# Patient Record
Sex: MALE | Race: WHITE | Employment: OTHER | URBAN - METROPOLITAN AREA
[De-identification: names, ages, dates, MRNs, and addresses within clinical notes are randomized per-mention and may not be internally consistent; named-entity substitution may affect disease eponyms.]

---

## 2017-08-16 ENCOUNTER — HOSPITAL ENCOUNTER (OUTPATIENT)
Dept: CT IMAGING | Age: 57
Discharge: HOME | End: 2017-08-16
Payer: MEDICARE

## 2017-08-16 DIAGNOSIS — K43.2 INCISIONAL HERNIA WITHOUT OBSTRUCTION OR GANGRENE: ICD-10-CM

## 2017-08-16 PROCEDURE — 74177 CT ABD & PELVIS W/CONTRAST: CPT

## 2017-08-16 PROCEDURE — 6360000004 HC RX CONTRAST MEDICATION: Performed by: SURGERY

## 2017-08-16 PROCEDURE — 2580000003 HC RX 258: Performed by: SURGERY

## 2017-08-16 RX ORDER — SODIUM CHLORIDE 0.9 % (FLUSH) 0.9 %
10 SYRINGE (ML) INJECTION PRN
Status: DISCONTINUED | OUTPATIENT
Start: 2017-08-16 | End: 2017-08-19 | Stop reason: HOSPADM

## 2017-08-16 RX ORDER — 0.9 % SODIUM CHLORIDE 0.9 %
100 INTRAVENOUS SOLUTION INTRAVENOUS ONCE
Status: COMPLETED | OUTPATIENT
Start: 2017-08-16 | End: 2017-08-16

## 2017-08-16 RX ADMIN — SODIUM CHLORIDE 100 ML: 9 INJECTION, SOLUTION INTRAVENOUS at 10:54

## 2017-08-16 RX ADMIN — IOVERSOL 130 ML: 741 INJECTION INTRA-ARTERIAL; INTRAVENOUS at 10:54

## 2017-08-16 RX ADMIN — Medication 10 ML: at 10:54

## 2017-08-16 RX ADMIN — IOHEXOL 50 ML: 240 INJECTION, SOLUTION INTRATHECAL; INTRAVASCULAR; INTRAVENOUS; ORAL at 10:54

## 2017-09-19 ENCOUNTER — OFFICE VISIT (OUTPATIENT)
Dept: UROLOGY | Age: 57
End: 2017-09-19
Payer: MEDICARE

## 2017-09-19 VITALS
SYSTOLIC BLOOD PRESSURE: 137 MMHG | WEIGHT: 194.8 LBS | BODY MASS INDEX: 27.89 KG/M2 | TEMPERATURE: 97.9 F | HEIGHT: 70 IN | HEART RATE: 85 BPM | DIASTOLIC BLOOD PRESSURE: 82 MMHG

## 2017-09-19 DIAGNOSIS — R33.9 URINARY RETENTION: ICD-10-CM

## 2017-09-19 DIAGNOSIS — R31.9 HEMATURIA: Primary | ICD-10-CM

## 2017-09-19 LAB
BILIRUBIN, POC: NORMAL
BLOOD URINE, POC: NORMAL
CLARITY, POC: CLEAR
COLOR, POC: YELLOW
GLUCOSE URINE, POC: NORMAL
KETONES, POC: NORMAL
LEUKOCYTE EST, POC: NORMAL
NITRITE, POC: NORMAL
PH, POC: NORMAL
PROTEIN, POC: NORMAL
SPECIFIC GRAVITY, POC: NORMAL
UROBILINOGEN, POC: NORMAL

## 2017-09-19 PROCEDURE — G8427 DOCREV CUR MEDS BY ELIG CLIN: HCPCS | Performed by: UROLOGY

## 2017-09-19 PROCEDURE — 3017F COLORECTAL CA SCREEN DOC REV: CPT | Performed by: UROLOGY

## 2017-09-19 PROCEDURE — G8417 CALC BMI ABV UP PARAM F/U: HCPCS | Performed by: UROLOGY

## 2017-09-19 PROCEDURE — 99204 OFFICE O/P NEW MOD 45 MIN: CPT | Performed by: UROLOGY

## 2017-09-19 PROCEDURE — 51798 US URINE CAPACITY MEASURE: CPT | Performed by: UROLOGY

## 2017-09-19 PROCEDURE — 4004F PT TOBACCO SCREEN RCVD TLK: CPT | Performed by: UROLOGY

## 2017-09-19 PROCEDURE — 81003 URINALYSIS AUTO W/O SCOPE: CPT | Performed by: UROLOGY

## 2017-09-19 ASSESSMENT — ENCOUNTER SYMPTOMS
SHORTNESS OF BREATH: 0
NAUSEA: 0
WHEEZING: 0
COUGH: 0
EYE REDNESS: 0
COLOR CHANGE: 0
VOMITING: 0
BACK PAIN: 1
EYE PAIN: 0
ABDOMINAL PAIN: 0

## 2017-10-06 ENCOUNTER — PROCEDURE VISIT (OUTPATIENT)
Dept: UROLOGY | Age: 57
End: 2017-10-06
Payer: MEDICARE

## 2017-10-06 DIAGNOSIS — R31.9 HEMATURIA: Primary | ICD-10-CM

## 2017-10-06 PROCEDURE — 4004F PT TOBACCO SCREEN RCVD TLK: CPT | Performed by: UROLOGY

## 2017-10-06 PROCEDURE — 52000 CYSTOURETHROSCOPY: CPT | Performed by: UROLOGY

## 2017-10-06 RX ORDER — LAMOTRIGINE 200 MG/1
200 TABLET ORAL DAILY
COMMUNITY
Start: 2017-09-25

## 2017-10-06 RX ORDER — TAMSULOSIN HYDROCHLORIDE 0.4 MG/1
0.4 CAPSULE ORAL DAILY
Qty: 90 CAPSULE | Refills: 3 | Status: SHIPPED | OUTPATIENT
Start: 2017-10-06 | End: 2018-10-24 | Stop reason: SDUPTHER

## 2017-10-06 NOTE — PROGRESS NOTES
Cystoscopy Operative Note (10/6/17)  Surgeon: Bakari Morgan MD  Anesthesia: Urethral 2% Xylocaine   Indications: hematuria  Position: Dorsal Lithotomy    Findings:   The patient was prepped and draped in the usual sterile fashion. The flexible cystoscope was advanced through the urethra and into the bladder. The bladder was thoroughly inspected and the following was noted:    Residual Urine: none  Urethra: normal appearing urethra with no masses, tenderness or lesions  Prostate: partially obstructing lateral lobes of prostate; median lobe present? yes. Bladder: No tumors or CIS noted. No bladder diverticulum. There was mild trabeculation noted. Ureters: Clear efflux from both ureters. Orifices with normal configuration and location. The cystoscope was removed. The patient tolerated the procedure well.

## 2017-10-06 NOTE — MR AVS SNAPSHOT
After Visit Summary             Isatu Pena   10/6/2017 9:30 AM   Procedure visit    Description:  Male : 1960   Provider:  Ja Rivera MD   Department:  [facility] - [address] [place] [address] 114 and Future Appointments         Below is a list of your follow-up and future appointments. This may not be a complete list as you may have made appointments directly with providers that we are not aware of or your providers may have made some for you. Please call your providers to confirm appointments. It is important to keep your appointments. Please bring your current insurance card, photo ID, co-pay, and all medication bottles to your appointment. If self-pay, payment is expected at the time of service. Your To-Do List     Future Appointments Provider Department Dept Phone    2017 1:00 PM Ten Khanna NP [address] [phone]    Please arrive 15 minutes prior to appointment, bring photo ID and insurance card. 2018 11:50 AM Ja Rivera MD [facility] - [place] [phone]    Please arrive 15 minutes prior to appointment, bring photo ID and insurance card. Future Orders Complete By Expires    PSA, Diagnostic [[id number] Custom]  2018 10/6/2018    Follow-Up    Return in about 6 months (around 2018) for Follow up. Information from Your Visit        Department     Name Address Phone Fax    [facility]   [address]  [address] [place] [address] [phone]      You Were Seen for:         Comments    Hematuria   [[id number]]         Vital Signs     Smoking Status                   Current Every Day Smoker           Additional Information about your Body Mass Index (BMI)           Your BMI as listed above is considered overweight (25.0-29.9). BMI is an estimate of body fat, calculated from your height and weight.   The higher cloNIDine (CATAPRES) 0.1 MG tablet Take 1 tablet by mouth daily    cyclobenzaprine (FLEXERIL) 10 MG tablet Take 1 tablet by mouth daily    acetaminophen (TYLENOL) 325 MG tablet Take 2 tablets by mouth every 4 hours as needed for Pain    meloxicam (MOBIC) 7.5 MG tablet Take 1 tablet by mouth 2 times daily    DULoxetine (CYMBALTA) 60 MG extended release capsule Take 60 mg by mouth daily    hydrOXYzine (VISTARIL) 25 MG capsule Take 25 mg by mouth 3 times daily as needed for Itching (1-2 tabs)    QUEtiapine (SEROQUEL) 50 MG tablet Take 1 tablet by mouth nightly    risperiDONE (RISPERDAL) 3 MG tablet Take 1 tablet by mouth nightly      Allergies           No Known Allergies      We Ordered/Performed the following           [place] CYSTOURETHROSCOPY          Additional Information        Basic Information     Date Of Birth Sex Race Ethnicity Preferred Language    1960 Male White Non-/Non  English      Problem List as of 10/6/2017  Date Reviewed: 9/19/2017                Colon polyp    History of colon surgery    Mild obesity    Abdominal pain, generalized    Colon polyposis    Erectile dysfunction    Encounter for genetic counseling    Macular puckering of retina, left eye    Tubular adenoma    Hyperplastic polyp of intestine    Retinal detachment with multiple breaks, left eye    Medication monitoring encounter    Chronic back pain    724.8 (Chronic)    Sacroiliitis, not elsewhere classified (HCC) (Chronic)    Chronic, continuous use of opioids    Lumbar radiculopathy    DDD (degenerative disc disease), lumbar    Hypertension    Anxiety    Nicotine dependence    Osteoarthritis    Obstructive sleep apnea on CPAP    Depression    Bipolar 1 disorder ([facility] [address]      Immunizations as of 10/6/2017     Name Date    Influenza, Quadv, 3 yrs and older, IM, Preservative Free 9/15/2017, 11/15/2016    Pneumococcal 13-valent Conjugate ([id number]) 11/15/2016    Pneumococcal Polysaccharide (Thitdnrva14) 9/15/2017 Preventive Care        Date Due    Tetanus Combination Vaccine (1 - Tdap) 2/10/1980    Diabetes Screening 5/23/2015    Colonoscopy 9/22/2017    Cholesterol Screening 9/19/2022            MyChart Signup           Our records indicate that you have an active [employer] account. You can view your After Visit Summary by going to [email or web address] and logging in with your [employer] username and password. If you don't have a [employer] username and password but a parent or guardian has access to your record, the parent or guardian should login with their own [employer] username and password and access your record to view the After Visit Summary. Additional Information  If you have questions, please contact the physician practice where you receive care. Remember, [employer] is NOT to be used for urgent needs. For medical emergencies, dial 911. For questions regarding your [employer] account call [phone]. If you have a clinical question, please call your doctor's office.

## 2017-10-19 DIAGNOSIS — R31.9 HEMATURIA: ICD-10-CM

## 2017-10-27 ENCOUNTER — TELEPHONE (OUTPATIENT)
Dept: UROLOGY | Age: 57
End: 2017-10-27

## 2017-10-27 DIAGNOSIS — Z12.5 SCREENING FOR PROSTATE CANCER: Primary | ICD-10-CM

## 2017-12-14 PROBLEM — K21.9 GASTROESOPHAGEAL REFLUX DISEASE WITHOUT ESOPHAGITIS: Status: ACTIVE | Noted: 2017-12-14

## 2018-04-05 ENCOUNTER — TELEPHONE (OUTPATIENT)
Dept: UROLOGY | Age: 58
End: 2018-04-05

## 2018-07-11 ENCOUNTER — ANESTHESIA EVENT (OUTPATIENT)
Dept: OPERATING ROOM | Age: 58
End: 2018-07-11
Payer: MEDICARE

## 2018-07-12 ENCOUNTER — HOSPITAL ENCOUNTER (OUTPATIENT)
Age: 58
Setting detail: OUTPATIENT SURGERY
Discharge: HOME | End: 2018-07-12
Attending: OPHTHALMOLOGY | Admitting: OPHTHALMOLOGY
Payer: MEDICARE

## 2018-07-12 ENCOUNTER — ANESTHESIA (OUTPATIENT)
Dept: OPERATING ROOM | Age: 58
End: 2018-07-12
Payer: MEDICARE

## 2018-07-12 VITALS
HEART RATE: 72 BPM | TEMPERATURE: 97 F | DIASTOLIC BLOOD PRESSURE: 98 MMHG | SYSTOLIC BLOOD PRESSURE: 148 MMHG | BODY MASS INDEX: 27.3 KG/M2 | OXYGEN SATURATION: 94 % | WEIGHT: 195 LBS | RESPIRATION RATE: 18 BRPM | HEIGHT: 71 IN

## 2018-07-12 VITALS — OXYGEN SATURATION: 98 % | DIASTOLIC BLOOD PRESSURE: 116 MMHG | SYSTOLIC BLOOD PRESSURE: 164 MMHG

## 2018-07-12 PROBLEM — H33.021 RETINAL DETACHMENT WITH MULTIPLE BREAKS, RIGHT: Status: ACTIVE | Noted: 2018-07-12

## 2018-07-12 PROCEDURE — 2500000003 HC RX 250 WO HCPCS: Performed by: OPHTHALMOLOGY

## 2018-07-12 PROCEDURE — 3600000014 HC SURGERY LEVEL 4 ADDTL 15MIN: Performed by: OPHTHALMOLOGY

## 2018-07-12 PROCEDURE — 7100000040 HC SPAR PHASE II RECOVERY - FIRST 15 MIN: Performed by: OPHTHALMOLOGY

## 2018-07-12 PROCEDURE — 3700000000 HC ANESTHESIA ATTENDED CARE: Performed by: OPHTHALMOLOGY

## 2018-07-12 PROCEDURE — 2580000003 HC RX 258: Performed by: NURSE ANESTHETIST, CERTIFIED REGISTERED

## 2018-07-12 PROCEDURE — 3700000001 HC ADD 15 MINUTES (ANESTHESIA): Performed by: OPHTHALMOLOGY

## 2018-07-12 PROCEDURE — 3600000004 HC SURGERY LEVEL 4 BASE: Performed by: OPHTHALMOLOGY

## 2018-07-12 PROCEDURE — 6360000002 HC RX W HCPCS: Performed by: NURSE ANESTHETIST, CERTIFIED REGISTERED

## 2018-07-12 PROCEDURE — 2580000003 HC RX 258: Performed by: ANESTHESIOLOGY

## 2018-07-12 PROCEDURE — 7100000041 HC SPAR PHASE II RECOVERY - ADDTL 15 MIN: Performed by: OPHTHALMOLOGY

## 2018-07-12 PROCEDURE — 6370000000 HC RX 637 (ALT 250 FOR IP): Performed by: OPHTHALMOLOGY

## 2018-07-12 PROCEDURE — 2500000003 HC RX 250 WO HCPCS: Performed by: NURSE ANESTHETIST, CERTIFIED REGISTERED

## 2018-07-12 PROCEDURE — 6360000002 HC RX W HCPCS: Performed by: OPHTHALMOLOGY

## 2018-07-12 RX ORDER — PROPOFOL 10 MG/ML
INJECTION, EMULSION INTRAVENOUS PRN
Status: DISCONTINUED | OUTPATIENT
Start: 2018-07-12 | End: 2018-07-12 | Stop reason: SDUPTHER

## 2018-07-12 RX ORDER — TOBRAMYCIN AND DEXAMETHASONE 3; 1 MG/ML; MG/ML
1 SUSPENSION/ DROPS OPHTHALMIC ONCE
Status: COMPLETED | OUTPATIENT
Start: 2018-07-12 | End: 2018-07-12

## 2018-07-12 RX ORDER — LIDOCAINE HYDROCHLORIDE 20 MG/ML
INJECTION, SOLUTION INFILTRATION; PERINEURAL PRN
Status: DISCONTINUED | OUTPATIENT
Start: 2018-07-12 | End: 2018-07-12 | Stop reason: HOSPADM

## 2018-07-12 RX ORDER — MIDAZOLAM HYDROCHLORIDE 1 MG/ML
INJECTION INTRAMUSCULAR; INTRAVENOUS PRN
Status: DISCONTINUED | OUTPATIENT
Start: 2018-07-12 | End: 2018-07-12 | Stop reason: SDUPTHER

## 2018-07-12 RX ORDER — ERYTHROMYCIN 5 MG/G
OINTMENT OPHTHALMIC PRN
Status: DISCONTINUED | OUTPATIENT
Start: 2018-07-12 | End: 2018-07-12 | Stop reason: HOSPADM

## 2018-07-12 RX ORDER — ATROPINE SULFATE 10 MG/ML
1 SOLUTION/ DROPS OPHTHALMIC ONCE
Status: COMPLETED | OUTPATIENT
Start: 2018-07-12 | End: 2018-07-12

## 2018-07-12 RX ORDER — LIDOCAINE HYDROCHLORIDE 10 MG/ML
1 INJECTION, SOLUTION EPIDURAL; INFILTRATION; INTRACAUDAL; PERINEURAL
Status: DISCONTINUED | OUTPATIENT
Start: 2018-07-12 | End: 2018-07-12 | Stop reason: HOSPADM

## 2018-07-12 RX ORDER — BUPIVACAINE HYDROCHLORIDE 7.5 MG/ML
INJECTION, SOLUTION EPIDURAL; RETROBULBAR PRN
Status: DISCONTINUED | OUTPATIENT
Start: 2018-07-12 | End: 2018-07-12 | Stop reason: HOSPADM

## 2018-07-12 RX ORDER — CEFTAZIDIME 1 G/1
INJECTION, POWDER, FOR SOLUTION INTRAMUSCULAR; INTRAVENOUS PRN
Status: DISCONTINUED | OUTPATIENT
Start: 2018-07-12 | End: 2018-07-12 | Stop reason: HOSPADM

## 2018-07-12 RX ORDER — BALANCED SALT SOLUTION ENRICHED WITH BICARBONATE, DEXTROSE, AND GLUTATHIONE
KIT INTRAOCULAR PRN
Status: DISCONTINUED | OUTPATIENT
Start: 2018-07-12 | End: 2018-07-12 | Stop reason: HOSPADM

## 2018-07-12 RX ORDER — LABETALOL HYDROCHLORIDE 5 MG/ML
INJECTION, SOLUTION INTRAVENOUS PRN
Status: DISCONTINUED | OUTPATIENT
Start: 2018-07-12 | End: 2018-07-12 | Stop reason: SDUPTHER

## 2018-07-12 RX ORDER — CYCLOPENTOLATE HYDROCHLORIDE 10 MG/ML
1 SOLUTION/ DROPS OPHTHALMIC EVERY 5 MIN PRN
Status: COMPLETED | OUTPATIENT
Start: 2018-07-12 | End: 2018-07-12

## 2018-07-12 RX ORDER — PHENYLEPHRINE HYDROCHLORIDE 100 MG/ML
1 SOLUTION/ DROPS OPHTHALMIC EVERY 5 MIN PRN
Status: COMPLETED | OUTPATIENT
Start: 2018-07-12 | End: 2018-07-12

## 2018-07-12 RX ORDER — SODIUM CHLORIDE, SODIUM LACTATE, POTASSIUM CHLORIDE, CALCIUM CHLORIDE 600; 310; 30; 20 MG/100ML; MG/100ML; MG/100ML; MG/100ML
INJECTION, SOLUTION INTRAVENOUS CONTINUOUS
Status: DISCONTINUED | OUTPATIENT
Start: 2018-07-12 | End: 2018-07-12 | Stop reason: HOSPADM

## 2018-07-12 RX ORDER — SODIUM CHLORIDE, SODIUM LACTATE, POTASSIUM CHLORIDE, CALCIUM CHLORIDE 600; 310; 30; 20 MG/100ML; MG/100ML; MG/100ML; MG/100ML
INJECTION, SOLUTION INTRAVENOUS CONTINUOUS PRN
Status: DISCONTINUED | OUTPATIENT
Start: 2018-07-12 | End: 2018-07-12 | Stop reason: SDUPTHER

## 2018-07-12 RX ADMIN — SODIUM CHLORIDE, POTASSIUM CHLORIDE, SODIUM LACTATE AND CALCIUM CHLORIDE: 600; 310; 30; 20 INJECTION, SOLUTION INTRAVENOUS at 13:25

## 2018-07-12 RX ADMIN — PHENYLEPHRINE HYDROCHLORIDE 1 DROP: 100 SOLUTION/ DROPS OPHTHALMIC at 12:46

## 2018-07-12 RX ADMIN — MIDAZOLAM HYDROCHLORIDE 2 MG: 1 INJECTION, SOLUTION INTRAMUSCULAR; INTRAVENOUS at 13:25

## 2018-07-12 RX ADMIN — CYCLOPENTOLATE HYDROCHLORIDE 1 DROP: 10 SOLUTION/ DROPS OPHTHALMIC at 12:46

## 2018-07-12 RX ADMIN — CYCLOPENTOLATE HYDROCHLORIDE 1 DROP: 10 SOLUTION/ DROPS OPHTHALMIC at 12:28

## 2018-07-12 RX ADMIN — PHENYLEPHRINE HYDROCHLORIDE 1 DROP: 100 SOLUTION/ DROPS OPHTHALMIC at 12:28

## 2018-07-12 RX ADMIN — PHENYLEPHRINE HYDROCHLORIDE 1 DROP: 100 SOLUTION/ DROPS OPHTHALMIC at 12:54

## 2018-07-12 RX ADMIN — LABETALOL HYDROCHLORIDE 10 MG: 5 INJECTION, SOLUTION INTRAVENOUS at 13:44

## 2018-07-12 RX ADMIN — SODIUM CHLORIDE, POTASSIUM CHLORIDE, SODIUM LACTATE AND CALCIUM CHLORIDE: 600; 310; 30; 20 INJECTION, SOLUTION INTRAVENOUS at 07:50

## 2018-07-12 RX ADMIN — ATROPINE SULFATE 1 DROP: 10 SOLUTION/ DROPS OPHTHALMIC at 12:54

## 2018-07-12 RX ADMIN — PROPOFOL 50 MG: 10 INJECTION, EMULSION INTRAVENOUS at 13:36

## 2018-07-12 RX ADMIN — CYCLOPENTOLATE HYDROCHLORIDE 1 DROP: 10 SOLUTION/ DROPS OPHTHALMIC at 12:54

## 2018-07-12 RX ADMIN — TOBRAMYCIN AND DEXAMETHASONE 1 DROP: 3; 1 SUSPENSION/ DROPS OPHTHALMIC at 12:54

## 2018-07-12 RX ADMIN — PROPOFOL 50 MG: 10 INJECTION, EMULSION INTRAVENOUS at 13:37

## 2018-07-12 RX ADMIN — SODIUM CHLORIDE, POTASSIUM CHLORIDE, SODIUM LACTATE AND CALCIUM CHLORIDE: 600; 310; 30; 20 INJECTION, SOLUTION INTRAVENOUS at 12:55

## 2018-07-12 RX ADMIN — LABETALOL HYDROCHLORIDE 10 MG: 5 INJECTION, SOLUTION INTRAVENOUS at 13:54

## 2018-07-12 ASSESSMENT — PAIN SCALES - GENERAL
PAINLEVEL_OUTOF10: 3

## 2018-07-12 ASSESSMENT — PULMONARY FUNCTION TESTS
PIF_VALUE: 0
PIF_VALUE: 1
PIF_VALUE: 0
PIF_VALUE: 1
PIF_VALUE: 0

## 2018-07-12 ASSESSMENT — PAIN DESCRIPTION - LOCATION
LOCATION: EYE
LOCATION: EYE

## 2018-07-12 ASSESSMENT — ENCOUNTER SYMPTOMS: SHORTNESS OF BREATH: 0

## 2018-07-12 ASSESSMENT — LIFESTYLE VARIABLES: SMOKING_STATUS: 1

## 2018-07-12 ASSESSMENT — PAIN DESCRIPTION - ORIENTATION
ORIENTATION: RIGHT
ORIENTATION: RIGHT

## 2018-07-12 ASSESSMENT — PAIN - FUNCTIONAL ASSESSMENT: PAIN_FUNCTIONAL_ASSESSMENT: 0-10

## 2018-07-12 ASSESSMENT — PAIN DESCRIPTION - PAIN TYPE: TYPE: ACUTE PAIN

## 2018-07-12 NOTE — PROGRESS NOTES
Dr. Dez Cerrato notified of pt. Drinking coffee with cream and sugar this a.m. Surgery postponed until 1 p.m. Dr. Summer Friedman informed. Surgery time rescheduled until 1 p.m.

## 2018-07-12 NOTE — H&P
Failure in his mother; Hypertension in an other family member; Mental Retardation in his sister; Seizures in his sister. Family Status   Relation Status    Mother    Mamta Nolan Sister  at age 50    Father         [address] Brother Alive    MGM     MGF     [facility]     PGF     Other (Not Specified)         Social History  Social History     Social History    Marital status: Single     Spouse name: N/A    Number of children: N/A    Years of education: N/A     Occupational History    disabled      Social History Main Topics    Smoking status: Current Every Day Smoker     Packs/day: 1.00     Years: 30.00     Types: Cigarettes    Smokeless tobacco: Never Used    Alcohol use Yes      Comment: 1 TO 6 BEERS Daily    Drug use: No    Sexual activity: Not on file     Other Topics Concern    Not on file     Social History Narrative    No narrative on file        Service:No         Hobbies:  Hot angy cars , the 3  dogs     OBJECTIVE:   VITALS:  height is 5' 10.5\" (1.791 m) and weight is 195 lb (88.5 kg). CONSTITUTIONAL: Alert and oriented times 3, no acute distress and cooperative to examination. friendly and pleasant , muscular build     SKIN: rash No    HEENT: Head is normocephalic, atraumatic. EOMI,     Oral air way :slightly narrow Yes    NECK: neck supple, no lymphadenopathy noted, trachea midline and straight       2+ carotid, no bruit    LUNGS: Chest expands equally bilaterally upon respiration, no accessory muscle used. Ausculation reveals no adventitious breath sounds. CARDIOVASCULAR: \"Heart sounds are normal.  Regular rate and rhythm without murmur,    ABDOMEN: Bowel sounds are present in all four quadrants      GENATALIA:Deferred. NEUROLOGIC: \"CN II-XII are grossly intact.        EXTREMITIES: Pitting edema:  No,  Varicose veins: No     Dorsal pedal/posterior tibial pulses palpable: Yes         Strength:  Normal Patient Active Problem List   Diagnosis    Depression    Bipolar 1 disorder ([address]    Hypertension    Anxiety    Nicotine dependence    Osteoarthritis    Obstructive sleep apnea on CPAP    Lumbar radiculopathy    DDD (degenerative disc disease), lumbar    Chronic, continuous use of opioids    724.8    Sacroiliitis, not elsewhere classified ([facility] [address]    Chronic back pain    Medication monitoring encounter    Lumbar facet arthropathy    Lumbar spondylosis    Cervical spondylosis    Osteoarthritis of cervical spine    Facet arthropathy, cervical    Retinal detachment with multiple breaks, left eye    Degenerative disc disease, cervical    Tubular adenoma    Hyperplastic polyp of intestine    Macular puckering of retina, left eye    Encounter for genetic counseling    Colon polyposis    Erectile dysfunction    Colon polyp    History of colon surgery    Mild obesity    Abdominal pain, generalized    Gastroesophageal reflux disease without esophagitis               IMPRESSIONS:   1. Right eye retinal detachment   2.  does not have any pertinent problems on file.     [facility]hyllistine [facility]  Electronically signed 7/12/2018 at 8:04 AM       Scheduled for:   Right eye surgery

## 2018-07-12 NOTE — ANESTHESIA PRE PROCEDURE
omeprazole (PRILOSEC) 40 MG delayed release capsule Take 1 capsule by mouth daily 3/23/18   Mira Jenkins MD   sildenafil (VIAGRA) 100 MG tablet Take 1 tablet by mouth daily as needed for Erectile Dysfunction 11/30/17   Mira Jenkins MD   hydrOXYzine (VISTARIL) 25 MG capsule Take 25 mg by mouth 3 times daily as needed for Itching (1-2 tabs)    Historical Provider, MD       Current medications:    Current Facility-Administered Medications   Medication Dose Route Frequency Provider Last Rate Last Dose    atropine 1 % ophthalmic solution 1 drop  1 drop Right Eye Once Breanna Turner MD        tobramycin-dexamethasone [facility]\"") ophthalmic suspension 1 drop  1 drop Right Eye Once Breanna Turner MD        cyclopentolate (CYCLOGYL) 1 % ophthalmic solution 1 drop  1 drop Right Eye Q5 Min PRN Breanna Turner MD        phenylephrine (SONY-SYNEPHRINE) 10 % ophthalmic solution 1 drop  1 drop Right Eye Q5 Min PRN Breanna Turner MD        lactated ringers infusion   Intravenous Continuous Tawana Palma  mL/hr at 07/12/18 0750      lidocaine PF 1 % injection 1 mL  1 mL Intradermal Once PRN Tawana Palma MD           Allergies:  No Known Allergies    Problem List:    Patient Active Problem List   Diagnosis Code    Depression F32.9    Bipolar 1 disorder ([facility] [address] F31.9    Hypertension I10    Anxiety F41.9    Nicotine dependence F17.200    Osteoarthritis M19.90    Obstructive sleep apnea on CPAP G47.33, Z99.89    Lumbar radiculopathy M54.16    DDD (degenerative disc disease), lumbar M51.36    Chronic, continuous use of opioids F11.90    724.8 M47.817    Sacroiliitis, not elsewhere classified ([facility] [address] M46.1    Chronic back pain M54.9, G89.29    Medication monitoring encounter Z51.81    Lumbar facet arthropathy M12.88    Lumbar spondylosis M47.816    Cervical spondylosis M47.812    Osteoarthritis of cervical spine M47.812    Facet arthropathy, cervical M12.88    Retinal detachment with multiple breaks, left eye H33.022    Degenerative disc disease, cervical M50.30    Tubular adenoma D36.9    Hyperplastic polyp of intestine K63.5    Macular puckering of retina, left eye H35.372    Encounter for genetic counseling [id number]    Colon polyposis K63.5    Erectile dysfunction N52.9    Colon polyp K63.5    History of colon surgery Z98.890    Mild obesity E66.9    Abdominal pain, generalized R10.84    Gastroesophageal reflux disease without esophagitis K21.9       Past Medical History:        Diagnosis Date    Abnormal EKG 10/27/2016    indicates inferior infarct.  Anxiety     Bipolar disorder ([facility] [address] 2006    on rx    Chronic back pain 11/21/2014    Depression 2006    on rx    Fracture, clavicle 1996    LEFT    Hyperplastic polyp of intestine     Hypertension 2006    on rx    Legally blind in left eye, as defined in [place]      very blurry    Lumbar radiculopathy 4/23/2014    Nicotine dependence     Osteoarthritis     Pain     LEFT EYE    Retinal detachment     history miguel    Sleep apnea     doesnt use cpap     Tubular adenoma     Visual floaters     Wears glasses     USES MAGNIFYING GLASS       Past Surgical History:        Procedure Laterality Date    CATARACT REMOVAL Left     CATARACT REMOVAL WITH IMPLANT Right 8-25-15    CHOLECYSTECTOMY  2/13    COLONOSCOPY  09/22/2016    the ICV was edematous and erythematous but most likely normal variant , bxs taken Large polyp 3 cm, at 50 cm from the anal verge with a very  wide stalk, not removed tattooed needs to be removed with subtotal colectomy    CYST REMOVAL  1970    foot, neck    EYE SURGERY  12/31/15    laser right eye, left vit    EYE SURGERY Left     torn retina lazer/freezer/hole    EYE SURGERY Left 7/40/91    SILICONE REMOVAL AIR FLUID EXCHANGE    EYE SURGERY      cataracts removed miguel.  EYE SURGERY      total 6 eye surg to left, 2 eye surg. to rt.     HERNIA REPAIR      umbilical    LYMPH NODE BIOPSY Left 1971 BASE OF SKULL    NASAL SEPTUM SURGERY  11-24-15    RETINAL LASER      SUBTOTAL COLECTOMY  12/09/2016    w/ ileorectal anastomosis    TONSILLECTOMY  4567    UMBILICAL HERNIA REPAIR  1996    VITRECTOMY Left 12/10/2015    VITRECTOMY Left 10/27/2016    membrane peeling       Social History:    Social History   Substance Use Topics    Smoking status: Current Every Day Smoker     Packs/day: 1.00     Years: 30.00     Types: Cigarettes    Smokeless tobacco: Never Used    Alcohol use Yes      Comment: 1 TO 6 BEERS Daily                                Ready to quit: Not Answered  Counseling given: Not Answered      Vital Signs (Current):   Vitals:    07/12/18 0713 07/12/18 0752   BP:  (!) 123/94   Pulse:  70   Resp:  16   Temp:  97.3 °F (36.3 °C)   TempSrc:  Temporal   SpO2:  96%   Weight: 195 lb (88.5 kg)    Height: 5' 10.5\" (1.791 m)                                               BP Readings from Last 3 Encounters:   07/12/18 (!) 123/94   06/21/18 138/88   03/15/18 128/78       NPO Status: Time of last liquid consumption: 0500 (coffee with cream and sugar)                        Time of last solid consumption: 1700                        Date of last liquid consumption: 07/12/18                        Date of last solid food consumption: 07/11/18    BMI:   Wt Readings from Last 3 Encounters:   07/12/18 195 lb (88.5 kg)   06/21/18 199 lb 6.4 oz (90.4 kg)   03/15/18 204 lb 9.6 oz (92.8 kg)     Body mass index is 27.58 kg/m².     CBC:   Lab Results   Component Value Date    WBC 9.8 12/15/2016    RBC 4.02 12/15/2016    RBC 5.02 05/22/2012    HGB 12.2 12/15/2016    HCT 36.7 12/15/2016    MCV 91.3 12/15/2016    RDW 13.9 12/15/2016     12/15/2016     05/22/2012       CMP:   Lab Results   Component Value Date     12/15/2016    K 4.1 12/15/2016     12/15/2016    CO2 26 12/15/2016    BUN 14 12/15/2016    CREATININE 0.66 12/15/2016    GFRAA >60 12/15/2016    LABGLOM >60 12/15/2016    GLUCOSE 121 12/15/2016    GLUCOSE 154 05/22/2012    PROT 6.8 06/27/2013    CALCIUM 8.8 12/15/2016    BILITOT 0.24 06/27/2013    ALKPHOS 129 06/27/2013    AST 16 06/27/2013    ALT 22 06/27/2013       POC Tests: No results for input(s): POCGLU, POCNA, POCK, POCCL, POCBUN, POCHEMO, POCHCT in the last 72 hours. Coags:   Lab Results   Component Value Date    PROTIME 13.4 05/23/2012    INR 1.3 05/23/2012    APTT 37.8 05/23/2012       HCG (If Applicable): No results found for: PREGTESTUR, PREGSERUM, HCG, HCGQUANT     ABGs:   Lab Results   Component Value Date    PO2ART 56.9 05/22/2012    VSL3FMA 22.8 05/22/2012    Z4CEZVMQ 87.1 05/22/2012        Type & Screen (If Applicable):  No results found for: LABABO, [address]    Anesthesia Evaluation  Patient summary reviewed and Nursing notes reviewed no history of anesthetic complications:   Airway: Mallampati: II        Dental: normal exam         Pulmonary: breath sounds clear to auscultation  (+) sleep apnea: on noncompliant,  current smoker    (-) pneumonia, COPD, asthma, shortness of breath and recent URI                           Cardiovascular:  Exercise tolerance: good (>4 METS),   (+) hypertension:, past MI:,     (-) pacemaker, valvular problems/murmurs, CAD, CABG/stent, dysrhythmias,  angina,  CHF, orthopnea, PND and  WHITMAN      Rhythm: regular  Rate: normal           Beta Blocker:  Dose within 24 Hrs         Neuro/Psych:   (+) neuromuscular disease:, psychiatric history:depression/anxiety    (-) seizures, TIA, CVA and headaches           GI/Hepatic/Renal:   (+) GERD:,      (-) hiatal hernia, PUD, hepatitis, liver disease, no renal disease and bowel prep       Endo/Other:    (+) : arthritis:., .    (-) hypothyroidism, hyperthyroidism, blood dyscrasia               Abdominal:         (-) obese     Vascular: negative vascular ROS. Anesthesia Plan      MAC     ASA 2       Induction: intravenous.       Anesthetic plan and risks discussed with

## 2018-07-15 NOTE — OP NOTE
no tears below  the equator. Scleral depression found the only tears were 3 small tears  between 11:30 and 1:00 o'clock. Scleral depression was used to trim  vitreous near the vitreous base. Vitreous was trimmed superiorly as far as  could be seen. A retinotomy was made with intraocular diathermy just above  the superotemporal arcade. An air-fluid exchange was then done, which  attached the retina nicely. Endolaser was placed around the retinotomy. Endolaser was placed from the equator to the ora 360 degrees, except for  3:00 o'clock and 9:00 o'clock. This included endolaser around the retinal  tears. Residual fluid was aspirated from over the optic nerve head. The  air was exchanged for 18% SF6. Trocars were removed, and the sites were  self-sealing. 50 mg of ceftazidime were injected sub-Tenon's in the  inferonasal quadrant. The eye was patched in the usual fashion. The  patient was taken to the recovery room in good condition. Silvia Otto    D: 07/14/2018 13:20:05       T: 07/14/2018 13:21:45     [email or web address]  Job#: [id number]     Doc#: [id number]    CC:

## 2018-08-29 ENCOUNTER — TELEPHONE (OUTPATIENT)
Dept: UROLOGY | Age: 58
End: 2018-08-29

## 2018-09-17 ENCOUNTER — TELEPHONE (OUTPATIENT)
Dept: UROLOGY | Age: 58
End: 2018-09-17

## 2018-09-17 NOTE — TELEPHONE ENCOUNTER
Message left for pt to complete PSA by today for appt tomorrow. If not completed, pt will need to reschedule.

## 2018-09-18 ENCOUNTER — HOSPITAL ENCOUNTER (OUTPATIENT)
Age: 58
Discharge: HOME | End: 2018-09-18
Payer: MEDICARE

## 2018-09-18 ENCOUNTER — OFFICE VISIT (OUTPATIENT)
Dept: UROLOGY | Age: 58
End: 2018-09-18
Payer: MEDICARE

## 2018-09-18 VITALS — DIASTOLIC BLOOD PRESSURE: 84 MMHG | TEMPERATURE: 97.9 F | SYSTOLIC BLOOD PRESSURE: 131 MMHG | HEART RATE: 86 BPM

## 2018-09-18 DIAGNOSIS — N52.9 ERECTILE DYSFUNCTION, UNSPECIFIED ERECTILE DYSFUNCTION TYPE: ICD-10-CM

## 2018-09-18 DIAGNOSIS — R39.12 BENIGN PROSTATIC HYPERPLASIA WITH WEAK URINARY STREAM: ICD-10-CM

## 2018-09-18 DIAGNOSIS — N40.1 BENIGN PROSTATIC HYPERPLASIA WITH WEAK URINARY STREAM: ICD-10-CM

## 2018-09-18 DIAGNOSIS — F17.200 CURRENT SMOKER: Primary | ICD-10-CM

## 2018-09-18 DIAGNOSIS — Z12.5 SCREENING FOR PROSTATE CANCER: ICD-10-CM

## 2018-09-18 DIAGNOSIS — R35.1 NOCTURIA: ICD-10-CM

## 2018-09-18 LAB — PROSTATE SPECIFIC ANTIGEN: 1.99 UG/L

## 2018-09-18 PROCEDURE — G8417 CALC BMI ABV UP PARAM F/U: HCPCS | Performed by: UROLOGY

## 2018-09-18 PROCEDURE — 36415 COLL VENOUS BLD VENIPUNCTURE: CPT

## 2018-09-18 PROCEDURE — 99214 OFFICE O/P EST MOD 30 MIN: CPT | Performed by: UROLOGY

## 2018-09-18 PROCEDURE — 4004F PT TOBACCO SCREEN RCVD TLK: CPT | Performed by: UROLOGY

## 2018-09-18 PROCEDURE — G0103 PSA SCREENING: HCPCS

## 2018-09-18 PROCEDURE — G8427 DOCREV CUR MEDS BY ELIG CLIN: HCPCS | Performed by: UROLOGY

## 2018-09-18 PROCEDURE — 3017F COLORECTAL CA SCREEN DOC REV: CPT | Performed by: UROLOGY

## 2018-09-18 ASSESSMENT — ENCOUNTER SYMPTOMS
SHORTNESS OF BREATH: 0
VOMITING: 0
BACK PAIN: 0
COLOR CHANGE: 0
WHEEZING: 0
EYE PAIN: 0
ABDOMINAL PAIN: 0
NAUSEA: 0
EYE REDNESS: 0
COUGH: 0

## 2018-09-18 NOTE — PROGRESS NOTES
Additional Lab/Culture results: none    Imaging Reviewed during this Office Visit: none  (results were independently reviewed by physician and radiology report verified)    PAST MEDICAL, FAMILY AND SOCIAL HISTORY UPDATE:  Past Medical History:   Diagnosis Date    Abnormal EKG 10/27/2016    indicates inferior infarct.  Anxiety     Bipolar disorder ([facility] [address] 2006    on rx    Chronic back pain 11/21/2014    Depression 2006    on rx    Fracture, clavicle 1996    LEFT    Hyperplastic polyp of intestine     Hypertension 2006    on rx    Legally blind in left eye, as defined in [place]      very blurry    Lumbar radiculopathy 4/23/2014    Nicotine dependence     Osteoarthritis     Pain     LEFT EYE    Retinal detachment     history miguel    Sleep apnea     doesnt use cpap     Tubular adenoma     Visual floaters     Wears glasses     USES MAGNIFYING GLASS     Past Surgical History:   Procedure Laterality Date    CATARACT REMOVAL Left     CATARACT REMOVAL WITH IMPLANT Right 8-25-15    CHOLECYSTECTOMY  2/13    COLONOSCOPY  09/22/2016    the ICV was edematous and erythematous but most likely normal variant , bxs taken Large polyp 3 cm, at 50 cm from the anal verge with a very  wide stalk, not removed tattooed needs to be removed with subtotal colectomy    CYST REMOVAL  1970    foot, neck    EYE SURGERY  12/31/15    laser right eye, left vit    EYE SURGERY Left     torn retina lazer/freezer/hole    EYE SURGERY Left 2/14/13    SILICONE REMOVAL AIR FLUID EXCHANGE    EYE SURGERY      cataracts removed miguel.  EYE SURGERY      total 6 eye surg to left, 2 eye surg. to rt.     HERNIA REPAIR      umbilical    LYMPH NODE BIOPSY Left 1971    BASE OF SKULL    NASAL SEPTUM SURGERY  11-24-15    LA OFFICE/OUTPT VISIT,PROCEDURE ONLY Right 7/12/2018    VITRECTOMY 25 GAUGE, AIR FLUID EXCHANGE, AIR GAS EXCHANGE WITH 18% SF6, ENDOLASER, 200 MSECONDS, 200 MWATTS, 377 PULSES performed by Edgard Jones MD at [place] [address]

## 2018-10-25 RX ORDER — TAMSULOSIN HYDROCHLORIDE 0.4 MG/1
0.4 CAPSULE ORAL DAILY
Qty: 90 CAPSULE | Refills: 3 | Status: SHIPPED | OUTPATIENT
Start: 2018-10-25 | End: 2019-01-16 | Stop reason: SDUPTHER

## 2019-01-16 ENCOUNTER — OFFICE VISIT (OUTPATIENT)
Dept: PRIMARY CARE CLINIC | Age: 59
End: 2019-01-16
Payer: MEDICARE

## 2019-01-16 VITALS
OXYGEN SATURATION: 92 % | SYSTOLIC BLOOD PRESSURE: 120 MMHG | DIASTOLIC BLOOD PRESSURE: 88 MMHG | WEIGHT: 225 LBS | HEIGHT: 71 IN | BODY MASS INDEX: 31.5 KG/M2 | HEART RATE: 92 BPM

## 2019-01-16 DIAGNOSIS — Z23 NEED FOR VIRAL IMMUNIZATION: ICD-10-CM

## 2019-01-16 DIAGNOSIS — G89.29 CHRONIC MIDLINE LOW BACK PAIN WITHOUT SCIATICA: ICD-10-CM

## 2019-01-16 DIAGNOSIS — Z13.220 ENCOUNTER FOR LIPID SCREENING FOR CARDIOVASCULAR DISEASE: ICD-10-CM

## 2019-01-16 DIAGNOSIS — Z72.0 TOBACCO ABUSE: ICD-10-CM

## 2019-01-16 DIAGNOSIS — Z13.6 ENCOUNTER FOR LIPID SCREENING FOR CARDIOVASCULAR DISEASE: ICD-10-CM

## 2019-01-16 DIAGNOSIS — Z87.891 PERSONAL HISTORY OF TOBACCO USE: ICD-10-CM

## 2019-01-16 DIAGNOSIS — M54.16 LUMBAR RADICULOPATHY: ICD-10-CM

## 2019-01-16 DIAGNOSIS — F31.9 BIPOLAR 1 DISORDER (HCC): ICD-10-CM

## 2019-01-16 DIAGNOSIS — M54.50 CHRONIC MIDLINE LOW BACK PAIN WITHOUT SCIATICA: ICD-10-CM

## 2019-01-16 DIAGNOSIS — Z12.5 SCREENING PSA (PROSTATE SPECIFIC ANTIGEN): ICD-10-CM

## 2019-01-16 DIAGNOSIS — M47.816 LUMBAR FACET ARTHROPATHY: ICD-10-CM

## 2019-01-16 DIAGNOSIS — I10 ESSENTIAL HYPERTENSION: Primary | ICD-10-CM

## 2019-01-16 DIAGNOSIS — F41.9 ANXIETY: ICD-10-CM

## 2019-01-16 DIAGNOSIS — M47.812 OSTEOARTHRITIS OF CERVICAL SPINE, UNSPECIFIED SPINAL OSTEOARTHRITIS COMPLICATION STATUS: ICD-10-CM

## 2019-01-16 PROCEDURE — 90750 HZV VACC RECOMBINANT IM: CPT | Performed by: FAMILY MEDICINE

## 2019-01-16 PROCEDURE — G8417 CALC BMI ABV UP PARAM F/U: HCPCS | Performed by: FAMILY MEDICINE

## 2019-01-16 PROCEDURE — 99214 OFFICE O/P EST MOD 30 MIN: CPT | Performed by: FAMILY MEDICINE

## 2019-01-16 PROCEDURE — 90471 IMMUNIZATION ADMIN: CPT | Performed by: FAMILY MEDICINE

## 2019-01-16 PROCEDURE — 4004F PT TOBACCO SCREEN RCVD TLK: CPT | Performed by: FAMILY MEDICINE

## 2019-01-16 PROCEDURE — 3017F COLORECTAL CA SCREEN DOC REV: CPT | Performed by: FAMILY MEDICINE

## 2019-01-16 PROCEDURE — G8427 DOCREV CUR MEDS BY ELIG CLIN: HCPCS | Performed by: FAMILY MEDICINE

## 2019-01-16 PROCEDURE — G0296 VISIT TO DETERM LDCT ELIG: HCPCS | Performed by: FAMILY MEDICINE

## 2019-01-16 PROCEDURE — G8482 FLU IMMUNIZE ORDER/ADMIN: HCPCS | Performed by: FAMILY MEDICINE

## 2019-01-16 RX ORDER — DIAZEPAM 5 MG/1
5 TABLET ORAL EVERY 12 HOURS PRN
Qty: 60 TABLET | Refills: 2 | Status: SHIPPED | OUTPATIENT
Start: 2019-01-16 | End: 2019-03-15

## 2019-01-16 RX ORDER — TAMSULOSIN HYDROCHLORIDE 0.4 MG/1
0.4 CAPSULE ORAL DAILY
Qty: 90 CAPSULE | Refills: 3 | Status: SHIPPED | OUTPATIENT
Start: 2019-01-16 | End: 2019-01-28 | Stop reason: SDUPTHER

## 2019-01-16 RX ORDER — OXYCODONE HYDROCHLORIDE AND ACETAMINOPHEN 5; 325 MG/1; MG/1
1 TABLET ORAL EVERY 8 HOURS PRN
Qty: 90 TABLET | Refills: 0 | Status: SHIPPED | OUTPATIENT
Start: 2019-01-16 | End: 2019-03-26 | Stop reason: SDUPTHER

## 2019-01-16 ASSESSMENT — ENCOUNTER SYMPTOMS
VOMITING: 0
ABDOMINAL PAIN: 0
BACK PAIN: 1
EYE DISCHARGE: 0
RHINORRHEA: 0
SORE THROAT: 0
DIARRHEA: 0
SHORTNESS OF BREATH: 0
EYE REDNESS: 0
COUGH: 0
WHEEZING: 0
NAUSEA: 0

## 2019-01-28 RX ORDER — TAMSULOSIN HYDROCHLORIDE 0.4 MG/1
0.4 CAPSULE ORAL DAILY
Qty: 90 CAPSULE | Refills: 3 | Status: SHIPPED | OUTPATIENT
Start: 2019-01-28 | End: 2020-04-08

## 2019-02-11 DIAGNOSIS — M47.816 OSTEOARTHRITIS OF LUMBAR SPINE, UNSPECIFIED SPINAL OSTEOARTHRITIS COMPLICATION STATUS: ICD-10-CM

## 2019-02-11 RX ORDER — CYCLOBENZAPRINE HCL 10 MG
10 TABLET ORAL DAILY
Qty: 30 TABLET | Refills: 4 | Status: SHIPPED | OUTPATIENT
Start: 2019-02-11 | End: 2019-06-21 | Stop reason: SDUPTHER

## 2019-02-11 RX ORDER — MELOXICAM 7.5 MG/1
7.5 TABLET ORAL 2 TIMES DAILY
Qty: 180 TABLET | Refills: 1 | Status: SHIPPED | OUTPATIENT
Start: 2019-02-11 | End: 2019-04-17

## 2019-02-19 ENCOUNTER — HOSPITAL ENCOUNTER (OUTPATIENT)
Age: 59
Discharge: HOME | End: 2019-02-19
Payer: MEDICARE

## 2019-02-19 DIAGNOSIS — M54.16 LUMBAR RADICULOPATHY: ICD-10-CM

## 2019-02-19 DIAGNOSIS — Z13.220 ENCOUNTER FOR LIPID SCREENING FOR CARDIOVASCULAR DISEASE: ICD-10-CM

## 2019-02-19 DIAGNOSIS — Z13.6 ENCOUNTER FOR LIPID SCREENING FOR CARDIOVASCULAR DISEASE: ICD-10-CM

## 2019-02-19 DIAGNOSIS — F31.9 BIPOLAR 1 DISORDER (HCC): ICD-10-CM

## 2019-02-19 LAB
ALBUMIN SERPL-MCNC: 4.6 G/DL (ref 3.5–5.2)
ALBUMIN/GLOBULIN RATIO: ABNORMAL (ref 1–2.5)
ALP BLD-CCNC: 122 U/L (ref 40–129)
ALT SERPL-CCNC: 39 U/L (ref 5–41)
ANION GAP SERPL CALCULATED.3IONS-SCNC: 12 MMOL/L (ref 9–17)
AST SERPL-CCNC: 24 U/L
BILIRUB SERPL-MCNC: 0.29 MG/DL (ref 0.3–1.2)
BILIRUBIN DIRECT: 0.09 MG/DL
BILIRUBIN, INDIRECT: 0.2 MG/DL (ref 0–1)
BUN BLDV-MCNC: 15 MG/DL (ref 6–20)
BUN/CREAT BLD: ABNORMAL (ref 9–20)
CALCIUM SERPL-MCNC: 9.5 MG/DL (ref 8.6–10.4)
CHLORIDE BLD-SCNC: 103 MMOL/L (ref 98–107)
CHOLESTEROL, FASTING: 188 MG/DL
CHOLESTEROL/HDL RATIO: 5.7
CO2: 29 MMOL/L (ref 20–31)
CREAT SERPL-MCNC: 0.86 MG/DL (ref 0.7–1.2)
GFR AFRICAN AMERICAN: >60 ML/MIN
GFR NON-AFRICAN AMERICAN: >60 ML/MIN
GFR SERPL CREATININE-BSD FRML MDRD: ABNORMAL ML/MIN/{1.73_M2}
GFR SERPL CREATININE-BSD FRML MDRD: ABNORMAL ML/MIN/{1.73_M2}
GLOBULIN: ABNORMAL G/DL (ref 1.5–3.8)
GLUCOSE FASTING: 122 MG/DL (ref 70–99)
HDLC SERPL-MCNC: 33 MG/DL
LDL CHOLESTEROL: 84 MG/DL (ref 0–130)
POTASSIUM SERPL-SCNC: 4.4 MMOL/L (ref 3.7–5.3)
SODIUM BLD-SCNC: 144 MMOL/L (ref 135–144)
TOTAL PROTEIN: 7.6 G/DL (ref 6.4–8.3)
TRIGLYCERIDE, FASTING: 353 MG/DL
VLDLC SERPL CALC-MCNC: ABNORMAL MG/DL (ref 1–30)

## 2019-02-19 PROCEDURE — 36415 COLL VENOUS BLD VENIPUNCTURE: CPT

## 2019-02-19 PROCEDURE — 80061 LIPID PANEL: CPT

## 2019-02-19 PROCEDURE — 80048 BASIC METABOLIC PNL TOTAL CA: CPT

## 2019-02-19 PROCEDURE — 80076 HEPATIC FUNCTION PANEL: CPT

## 2019-02-20 ENCOUNTER — TELEPHONE (OUTPATIENT)
Dept: ONCOLOGY | Age: 59
End: 2019-02-20

## 2019-02-20 DIAGNOSIS — Z87.891 PERSONAL HISTORY OF NICOTINE DEPENDENCE: Primary | ICD-10-CM

## 2019-02-26 ENCOUNTER — HOSPITAL ENCOUNTER (OUTPATIENT)
Dept: CT IMAGING | Age: 59
Discharge: HOME | End: 2019-02-28
Payer: MEDICARE

## 2019-02-26 DIAGNOSIS — Z87.891 PERSONAL HISTORY OF TOBACCO USE: ICD-10-CM

## 2019-02-26 DIAGNOSIS — I10 ESSENTIAL HYPERTENSION: ICD-10-CM

## 2019-02-26 PROCEDURE — G0297 LDCT FOR LUNG CA SCREEN: HCPCS

## 2019-02-27 RX ORDER — ATENOLOL 25 MG/1
TABLET ORAL
Qty: 30 TABLET | Refills: 2 | Status: SHIPPED | OUTPATIENT
Start: 2019-02-27 | End: 2019-05-22 | Stop reason: SDUPTHER

## 2019-03-11 DIAGNOSIS — R79.9 ABNORMAL BLOOD FINDING: ICD-10-CM

## 2019-03-11 DIAGNOSIS — R73.9 BLOOD GLUCOSE ELEVATED: Primary | ICD-10-CM

## 2019-03-11 DIAGNOSIS — I10 ESSENTIAL HYPERTENSION: ICD-10-CM

## 2019-03-12 ENCOUNTER — TELEPHONE (OUTPATIENT)
Dept: PRIMARY CARE CLINIC | Age: 59
End: 2019-03-12

## 2019-03-15 ENCOUNTER — TELEPHONE (OUTPATIENT)
Dept: PRIMARY CARE CLINIC | Age: 59
End: 2019-03-15

## 2019-03-15 DIAGNOSIS — F41.9 ANXIETY: ICD-10-CM

## 2019-03-15 RX ORDER — DIAZEPAM 5 MG/1
5 TABLET ORAL EVERY 12 HOURS PRN
Qty: 60 TABLET | Refills: 2 | Status: SHIPPED | OUTPATIENT
Start: 2019-03-15 | End: 2019-05-16 | Stop reason: SDUPTHER

## 2019-03-18 ENCOUNTER — HOSPITAL ENCOUNTER (OUTPATIENT)
Age: 59
Discharge: HOME | End: 2019-03-18
Payer: MEDICARE

## 2019-03-18 DIAGNOSIS — R73.9 BLOOD GLUCOSE ELEVATED: ICD-10-CM

## 2019-03-18 DIAGNOSIS — I10 ESSENTIAL HYPERTENSION: ICD-10-CM

## 2019-03-18 DIAGNOSIS — R79.9 ABNORMAL BLOOD FINDING: ICD-10-CM

## 2019-03-18 LAB
ESTIMATED AVERAGE GLUCOSE: 140 MG/DL
HBA1C MFR BLD: 6.5 % (ref 4–6)

## 2019-03-18 PROCEDURE — 36415 COLL VENOUS BLD VENIPUNCTURE: CPT

## 2019-03-18 PROCEDURE — 83036 HEMOGLOBIN GLYCOSYLATED A1C: CPT

## 2019-03-26 DIAGNOSIS — M54.50 CHRONIC MIDLINE LOW BACK PAIN WITHOUT SCIATICA: ICD-10-CM

## 2019-03-26 DIAGNOSIS — G89.29 CHRONIC MIDLINE LOW BACK PAIN WITHOUT SCIATICA: ICD-10-CM

## 2019-03-26 DIAGNOSIS — M47.812 OSTEOARTHRITIS OF CERVICAL SPINE, UNSPECIFIED SPINAL OSTEOARTHRITIS COMPLICATION STATUS: ICD-10-CM

## 2019-03-26 DIAGNOSIS — M54.16 LUMBAR RADICULOPATHY: ICD-10-CM

## 2019-03-26 RX ORDER — OXYCODONE HYDROCHLORIDE AND ACETAMINOPHEN 5; 325 MG/1; MG/1
1 TABLET ORAL EVERY 8 HOURS PRN
Qty: 90 TABLET | Refills: 0 | Status: SHIPPED | OUTPATIENT
Start: 2019-03-26 | End: 2019-06-17 | Stop reason: SDUPTHER

## 2019-04-01 ENCOUNTER — OFFICE VISIT (OUTPATIENT)
Dept: GASTROENTEROLOGY | Age: 59
End: 2019-04-01
Payer: MEDICARE

## 2019-04-01 DIAGNOSIS — K21.9 GASTROESOPHAGEAL REFLUX DISEASE, ESOPHAGITIS PRESENCE NOT SPECIFIED: ICD-10-CM

## 2019-04-01 DIAGNOSIS — K63.5 COLON POLYPOSIS: Primary | ICD-10-CM

## 2019-04-01 DIAGNOSIS — K21.9 GASTROESOPHAGEAL REFLUX DISEASE WITHOUT ESOPHAGITIS: ICD-10-CM

## 2019-04-01 PROCEDURE — G8428 CUR MEDS NOT DOCUMENT: HCPCS | Performed by: INTERNAL MEDICINE

## 2019-04-01 PROCEDURE — 99214 OFFICE O/P EST MOD 30 MIN: CPT | Performed by: INTERNAL MEDICINE

## 2019-04-01 PROCEDURE — 3017F COLORECTAL CA SCREEN DOC REV: CPT | Performed by: INTERNAL MEDICINE

## 2019-04-01 PROCEDURE — G8417 CALC BMI ABV UP PARAM F/U: HCPCS | Performed by: INTERNAL MEDICINE

## 2019-04-01 PROCEDURE — 4004F PT TOBACCO SCREEN RCVD TLK: CPT | Performed by: INTERNAL MEDICINE

## 2019-04-01 RX ORDER — SODIUM, POTASSIUM,MAG SULFATES 17.5-3.13G
2 SOLUTION, RECONSTITUTED, ORAL ORAL ONCE
Qty: 1 BOTTLE | Refills: 0 | Status: SHIPPED | OUTPATIENT
Start: 2019-04-01 | End: 2019-04-01

## 2019-04-01 RX ORDER — LOPERAMIDE HYDROCHLORIDE 2 MG/1
2 CAPSULE ORAL 4 TIMES DAILY PRN
Qty: 30 CAPSULE | Refills: 3 | Status: SHIPPED | OUTPATIENT
Start: 2019-04-01 | End: 2019-05-27 | Stop reason: SDUPTHER

## 2019-04-17 ENCOUNTER — OFFICE VISIT (OUTPATIENT)
Dept: PRIMARY CARE CLINIC | Age: 59
End: 2019-04-17
Payer: MEDICARE

## 2019-04-17 VITALS
OXYGEN SATURATION: 94 % | HEIGHT: 71 IN | WEIGHT: 213.8 LBS | DIASTOLIC BLOOD PRESSURE: 88 MMHG | HEART RATE: 86 BPM | BODY MASS INDEX: 29.93 KG/M2 | SYSTOLIC BLOOD PRESSURE: 138 MMHG

## 2019-04-17 DIAGNOSIS — Z23 NEED FOR VIRAL IMMUNIZATION: ICD-10-CM

## 2019-04-17 DIAGNOSIS — E66.9 DIABETES MELLITUS TYPE 2 IN OBESE (HCC): ICD-10-CM

## 2019-04-17 DIAGNOSIS — E11.69 DIABETES MELLITUS TYPE 2 IN OBESE (HCC): ICD-10-CM

## 2019-04-17 DIAGNOSIS — F41.9 ANXIETY DISORDER, UNSPECIFIED TYPE: Primary | ICD-10-CM

## 2019-04-17 PROCEDURE — 2022F DILAT RTA XM EVC RTNOPTHY: CPT | Performed by: FAMILY MEDICINE

## 2019-04-17 PROCEDURE — 90471 IMMUNIZATION ADMIN: CPT | Performed by: FAMILY MEDICINE

## 2019-04-17 PROCEDURE — G8417 CALC BMI ABV UP PARAM F/U: HCPCS | Performed by: FAMILY MEDICINE

## 2019-04-17 PROCEDURE — G8427 DOCREV CUR MEDS BY ELIG CLIN: HCPCS | Performed by: FAMILY MEDICINE

## 2019-04-17 PROCEDURE — 4004F PT TOBACCO SCREEN RCVD TLK: CPT | Performed by: FAMILY MEDICINE

## 2019-04-17 PROCEDURE — 99214 OFFICE O/P EST MOD 30 MIN: CPT | Performed by: FAMILY MEDICINE

## 2019-04-17 PROCEDURE — 3017F COLORECTAL CA SCREEN DOC REV: CPT | Performed by: FAMILY MEDICINE

## 2019-04-17 PROCEDURE — 90750 HZV VACC RECOMBINANT IM: CPT | Performed by: FAMILY MEDICINE

## 2019-04-17 PROCEDURE — 3044F HG A1C LEVEL LT 7.0%: CPT | Performed by: FAMILY MEDICINE

## 2019-04-17 RX ORDER — RISPERIDONE 1 MG/1
1 TABLET, FILM COATED ORAL NIGHTLY
Qty: 90 TABLET | Refills: 3
Start: 2019-04-17 | End: 2021-08-11

## 2019-04-17 ASSESSMENT — ENCOUNTER SYMPTOMS
EYE DISCHARGE: 0
ABDOMINAL PAIN: 0
SORE THROAT: 0
RHINORRHEA: 0
DIARRHEA: 0
NAUSEA: 0
COUGH: 0
WHEEZING: 0
SHORTNESS OF BREATH: 0
EYE REDNESS: 0
VOMITING: 0

## 2019-04-17 NOTE — PROGRESS NOTES
[address] [place] [address] PRIMARY CARE  [address] [place] [address]  Dept: [phone]    Rylan Banks is a 63 y.o. male who presents today for his medical conditions/complaintsas noted below. Chief Complaint   Patient presents with    3 Month Follow-Up       HPI:     HPI   Pt presents for a 3 month follow-up. Currently taking percocet 1-2x per day for neck, back, and hip pain. Pt currently taking cymbalta as prescribed and he states it is helping his mood. Pt states he doesn't check his bp often except when he is at the store and bp is up today. LDL Cholesterol (mg/dL)   Date Value   02/19/2019 84   06/27/2013 Unable to calc. as TRIG>400   05/23/2012 83       (goal LDL is <100)   AST (U/L)   Date Value   02/19/2019 24     ALT (U/L)   Date Value   02/19/2019 39     BUN (mg/dL)   Date Value   02/19/2019 15     BP Readings from Last 3 Encounters:   04/17/19 138/88   01/16/19 120/88   09/27/18 132/88          (goal 120/80)    Past Medical History:   Diagnosis Date    Abnormal EKG 10/27/2016    indicates inferior infarct.     Anxiety     Bipolar disorder ([facility] [address] 2006    on rx    Chronic back pain 11/21/2014    Depression 2006    on rx    Fracture, clavicle 1996    LEFT    Hyperplastic polyp of intestine     Hypertension 2006    on rx    Legally blind in left eye, as defined in [place]      very blurry    Lumbar radiculopathy 4/23/2014    Nicotine dependence     Osteoarthritis     Pain     LEFT EYE    Retinal detachment     history miguel    Sleep apnea     doesnt use cpap     Tubular adenoma     Visual floaters     Wears glasses     USES MAGNIFYING GLASS      Past Surgical History:   Procedure Laterality Date    CATARACT REMOVAL Left     CATARACT REMOVAL WITH IMPLANT Right 8-25-15    CHOLECYSTECTOMY  2/13    COLONOSCOPY  09/22/2016    the ICV was edematous and erythematous but most likely normal variant , bxs taken Large polyp 3 cm, at 50 cm from the anal verge with a very  wide stalk, not removed tattooed needs to be removed with subtotal colectomy    CYST REMOVAL  1970    foot, neck    EYE SURGERY  12/31/15    laser right eye, left vit    EYE SURGERY Left     torn retina lazer/freezer/hole    EYE SURGERY Left 4/44/28    SILICONE REMOVAL AIR FLUID EXCHANGE    EYE SURGERY      cataracts removed miguel.  EYE SURGERY      total 6 eye surg to left, 2 eye surg. to rt.  HERNIA REPAIR      umbilical    LYMPH NODE BIOPSY Left 1971    BASE OF SKULL    NASAL SEPTUM SURGERY  11-24-15    [place] OFFICE/OUTPT VISIT,PROCEDURE ONLY Right 7/12/2018    VITRECTOMY 25 GAUGE, AIR FLUID EXCHANGE, AIR GAS EXCHANGE WITH 18% SF6, ENDOLASER, 200 MSECONDS, 200 MWATTS, 377 PULSES performed by Lamberto Pearson MD at [address]  12/09/2016    w/ ileorectal anastomosis    TONSILLECTOMY  2330    UMBILICAL HERNIA REPAIR  1996    VITRECTOMY Left 12/10/2015    VITRECTOMY Left 10/27/2016    membrane peeling    VITRECTOMY Right 07/12/2018    laser, gas       Family History   Problem Relation Age of Onset    Heart Failure Mother     Cancer Mother     Heart Disease Mother         CAD-OPEN HEART    Mental Retardation Sister     Seizures Sister     Hypertension Other         maternal history       Social History     Tobacco Use    Smoking status: Current Every Day Smoker     Packs/day: 1.00     Years: 30.00     Pack years: 30.00     Types: Cigarettes    Smokeless tobacco: Never Used   Substance Use Topics    Alcohol use: Yes     Comment: 1 TO 6 BEERS Daily      Current Outpatient Medications   Medication Sig Dispense Refill    metFORMIN (GLUCOPHAGE) 500 MG tablet Take 1 tablet by mouth daily (with breakfast) 90 tablet 3    risperiDONE (RISPERDAL) 1 MG tablet Take 1 tablet by mouth nightly 90 tablet 3    oxyCODONE-acetaminophen (PERCOCET) 5-325 MG per tablet Take 1 tablet by mouth every 8 hours as needed for Pain for up to 30 days.  [address] treatments discussed. Chronic Pain > 80 MEDD Obtained or confirmed a written medication contract was on file. Assessment:       Diagnosis Orders   1. Anxiety disorder, unspecified type     2. Need for viral immunization  Zoster [facility])   3. Diabetes mellitus type 2 in obese (HCC)  metFORMIN (GLUCOPHAGE) 500 MG tablet        Plan:    start metformin  Shingrix today  Med list updated    Return in about 3 months (around 7/17/2019). Orders Placed This Encounter   Procedures    Zoster Subunit [facility])     Orders Placed This Encounter   Medications    metFORMIN (GLUCOPHAGE) 500 MG tablet     Sig: Take 1 tablet by mouth daily (with breakfast)     Dispense:  90 tablet     Refill:  3    risperiDONE (RISPERDAL) 1 MG tablet     Sig: Take 1 tablet by mouth nightly     Dispense:  90 tablet     Refill:  3       Patient given educationalmaterials - see patient instructions. Discussed use, benefit, and side effectsof prescribed medications. All patient questions answered. Pt voiced understanding. Reviewed health maintenance. Instructed to continue current medications, diet andexercise. Patient agreed with treatment plan. Follow up as directed.      Electronicallysigned by Susan Mansfield MD on 4/17/2019 at 5:12 PM

## 2019-04-30 ENCOUNTER — TELEPHONE (OUTPATIENT)
Dept: GASTROENTEROLOGY | Age: 59
End: 2019-04-30

## 2019-04-30 NOTE — TELEPHONE ENCOUNTER
Patient LVM. He is scheduled for colonoscopy on 5/2/19 with Dr. Marika Gautam but is asking whether or not he needs to complete the entire bowel prep since he has had his colon removed. Please check with Dr. Marika Gautam so that patient can be contacted.  Thanks

## 2019-04-30 NOTE — TELEPHONE ENCOUNTER
Per Dr Avni Betancourt, pt can do half dose (split dose) of just tonights dose and skip the second round. Thank you.

## 2019-05-01 ENCOUNTER — TELEPHONE (OUTPATIENT)
Dept: GASTROENTEROLOGY | Age: 59
End: 2019-05-01

## 2019-05-01 NOTE — TELEPHONE ENCOUNTER
Writer left msg on pt's vm to call back & confirm he will be here for EGD/colon proc scheduled w/ Beatriz at [facility] Thursday 5/2/19 @ 9am proc time, 7am arrival time. Also confirm pt will have a  and ask if he has any questions regarding his bowel prep instructions.

## 2019-05-02 ENCOUNTER — ANESTHESIA (OUTPATIENT)
Dept: OPERATING ROOM | Age: 59
End: 2019-05-02
Payer: MEDICARE

## 2019-05-02 ENCOUNTER — HOSPITAL ENCOUNTER (OUTPATIENT)
Age: 59
Setting detail: OUTPATIENT SURGERY
Discharge: HOME | End: 2019-05-02
Attending: INTERNAL MEDICINE | Admitting: INTERNAL MEDICINE
Payer: MEDICARE

## 2019-05-02 ENCOUNTER — ANESTHESIA EVENT (OUTPATIENT)
Dept: OPERATING ROOM | Age: 59
End: 2019-05-02
Payer: MEDICARE

## 2019-05-02 VITALS
SYSTOLIC BLOOD PRESSURE: 129 MMHG | TEMPERATURE: 97.8 F | BODY MASS INDEX: 30.49 KG/M2 | DIASTOLIC BLOOD PRESSURE: 80 MMHG | OXYGEN SATURATION: 98 % | RESPIRATION RATE: 16 BRPM | WEIGHT: 213 LBS | HEIGHT: 70 IN | HEART RATE: 78 BPM

## 2019-05-02 VITALS
DIASTOLIC BLOOD PRESSURE: 90 MMHG | RESPIRATION RATE: 13 BRPM | SYSTOLIC BLOOD PRESSURE: 160 MMHG | OXYGEN SATURATION: 89 %

## 2019-05-02 LAB — GLUCOSE BLD-MCNC: 124 MG/DL (ref 75–110)

## 2019-05-02 PROCEDURE — 7100000011 HC PHASE II RECOVERY - ADDTL 15 MIN: Performed by: INTERNAL MEDICINE

## 2019-05-02 PROCEDURE — 3609010600 HC COLONOSCOPY POLYPECTOMY SNARE/COLD BIOPSY: Performed by: INTERNAL MEDICINE

## 2019-05-02 PROCEDURE — 7100000000 HC PACU RECOVERY - FIRST 15 MIN: Performed by: INTERNAL MEDICINE

## 2019-05-02 PROCEDURE — 3700000001 HC ADD 15 MINUTES (ANESTHESIA): Performed by: INTERNAL MEDICINE

## 2019-05-02 PROCEDURE — 6360000002 HC RX W HCPCS: Performed by: NURSE ANESTHETIST, CERTIFIED REGISTERED

## 2019-05-02 PROCEDURE — 2580000003 HC RX 258: Performed by: ANESTHESIOLOGY

## 2019-05-02 PROCEDURE — 3700000000 HC ANESTHESIA ATTENDED CARE: Performed by: INTERNAL MEDICINE

## 2019-05-02 PROCEDURE — 3609012400 HC EGD TRANSORAL BIOPSY SINGLE/MULTIPLE: Performed by: INTERNAL MEDICINE

## 2019-05-02 PROCEDURE — 88305 TISSUE EXAM BY PATHOLOGIST: CPT

## 2019-05-02 PROCEDURE — 45385 COLONOSCOPY W/LESION REMOVAL: CPT | Performed by: INTERNAL MEDICINE

## 2019-05-02 PROCEDURE — 7100000031 HC ASPR PHASE II RECOVERY - ADDTL 15 MIN: Performed by: INTERNAL MEDICINE

## 2019-05-02 PROCEDURE — 2500000003 HC RX 250 WO HCPCS: Performed by: NURSE ANESTHETIST, CERTIFIED REGISTERED

## 2019-05-02 PROCEDURE — 7100000001 HC PACU RECOVERY - ADDTL 15 MIN: Performed by: INTERNAL MEDICINE

## 2019-05-02 PROCEDURE — 7100000010 HC PHASE II RECOVERY - FIRST 15 MIN: Performed by: INTERNAL MEDICINE

## 2019-05-02 PROCEDURE — 45380 COLONOSCOPY AND BIOPSY: CPT | Performed by: INTERNAL MEDICINE

## 2019-05-02 PROCEDURE — 43239 EGD BIOPSY SINGLE/MULTIPLE: CPT | Performed by: INTERNAL MEDICINE

## 2019-05-02 PROCEDURE — 7100000030 HC ASPR PHASE II RECOVERY - FIRST 15 MIN: Performed by: INTERNAL MEDICINE

## 2019-05-02 PROCEDURE — 2709999900 HC NON-CHARGEABLE SUPPLY: Performed by: INTERNAL MEDICINE

## 2019-05-02 PROCEDURE — 82947 ASSAY GLUCOSE BLOOD QUANT: CPT

## 2019-05-02 RX ORDER — PROPOFOL 10 MG/ML
INJECTION, EMULSION INTRAVENOUS PRN
Status: DISCONTINUED | OUTPATIENT
Start: 2019-05-02 | End: 2019-05-02 | Stop reason: SDUPTHER

## 2019-05-02 RX ORDER — LIDOCAINE HYDROCHLORIDE 10 MG/ML
INJECTION, SOLUTION EPIDURAL; INFILTRATION; INTRACAUDAL; PERINEURAL PRN
Status: DISCONTINUED | OUTPATIENT
Start: 2019-05-02 | End: 2019-05-02 | Stop reason: SDUPTHER

## 2019-05-02 RX ORDER — SODIUM CHLORIDE, SODIUM LACTATE, POTASSIUM CHLORIDE, CALCIUM CHLORIDE 600; 310; 30; 20 MG/100ML; MG/100ML; MG/100ML; MG/100ML
INJECTION, SOLUTION INTRAVENOUS CONTINUOUS
Status: DISCONTINUED | OUTPATIENT
Start: 2019-05-02 | End: 2019-05-02 | Stop reason: HOSPADM

## 2019-05-02 RX ORDER — PROPOFOL 10 MG/ML
INJECTION, EMULSION INTRAVENOUS CONTINUOUS PRN
Status: DISCONTINUED | OUTPATIENT
Start: 2019-05-02 | End: 2019-05-02 | Stop reason: SDUPTHER

## 2019-05-02 RX ADMIN — PROPOFOL 50 MG: 10 INJECTION, EMULSION INTRAVENOUS at 08:26

## 2019-05-02 RX ADMIN — LIDOCAINE HYDROCHLORIDE 25 MG: 10 INJECTION, SOLUTION EPIDURAL; INFILTRATION; INTRACAUDAL; PERINEURAL at 08:21

## 2019-05-02 RX ADMIN — PROPOFOL 125 MCG/KG/MIN: 10 INJECTION, EMULSION INTRAVENOUS at 08:26

## 2019-05-02 RX ADMIN — SODIUM CHLORIDE, POTASSIUM CHLORIDE, SODIUM LACTATE AND CALCIUM CHLORIDE: 600; 310; 30; 20 INJECTION, SOLUTION INTRAVENOUS at 07:50

## 2019-05-02 RX ADMIN — PROPOFOL 150 MG: 10 INJECTION, EMULSION INTRAVENOUS at 08:23

## 2019-05-02 ASSESSMENT — PULMONARY FUNCTION TESTS
PIF_VALUE: 1

## 2019-05-02 ASSESSMENT — PAIN - FUNCTIONAL ASSESSMENT: PAIN_FUNCTIONAL_ASSESSMENT: 0-10

## 2019-05-02 ASSESSMENT — PAIN SCALES - GENERAL
PAINLEVEL_OUTOF10: 0
PAINLEVEL_OUTOF10: 0

## 2019-05-02 NOTE — H&P
HISTORY and [address]MA[id number]       NAME:  George Wesley  MRN: [id number]   YOB: 1960   Date: 5/2/2019   Age: 63 y.o. Gender: male       COMPLAINT AND PRESENT HISTORY:     George Wesley is 63 y.o.,   male, having an EGD, Dx. Colonoscopy. Pt has GERD, Hx of colon Polyps and colon surg 3 years ago with chronic diarrhea. Patient denies any other GI symptoms. No nausea / vomiting, No  constipation. No abdominal pains or cramping, No changes in the color of the stools. No fever or chills. PAST MEDICAL HISTORY     Past Medical History:   Diagnosis Date    Abnormal EKG 10/27/2016    indicates inferior infarct.  Anxiety     Bipolar disorder ([facility] [address] 2006    on rx    Chronic back pain 11/21/2014    Depression 2006    on rx    Fracture, clavicle 1996    LEFT    Hyperplastic polyp of intestine     Hypertension 2006    on rx    Legally blind in left eye, as defined in [place]      very blurry    Lumbar radiculopathy 4/23/2014    Nicotine dependence     Osteoarthritis     Pain     LEFT EYE    Retinal detachment     history miguel    Sleep apnea     doesnt use cpap     Tubular adenoma     Visual floaters     Wears glasses     USES MAGNIFYING GLASS       SURGICAL HISTORY       Past Surgical History:   Procedure Laterality Date    CATARACT REMOVAL Left     CATARACT REMOVAL WITH IMPLANT Right 8-25-15    CHOLECYSTECTOMY  2/13    COLONOSCOPY  09/22/2016    the ICV was edematous and erythematous but most likely normal variant , bxs taken Large polyp 3 cm, at 50 cm from the anal verge with a very  wide stalk, not removed tattooed needs to be removed with subtotal colectomy    CYST REMOVAL  1970    foot, neck    EYE SURGERY  12/31/15    laser right eye, left vit    EYE SURGERY Left     torn retina lazer/freezer/hole    EYE SURGERY Left 3/22/66    SILICONE REMOVAL AIR FLUID EXCHANGE    EYE SURGERY      cataracts removed miguel.     EYE SURGERY      total 6 eye surg to left, 2 eye surg. to rt.     HERNIA REPAIR      umbilical    LYMPH NODE BIOPSY Left 1971    BASE OF SKULL    NASAL SEPTUM SURGERY  11-24-15    [place] OFFICE/OUTPT VISIT,PROCEDURE ONLY Right 7/12/2018    VITRECTOMY 25 GAUGE, AIR FLUID EXCHANGE, AIR GAS EXCHANGE WITH 18% SF6, ENDOLASER, 200 MSECONDS, 200 MWATTS, 377 PULSES performed by Rodriguez Corley MD at [address]  12/09/2016    w/ ileorectal anastomosis    TONSILLECTOMY  7488    UMBILICAL HERNIA REPAIR  1996    VITRECTOMY Left 12/10/2015    VITRECTOMY Left 10/27/2016    membrane peeling    VITRECTOMY Right 07/12/2018    laser, gas       FAMILY HISTORY       Family History   Problem Relation Age of Onset    Heart Failure Mother     Cancer Mother     Heart Disease Mother         CAD-OPEN HEART    Mental Retardation Sister     Seizures Sister     Hypertension Other         maternal history       SOCIAL HISTORY       Social History     Socioeconomic History    Marital status: Single     Spouse name: None    Number of children: None    Years of education: None    Highest education level: None   Occupational History    Occupation: disabled   Social Needs    Financial resource strain: None    Food insecurity:     Worry: None     Inability: None    Transportation needs:     Medical: None     Non-medical: None   Tobacco Use    Smoking status: Current Every Day Smoker     Packs/day: 1.00     Years: 30.00     Pack years: 30.00     Types: Cigarettes    Smokeless tobacco: Never Used   Substance and Sexual Activity    Alcohol use: Yes     Comment: 1 TO 6 BEERS Daily    Drug use: No    Sexual activity: None   Lifestyle    Physical activity:     Days per week: None     Minutes per session: None    Stress: None   Relationships    Social connections:     Talks on phone: None     Gets together: None     Attends Cheondoism service: None     Active member of club or organization: None     Attends meetings of clubs or organizations: None     Relationship status: None    Intimate partner violence:     Fear of current or ex partner: None     Emotionally abused: None     Physically abused: None     Forced sexual activity: None   Other Topics Concern    None   Social History Narrative    None           REVIEW OF SYSTEMS      No Known Allergies    No current facility-administered medications on file prior to encounter. Current Outpatient Medications on File Prior to Encounter   Medication Sig Dispense Refill    oxyCODONE-acetaminophen (PERCOCET) 5-325 MG per tablet Take 1 tablet by mouth every 8 hours as needed for Pain for up to 30 days. 90 tablet 0    atenolol (TENORMIN) 25 MG tablet TAKE ONE TABLET BY MOUTH DAILY 30 tablet 2    cyclobenzaprine (FLEXERIL) 10 MG tablet Take 1 tablet by mouth daily 30 tablet 4    tamsulosin (FLOMAX) 0.4 MG capsule Take 1 capsule by mouth daily 90 capsule 3    cloNIDine (CATAPRES) 0.1 MG tablet TAKE ONE TABLET BY MOUTH DAILY 90 tablet 2    omeprazole (PRILOSEC) 40 MG delayed release capsule TAKE ONE CAPSULE BY MOUTH DAILY 52 capsule 4    atorvastatin (LIPITOR) 20 MG tablet Take 1 tablet by mouth daily 90 tablet 3    lamoTRIgine (LAMICTAL) 200 MG tablet Take 200 mg by mouth daily      DULoxetine (CYMBALTA) 60 MG extended release capsule Take 60 mg by mouth daily      hydrOXYzine (VISTARIL) 25 MG capsule Take 25 mg by mouth 3 times daily as needed for Itching (1-2 tabs)      QUEtiapine (SEROQUEL) 50 MG tablet Take 1 tablet by mouth nightly 60 tablet 3    sildenafil (VIAGRA) 100 MG tablet Take 1 tablet by mouth daily as needed for Erectile Dysfunction 30 tablet 1    acetaminophen (TYLENOL) 325 MG tablet Take 2 tablets by mouth every 4 hours as needed for Pain 120 tablet 3       Negative except for what is mentioned in the HPI.      GENERAL PHYSICAL EXAM     Vitals: BP (!) 138/90   Pulse 74   Temp 97.2 °F (36.2 °C) (Oral)   Resp 16   Ht 5' 10\" (1.778 m)   Wt 213 lb (96.6 kg) counseling 10/29/2016    Macular puckering of retina, left eye 10/27/2016    Tubular adenoma     Hyperplastic polyp of intestine     Degenerative disc disease, cervical     Retinal detachment with multiple breaks, left eye 12/10/2015    Facet arthropathy, cervical     Osteoarthritis of cervical spine     Cervical spondylosis     Lumbar facet arthropathy     Lumbar spondylosis     Medication monitoring encounter 11/23/2014    Chronic back pain 11/21/2014    724.8 07/31/2014    [facility] not elsewhere classified [facility] 07/31/2014    Chronic, continuous use of opioids 05/23/2014    Lumbar radiculopathy 04/23/2014    DDD (degenerative disc disease), lumbar 04/23/2014    Hypertension     Anxiety     Nicotine dependence     Osteoarthritis     Obstructive sleep apnea on CPAP     Depression 06/07/2012    Bipolar 1 disorder ([facility] [address] 06/07/2012           JANET TOURE PA-C on 5/2/2019 at 7:58 AM

## 2019-05-02 NOTE — ANESTHESIA POSTPROCEDURE EVALUATION
Department of Anesthesiology  Postprocedure Note    Patient: Kailash Mcrae  MRN: [id number]  YOB: 1960  Date of evaluation: 5/2/2019  Time:  10:42 AM     Procedure Summary     Date:  05/02/19 Room / Location:  [address] / [address]    Anesthesia Start:  0820 Anesthesia Stop:  0906    Procedures:       EGD BIOPSY (N/A Esophagus)      COLONOSCOPY POLYPECTOMY COLD BIOPSY, HOT SNARE (N/A ) Diagnosis:  (GERD COLON POLYPOSIS      PAT ON ADMIT PER ANES)    Surgeon:  Chandu Diop MD Responsible Provider:  Nyasia Rosenberg MD    Anesthesia Type:  MAC ASA Status:  2          Anesthesia Type: MAC    Swetha Phase I: Swetha Score: 10    Swetha Phase II:      Last vitals: Reviewed and per EMR flowsheets.        Anesthesia Post Evaluation    Comments: POST- ANESTHESIA EVALUATION       Pt Name: Kaliash Mcrae  MRN: [id number]  YOB: 1960  Date of evaluation: 5/2/2019  Time:  10:42 AM      BP (!) 142/89   Pulse 70   Temp 97.8 °F (36.6 °C) (Temporal)   Resp 16   Ht 5' 10\" (1.778 m)   Wt 213 lb (96.6 kg)   SpO2 98%   BMI 30.56 kg/m²      Consciousness Level  Awake  Cardiopulmonary Status  Stable  Pain Adequately Treated YES  Nausea / Vomiting  NO  Adequate Hydration  YES  Anesthesia Related Complications NONE      Electronically signed by Nyasia Rosenberg MD on 5/2/2019 at 10:42 AM

## 2019-05-02 NOTE — OP NOTE
PROCEDURE NOTE    DATE OF PROCEDURE: 5/2/2019     SURGEON: Annalise Porter MD  Facility: [facility]  ASSISTANT: None  Anesthesia: MAc  PREOPERATIVE DIAGNOSIS:   GERD    Diagnosis:  Barrette's esophagus , short segment 2 cm , bxs taken   severe gastritis with edema nd deformity of pyloric orifice   duodenitis     POSTOPERATIVE DIAGNOSIS: As described below    OPERATION: Upper GI endoscopy with Biopsy    ANESTHESIA: Moderate Sedation     ESTIMATED BLOOD LOSS: Less than 50 ml    COMPLICATIONS: None. SPECIMENS:  Was Obtained:     Barrette's esophagus , short segment 2 cm , bxs taken   severe gastritis with edema nd deformity of pyloric orifice   duodenitis     HISTORY: The patient is a 63y.o. year old male with history of above preop diagnosis. I recommended esophagogastroduodenoscopy with possible biopsy and I explained the risk, benefits, expected outcome, and alternatives to the procedure. Risks included but are not limited to bleeding, infection, respiratory distress, hypotension, and perforation of the esophagus, stomach, or duodenum. Patient understands and is in agreement. PROCEDURE: The patient was given IV conscious sedation. The patient's SPO2 remained above 90% throughout the procedure. The gastroscope was inserted orally and advanced under direct vision through the esophagus, through the stomach, through the pylorus, and into the descending duodenum. Post sedation note : The patient's SPO2 remained above 90% throughout the procedure. the vital signs remained stable , and no immediate complication form the procedure noted, patient will be ready for d/c when criteria is met .       Findings:    Retropharyngeal area was grossly normal appearing    Esophagus: abnormal: Barrette's esophagus , short segment 2 cm , bxs taken      Stomach:    Fundus: abnormal:  severe gastritis    Body: abnormal: severe gastritis    Antrum:  severe gastritis with edema nd deformity of pyloric orifice    Duodenum:
[id number]    Electronically signed by Gris Farmer MD  on 5/2/2019 at 9:13 AM

## 2019-05-02 NOTE — ANESTHESIA PRE PROCEDURE
Jaclyn Vieira MD   sildenafil (VIAGRA) 100 MG tablet Take 1 tablet by mouth daily as needed for Erectile Dysfunction 11/14/18   Beto Cruz MD   acetaminophen (TYLENOL) 325 MG tablet Take 2 tablets by mouth every 4 hours as needed for Pain 12/15/16   Beto Cruz MD       Current medications:    Current Facility-Administered Medications   Medication Dose Route Frequency Provider Last Rate Last Dose    lactated ringers infusion   Intravenous Continuous Cynthia Levin  mL/hr at 05/02/19 0750         Allergies:  No Known Allergies    Problem List:    Patient Active Problem List   Diagnosis Code    Depression F32.9    [facility] F31.9    Hypertension I10    Anxiety F41.9    Nicotine dependence F17.200    Osteoarthritis M19.90    Obstructive sleep apnea on CPAP G47.33, Z99.89    Lumbar radiculopathy M54.16    DDD (degenerative disc disease), lumbar M51.36    Chronic, continuous use of opioids F11.90    724.8 M47.817    Sacroiliitis, not elsewhere classified ([facility] M46.1    Chronic back pain M54.9, G89.29    Medication monitoring encounter Z51.81    Lumbar facet arthropathy M47.816    Lumbar spondylosis M47.816    Cervical spondylosis M47.812    Osteoarthritis of cervical spine M47.812    Facet arthropathy, cervical M47.812    Retinal detachment with multiple breaks, left eye H33.022    Degenerative disc disease, cervical M50.30    Tubular adenoma D36.9    Hyperplastic polyp of intestine K63.5    Macular puckering of retina, left eye H35.372    Encounter for genetic counseling [id number]    Colon polyposis K63.5    Erectile dysfunction N52.9    Colon polyp K63.5    History of colon surgery Z98.890    Mild obesity E66.9    Abdominal pain, generalized R10.84    Gastroesophageal reflux disease without esophagitis K21.9    Retinal detachment with multiple breaks, right H33.021       Past Medical History:        Diagnosis Date    Abnormal EKG 10/27/2016 indicates inferior infarct.  Anxiety     Bipolar disorder ([facility] 2006    on rx    Chronic back pain 11/21/2014    Depression 2006    on rx    Fracture, clavicle 1996    LEFT    Hyperplastic polyp of intestine     Hypertension 2006    on rx    Legally blind in left eye, as defined in [place]      very blurry    Lumbar radiculopathy 4/23/2014    Nicotine dependence     Osteoarthritis     Pain     LEFT EYE    Retinal detachment     history miguel    Sleep apnea     doesnt use cpap     Tubular adenoma     Visual floaters     Wears glasses     USES MAGNIFYING GLASS       Past Surgical History:        Procedure Laterality Date    CATARACT REMOVAL Left     CATARACT REMOVAL WITH IMPLANT Right 8-25-15    CHOLECYSTECTOMY  2/13    COLONOSCOPY  09/22/2016    the ICV was edematous and erythematous but most likely normal variant , bxs taken Large polyp 3 cm, at 50 cm from the anal verge with a very  wide stalk, not removed tattooed needs to be removed with subtotal colectomy    CYST REMOVAL  1970    foot, neck    EYE SURGERY  12/31/15    laser right eye, left vit    EYE SURGERY Left     torn retina lazer/freezer/hole    EYE SURGERY Left 6/78/19    SILICONE REMOVAL AIR FLUID EXCHANGE    EYE SURGERY      cataracts removed miguel.  EYE SURGERY      total 6 eye surg to left, 2 eye surg. to rt.     HERNIA REPAIR      umbilical    LYMPH NODE BIOPSY Left 1971    BASE OF SKULL    NASAL SEPTUM SURGERY  11-24-15    [place] OFFICE/OUTPT VISIT,PROCEDURE ONLY Right 7/12/2018    VITRECTOMY 25 GAUGE, AIR FLUID EXCHANGE, AIR GAS EXCHANGE WITH 18% SF6, ENDOLASER, 200 MSECONDS, 200 MWATTS, 377 PULSES performed by Dexter Avina MD at [address]  12/09/2016    w/ ileorectal anastomosis    TONSILLECTOMY  8642    UMBILICAL HERNIA REPAIR  1996    VITRECTOMY Left 12/10/2015    VITRECTOMY Left 10/27/2016    membrane peeling    VITRECTOMY Right 07/12/2018    laser, gas       Social History: Social History     Tobacco Use    Smoking status: Current Every Day Smoker     Packs/day: 1.00     Years: 30.00     Pack years: 30.00     Types: Cigarettes    Smokeless tobacco: Never Used   Substance Use Topics    Alcohol use: Yes     Comment: 1 TO 6 BEERS Daily                                Ready to quit: Not Answered  Counseling given: Not Answered      Vital Signs (Current):   Vitals:    05/02/19 0729   BP: (!) 138/90   Pulse: 74   Resp: 16   Temp: 97.2 °F (36.2 °C)   TempSrc: Oral   SpO2: 96%   Weight: 213 lb (96.6 kg)   Height: 5' 10\" (1.778 m)                                              BP Readings from Last 3 Encounters:   05/02/19 (!) 138/90   04/17/19 138/88   01/16/19 120/88       NPO Status: Time of last liquid consumption: 1900                        Time of last solid consumption: 1800                        Date of last liquid consumption: 05/01/19                        Date of last solid food consumption: 04/30/19    BMI:   Wt Readings from Last 3 Encounters:   05/02/19 213 lb (96.6 kg)   04/17/19 213 lb 12.8 oz (97 kg)   01/16/19 225 lb (102.1 kg)     Body mass index is 30.56 kg/m².     CBC:   Lab Results   Component Value Date    WBC 9.8 12/15/2016    RBC 4.02 12/15/2016    RBC 5.02 05/22/2012    HGB 12.2 12/15/2016    HCT 36.7 12/15/2016    MCV 91.3 12/15/2016    RDW 13.9 12/15/2016     12/15/2016     05/22/2012       CMP:   Lab Results   Component Value Date     02/19/2019    K 4.4 02/19/2019     02/19/2019    CO2 29 02/19/2019    BUN 15 02/19/2019    CREATININE 0.86 02/19/2019    GFRAA >60 02/19/2019    LABGLOM >60 02/19/2019    GLUCOSE 121 12/15/2016    GLUCOSE 154 05/22/2012    PROT 7.6 02/19/2019    CALCIUM 9.5 02/19/2019    BILITOT 0.29 02/19/2019    ALKPHOS 122 02/19/2019    AST 24 02/19/2019    ALT 39 02/19/2019       POC Tests:   Recent Labs     05/02/19  0747   POCGLU 124*       Coags:   Lab Results   Component Value Date    PROTIME 13.4 05/23/2012 INR 1.3 05/23/2012    APTT 37.8 05/23/2012       HCG (If Applicable): No results found for: PREGTESTUR, PREGSERUM, HCG, HCGQUANT     ABGs:   Lab Results   Component Value Date    PO2ART 56.9 05/22/2012    EAP2YKX 22.8 05/22/2012    W7OUFRGC 87.1 05/22/2012        Type & Screen (If Applicable):  No results found for: LABABO, [address]    Anesthesia Evaluation  Patient summary reviewed and Nursing notes reviewed  Airway: Mallampati: III  TM distance: >3 FB   Neck ROM: full  Mouth opening: > = 3 FB Dental: normal exam         Pulmonary:normal exam    (+) sleep apnea: on noncompliant,                             Cardiovascular:    (+) hypertension: no interval change,       ECG reviewed  Rhythm: regular  Rate: normal           Beta Blocker:  No for medical reason         Neuro/Psych:   (+) neuromuscular disease:, psychiatric history:            GI/Hepatic/Renal:   (+) GERD: no interval change,           Endo/Other:    (+) : arthritis:., .                 Abdominal:           Vascular:                                        Anesthesia Plan      MAC     ASA 2       Induction: intravenous. Anesthetic plan and risks discussed with patient. Plan discussed with CRNA.                   Wei Zuluaga MD   5/2/2019

## 2019-05-03 LAB — SURGICAL PATHOLOGY REPORT: NORMAL

## 2019-05-10 ENCOUNTER — TELEPHONE (OUTPATIENT)
Dept: PRIMARY CARE CLINIC | Age: 59
End: 2019-05-10

## 2019-05-14 ENCOUNTER — APPOINTMENT (OUTPATIENT)
Dept: CT IMAGING | Age: 59
End: 2019-05-14
Payer: MEDICARE

## 2019-05-14 ENCOUNTER — HOSPITAL ENCOUNTER (EMERGENCY)
Age: 59
Discharge: HOME | End: 2019-05-14
Attending: EMERGENCY MEDICINE
Payer: MEDICARE

## 2019-05-14 VITALS
BODY MASS INDEX: 28.7 KG/M2 | OXYGEN SATURATION: 94 % | RESPIRATION RATE: 16 BRPM | DIASTOLIC BLOOD PRESSURE: 89 MMHG | WEIGHT: 205 LBS | TEMPERATURE: 97.7 F | HEIGHT: 71 IN | SYSTOLIC BLOOD PRESSURE: 123 MMHG | HEART RATE: 80 BPM

## 2019-05-14 DIAGNOSIS — F07.81 POST CONCUSSIVE SYNDROME: Primary | ICD-10-CM

## 2019-05-14 LAB
ABSOLUTE EOS #: 0.4 K/UL (ref 0–0.4)
ABSOLUTE IMMATURE GRANULOCYTE: NORMAL K/UL (ref 0–0.3)
ABSOLUTE LYMPH #: 3.3 K/UL (ref 1–4.8)
ABSOLUTE MONO #: 0.5 K/UL (ref 0.1–1.3)
ALBUMIN SERPL-MCNC: 4.6 G/DL (ref 3.5–5.2)
ALBUMIN/GLOBULIN RATIO: ABNORMAL (ref 1–2.5)
ALP BLD-CCNC: 107 U/L (ref 40–129)
ALT SERPL-CCNC: 29 U/L (ref 5–41)
ANION GAP SERPL CALCULATED.3IONS-SCNC: 12 MMOL/L (ref 9–17)
AST SERPL-CCNC: 18 U/L
BASOPHILS # BLD: 1 % (ref 0–2)
BASOPHILS ABSOLUTE: 0.1 K/UL (ref 0–0.2)
BILIRUB SERPL-MCNC: 0.21 MG/DL (ref 0.3–1.2)
BUN BLDV-MCNC: 14 MG/DL (ref 6–20)
BUN/CREAT BLD: ABNORMAL (ref 9–20)
CALCIUM SERPL-MCNC: 9 MG/DL (ref 8.6–10.4)
CHLORIDE BLD-SCNC: 99 MMOL/L (ref 98–107)
CO2: 25 MMOL/L (ref 20–31)
CREAT SERPL-MCNC: 0.96 MG/DL (ref 0.7–1.2)
DIFFERENTIAL TYPE: NORMAL
EOSINOPHILS RELATIVE PERCENT: 4 % (ref 0–4)
GFR AFRICAN AMERICAN: >60 ML/MIN
GFR NON-AFRICAN AMERICAN: >60 ML/MIN
GFR SERPL CREATININE-BSD FRML MDRD: ABNORMAL ML/MIN/{1.73_M2}
GFR SERPL CREATININE-BSD FRML MDRD: ABNORMAL ML/MIN/{1.73_M2}
GLUCOSE BLD-MCNC: 111 MG/DL (ref 70–99)
HCT VFR BLD CALC: 43.1 % (ref 41–53)
HEMOGLOBIN: 15.3 G/DL (ref 13.5–17.5)
IMMATURE GRANULOCYTES: NORMAL %
LYMPHOCYTES # BLD: 39 % (ref 24–44)
MCH RBC QN AUTO: 32.3 PG (ref 26–34)
MCHC RBC AUTO-ENTMCNC: 35.6 G/DL (ref 31–37)
MCV RBC AUTO: 90.7 FL (ref 80–100)
MONOCYTES # BLD: 6 % (ref 1–7)
NRBC AUTOMATED: NORMAL PER 100 WBC
PDW BLD-RTO: 13.3 % (ref 11.5–14.9)
PLATELET # BLD: 288 K/UL (ref 150–450)
PLATELET ESTIMATE: NORMAL
PMV BLD AUTO: 8.2 FL (ref 6–12)
POTASSIUM SERPL-SCNC: 4 MMOL/L (ref 3.7–5.3)
RBC # BLD: 4.75 M/UL (ref 4.5–5.9)
RBC # BLD: NORMAL 10*6/UL
SEG NEUTROPHILS: 50 % (ref 36–66)
SEGMENTED NEUTROPHILS ABSOLUTE COUNT: 4.1 K/UL (ref 1.3–9.1)
SODIUM BLD-SCNC: 136 MMOL/L (ref 135–144)
TOTAL PROTEIN: 7.3 G/DL (ref 6.4–8.3)
TROPONIN INTERP: NORMAL
TROPONIN T: NORMAL NG/ML
TROPONIN, HIGH SENSITIVITY: 9 NG/L (ref 0–22)
WBC # BLD: 8.4 K/UL (ref 3.5–11)
WBC # BLD: NORMAL 10*3/UL

## 2019-05-14 PROCEDURE — 80053 COMPREHEN METABOLIC PANEL: CPT

## 2019-05-14 PROCEDURE — 36415 COLL VENOUS BLD VENIPUNCTURE: CPT

## 2019-05-14 PROCEDURE — 85025 COMPLETE CBC W/AUTO DIFF WBC: CPT

## 2019-05-14 PROCEDURE — 93005 ELECTROCARDIOGRAM TRACING: CPT

## 2019-05-14 PROCEDURE — 6360000002 HC RX W HCPCS: Performed by: STUDENT IN AN ORGANIZED HEALTH CARE EDUCATION/TRAINING PROGRAM

## 2019-05-14 PROCEDURE — 84484 ASSAY OF TROPONIN QUANT: CPT

## 2019-05-14 PROCEDURE — 99284 EMERGENCY DEPT VISIT MOD MDM: CPT

## 2019-05-14 PROCEDURE — 96374 THER/PROPH/DIAG INJ IV PUSH: CPT

## 2019-05-14 PROCEDURE — 70450 CT HEAD/BRAIN W/O DYE: CPT

## 2019-05-14 RX ORDER — ONDANSETRON 2 MG/ML
4 INJECTION INTRAMUSCULAR; INTRAVENOUS ONCE
Status: COMPLETED | OUTPATIENT
Start: 2019-05-14 | End: 2019-05-14

## 2019-05-14 RX ADMIN — ONDANSETRON 4 MG: 2 INJECTION INTRAMUSCULAR; INTRAVENOUS at 21:44

## 2019-05-14 ASSESSMENT — PAIN SCALES - GENERAL: PAINLEVEL_OUTOF10: 2

## 2019-05-14 ASSESSMENT — PAIN DESCRIPTION - LOCATION: LOCATION: HEAD

## 2019-05-14 ASSESSMENT — PAIN DESCRIPTION - PAIN TYPE: TYPE: ACUTE PAIN

## 2019-05-15 ASSESSMENT — ENCOUNTER SYMPTOMS
ABDOMINAL PAIN: 0
SHORTNESS OF BREATH: 0
DIARRHEA: 0
NAUSEA: 1
VOMITING: 0
SORE THROAT: 0
COUGH: 0
PHOTOPHOBIA: 0

## 2019-05-15 NOTE — ED PROVIDER NOTES
[address]  Emergency Department Encounter  EmergencyMedicine Resident     Pt Barbara Cavanaugh  MRN: [id number]  [place] 1960  Date of evaluation: 5/14/19  PCP:  Magdalena Suarez MD    [address] [place] [address]       Chief Complaint   Patient presents with    Head Injury     hit head 2 days ago    Nausea    Dizziness    Headache    Epistaxis       HISTORY OF PRESENT ILLNESS  (Location/Symptom, Timing/Onset, Context/Setting, Quality, Duration, Modifying Factors, Severity.)      Abril Duncan is a 63 y.o. male who presents with headache, lightheadedness, nausea, and \"feeling foggy\" after he struck his head against the ceiling beam 2 days ago. Patient reports he was moving a table in his house when he struck his head against the ceiling beam quite hard. He reports he was \"knocked back\" a couple of feet, but did not completely fall to the ground. He denies any loss of consciousness, vision changes, numbness or tingling, focal weakness, or any injury to any other part of his body. Since then, he has had intermittent mild headache with associated intermittent lightheadedness, generalized weakness, and persistent nausea with no vomiting. He reports he \"feels foggy,\" which he describes as not feeling like himself. He reports his symptoms on when he stands from a seated position or walks around. No chest pain, shortness of breath, abdominal pain, or any other symptoms whatsoever. PAST MEDICAL / SURGICAL / SOCIAL / FAMILY HISTORY      has a past medical history of Abnormal EKG, Anxiety, Bipolar disorder ([address] Chronic back pain, Depression, Fracture, clavicle, Hyperplastic polyp of intestine, Hypertension, Legally blind in left eye, as defined in [place], Lumbar radiculopathy, Nicotine dependence, Osteoarthritis, Pain, Retinal detachment, Sleep apnea, Tubular adenoma, Visual floaters, and Wears glasses.      has a past surgical history that includes Cholecystectomy (2/13); cyst removal (1970); Tonsillectomy (1969); Umbilical hernia repair (1996); lymph node biopsy (Left, 1971); Cataract removal with implant (Right, 8-25-15); Nasal septum surgery (11-24-15); Cataract removal (Left); vitrectomy (Left, 12/10/2015); retinal laser; Eye surgery (12/31/15); Colonoscopy (09/22/2016); vitrectomy (Left, 10/27/2016); eye surgery (Left); eye surgery (Left, 3/31/16); eye surgery; eye surgery; hernia repair; subtotal colectomy (12/09/2016); vitrectomy (Right, 07/12/2018); pr office/outpt visit,procedure only (Right, 7/12/2018); Upper gastrointestinal endoscopy (N/A, 5/2/2019); and Colonoscopy (N/A, 5/2/2019).     Social History     Socioeconomic History    Marital status: Single     Spouse name: Not on file    Number of children: Not on file    Years of education: Not on file    Highest education level: Not on file   Occupational History    Occupation: disabled   Social Needs    Financial resource strain: Not on file    Food insecurity:     Worry: Not on file     Inability: Not on file   [employer] needs:     Medical: Not on file     Non-medical: Not on file   Tobacco Use    Smoking status: Current Every Day Smoker     Packs/day: 1.00     Years: 30.00     Pack years: 30.00     Types: Cigarettes    Smokeless tobacco: Never Used   Substance and Sexual Activity    Alcohol use: Yes     Comment: 1 TO 6 BEERS Daily    Drug use: No    Sexual activity: Not on file   Lifestyle    Physical activity:     Days per week: Not on file     Minutes per session: Not on file    Stress: Not on file   Relationships    Social connections:     Talks on phone: Not on file     Gets together: Not on file     Attends [employer] service: Not on file     Active member of club or organization: Not on file     Attends meetings of clubs or organizations: Not on file     Relationship status: Not on file    Intimate partner violence:     Fear of current or ex partner: Not on file     Emotionally abused: Not on file     Physically Yes Vee Cheng MD   DULoxetine (CYMBALTA) 60 MG extended release capsule Take 60 mg by mouth daily   Yes Historical Provider, MD   hydrOXYzine (VISTARIL) 25 MG capsule Take 25 mg by mouth 3 times daily as needed for Itching (1-2 tabs)   Yes Historical Provider, MD   QUEtiapine (SEROQUEL) 50 MG tablet Take 1 tablet by mouth nightly 11/19/15  Yes Vee Cheng MD       REVIEW OF SYSTEMS    (2-9 systems for level 4, 10 or more for level 5)      Review of Systems   Constitutional: Negative for chills and fever. HENT: Negative for congestion and sore throat. Eyes: Negative for photophobia and visual disturbance. Respiratory: Negative for cough and shortness of breath. Cardiovascular: Negative for chest pain. Gastrointestinal: Positive for nausea. Negative for abdominal pain, diarrhea and vomiting. Genitourinary: Negative for dysuria, flank pain and hematuria. Musculoskeletal: Negative for neck pain and neck stiffness. Skin: Negative for rash and wound. Neurological: Positive for weakness (generalized), light-headedness and headaches. Negative for numbness. PHYSICAL EXAM   (up to 7 for level 4, 8 or more for level 5)      INITIAL VITALS:   /89   Pulse 80   Temp 97.7 °F (36.5 °C) (Oral)   Resp 16   Ht 5' 11\" (1.803 m)   Wt 205 lb (93 kg)   SpO2 94%   BMI 28.59 kg/m²     Physical Exam   Gen. Appearance: patient appears well, nondistressed. HEENT: Minor healing superficial abrasion parietal scalp; head otherwise atraumatic, normocephalic. No raccoon eyes, no wong sign, no hemotympanum, no rhinorrhea. R pupil 3mm and reactive to light. L pupil slightly irregular and nonreactive; baseline per patient. Oropharynx clear and moist.  Neck: Supple, normal range of motion. No tenderness on palpation of cervical spine. Pulmonary: Lungs clear to auscultation bilaterally. Equal air entry right left. Cardiovascular:  Heart sounds normal, no murmurs.   Peripheral pulses strong, regular, equal.   Abdomen: Soft, nontender, nondistended. Bowel sounds normal. No palpable masses. Neurology: GCS 15. Oriented to person place and time.    -Cranial nerve exam:                        CN III, IV, VI: EOM normal                        CN V: facial sensation normal                        CN VII: normal facial expressions - symmetrical and normal eyebrow raise, eye closure, smile                        CN IX, X: uvula midline, symmetrical palate motion                        CN XI: normal symmetrical shoulder raise                        CN XII: normal tongue movement; no deviation  -Peripheral nerve exam: Normal tone and power in all 4 extremities. No sensory deficits.     Skin: Warm, dry, well perfused      DIFFERENTIAL  DIAGNOSIS     PLAN (LABS / [place] [facility] / EKG):  Orders Placed This Encounter   Procedures    CT Head WO Contrast    CBC Auto Differential    Comprehensive Metabolic Panel    Troponin    EKG 12 Lead       MEDICATIONS ORDERED:  Orders Placed This Encounter   Medications    ondansetron (ZOFRAN) injection 4 mg       DDX: Postconcussive syndrome, intracranial pathology, ACS, electrolyte abnormality, anemia, viral syndrome, glucose derangement    DIAGNOSTIC RESULTS / EMERGENCY DEPARTMENT COURSE / MDM     LABS:  Results for orders placed or performed during the hospital encounter of 05/14/19   CBC Auto Differential   Result Value Ref Range    WBC 8.4 3.5 - 11.0 k/uL    RBC 4.75 4.5 - 5.9 m/uL    Hemoglobin 15.3 13.5 - 17.5 g/dL    Hematocrit 43.1 41 - 53 %    MCV 90.7 80 - 100 fL    MCH 32.3 26 - 34 pg    MCHC 35.6 31 - 37 g/dL    RDW 13.3 11.5 - 14.9 %    Platelets 627 528 - 398 k/uL    MPV 8.2 6.0 - 12.0 fL    NRBC Automated NOT REPORTED per 100 WBC    Differential Type NOT REPORTED     Seg Neutrophils 50 36 - 66 %    Lymphocytes 39 24 - 44 %    Monocytes 6 1 - 7 %    Eosinophils % 4 0 - 4 %    Basophils 1 0 - 2 %    Immature Granulocytes NOT REPORTED 0 %    Segs Absolute 4.10 patient's risk factors, he may present atypically with lightheadedness, nausea, and diaphoresis; will obtain EKG, one troponin. Plan for CBC, CMP, troponin, CT head. Zofran. Reassess. RADIOLOGY:  CT Head WO Contrast (Final result)   Result time 05/14/19 23:03:42   Final result by Sarath Benz MD (05/14/19 23:03:42)                Impression:    No acute intracranial abnormality. Narrative:    EXAMINATION:  CT OF THE HEAD WITHOUT CONTRAST  5/14/2019 10:25 pm    TECHNIQUE:  CT of the head was performed without the administration of intravenous  contrast. Dose modulation, iterative reconstruction, and/or weight based  adjustment of the mA/kV was utilized to reduce the radiation dose to as low  as reasonably achievable. COMPARISON:  05/20/2006    HISTORY:  ORDERING SYSTEM PROVIDED HISTORY: head injury, nausea, dizziness  TECHNOLOGIST PROVIDED HISTORY:    Ordering Physician Provided Reason for Exam: S/P head injury x1 day prior. Patient states nausea, dizziness and blood nose.  Recently diagnosised with  Diabetes. Acuity: Acute  Type of Exam: Initial  Relevant Medical/Surgical History: No relevant surgical hx to area. Hx - HBP,  Diabtes, Bi-polar, anxiety and depression. FINDINGS:  BRAIN/VENTRICLES: There is no acute intracranial hemorrhage, mass effect or  midline shift.  No abnormal extra-axial fluid collection.  The gray-white  differentiation is maintained without evidence of an acute infarct.  There is  no evidence of hydrocephalus. Right middle cranial fossa arachnoid cyst again  noted, stable benign finding. ORBITS: The visualized portion of the orbits demonstrate no acute abnormality. SINUSES: The visualized paranasal sinuses and mastoid air cells demonstrate  no acute abnormality.  Mucosal thickening sphenoid sinus, multiple ethmoid  air cells, and right maxillary sinus. SOFT TISSUES/SKULL:  No acute abnormality of the visualized skull or soft  tissues. EKG  EKG Interpretation    Interpreted by me    Rhythm: normal sinus   Rate: normal  Axis: normal  Ectopy: none  Conduction: normal  ST Segments: no acute change  T Waves: no acute change  Q Waves: none    Clinical Impression: no acute changes    All EKG's are interpreted by the Emergency Department Physician who either signs or Co-signs this chart in the absence of a cardiologist.    EMERGENCY DEPARTMENT COURSE:    Laboratory investigations unremarkable. EKG unremarkable. CT head unremarkable. Patient reassessed. Results discussed. I discussed postconcussive syndrome with the patient. Patient was counseled to follow up with their Primary Care Provider as soon as possible for an ER follow-up visit. Patient counseled to return to the Emergency Department for any worsening symptoms, worsening headache, excessive vomiting/inability to tolerate fluids for more than a few hours, vision changes, numbness or tingling, focal weakness, or for any other cares or concerns. Patient verbalized understanding and agreement with plan. Discharged to home in stable condition and in no distress. PROCEDURES:  None    CONSULTS:  None    CRITICAL CARE:  None    FINAL IMPRESSION      1. Post concussive syndrome          DISPOSITION / PLAN     DISPOSITION Decision To Discharge 05/14/2019 11:16:31 PM      PATIENT REFERRED TO:  Epi Giron MD  [address]   [address] [phone]    Schedule an appointment as soon as possible for a visit       [facility] ED  [place] [id number] [phone]    As needed      DISCHARGE MEDICATIONS:  Discharge Medication List as of 5/14/2019 11:17 PM          Og Rivera MD  Emergency Medicine Resident    (Please note that portions of thisnote were completed with a voice recognition program.  Efforts were made to edit the dictations but occasionally words are mis-transcribed.)        Og Rivera MD  05/15/19

## 2019-05-16 ENCOUNTER — TELEPHONE (OUTPATIENT)
Dept: GASTROENTEROLOGY | Age: 59
End: 2019-05-16

## 2019-05-16 DIAGNOSIS — F41.9 ANXIETY: ICD-10-CM

## 2019-05-16 RX ORDER — DIAZEPAM 5 MG/1
5 TABLET ORAL EVERY 12 HOURS PRN
Qty: 60 TABLET | Refills: 2 | Status: SHIPPED | OUTPATIENT
Start: 2019-05-16 | End: 2019-08-14 | Stop reason: SDUPTHER

## 2019-05-16 NOTE — TELEPHONE ENCOUNTER
Last visit: 4/17/19  Last Med refill: 3/15/19  Does patient have enough medication for 72 hours: No     Next Visit Date:  Future Appointments   Date Time Provider Jessica Carlosi   7/17/2019  3:30 PM MD [facility] [address] Maintenance   Topic Date Due    Diabetic foot exam  02/10/1971    Diabetic retinal exam  02/10/1971    Diabetic microalbuminuria test  02/10/1979    Hepatitis B Vaccine (1 of 3 - Risk 3-dose series) 02/10/1980    Lipid screen  02/19/2020    Low dose CT lung screening  02/26/2020    A1C test (Diabetic or Prediabetic)  03/18/2020    Colon cancer screen colonoscopy  05/02/2020    DTaP/Tdap/Td vaccine (2 - Td) 06/21/2028    Flu vaccine  Completed    Shingles Vaccine  Completed    Pneumococcal 0-64 years Vaccine  Completed    Hepatitis C screen  Completed    HIV screen  Completed       Hemoglobin A1C (%)   Date Value   03/18/2019 6.5 (H)   05/23/2012 5.7             ( goal A1C is < 7)   No results found for: LABMICR  LDL Cholesterol (mg/dL)   Date Value   02/19/2019 84   06/27/2013 Unable to calc.  as TRIG>400       (goal LDL is <100)   AST (U/L)   Date Value   05/14/2019 18     ALT (U/L)   Date Value   05/14/2019 29     BUN (mg/dL)   Date Value   05/14/2019 14     BP Readings from Last 3 Encounters:   05/14/19 123/89   05/02/19 129/80   05/02/19 (!) 160/90          (goal 120/80)    All Future Testing planned in [facility]  Lab Frequency Next Occurrence   PSA, Diagnostic Once 09/18/2019   Lipid Panel Once 52/73/9169   Basic Metabolic Panel Once 56/68/6915   CBC with Differential Once 09/27/2018   PSA Screening Once 07/24/2019   CT LUNG SCREENING (ANNUAL) Once 02/20/2019   EGD Once 04/01/2019   COLONOSCOPY W/ OR W/O BIOPSY Once 04/01/2019               Patient Active Problem List:     Depression     Bipolar 1 disorder ([facility] [address]     Hypertension     Anxiety     Nicotine dependence     Osteoarthritis     Obstructive sleep apnea on CPAP     Lumbar radiculopathy     DDD (degenerative disc disease), lumbar     Chronic, continuous use of opioids     724.8     Sacroiliitis, not elsewhere classified (HCC)     Chronic back pain     Medication monitoring encounter     Lumbar facet arthropathy     Lumbar spondylosis     Cervical spondylosis     Osteoarthritis of cervical spine     Facet arthropathy, cervical     Retinal detachment with multiple breaks, left eye     Degenerative disc disease, cervical     Tubular adenoma     Hyperplastic polyp of intestine     Macular puckering of retina, left eye     Encounter for genetic counseling     Colon polyposis     Erectile dysfunction     Colon polyp     History of colon surgery     Mild obesity     Abdominal pain, generalized     Gastroesophageal reflux disease without esophagitis     Retinal detachment with multiple breaks, right

## 2019-05-22 DIAGNOSIS — I10 ESSENTIAL HYPERTENSION: ICD-10-CM

## 2019-05-23 RX ORDER — ATENOLOL 25 MG/1
TABLET ORAL
Qty: 30 TABLET | Refills: 1 | Status: SHIPPED | OUTPATIENT
Start: 2019-05-23 | End: 2019-07-21 | Stop reason: SDUPTHER

## 2019-05-28 RX ORDER — LOPERAMIDE HYDROCHLORIDE 2 MG/1
CAPSULE ORAL
Qty: 30 CAPSULE | Refills: 2 | Status: SHIPPED | OUTPATIENT
Start: 2019-05-28 | End: 2019-08-20 | Stop reason: SDUPTHER

## 2019-05-28 RX ORDER — LOPERAMIDE HYDROCHLORIDE 2 MG/1
CAPSULE ORAL
Qty: 30 CAPSULE | Refills: 2 | Status: SHIPPED | OUTPATIENT
Start: 2019-05-28 | End: 2019-05-28 | Stop reason: SDUPTHER

## 2019-05-30 LAB
EKG ATRIAL RATE: 74 BPM
EKG P AXIS: 45 DEGREES
EKG P-R INTERVAL: 134 MS
EKG Q-T INTERVAL: 414 MS
EKG QRS DURATION: 94 MS
EKG QTC CALCULATION (BAZETT): 459 MS
EKG R AXIS: 18 DEGREES
EKG T AXIS: 65 DEGREES
EKG VENTRICULAR RATE: 74 BPM

## 2019-06-17 ENCOUNTER — TELEPHONE (OUTPATIENT)
Dept: PRIMARY CARE CLINIC | Age: 59
End: 2019-06-17

## 2019-06-17 DIAGNOSIS — M54.50 CHRONIC MIDLINE LOW BACK PAIN WITHOUT SCIATICA: ICD-10-CM

## 2019-06-17 DIAGNOSIS — M47.812 OSTEOARTHRITIS OF CERVICAL SPINE, UNSPECIFIED SPINAL OSTEOARTHRITIS COMPLICATION STATUS: ICD-10-CM

## 2019-06-17 DIAGNOSIS — G89.29 CHRONIC MIDLINE LOW BACK PAIN WITHOUT SCIATICA: ICD-10-CM

## 2019-06-17 DIAGNOSIS — M54.16 LUMBAR RADICULOPATHY: ICD-10-CM

## 2019-06-17 RX ORDER — OXYCODONE HYDROCHLORIDE AND ACETAMINOPHEN 5; 325 MG/1; MG/1
1 TABLET ORAL EVERY 8 HOURS PRN
Qty: 90 TABLET | Refills: 0 | Status: SHIPPED | OUTPATIENT
Start: 2019-06-17 | End: 2019-08-29 | Stop reason: SDUPTHER

## 2019-06-17 NOTE — TELEPHONE ENCOUNTER
Patient called and said he needs a refill on Sildenafil 100mg. He said he goes through [employer] [facility] that [facility] has to send through patient assistance.

## 2019-06-21 DIAGNOSIS — M47.816 OSTEOARTHRITIS OF LUMBAR SPINE, UNSPECIFIED SPINAL OSTEOARTHRITIS COMPLICATION STATUS: ICD-10-CM

## 2019-06-21 RX ORDER — CYCLOBENZAPRINE HCL 10 MG
TABLET ORAL
Qty: 30 TABLET | Refills: 3 | Status: SHIPPED | OUTPATIENT
Start: 2019-06-21 | End: 2019-10-14 | Stop reason: SDUPTHER

## 2019-06-21 RX ORDER — CLONIDINE HYDROCHLORIDE 0.1 MG/1
TABLET ORAL
Qty: 90 TABLET | Refills: 1 | Status: SHIPPED | OUTPATIENT
Start: 2019-06-21 | End: 2020-01-10

## 2019-07-17 ENCOUNTER — OFFICE VISIT (OUTPATIENT)
Dept: PRIMARY CARE CLINIC | Age: 59
End: 2019-07-17
Payer: MEDICARE

## 2019-07-17 VITALS
HEIGHT: 71 IN | HEART RATE: 83 BPM | OXYGEN SATURATION: 96 % | DIASTOLIC BLOOD PRESSURE: 78 MMHG | WEIGHT: 209.2 LBS | BODY MASS INDEX: 29.29 KG/M2 | SYSTOLIC BLOOD PRESSURE: 130 MMHG

## 2019-07-17 DIAGNOSIS — M47.816 LUMBAR SPONDYLOSIS: ICD-10-CM

## 2019-07-17 DIAGNOSIS — E11.69 DIABETES MELLITUS TYPE 2 IN OBESE (HCC): Primary | ICD-10-CM

## 2019-07-17 DIAGNOSIS — E66.9 DIABETES MELLITUS TYPE 2 IN OBESE (HCC): Primary | ICD-10-CM

## 2019-07-17 DIAGNOSIS — M47.812 OSTEOARTHRITIS OF CERVICAL SPINE, UNSPECIFIED SPINAL OSTEOARTHRITIS COMPLICATION STATUS: ICD-10-CM

## 2019-07-17 LAB
CREATININE URINE POCT: NORMAL
HBA1C MFR BLD: 5.6 %
MICROALBUMIN/CREAT 24H UR: NORMAL MG/G{CREAT}
MICROALBUMIN/CREAT UR-RTO: NORMAL

## 2019-07-17 PROCEDURE — 82044 UR ALBUMIN SEMIQUANTITATIVE: CPT | Performed by: FAMILY MEDICINE

## 2019-07-17 PROCEDURE — G8417 CALC BMI ABV UP PARAM F/U: HCPCS | Performed by: FAMILY MEDICINE

## 2019-07-17 PROCEDURE — 3017F COLORECTAL CA SCREEN DOC REV: CPT | Performed by: FAMILY MEDICINE

## 2019-07-17 PROCEDURE — 3044F HG A1C LEVEL LT 7.0%: CPT | Performed by: FAMILY MEDICINE

## 2019-07-17 PROCEDURE — 99214 OFFICE O/P EST MOD 30 MIN: CPT | Performed by: FAMILY MEDICINE

## 2019-07-17 PROCEDURE — 4004F PT TOBACCO SCREEN RCVD TLK: CPT | Performed by: FAMILY MEDICINE

## 2019-07-17 PROCEDURE — 83036 HEMOGLOBIN GLYCOSYLATED A1C: CPT | Performed by: FAMILY MEDICINE

## 2019-07-17 PROCEDURE — 2022F DILAT RTA XM EVC RTNOPTHY: CPT | Performed by: FAMILY MEDICINE

## 2019-07-17 PROCEDURE — G8427 DOCREV CUR MEDS BY ELIG CLIN: HCPCS | Performed by: FAMILY MEDICINE

## 2019-07-17 ASSESSMENT — ENCOUNTER SYMPTOMS
SHORTNESS OF BREATH: 0
WHEEZING: 0
EYE REDNESS: 0
NAUSEA: 0
COUGH: 0
DIARRHEA: 0
RHINORRHEA: 0
VOMITING: 0
ABDOMINAL PAIN: 0
SORE THROAT: 0
EYE DISCHARGE: 0

## 2019-07-17 NOTE — PROGRESS NOTES
cyclobenzaprine (FLEXERIL) 10 MG tablet TAKE ONE TABLET BY MOUTH DAILY 30 tablet 3    oxyCODONE-acetaminophen (PERCOCET) 5-325 MG per tablet Take 1 tablet by mouth every 8 hours as needed for Pain for up to 30 days. 90 tablet 0    loperamide (IMODIUM) 2 MG capsule TAKE ONE CAPSULE BY MOUTH FOUR TIMES A DAY AS NEEDED FOR DIARRHEA 30 capsule 2    atenolol (TENORMIN) 25 MG tablet TAKE ONE TABLET BY MOUTH DAILY 30 tablet 1    metFORMIN (GLUCOPHAGE) 500 MG tablet Take 1 tablet by mouth daily (with breakfast) 90 tablet 3    risperiDONE (RISPERDAL) 1 MG tablet Take 1 tablet by mouth nightly 90 tablet 3    tamsulosin (FLOMAX) 0.4 MG capsule Take 1 capsule by mouth daily 90 capsule 3    sildenafil (VIAGRA) 100 MG tablet Take 1 tablet by mouth daily as needed for Erectile Dysfunction 30 tablet 1    omeprazole (PRILOSEC) 40 MG delayed release capsule TAKE ONE CAPSULE BY MOUTH DAILY 52 capsule 4    atorvastatin (LIPITOR) 20 MG tablet Take 1 tablet by mouth daily 90 tablet 3    lamoTRIgine (LAMICTAL) 200 MG tablet Take 200 mg by mouth daily      acetaminophen (TYLENOL) 325 MG tablet Take 2 tablets by mouth every 4 hours as needed for Pain 120 tablet 3    DULoxetine (CYMBALTA) 60 MG extended release capsule Take 60 mg by mouth daily      hydrOXYzine (VISTARIL) 25 MG capsule Take 25 mg by mouth 3 times daily as needed for Itching (1-2 tabs)      QUEtiapine (SEROQUEL) 50 MG tablet Take 1 tablet by mouth nightly 60 tablet 3     No current facility-administered medications for this visit.       No Known Allergies    Health Maintenance   Topic Date Due    Diabetic foot exam  02/10/1971    Diabetic microalbuminuria test  02/10/1979    Flu vaccine (1) 09/01/2019    Lipid screen  02/19/2020    Low dose CT lung screening  02/26/2020    A1C test (Diabetic or Prediabetic)  03/18/2020    Colon cancer screen colonoscopy  05/02/2020    Diabetic retinal exam  06/25/2020    DTaP/Tdap/Td vaccine (2 - Td) 06/21/2028    Shingles Vaccine  Completed    Pneumococcal 0-64 years Vaccine  Completed    Hepatitis C screen  Completed    HIV screen  Completed       Subjective:      Review of Systems   Constitutional: Negative for chills and fever. HENT: Negative for rhinorrhea and sore throat. Eyes: Negative for discharge and redness. Respiratory: Negative for cough, shortness of breath and wheezing. Cardiovascular: Negative for chest pain and palpitations. Gastrointestinal: Negative for abdominal pain, diarrhea, nausea and vomiting. Genitourinary: Negative for dysuria and frequency. Musculoskeletal: Negative for arthralgias and myalgias. Neurological: Negative for dizziness, light-headedness and headaches. Psychiatric/Behavioral: Negative for sleep disturbance. Objective:     /78   Pulse 83   Ht 5' 11.04\" (1.804 m)   Wt 209 lb 3.2 oz (94.9 kg)   SpO2 96%   BMI 29.14 kg/m²   Physical Exam   Constitutional: He is oriented to person, place, and time. He appears well-developed and well-nourished. No distress. HENT:   Head: Normocephalic and atraumatic. Right Ear: External ear normal.   Left Ear: External ear normal.   Mouth/Throat: Oropharynx is clear and moist.   Eyes: Pupils are equal, round, and reactive to light. Conjunctivae are normal. Right eye exhibits no discharge. Left eye exhibits no discharge. No scleral icterus. Neck: No tracheal deviation present. No thyromegaly present. Cardiovascular: Normal rate, regular rhythm and normal heart sounds. No carotid bruits   Pulmonary/Chest: Effort normal and breath sounds normal. No respiratory distress. He has no wheezes. Musculoskeletal: He exhibits no edema. Lymphadenopathy:     He has no cervical adenopathy. Neurological: He is alert and oriented to person, place, and time. Diabetic foot check: Bunions: none . Hammertoes none. Plantar calluses none. No cyanosis or clubbing.   sensation intact on 6 of 6 test points with the 10 gram

## 2019-07-18 ENCOUNTER — TELEPHONE (OUTPATIENT)
Dept: UROLOGY | Age: 59
End: 2019-07-18

## 2019-07-18 DIAGNOSIS — E11.69 DIABETES MELLITUS TYPE 2 IN OBESE (HCC): Primary | ICD-10-CM

## 2019-07-18 DIAGNOSIS — N52.9 ERECTILE DYSFUNCTION, UNSPECIFIED ERECTILE DYSFUNCTION TYPE: ICD-10-CM

## 2019-07-18 DIAGNOSIS — E66.9 DIABETES MELLITUS TYPE 2 IN OBESE (HCC): Primary | ICD-10-CM

## 2019-07-18 RX ORDER — GLUCOSAMINE HCL/CHONDROITIN SU 500-400 MG
CAPSULE ORAL
Qty: 100 STRIP | Refills: 3 | Status: SHIPPED | OUTPATIENT
Start: 2019-07-18 | End: 2021-10-22 | Stop reason: SDUPTHER

## 2019-07-18 RX ORDER — DIPHENHYDRAMINE HYDROCHLORIDE 25 MG/1
CAPSULE, LIQUID FILLED ORAL
Qty: 1 KIT | Refills: 0 | Status: SHIPPED | OUTPATIENT
Start: 2019-07-18 | End: 2021-10-22 | Stop reason: SDUPTHER

## 2019-07-18 RX ORDER — SILDENAFIL 100 MG/1
100 TABLET, FILM COATED ORAL DAILY PRN
Qty: 30 TABLET | Refills: 1 | Status: SHIPPED | OUTPATIENT
Start: 2019-07-18 | End: 2020-09-16 | Stop reason: SDUPTHER

## 2019-07-19 ENCOUNTER — TELEPHONE (OUTPATIENT)
Dept: GASTROENTEROLOGY | Age: 59
End: 2019-07-19

## 2019-07-21 DIAGNOSIS — R19.7 DIARRHEA, UNSPECIFIED TYPE: ICD-10-CM

## 2019-07-21 DIAGNOSIS — I10 ESSENTIAL HYPERTENSION: ICD-10-CM

## 2019-07-21 DIAGNOSIS — M47.816 OSTEOARTHRITIS OF LUMBAR SPINE, UNSPECIFIED SPINAL OSTEOARTHRITIS COMPLICATION STATUS: ICD-10-CM

## 2019-07-21 DIAGNOSIS — K21.9 GASTROESOPHAGEAL REFLUX DISEASE WITHOUT ESOPHAGITIS: ICD-10-CM

## 2019-07-22 RX ORDER — MELOXICAM 7.5 MG/1
TABLET ORAL
Qty: 146 TABLET | Refills: 0 | Status: SHIPPED | OUTPATIENT
Start: 2019-07-22 | End: 2020-08-04

## 2019-07-22 RX ORDER — ATENOLOL 25 MG/1
TABLET ORAL
Qty: 30 TABLET | Refills: 0 | Status: SHIPPED | OUTPATIENT
Start: 2019-07-22 | End: 2019-08-20 | Stop reason: SDUPTHER

## 2019-07-22 RX ORDER — OMEPRAZOLE 40 MG/1
CAPSULE, DELAYED RELEASE ORAL
Qty: 30 CAPSULE | Refills: 2 | Status: SHIPPED | OUTPATIENT
Start: 2019-07-22 | End: 2019-10-16 | Stop reason: SDUPTHER

## 2019-08-14 DIAGNOSIS — F41.9 ANXIETY: ICD-10-CM

## 2019-08-14 RX ORDER — DIAZEPAM 5 MG/1
5 TABLET ORAL EVERY 12 HOURS PRN
Qty: 60 TABLET | Refills: 2 | Status: SHIPPED | OUTPATIENT
Start: 2019-08-14 | End: 2019-10-29 | Stop reason: SDUPTHER

## 2019-08-20 DIAGNOSIS — I10 ESSENTIAL HYPERTENSION: ICD-10-CM

## 2019-08-20 RX ORDER — LOPERAMIDE HYDROCHLORIDE 2 MG/1
CAPSULE ORAL
Qty: 30 CAPSULE | Refills: 1 | Status: SHIPPED | OUTPATIENT
Start: 2019-08-20 | End: 2019-09-24 | Stop reason: SDUPTHER

## 2019-08-20 RX ORDER — ATENOLOL 25 MG/1
TABLET ORAL
Qty: 30 TABLET | Refills: 0 | Status: SHIPPED | OUTPATIENT
Start: 2019-08-20 | End: 2019-09-19 | Stop reason: SDUPTHER

## 2019-08-22 ENCOUNTER — TELEPHONE (OUTPATIENT)
Dept: PRIMARY CARE CLINIC | Age: 59
End: 2019-08-22

## 2019-08-29 DIAGNOSIS — M54.50 CHRONIC MIDLINE LOW BACK PAIN WITHOUT SCIATICA: ICD-10-CM

## 2019-08-29 DIAGNOSIS — M47.812 OSTEOARTHRITIS OF CERVICAL SPINE, UNSPECIFIED SPINAL OSTEOARTHRITIS COMPLICATION STATUS: ICD-10-CM

## 2019-08-29 DIAGNOSIS — G89.29 CHRONIC MIDLINE LOW BACK PAIN WITHOUT SCIATICA: ICD-10-CM

## 2019-08-29 DIAGNOSIS — M54.16 LUMBAR RADICULOPATHY: ICD-10-CM

## 2019-08-29 RX ORDER — OXYCODONE HYDROCHLORIDE AND ACETAMINOPHEN 5; 325 MG/1; MG/1
1 TABLET ORAL EVERY 8 HOURS PRN
Qty: 90 TABLET | Refills: 0 | Status: SHIPPED | OUTPATIENT
Start: 2019-08-29 | End: 2019-10-29 | Stop reason: SDUPTHER

## 2019-09-12 ENCOUNTER — TELEPHONE (OUTPATIENT)
Dept: PRIMARY CARE CLINIC | Age: 59
End: 2019-09-12

## 2019-09-13 RX ORDER — AMOXICILLIN 500 MG/1
500 CAPSULE ORAL 3 TIMES DAILY
Qty: 30 CAPSULE | Refills: 0 | Status: SHIPPED | OUTPATIENT
Start: 2019-09-13 | End: 2019-09-23

## 2019-09-17 RX ORDER — LOPERAMIDE HYDROCHLORIDE 2 MG/1
CAPSULE ORAL
Qty: 30 CAPSULE | Refills: 0 | OUTPATIENT
Start: 2019-09-17

## 2019-09-19 DIAGNOSIS — I10 ESSENTIAL HYPERTENSION: ICD-10-CM

## 2019-09-19 DIAGNOSIS — M47.816 OSTEOARTHRITIS OF LUMBAR SPINE, UNSPECIFIED SPINAL OSTEOARTHRITIS COMPLICATION STATUS: ICD-10-CM

## 2019-09-19 RX ORDER — MELOXICAM 7.5 MG/1
TABLET ORAL
Qty: 146 TABLET | Refills: 0 | Status: SHIPPED | OUTPATIENT
Start: 2019-09-19 | End: 2019-12-11 | Stop reason: SDUPTHER

## 2019-09-19 RX ORDER — ATENOLOL 25 MG/1
TABLET ORAL
Qty: 30 TABLET | Refills: 0 | Status: SHIPPED | OUTPATIENT
Start: 2019-09-19 | End: 2019-10-16 | Stop reason: SDUPTHER

## 2019-09-23 ENCOUNTER — TELEPHONE (OUTPATIENT)
Dept: GASTROENTEROLOGY | Age: 59
End: 2019-09-23

## 2019-09-24 RX ORDER — LOPERAMIDE HYDROCHLORIDE 2 MG/1
CAPSULE ORAL
Qty: 30 CAPSULE | Refills: 1 | Status: SHIPPED | OUTPATIENT
Start: 2019-09-24 | End: 2020-08-04

## 2019-10-14 DIAGNOSIS — M47.816 OSTEOARTHRITIS OF LUMBAR SPINE, UNSPECIFIED SPINAL OSTEOARTHRITIS COMPLICATION STATUS: ICD-10-CM

## 2019-10-15 RX ORDER — CYCLOBENZAPRINE HCL 10 MG
TABLET ORAL
Qty: 30 TABLET | Refills: 2 | Status: SHIPPED | OUTPATIENT
Start: 2019-10-15 | End: 2020-01-10

## 2019-10-16 DIAGNOSIS — R19.7 DIARRHEA, UNSPECIFIED TYPE: ICD-10-CM

## 2019-10-16 DIAGNOSIS — I10 ESSENTIAL HYPERTENSION: ICD-10-CM

## 2019-10-16 DIAGNOSIS — K21.9 GASTROESOPHAGEAL REFLUX DISEASE WITHOUT ESOPHAGITIS: ICD-10-CM

## 2019-10-18 RX ORDER — OMEPRAZOLE 40 MG/1
CAPSULE, DELAYED RELEASE ORAL
Qty: 30 CAPSULE | Refills: 3 | Status: SHIPPED | OUTPATIENT
Start: 2019-10-18 | End: 2020-02-10

## 2019-10-18 RX ORDER — ATENOLOL 25 MG/1
TABLET ORAL
Qty: 30 TABLET | Refills: 3 | Status: SHIPPED | OUTPATIENT
Start: 2019-10-18 | End: 2020-02-10

## 2019-10-23 ENCOUNTER — TELEPHONE (OUTPATIENT)
Dept: PRIMARY CARE CLINIC | Age: 59
End: 2019-10-23

## 2019-10-24 DIAGNOSIS — M47.812 OSTEOARTHRITIS OF CERVICAL SPINE, UNSPECIFIED SPINAL OSTEOARTHRITIS COMPLICATION STATUS: ICD-10-CM

## 2019-10-24 DIAGNOSIS — G89.29 CHRONIC MIDLINE LOW BACK PAIN WITHOUT SCIATICA: ICD-10-CM

## 2019-10-24 DIAGNOSIS — M54.50 CHRONIC MIDLINE LOW BACK PAIN WITHOUT SCIATICA: ICD-10-CM

## 2019-10-24 DIAGNOSIS — M54.16 LUMBAR RADICULOPATHY: ICD-10-CM

## 2019-10-24 RX ORDER — OXYCODONE HYDROCHLORIDE AND ACETAMINOPHEN 5; 325 MG/1; MG/1
1 TABLET ORAL EVERY 8 HOURS PRN
Qty: 90 TABLET | Refills: 0 | OUTPATIENT
Start: 2019-10-24 | End: 2019-11-23

## 2019-10-29 DIAGNOSIS — M54.16 LUMBAR RADICULOPATHY: ICD-10-CM

## 2019-10-29 DIAGNOSIS — K63.5 COLON POLYPOSIS: ICD-10-CM

## 2019-10-29 DIAGNOSIS — G89.29 CHRONIC MIDLINE LOW BACK PAIN WITHOUT SCIATICA: ICD-10-CM

## 2019-10-29 DIAGNOSIS — M54.50 CHRONIC MIDLINE LOW BACK PAIN WITHOUT SCIATICA: ICD-10-CM

## 2019-10-29 DIAGNOSIS — F41.9 ANXIETY: ICD-10-CM

## 2019-10-29 DIAGNOSIS — M47.812 OSTEOARTHRITIS OF CERVICAL SPINE, UNSPECIFIED SPINAL OSTEOARTHRITIS COMPLICATION STATUS: ICD-10-CM

## 2019-10-29 PROBLEM — K29.70 GASTRITIS: Status: ACTIVE | Noted: 2019-10-29

## 2019-10-29 RX ORDER — OXYCODONE HYDROCHLORIDE AND ACETAMINOPHEN 5; 325 MG/1; MG/1
1 TABLET ORAL EVERY 8 HOURS PRN
Qty: 90 TABLET | Refills: 0 | Status: SHIPPED | OUTPATIENT
Start: 2019-10-29 | End: 2019-12-31 | Stop reason: SDUPTHER

## 2019-10-29 RX ORDER — DIAZEPAM 5 MG/1
5 TABLET ORAL EVERY 12 HOURS PRN
Qty: 60 TABLET | Refills: 0 | Status: SHIPPED | OUTPATIENT
Start: 2019-10-29 | End: 2019-12-17 | Stop reason: SDUPTHER

## 2019-11-13 DIAGNOSIS — E78.2 MIXED HYPERLIPIDEMIA: ICD-10-CM

## 2019-11-14 RX ORDER — ATORVASTATIN CALCIUM 20 MG/1
TABLET, FILM COATED ORAL
Qty: 90 TABLET | Refills: 3 | Status: SHIPPED | OUTPATIENT
Start: 2019-11-14 | End: 2020-11-02

## 2019-11-15 ENCOUNTER — TELEPHONE (OUTPATIENT)
Dept: UROLOGY | Age: 59
End: 2019-11-15

## 2019-11-15 DIAGNOSIS — Z12.5 SCREENING FOR PROSTATE CANCER: Primary | ICD-10-CM

## 2019-11-18 ENCOUNTER — HOSPITAL ENCOUNTER (OUTPATIENT)
Age: 59
Discharge: HOME | End: 2019-11-18
Payer: MEDICARE

## 2019-11-18 DIAGNOSIS — Z12.5 SCREENING PSA (PROSTATE SPECIFIC ANTIGEN): ICD-10-CM

## 2019-11-18 LAB — PROSTATE SPECIFIC ANTIGEN: 2.32 UG/L

## 2019-11-18 PROCEDURE — G0103 PSA SCREENING: HCPCS

## 2019-11-18 PROCEDURE — 36415 COLL VENOUS BLD VENIPUNCTURE: CPT

## 2019-11-19 ENCOUNTER — OFFICE VISIT (OUTPATIENT)
Dept: UROLOGY | Age: 59
End: 2019-11-19
Payer: MEDICARE

## 2019-11-19 VITALS
DIASTOLIC BLOOD PRESSURE: 87 MMHG | SYSTOLIC BLOOD PRESSURE: 131 MMHG | HEART RATE: 88 BPM | HEIGHT: 71 IN | WEIGHT: 211 LBS | TEMPERATURE: 97.6 F | BODY MASS INDEX: 29.54 KG/M2

## 2019-11-19 DIAGNOSIS — R35.1 BENIGN PROSTATIC HYPERPLASIA WITH NOCTURIA: Primary | ICD-10-CM

## 2019-11-19 DIAGNOSIS — N40.1 BENIGN PROSTATIC HYPERPLASIA WITH NOCTURIA: Primary | ICD-10-CM

## 2019-11-19 DIAGNOSIS — N52.01 ERECTILE DYSFUNCTION DUE TO ARTERIAL INSUFFICIENCY: ICD-10-CM

## 2019-11-19 DIAGNOSIS — R35.1 NOCTURIA: ICD-10-CM

## 2019-11-19 PROCEDURE — 3017F COLORECTAL CA SCREEN DOC REV: CPT | Performed by: UROLOGY

## 2019-11-19 PROCEDURE — G8417 CALC BMI ABV UP PARAM F/U: HCPCS | Performed by: UROLOGY

## 2019-11-19 PROCEDURE — G8484 FLU IMMUNIZE NO ADMIN: HCPCS | Performed by: UROLOGY

## 2019-11-19 PROCEDURE — G8427 DOCREV CUR MEDS BY ELIG CLIN: HCPCS | Performed by: UROLOGY

## 2019-11-19 PROCEDURE — 99213 OFFICE O/P EST LOW 20 MIN: CPT | Performed by: UROLOGY

## 2019-11-19 PROCEDURE — 4004F PT TOBACCO SCREEN RCVD TLK: CPT | Performed by: UROLOGY

## 2019-11-19 ASSESSMENT — ENCOUNTER SYMPTOMS
SHORTNESS OF BREATH: 0
BACK PAIN: 0
VOMITING: 0
CONSTIPATION: 0
COUGH: 0
EYE PAIN: 0
NAUSEA: 0
EYE REDNESS: 0
ABDOMINAL PAIN: 0
DIARRHEA: 0
WHEEZING: 0

## 2019-11-27 ENCOUNTER — OFFICE VISIT (OUTPATIENT)
Dept: PRIMARY CARE CLINIC | Age: 59
End: 2019-11-27
Payer: MEDICARE

## 2019-11-27 VITALS
OXYGEN SATURATION: 94 % | SYSTOLIC BLOOD PRESSURE: 136 MMHG | DIASTOLIC BLOOD PRESSURE: 84 MMHG | BODY MASS INDEX: 29.4 KG/M2 | HEART RATE: 86 BPM | WEIGHT: 210 LBS | HEIGHT: 71 IN

## 2019-11-27 DIAGNOSIS — Z23 NEED FOR VIRAL IMMUNIZATION: ICD-10-CM

## 2019-11-27 DIAGNOSIS — E66.9 DIABETES MELLITUS TYPE 2 IN OBESE (HCC): Primary | ICD-10-CM

## 2019-11-27 DIAGNOSIS — M50.30 DEGENERATIVE DISC DISEASE, CERVICAL: ICD-10-CM

## 2019-11-27 DIAGNOSIS — M47.816 LUMBAR FACET ARTHROPATHY: ICD-10-CM

## 2019-11-27 DIAGNOSIS — E11.69 DIABETES MELLITUS TYPE 2 IN OBESE (HCC): Primary | ICD-10-CM

## 2019-11-27 DIAGNOSIS — Z79.899 HIGH RISK MEDICATION USE: ICD-10-CM

## 2019-11-27 LAB
AMPHETAMINE SCREEN, URINE: NEGATIVE
BARBITURATE SCREEN, URINE: NEGATIVE
BENZODIAZEPINE SCREEN, URINE: POSITIVE
BUPRENORPHINE URINE: NEGATIVE
COCAINE METABOLITE SCREEN URINE: NEGATIVE
GABAPENTIN SCREEN, URINE: NEGATIVE
HBA1C MFR BLD: 6.3 %
MDMA URINE: NEGATIVE
METHADONE SCREEN, URINE: NEGATIVE
METHAMPHETAMINE, URINE: NEGATIVE
OPIATE SCREEN URINE: NEGATIVE
OXYCODONE SCREEN URINE: POSITIVE
PHENCYCLIDINE SCREEN URINE: NEGATIVE
PROPOXYPHENE SCREEN, URINE: NEGATIVE
THC SCREEN, URINE: NEGATIVE
TRICYCLIC ANTIDEPRESSANTS, UR: POSITIVE

## 2019-11-27 PROCEDURE — G0008 ADMIN INFLUENZA VIRUS VAC: HCPCS | Performed by: FAMILY MEDICINE

## 2019-11-27 PROCEDURE — 3044F HG A1C LEVEL LT 7.0%: CPT | Performed by: FAMILY MEDICINE

## 2019-11-27 PROCEDURE — 80305 DRUG TEST PRSMV DIR OPT OBS: CPT | Performed by: FAMILY MEDICINE

## 2019-11-27 PROCEDURE — 2022F DILAT RTA XM EVC RTNOPTHY: CPT | Performed by: FAMILY MEDICINE

## 2019-11-27 PROCEDURE — 4004F PT TOBACCO SCREEN RCVD TLK: CPT | Performed by: FAMILY MEDICINE

## 2019-11-27 PROCEDURE — 83036 HEMOGLOBIN GLYCOSYLATED A1C: CPT | Performed by: FAMILY MEDICINE

## 2019-11-27 PROCEDURE — G8482 FLU IMMUNIZE ORDER/ADMIN: HCPCS | Performed by: FAMILY MEDICINE

## 2019-11-27 PROCEDURE — 3017F COLORECTAL CA SCREEN DOC REV: CPT | Performed by: FAMILY MEDICINE

## 2019-11-27 PROCEDURE — G8417 CALC BMI ABV UP PARAM F/U: HCPCS | Performed by: FAMILY MEDICINE

## 2019-11-27 PROCEDURE — G8427 DOCREV CUR MEDS BY ELIG CLIN: HCPCS | Performed by: FAMILY MEDICINE

## 2019-11-27 PROCEDURE — 90686 IIV4 VACC NO PRSV 0.5 ML IM: CPT | Performed by: FAMILY MEDICINE

## 2019-11-27 PROCEDURE — 99214 OFFICE O/P EST MOD 30 MIN: CPT | Performed by: FAMILY MEDICINE

## 2019-11-27 ASSESSMENT — ENCOUNTER SYMPTOMS
SORE THROAT: 0
WHEEZING: 0
ABDOMINAL PAIN: 0
EYE REDNESS: 0
EYE DISCHARGE: 0
NAUSEA: 0
BACK PAIN: 1
COUGH: 0
VOMITING: 0
RHINORRHEA: 0
SHORTNESS OF BREATH: 0
DIARRHEA: 0

## 2019-12-11 DIAGNOSIS — M47.816 OSTEOARTHRITIS OF LUMBAR SPINE, UNSPECIFIED SPINAL OSTEOARTHRITIS COMPLICATION STATUS: ICD-10-CM

## 2019-12-11 RX ORDER — MELOXICAM 7.5 MG/1
TABLET ORAL
Qty: 146 TABLET | Refills: 0 | Status: SHIPPED | OUTPATIENT
Start: 2019-12-11 | End: 2020-02-10

## 2019-12-17 DIAGNOSIS — F41.9 ANXIETY: ICD-10-CM

## 2019-12-17 RX ORDER — DIAZEPAM 5 MG/1
5 TABLET ORAL EVERY 12 HOURS PRN
Qty: 60 TABLET | Refills: 0 | Status: SHIPPED | OUTPATIENT
Start: 2019-12-17 | End: 2020-01-28 | Stop reason: SDUPTHER

## 2019-12-31 DIAGNOSIS — M54.50 CHRONIC MIDLINE LOW BACK PAIN WITHOUT SCIATICA: ICD-10-CM

## 2019-12-31 DIAGNOSIS — M54.16 LUMBAR RADICULOPATHY: ICD-10-CM

## 2019-12-31 DIAGNOSIS — M47.812 OSTEOARTHRITIS OF CERVICAL SPINE, UNSPECIFIED SPINAL OSTEOARTHRITIS COMPLICATION STATUS: ICD-10-CM

## 2019-12-31 DIAGNOSIS — G89.29 CHRONIC MIDLINE LOW BACK PAIN WITHOUT SCIATICA: ICD-10-CM

## 2019-12-31 RX ORDER — OXYCODONE HYDROCHLORIDE AND ACETAMINOPHEN 5; 325 MG/1; MG/1
1 TABLET ORAL EVERY 8 HOURS PRN
Qty: 90 TABLET | Refills: 0 | Status: SHIPPED | OUTPATIENT
Start: 2019-12-31 | End: 2020-02-27 | Stop reason: SDUPTHER

## 2020-01-10 RX ORDER — CLONIDINE HYDROCHLORIDE 0.1 MG/1
TABLET ORAL
Qty: 90 TABLET | Refills: 0 | Status: SHIPPED | OUTPATIENT
Start: 2020-01-10 | End: 2020-04-08

## 2020-01-10 RX ORDER — CYCLOBENZAPRINE HCL 10 MG
TABLET ORAL
Qty: 30 TABLET | Refills: 3 | Status: SHIPPED | OUTPATIENT
Start: 2020-01-10 | End: 2020-05-08

## 2020-01-27 ENCOUNTER — TELEPHONE (OUTPATIENT)
Dept: PRIMARY CARE CLINIC | Age: 60
End: 2020-01-27

## 2020-01-27 NOTE — TELEPHONE ENCOUNTER
Left. Message to call office back. He will need to call patient assistance I am not sure why they are not sending more to him.

## 2020-01-28 RX ORDER — DIAZEPAM 5 MG/1
5 TABLET ORAL EVERY 12 HOURS PRN
Qty: 60 TABLET | Refills: 0 | Status: SHIPPED | OUTPATIENT
Start: 2020-01-28 | End: 2020-02-25 | Stop reason: SDUPTHER

## 2020-02-10 RX ORDER — MELOXICAM 7.5 MG/1
TABLET ORAL
Qty: 146 TABLET | Refills: 0 | Status: SHIPPED | OUTPATIENT
Start: 2020-02-10 | End: 2020-04-08

## 2020-02-10 RX ORDER — ATENOLOL 25 MG/1
TABLET ORAL
Qty: 30 TABLET | Refills: 2 | Status: SHIPPED | OUTPATIENT
Start: 2020-02-10 | End: 2020-05-08

## 2020-02-10 RX ORDER — OMEPRAZOLE 40 MG/1
CAPSULE, DELAYED RELEASE ORAL
Qty: 30 CAPSULE | Refills: 2 | Status: SHIPPED | OUTPATIENT
Start: 2020-02-10 | End: 2020-05-08

## 2020-02-25 RX ORDER — DIAZEPAM 5 MG/1
5 TABLET ORAL EVERY 12 HOURS PRN
Qty: 60 TABLET | Refills: 0 | Status: SHIPPED | OUTPATIENT
Start: 2020-02-25 | End: 2020-04-14 | Stop reason: SDUPTHER

## 2020-02-27 ENCOUNTER — OFFICE VISIT (OUTPATIENT)
Dept: PRIMARY CARE CLINIC | Age: 60
End: 2020-02-27
Payer: MEDICARE

## 2020-02-27 VITALS
HEIGHT: 71 IN | SYSTOLIC BLOOD PRESSURE: 142 MMHG | HEART RATE: 83 BPM | BODY MASS INDEX: 29.85 KG/M2 | OXYGEN SATURATION: 93 % | DIASTOLIC BLOOD PRESSURE: 96 MMHG | WEIGHT: 213.2 LBS

## 2020-02-27 PROBLEM — E11.69 DIABETES MELLITUS TYPE 2 IN OBESE (HCC): Status: ACTIVE | Noted: 2020-02-27

## 2020-02-27 PROBLEM — E66.9 DIABETES MELLITUS TYPE 2 IN OBESE (HCC): Status: ACTIVE | Noted: 2020-02-27

## 2020-02-27 PROCEDURE — G0439 PPPS, SUBSEQ VISIT: HCPCS | Performed by: FAMILY MEDICINE

## 2020-02-27 PROCEDURE — 3046F HEMOGLOBIN A1C LEVEL >9.0%: CPT | Performed by: FAMILY MEDICINE

## 2020-02-27 PROCEDURE — G8482 FLU IMMUNIZE ORDER/ADMIN: HCPCS | Performed by: FAMILY MEDICINE

## 2020-02-27 PROCEDURE — 3017F COLORECTAL CA SCREEN DOC REV: CPT | Performed by: FAMILY MEDICINE

## 2020-02-27 RX ORDER — OXYCODONE HYDROCHLORIDE AND ACETAMINOPHEN 5; 325 MG/1; MG/1
1 TABLET ORAL EVERY 8 HOURS PRN
Qty: 90 TABLET | Refills: 0 | Status: SHIPPED | OUTPATIENT
Start: 2020-02-27 | End: 2020-04-23 | Stop reason: SDUPTHER

## 2020-02-27 ASSESSMENT — PATIENT HEALTH QUESTIONNAIRE - PHQ9
SUM OF ALL RESPONSES TO PHQ QUESTIONS 1-9: 2
SUM OF ALL RESPONSES TO PHQ QUESTIONS 1-9: 2

## 2020-02-27 NOTE — PATIENT INSTRUCTIONS
Personalized Preventive Plan for Devan Leonard - 2/27/2020  Medicare offers a range of preventive health benefits. Some of the tests and screenings are paid in full while other may be subject to a deductible, co-insurance, and/or copay. Some of these benefits include a comprehensive review of your medical history including lifestyle, illnesses that may run in your family, and various assessments and screenings as appropriate. After reviewing your medical record and screening and assessments performed today your provider may have ordered immunizations, labs, imaging, and/or referrals for you. A list of these orders (if applicable) as well as your Preventive Care list are included within your After Visit Summary for your review. Other Preventive Recommendations:    · A preventive eye exam performed by an eye specialist is recommended every 1-2 years to screen for glaucoma; cataracts, macular degeneration, and other eye disorders. · A preventive dental visit is recommended every 6 months. · Try to get at least 150 minutes of exercise per week or 10,000 steps per day on a pedometer . · Order or download the FREE \"Exercise & Physical Activity: Your Everyday Guide\" from [employer]. Call 6-967.950.1677 or search [employer] online. · You need 1700-3064 mg of calcium and 6529-4765 IU of vitamin D per day. It is possible to meet your calcium requirement with diet alone, but a vitamin D supplement is usually necessary to meet this goal.  · When exposed to the sun, use a sunscreen that protects against both UVA and UVB radiation with an SPF of 30 or greater. Reapply every 2 to 3 hours or after sweating, drying off with a towel, or swimming. · Always wear a seat belt when traveling in a car. Always wear a helmet when riding a bicycle or motorcycle.

## 2020-02-27 NOTE — PROGRESS NOTES
and Oncology)  Rima Horton RN as Nurse Navigator    Wt Readings from Last 3 Encounters:   02/27/20 213 lb 3.2 oz (96.7 kg)   11/27/19 210 lb (95.3 kg)   11/19/19 211 lb (95.7 kg)     Vitals:    02/27/20 1303   BP: (!) 142/96   Pulse: 83   SpO2: 93%   Weight: 213 lb 3.2 oz (96.7 kg)   Height: 5' 11.04\" (1.804 m)     Body mass index is 29.7 kg/m². Based upon direct observation of the patient, evaluation of cognition reveals recent and remote memory intact. General Appearance: alert and oriented to person, place and time, well developed and well- nourished, in no acute distress  Skin: warm and dry, no rash or erythema  Head: normocephalic and atraumatic  Eyes: pupils equal, round, and reactive to light, extraocular eye movements intact, conjunctivae normal  ENT: tympanic membrane, external ear and ear canal normal bilaterally, nose without deformity, nasal mucosa and turbinates normal without polyps  Neck: supple and non-tender without mass, no thyromegaly or thyroid nodules, no cervical lymphadenopathy  Pulmonary/Chest: clear to auscultation bilaterally- no wheezes, rales or rhonchi, normal air movement, no respiratory distress  Cardiovascular: normal rate, regular rhythm, normal S1 and S2, no murmurs, rubs, clicks, or gallops, distal pulses intact, no carotid bruits  Abdomen: soft, non-tender, non-distended, normal bowel sounds, no masses or organomegaly  Extremities: no cyanosis, clubbing or edema  Musculoskeletal: normal range of motion, no joint swelling, deformity or tenderness  Neurologic: reflexes normal and symmetric, no cranial nerve deficit, gait, coordination and speech normal    Patient's complete Health Risk Assessment and screening values have been reviewed and are found in Flowsheets. The following problems were reviewed today and where indicated follow up appointments were made and/or referrals ordered.     Positive Risk Factor Screenings with Interventions:     Substance Abuse:  Social bathtubs/showers?: (!) No  Do all of your stairways have a railing or banister?: Yes  Are your doorways, halls and stairs free of clutter?: Yes  Do you always fasten your seatbelt when you are in a car?: Yes  Safety Interventions:  · Patient declines any further evaluation/treatment for this issue    Personalized Preventive Plan   Current Health Maintenance Status  Immunization History   Administered Date(s) Administered    Influenza, Quadv, IM, PF (6 mo and older Fluzone, Flulaval, Fluarix, and 3 yrs and older Afluria) 11/15/2016, 09/15/2017, 09/27/2018, 11/27/2019    Pneumococcal Conjugate 13-valent ([id number]) 11/15/2016    Pneumococcal Polysaccharide ([id number]) 09/15/2017    Tdap (Boostrix, Adacel) 06/21/2018    Zoster Recombinant (Shingrix) 01/16/2019, 04/17/2019        Health Maintenance   Topic Date Due    Hepatitis B vaccine (1 of 3 - Risk 3-dose series) 02/10/1980    Annual Wellness Visit (AWV)  05/29/2019    Low dose CT lung screening  02/26/2020    Lipid screen  02/19/2020    Diabetic retinal exam  06/25/2020    Diabetic foot exam  07/17/2020    Diabetic microalbuminuria test  07/17/2020    A1C test (Diabetic or Prediabetic)  11/27/2020    Colon cancer screen colonoscopy  05/02/2022    DTaP/Tdap/Td vaccine (2 - Td) 06/21/2028    Flu vaccine  Completed    Shingles Vaccine  Completed    Pneumococcal 0-64 years Vaccine  Completed    Hepatitis C screen  Completed    HIV screen  Completed    Hepatitis A vaccine  Aged Out    Hib vaccine  Aged Out    Meningococcal (ACWY) vaccine  Aged Out     Recommendations for [place] Due: see orders and patient instructions/AVS.  . Recommended screening schedule for the next 5-10 years is provided to the patient in written form: see Patient Instructions/AVS.    Mari Sprain was seen today for medicare awv.     Diagnoses and all orders for this visit:    Routine general medical examination at a health care facility    Sacroiliitis, not elsewhere classified ([facility] [address]    [facility]    Diabetes mellitus type 2 in obese [facility])        Patient states using oxycodone for all her own pain. Denies any street drugs. Otherwise unremarkable. States his back pain is unchanged. His mood is not had any alterations. Taking medication as prescribed. Denies any thoughts of hurting himself or hurting others. Patient states is cut back on smoking and is down to 1 pack/day.

## 2020-03-12 ENCOUNTER — TELEPHONE (OUTPATIENT)
Dept: PRIMARY CARE CLINIC | Age: 60
End: 2020-03-12

## 2020-03-12 NOTE — TELEPHONE ENCOUNTER
Patient calling and sattes he needs to be re-enrroled into [facility] for his sildenafil (VIAGRA) 100 MG tablet. Please advise patient when done.         [phone]  Fax [phone]    Patient  [phone]

## 2020-03-19 ENCOUNTER — TELEPHONE (OUTPATIENT)
Dept: PRIMARY CARE CLINIC | Age: 60
End: 2020-03-19

## 2020-03-19 NOTE — TELEPHONE ENCOUNTER
Patient notified, patient assistance here for him to fill out so we can send in to them for his viagra he said he will come in to get later this week.

## 2020-04-08 RX ORDER — CLONIDINE HYDROCHLORIDE 0.1 MG/1
TABLET ORAL
Qty: 90 TABLET | Refills: 0 | Status: SHIPPED | OUTPATIENT
Start: 2020-04-08 | End: 2020-06-08

## 2020-04-08 RX ORDER — TAMSULOSIN HYDROCHLORIDE 0.4 MG/1
CAPSULE ORAL
Qty: 90 CAPSULE | Refills: 2 | Status: SHIPPED | OUTPATIENT
Start: 2020-04-08 | End: 2021-01-04

## 2020-04-08 RX ORDER — MELOXICAM 7.5 MG/1
TABLET ORAL
Qty: 146 TABLET | Refills: 0 | Status: SHIPPED | OUTPATIENT
Start: 2020-04-08 | End: 2020-06-08

## 2020-04-14 RX ORDER — DIAZEPAM 5 MG/1
5 TABLET ORAL EVERY 12 HOURS PRN
Qty: 60 TABLET | Refills: 0 | Status: SHIPPED | OUTPATIENT
Start: 2020-04-14 | End: 2020-05-26 | Stop reason: SDUPTHER

## 2020-04-23 RX ORDER — OXYCODONE HYDROCHLORIDE AND ACETAMINOPHEN 5; 325 MG/1; MG/1
1 TABLET ORAL EVERY 8 HOURS PRN
Qty: 90 TABLET | Refills: 0 | Status: SHIPPED | OUTPATIENT
Start: 2020-04-23 | End: 2020-06-23 | Stop reason: SDUPTHER

## 2020-05-04 ENCOUNTER — OFFICE VISIT (OUTPATIENT)
Dept: PRIMARY CARE CLINIC | Age: 60
End: 2020-05-04
Payer: MEDICARE

## 2020-05-04 VITALS
SYSTOLIC BLOOD PRESSURE: 134 MMHG | HEIGHT: 71 IN | DIASTOLIC BLOOD PRESSURE: 82 MMHG | BODY MASS INDEX: 28.03 KG/M2 | HEART RATE: 90 BPM | WEIGHT: 200.2 LBS | OXYGEN SATURATION: 96 % | TEMPERATURE: 98 F

## 2020-05-04 LAB — HBA1C MFR BLD: 6 %

## 2020-05-04 PROCEDURE — 3046F HEMOGLOBIN A1C LEVEL >9.0%: CPT | Performed by: FAMILY MEDICINE

## 2020-05-04 PROCEDURE — G8427 DOCREV CUR MEDS BY ELIG CLIN: HCPCS | Performed by: FAMILY MEDICINE

## 2020-05-04 PROCEDURE — 99214 OFFICE O/P EST MOD 30 MIN: CPT | Performed by: FAMILY MEDICINE

## 2020-05-04 PROCEDURE — 83036 HEMOGLOBIN GLYCOSYLATED A1C: CPT | Performed by: FAMILY MEDICINE

## 2020-05-04 PROCEDURE — 2022F DILAT RTA XM EVC RTNOPTHY: CPT | Performed by: FAMILY MEDICINE

## 2020-05-04 PROCEDURE — 4004F PT TOBACCO SCREEN RCVD TLK: CPT | Performed by: FAMILY MEDICINE

## 2020-05-04 PROCEDURE — 3017F COLORECTAL CA SCREEN DOC REV: CPT | Performed by: FAMILY MEDICINE

## 2020-05-04 PROCEDURE — G8417 CALC BMI ABV UP PARAM F/U: HCPCS | Performed by: FAMILY MEDICINE

## 2020-05-04 ASSESSMENT — ENCOUNTER SYMPTOMS
SHORTNESS OF BREATH: 0
ABDOMINAL PAIN: 0
RHINORRHEA: 0
EYE REDNESS: 0
VOMITING: 0
SORE THROAT: 0
WHEEZING: 0
NAUSEA: 0
DIARRHEA: 0
EYE DISCHARGE: 0
COUGH: 0

## 2020-05-04 NOTE — PROGRESS NOTES
Dispense Refill    oxyCODONE-acetaminophen (PERCOCET) 5-325 MG per tablet Take 1 tablet by mouth every 8 hours as needed for Pain for up to 30 days. 90 tablet 0    diazePAM (VALIUM) 5 MG tablet Take 1 tablet by mouth every 12 hours as needed for Anxiety or Sleep for up to 30 days. 60 tablet 0    tamsulosin (FLOMAX) 0.4 MG capsule TAKE ONE CAPSULE BY MOUTH DAILY 90 capsule 2    cloNIDine (CATAPRES) 0.1 MG tablet TAKE ONE TABLET BY MOUTH DAILY 90 tablet 0    metFORMIN (GLUCOPHAGE) 500 MG tablet TAKE ONE TABLET BY MOUTH DAILY WITH BREAKFAST 90 tablet 2    meloxicam (MOBIC) 7.5 MG tablet TAKE ONE TABLET BY MOUTH TWICE A  tablet 0    atenolol (TENORMIN) 25 MG tablet TAKE ONE TABLET BY MOUTH DAILY 30 tablet 2    omeprazole (PRILOSEC) 40 MG delayed release capsule TAKE ONE CAPSULE BY MOUTH DAILY 30 capsule 2    cyclobenzaprine (FLEXERIL) 10 MG tablet TAKE ONE TABLET BY MOUTH DAILY 30 tablet 3    atorvastatin (LIPITOR) 20 MG tablet TAKE ONE TABLET BY MOUTH DAILY 90 tablet 3    loperamide (IMODIUM) 2 MG capsule TAKE ONE CAPSULE daily for DIARRHEA 30 capsule 1    meloxicam (MOBIC) 7.5 MG tablet TAKE ONE TABLET BY MOUTH TWICE A  tablet 0    Blood Glucose Monitoring Suppl (BLOOD GLUCOSE MONITOR SYSTEM) w/Device KIT USE TO MONITOR BLOOD GLUCOSE LEVEL. (TEST ONCE PER DAY) 1 kit 0    blood glucose monitor strips Test blood glucose level 1 time per day.  100 strip 3    Lancets 30G MISC Inject 1 each into the skin daily 100 each 3    sildenafil (VIAGRA) 100 MG tablet Take 1 tablet by mouth daily as needed for Erectile Dysfunction 30 tablet 1    risperiDONE (RISPERDAL) 1 MG tablet Take 1 tablet by mouth nightly 90 tablet 3    lamoTRIgine (LAMICTAL) 200 MG tablet Take 200 mg by mouth daily      acetaminophen (TYLENOL) 325 MG tablet Take 2 tablets by mouth every 4 hours as needed for Pain 120 tablet 3    DULoxetine (CYMBALTA) 60 MG extended release capsule Take 60 mg by mouth daily      hydrOXYzine (VISTARIL) 25 MG capsule Take 25 mg by mouth 3 times daily as needed for Itching (1-2 tabs)      QUEtiapine (SEROQUEL) 50 MG tablet Take 1 tablet by mouth nightly 60 tablet 3     No current facility-administered medications for this visit. No Known Allergies    Health Maintenance   Topic Date Due    Hepatitis B vaccine (1 of 3 - Risk 3-dose series) 02/10/1980    Lipid screen  02/19/2020    Low dose CT lung screening  02/26/2020    Diabetic retinal exam  06/25/2020    Diabetic foot exam  07/17/2020    Diabetic microalbuminuria test  07/17/2020    A1C test (Diabetic or Prediabetic)  11/27/2020    Annual Wellness Visit (AWV)  02/27/2021    Colon cancer screen colonoscopy  05/02/2022    DTaP/Tdap/Td vaccine (2 - Td) 06/21/2028    Flu vaccine  Completed    Shingles Vaccine  Completed    Pneumococcal 0-64 years Vaccine  Completed    Hepatitis C screen  Completed    HIV screen  Completed    Hepatitis A vaccine  Aged Out    Hib vaccine  Aged Out    Meningococcal (ACWY) vaccine  Aged Out       Subjective:      Review of Systems   Constitutional: Negative for chills and fever. HENT: Negative for rhinorrhea and sore throat. Eyes: Negative for discharge and redness. Respiratory: Negative for cough, shortness of breath and wheezing. Cardiovascular: Negative for chest pain and palpitations. Gastrointestinal: Negative for abdominal pain, diarrhea, nausea and vomiting. Genitourinary: Negative for dysuria and frequency. Musculoskeletal: Negative for arthralgias and myalgias. Neurological: Negative for dizziness, light-headedness and headaches. Psychiatric/Behavioral: Negative for sleep disturbance. Objective:     /82   Pulse 90   Temp 98 °F (36.7 °C)   Ht 5' 11.04\" (1.804 m)   Wt 200 lb 3.2 oz (90.8 kg)   SpO2 96%   BMI 27.89 kg/m²   Physical Exam  Vitals signs and nursing note reviewed. Constitutional:       General: He is not in acute distress.      Appearance: He is chest done. Continue present medications as is. Return in about 3 months (around 8/4/2020). Orders Placed This Encounter   Procedures    Lipid, Fasting     Standing Status:   Future     Standing Expiration Date:   5/4/2021    Basic Metabolic Panel, Fasting     Standing Status:   Future     Standing Expiration Date:   5/4/2021    Hepatic Function Panel     Standing Status:   Future     Standing Expiration Date:   5/4/2021    POCT glycosylated hemoglobin (Hb A1C)    UT COLLECTION CAPILLARY BLOOD SPECIMEN     No orders of the defined types were placed in this encounter. Patient given educationalmaterials - see patient instructions. Discussed use, benefit, and side effectsof prescribed medications. All patient questions answered. Pt voiced understanding. Reviewed health maintenance. Instructed to continue current medications, diet andexercise. Patient agreed with treatment plan. Follow up as directed.      Electronicallysigned by Larisa Wells MD on 5/4/2020 at 3:17 PM

## 2020-05-08 RX ORDER — CYCLOBENZAPRINE HCL 10 MG
TABLET ORAL
Qty: 30 TABLET | Refills: 2 | Status: SHIPPED | OUTPATIENT
Start: 2020-05-08 | End: 2020-07-07 | Stop reason: SDUPTHER

## 2020-05-08 RX ORDER — ATENOLOL 25 MG/1
TABLET ORAL
Qty: 30 TABLET | Refills: 1 | Status: SHIPPED | OUTPATIENT
Start: 2020-05-08 | End: 2020-07-07

## 2020-05-08 RX ORDER — OMEPRAZOLE 40 MG/1
CAPSULE, DELAYED RELEASE ORAL
Qty: 30 CAPSULE | Refills: 1 | Status: SHIPPED | OUTPATIENT
Start: 2020-05-08 | End: 2020-07-07

## 2020-05-26 RX ORDER — DIAZEPAM 5 MG/1
5 TABLET ORAL EVERY 12 HOURS PRN
Qty: 60 TABLET | Refills: 0 | Status: SHIPPED | OUTPATIENT
Start: 2020-05-26 | End: 2020-07-07 | Stop reason: SDUPTHER

## 2020-06-08 RX ORDER — CLONIDINE HYDROCHLORIDE 0.1 MG/1
TABLET ORAL
Qty: 90 TABLET | Refills: 0 | Status: SHIPPED | OUTPATIENT
Start: 2020-06-08 | End: 2020-09-04

## 2020-06-08 RX ORDER — MELOXICAM 7.5 MG/1
TABLET ORAL
Qty: 146 TABLET | Refills: 0 | Status: SHIPPED | OUTPATIENT
Start: 2020-06-08 | End: 2020-08-04

## 2020-06-23 ENCOUNTER — TELEPHONE (OUTPATIENT)
Dept: PRIMARY CARE CLINIC | Age: 60
End: 2020-06-23

## 2020-06-23 RX ORDER — OXYCODONE HYDROCHLORIDE AND ACETAMINOPHEN 5; 325 MG/1; MG/1
1 TABLET ORAL EVERY 8 HOURS PRN
Qty: 90 TABLET | Refills: 0 | Status: SHIPPED | OUTPATIENT
Start: 2020-06-23 | End: 2020-08-17 | Stop reason: SDUPTHER

## 2020-06-23 NOTE — TELEPHONE ENCOUNTER
Patient is calling stating that he should have [employer] forms to sign for the Viagra patient assistance. Have these been started? If not I can start them. Please notify patient when ready for him to sign.

## 2020-07-07 RX ORDER — DIAZEPAM 5 MG/1
5 TABLET ORAL EVERY 12 HOURS PRN
Qty: 60 TABLET | Refills: 0 | Status: SHIPPED | OUTPATIENT
Start: 2020-07-07 | End: 2020-08-21 | Stop reason: SDUPTHER

## 2020-07-07 RX ORDER — OMEPRAZOLE 40 MG/1
CAPSULE, DELAYED RELEASE ORAL
Qty: 30 CAPSULE | Refills: 5 | Status: SHIPPED | OUTPATIENT
Start: 2020-07-07 | End: 2020-08-04

## 2020-07-07 RX ORDER — CYCLOBENZAPRINE HCL 10 MG
TABLET ORAL
Qty: 30 TABLET | Refills: 5 | Status: SHIPPED | OUTPATIENT
Start: 2020-07-07 | End: 2020-08-04 | Stop reason: SDUPTHER

## 2020-07-07 RX ORDER — ATENOLOL 25 MG/1
TABLET ORAL
Qty: 30 TABLET | Refills: 5 | Status: SHIPPED | OUTPATIENT
Start: 2020-07-07 | End: 2020-12-04

## 2020-08-04 ENCOUNTER — OFFICE VISIT (OUTPATIENT)
Dept: PRIMARY CARE CLINIC | Age: 60
End: 2020-08-04
Payer: MEDICARE

## 2020-08-04 VITALS
SYSTOLIC BLOOD PRESSURE: 136 MMHG | WEIGHT: 196 LBS | HEART RATE: 81 BPM | HEIGHT: 71 IN | DIASTOLIC BLOOD PRESSURE: 88 MMHG | OXYGEN SATURATION: 95 % | BODY MASS INDEX: 27.44 KG/M2 | TEMPERATURE: 97.4 F

## 2020-08-04 LAB — HBA1C MFR BLD: 5.8 %

## 2020-08-04 PROCEDURE — 3017F COLORECTAL CA SCREEN DOC REV: CPT | Performed by: FAMILY MEDICINE

## 2020-08-04 PROCEDURE — 4004F PT TOBACCO SCREEN RCVD TLK: CPT | Performed by: FAMILY MEDICINE

## 2020-08-04 PROCEDURE — G8417 CALC BMI ABV UP PARAM F/U: HCPCS | Performed by: FAMILY MEDICINE

## 2020-08-04 PROCEDURE — G8427 DOCREV CUR MEDS BY ELIG CLIN: HCPCS | Performed by: FAMILY MEDICINE

## 2020-08-04 PROCEDURE — 83036 HEMOGLOBIN GLYCOSYLATED A1C: CPT | Performed by: FAMILY MEDICINE

## 2020-08-04 PROCEDURE — 99214 OFFICE O/P EST MOD 30 MIN: CPT | Performed by: FAMILY MEDICINE

## 2020-08-04 PROCEDURE — 2022F DILAT RTA XM EVC RTNOPTHY: CPT | Performed by: FAMILY MEDICINE

## 2020-08-04 PROCEDURE — 3044F HG A1C LEVEL LT 7.0%: CPT | Performed by: FAMILY MEDICINE

## 2020-08-04 RX ORDER — CYCLOBENZAPRINE HCL 10 MG
TABLET ORAL
Qty: 30 TABLET | Refills: 5 | Status: SHIPPED | OUTPATIENT
Start: 2020-08-04 | End: 2021-02-09

## 2020-08-04 RX ORDER — LOPERAMIDE HYDROCHLORIDE 2 MG/1
CAPSULE ORAL
Qty: 90 CAPSULE | Refills: 5
Start: 2020-08-04 | End: 2022-08-23

## 2020-08-04 ASSESSMENT — ENCOUNTER SYMPTOMS
DIARRHEA: 1
SORE THROAT: 0
SHORTNESS OF BREATH: 0
EYE PAIN: 0
EYE DISCHARGE: 0
CHEST TIGHTNESS: 0
VOMITING: 0
NAUSEA: 0
COUGH: 1

## 2020-08-04 ASSESSMENT — PATIENT HEALTH QUESTIONNAIRE - PHQ9
2. FEELING DOWN, DEPRESSED OR HOPELESS: 0
SUM OF ALL RESPONSES TO PHQ QUESTIONS 1-9: 0
SUM OF ALL RESPONSES TO PHQ9 QUESTIONS 1 & 2: 0
SUM OF ALL RESPONSES TO PHQ QUESTIONS 1-9: 0
1. LITTLE INTEREST OR PLEASURE IN DOING THINGS: 0

## 2020-08-04 NOTE — PROGRESS NOTES
[address] [facility] PRIMARY CARE  [address]  [facility] [address]  Dept: [phone]    Gino Goodrich is a 62 y.o. male who presents today for his medical conditions/complaintsas noted below. Chief Complaint   Patient presents with    Diabetes       HPI:     HPI   Patient presents for follow up. His HTN is controlled with clonidine and atenolol. He has bipolar disorder which is well controlled with medications. He denies feeling depressed, anhedonia, and suicidal or homicidal ideations. His anxiety is well-controlled with diazepam which he takes 1-2 times per day most days. He also has arthritis for which he takes percocet, cymbalta, and flexeril. He has good and bad days, but overall is doing well. He takes metformin for his diabetes. He reports no low blood sugars and check his sugars at home - he reports an average range from 110-130 and checks them first thing in the morning. He also has concerns regarding persistent diarrhea following a right sided hemicolectomy 4 years. He needs an appointment for an eye exam and will make that. LDL Cholesterol (mg/dL)   Date Value   02/19/2019 84   06/27/2013 Unable to calc. as TRIG>400   05/23/2012 83       (goal LDL is <100)   AST (U/L)   Date Value   05/14/2019 18     ALT (U/L)   Date Value   05/14/2019 29     BUN (mg/dL)   Date Value   05/14/2019 14     BP Readings from Last 3 Encounters:   08/04/20 136/88   05/04/20 134/82   02/27/20 (!) 142/96          (goal 120/80)    Past Medical History:   Diagnosis Date    Abnormal EKG 10/27/2016    indicates inferior infarct.     Anxiety     Bipolar disorder ([facility] [address] 2006    on rx    Chronic back pain 11/21/2014    Depression 2006    on rx    Fracture, clavicle 1996    LEFT    Hyperplastic polyp of intestine     Hypertension 2006    on rx    Legally blind in left eye, as defined in [place]      very blurry    Lumbar radiculopathy 4/23/2014    Nicotine dependence     Osteoarthritis     Pain     LEFT EYE    Retinal detachment     history miguel    Sleep apnea     doesnt use cpap     Tubular adenoma     Visual floaters     Wears glasses     USES MAGNIFYING GLASS      Past Surgical History:   Procedure Laterality Date    CATARACT REMOVAL Left     CATARACT REMOVAL WITH IMPLANT Right 8-25-15    CHOLECYSTECTOMY  2/13    COLONOSCOPY  09/22/2016    the ICV was edematous and erythematous but most likely normal variant , bxs taken Large polyp 3 cm, at 50 cm from the anal verge with a very  wide stalk, not removed tattooed needs to be removed with subtotal colectomy    COLONOSCOPY N/A 5/2/2019    COLONOSCOPY POLYPECTOMY COLD BIOPSY, HOT SNARE performed by Stephanie Newell MD at [address] [place] [address]    foot, neck    EYE SURGERY  12/31/15    laser right eye, left vit    EYE SURGERY Left     torn retina lazer/freezer/hole    EYE SURGERY Left 1/27/00    SILICONE REMOVAL AIR FLUID EXCHANGE    EYE SURGERY      cataracts removed miguel.  EYE SURGERY      total 6 eye surg to left, 2 eye surg. to rt.     HERNIA REPAIR      umbilical    LYMPH NODE BIOPSY Left 1971    BASE OF SKULL    NASAL SEPTUM SURGERY  11-24-15    IA OFFICE/OUTPT VISIT,PROCEDURE ONLY Right 7/12/2018    VITRECTOMY 25 GAUGE, AIR FLUID EXCHANGE, AIR GAS EXCHANGE WITH 18% SF6, ENDOLASER, 200 MSECONDS, 200 MWATTS, 377 PULSES performed by Kelli Arevalo MD at [address]  12/09/2016    w/ ileorectal anastomosis    TONSILLECTOMY  5909    UMBILICAL HERNIA REPAIR  1996    UPPER GASTROINTESTINAL ENDOSCOPY N/A 5/2/2019    EGD BIOPSY performed by Stephanie Newell MD at [address] VITRECTOMY Left 12/10/2015    VITRECTOMY Left 10/27/2016    membrane peeling    VITRECTOMY Right 07/12/2018    laser, gas       Family History   Problem Relation Age of Onset    Heart Failure Mother     Cancer Mother     Heart Disease Mother         CAD-OPEN HEART    Mental Retardation Sister    Patricia Fox Seizures Sister     Hypertension Other         maternal history       Social History     Tobacco Use    Smoking status: Current Every Day Smoker     Packs/day: 1.00     Years: 30.00     Pack years: 30.00     Types: Cigarettes    Smokeless tobacco: Never Used   Substance Use Topics    Alcohol use: Yes     Comment: 1 TO 6 BEERS Daily      Current Outpatient Medications   Medication Sig Dispense Refill    loperamide (IMODIUM) 2 MG capsule TAKE ONE CAPSULE daily for DIARRHEA 90 capsule 5    cyclobenzaprine (FLEXERIL) 10 MG tablet TAKE ONE TABLET BY MOUTH DAILY 30 tablet 5    atenolol (TENORMIN) 25 MG tablet TAKE ONE TABLET BY MOUTH DAILY 30 tablet 5    diazePAM (VALIUM) 5 MG tablet Take 1 tablet by mouth every 12 hours as needed for Anxiety or Sleep for up to 30 days. 60 tablet 0    cloNIDine (CATAPRES) 0.1 MG tablet TAKE ONE TABLET BY MOUTH DAILY 90 tablet 0    tamsulosin (FLOMAX) 0.4 MG capsule TAKE ONE CAPSULE BY MOUTH DAILY 90 capsule 2    metFORMIN (GLUCOPHAGE) 500 MG tablet TAKE ONE TABLET BY MOUTH DAILY WITH BREAKFAST 90 tablet 2    atorvastatin (LIPITOR) 20 MG tablet TAKE ONE TABLET BY MOUTH DAILY 90 tablet 3    Blood Glucose Monitoring Suppl (BLOOD GLUCOSE MONITOR SYSTEM) w/Device KIT USE TO MONITOR BLOOD GLUCOSE LEVEL. (TEST ONCE PER DAY) 1 kit 0    blood glucose monitor strips Test blood glucose level 1 time per day.  100 strip 3    Lancets 30G MISC Inject 1 each into the skin daily 100 each 3    sildenafil (VIAGRA) 100 MG tablet Take 1 tablet by mouth daily as needed for Erectile Dysfunction 30 tablet 1    risperiDONE (RISPERDAL) 1 MG tablet Take 1 tablet by mouth nightly 90 tablet 3    lamoTRIgine (LAMICTAL) 200 MG tablet Take 200 mg by mouth daily      acetaminophen (TYLENOL) 325 MG tablet Take 2 tablets by mouth every 4 hours as needed for Pain 120 tablet 3    DULoxetine (CYMBALTA) 60 MG extended release capsule Take 60 mg by mouth daily      oxyCODONE-acetaminophen (PERCOCET) 5-325 MG well-developed. HENT:      Head: Normocephalic and atraumatic. Right Ear: External ear normal.      Left Ear: External ear normal.   Eyes:      General:         Right eye: No discharge. Left eye: No discharge. Conjunctiva/sclera: Conjunctivae normal.      Pupils: Pupils are equal, round, and reactive to light. Neck:      Thyroid: No thyromegaly. Trachea: No tracheal deviation. Cardiovascular:      Rate and Rhythm: Normal rate and regular rhythm. Heart sounds: Normal heart sounds. Comments: No carotid bruits  Pulmonary:      Effort: Pulmonary effort is normal. No respiratory distress. Breath sounds: Normal breath sounds. No wheezing. Abdominal:      General: There is no distension. Palpations: There is no mass. Tenderness: There is no abdominal tenderness. Hernia: No hernia is present. Lymphadenopathy:      Cervical: No cervical adenopathy. Skin:     General: Skin is warm and dry. Findings: No rash. Neurological:      Mental Status: He is alert and oriented to person, place, and time. Comments: Dnoneiabetic foot check: Bunions: none . Hammertoes none. Plantar calluses none. No cyanosis or clubbing. sensation intact on 6 of 6 test points with the 10 gram filament. Dorsalis pedis pulses intact bilaterally. Capillary refill at the toes was less than 2 seconds. No skin breakdown, erythema, blisters, scaling, or ulcers. No evidence of fungal infection. Psychiatric:         Behavior: Behavior normal.         Thought Content: Thought content normal.       Controlled Substance Monitoring:    Acute and Chronic Pain Monitoring:   RX Monitoring 8/4/2020   Attestation -   Acute Pain Prescriptions -   Periodic Controlled Substance Monitoring Possible medication side effects, risk of tolerance/dependence & alternative treatments discussed. ;No signs of potential drug abuse or diversion identified.    Chronic Pain > 50 MEDD Obtained or confirmed

## 2020-08-06 RX ORDER — MELOXICAM 7.5 MG/1
TABLET ORAL
Qty: 146 TABLET | Refills: 0 | Status: SHIPPED | OUTPATIENT
Start: 2020-08-06 | End: 2020-09-04

## 2020-08-17 RX ORDER — OXYCODONE HYDROCHLORIDE AND ACETAMINOPHEN 5; 325 MG/1; MG/1
1 TABLET ORAL EVERY 8 HOURS PRN
Qty: 90 TABLET | Refills: 0 | Status: SHIPPED | OUTPATIENT
Start: 2020-08-17 | End: 2020-10-05 | Stop reason: SDUPTHER

## 2020-08-21 RX ORDER — DIAZEPAM 5 MG/1
5 TABLET ORAL EVERY 12 HOURS PRN
Qty: 60 TABLET | Refills: 0 | Status: SHIPPED | OUTPATIENT
Start: 2020-08-21 | End: 2020-09-29 | Stop reason: SDUPTHER

## 2020-09-04 RX ORDER — MELOXICAM 7.5 MG/1
TABLET ORAL
Qty: 146 TABLET | Refills: 0 | Status: SHIPPED | OUTPATIENT
Start: 2020-09-04 | End: 2020-09-30 | Stop reason: ALTCHOICE

## 2020-09-04 RX ORDER — CLONIDINE HYDROCHLORIDE 0.1 MG/1
TABLET ORAL
Qty: 90 TABLET | Refills: 0 | Status: SHIPPED | OUTPATIENT
Start: 2020-09-04 | End: 2020-12-14

## 2020-09-16 RX ORDER — SILDENAFIL 100 MG/1
100 TABLET, FILM COATED ORAL DAILY PRN
Qty: 30 TABLET | Refills: 1 | Status: SHIPPED | OUTPATIENT
Start: 2020-09-16 | End: 2021-08-02 | Stop reason: SDUPTHER

## 2020-09-29 RX ORDER — DIAZEPAM 5 MG/1
5 TABLET ORAL EVERY 12 HOURS PRN
Qty: 60 TABLET | Refills: 0 | Status: SHIPPED | OUTPATIENT
Start: 2020-09-29 | End: 2020-10-29 | Stop reason: SDUPTHER

## 2020-09-30 ENCOUNTER — APPOINTMENT (OUTPATIENT)
Dept: GENERAL RADIOLOGY | Age: 60
End: 2020-09-30
Payer: MEDICARE

## 2020-09-30 ENCOUNTER — HOSPITAL ENCOUNTER (EMERGENCY)
Age: 60
Discharge: HOME | End: 2020-09-30
Attending: EMERGENCY MEDICINE
Payer: MEDICARE

## 2020-09-30 VITALS
BODY MASS INDEX: 25.2 KG/M2 | SYSTOLIC BLOOD PRESSURE: 131 MMHG | WEIGHT: 180 LBS | TEMPERATURE: 98 F | HEART RATE: 77 BPM | HEIGHT: 71 IN | DIASTOLIC BLOOD PRESSURE: 84 MMHG | RESPIRATION RATE: 20 BRPM | OXYGEN SATURATION: 96 %

## 2020-09-30 PROCEDURE — 6370000000 HC RX 637 (ALT 250 FOR IP): Performed by: STUDENT IN AN ORGANIZED HEALTH CARE EDUCATION/TRAINING PROGRAM

## 2020-09-30 PROCEDURE — 72100 X-RAY EXAM L-S SPINE 2/3 VWS: CPT

## 2020-09-30 PROCEDURE — 99283 EMERGENCY DEPT VISIT LOW MDM: CPT

## 2020-09-30 RX ORDER — CYCLOBENZAPRINE HCL 10 MG
5 TABLET ORAL EVERY 8 HOURS PRN
Qty: 20 TABLET | Refills: 0 | Status: SHIPPED | OUTPATIENT
Start: 2020-09-30 | End: 2020-10-10

## 2020-09-30 RX ORDER — CYCLOBENZAPRINE HCL 10 MG
10 TABLET ORAL ONCE
Status: COMPLETED | OUTPATIENT
Start: 2020-09-30 | End: 2020-09-30

## 2020-09-30 RX ORDER — LIDOCAINE 50 MG/G
1 PATCH TOPICAL EVERY 12 HOURS PRN
Qty: 20 PATCH | Refills: 0 | Status: SHIPPED | OUTPATIENT
Start: 2020-09-30 | End: 2022-01-27 | Stop reason: ALTCHOICE

## 2020-09-30 RX ORDER — IBUPROFEN 400 MG/1
400 TABLET ORAL ONCE
Status: COMPLETED | OUTPATIENT
Start: 2020-09-30 | End: 2020-09-30

## 2020-09-30 RX ORDER — IBUPROFEN 600 MG/1
600 TABLET ORAL EVERY 6 HOURS PRN
Qty: 20 TABLET | Refills: 0 | Status: SHIPPED | OUTPATIENT
Start: 2020-09-30 | End: 2022-01-27

## 2020-09-30 RX ADMIN — CYCLOBENZAPRINE 10 MG: 10 TABLET, FILM COATED ORAL at 14:50

## 2020-09-30 RX ADMIN — IBUPROFEN 400 MG: 400 TABLET, FILM COATED ORAL at 14:50

## 2020-09-30 ASSESSMENT — ENCOUNTER SYMPTOMS
ABDOMINAL PAIN: 0
VOMITING: 0
COUGH: 0
SHORTNESS OF BREATH: 0
NAUSEA: 0
BACK PAIN: 1

## 2020-09-30 ASSESSMENT — PAIN SCALES - GENERAL: PAINLEVEL_OUTOF10: 6

## 2020-09-30 NOTE — ED PROVIDER NOTES
[address] [facility]     Emergency Department     Faculty Attestation    I performed a history and physical examination of the patient and discussed management with the resident. I reviewed the residents note and agree with the documented findings including all diagnostic interpretations and plan of care. Any areas of disagreement are noted on the chart. I was personally present for the key portions of any procedures. I have documented in the chart those procedures where I was not present during the key portions. I have reviewed the emergency nurses triage note. I agree with the chief complaint, past medical history, past surgical history, allergies, medications, social and family history as documented unless otherwise noted below. Documentation of the HPI, Physical Exam and Medical Decision Making performed by scribes is based on my personal performance of the HPI, PE and MDM. For Physician Assistant/ Nurse Practitioner cases/documentation I have personally evaluated this patient and have completed at least one if not all key elements of the E/M (history, physical exam, and MDM). Additional findings are as noted. This patient was evaluated in the Emergency Department for symptoms described in the history of present illness. He/she was evaluated in the context of the global COVID-19 pandemic, which necessitated consideration that the patient might be at risk for infection with the SARS-CoV-2 virus that causes COVID-19. Institutional protocols and algorithms that pertain to the evaluation of patients at risk for COVID-19 are in a state of rapid change based on information released by regulatory bodies including the CDC and federal and state organizations. These policies and algorithms were followed during the patient's care in the ED. Primary Care Physician: Dieter Kwan MD    History:  This is a 62 y.o. male who presents to the Emergency Department with complaint of low back pain. Fall 1 week ago onto buttocks. He then attempted to lift his motorcycle to standing position and felt increased pain. No numbness no weakness no loss of bowel or bladder control. No blood thinners. No recent injections. Physical:     height is 5' 11\" (1.803 m) and weight is 180 lb (81.6 kg). His oral temperature is 98 °F (36.7 °C). His blood pressure is 131/84 and his pulse is 77. His respiration is 20 and oxygen saturation is 96%.    62 y.o. male no acute distress, abdomen soft nontender nondistended, patient has bilateral paraspinal spasm and mild tenderness there is no obvious midline tenderness. Ambulates without ataxia. Strength is 5/5 in the lower extremities.     Impression: Low back pain, musculoskeletal    Plan: X-ray lumbar spine, symptomatic treatment      Dorthey Siemens, MD, [facility]  Attending Emergency Physician        Jennyfer Medina MD  09/30/20 [id number]

## 2020-09-30 NOTE — DISCHARGE INSTR - COC
Continuity of Care Form    Patient Name: Jerri Rene   :  1960  MRN:  [id number]    Admit date:  2020  Discharge date:  ***    Code Status Order: Prior   Advance Directives:     Admitting Physician:  No admitting provider for patient encounter. PCP: Kendrick Mejia MD    Discharging Nurse: [facility]/Room#: 49PED/49PED  Discharging Unit Phone Number: ***    Emergency Contact:   Extended Emergency Contact Information  Primary Emergency Contact: Army Rodrigues  Home Phone: [phone]  Relation: Other    Past Surgical History:  Past Surgical History:   Procedure Laterality Date    CATARACT REMOVAL Left     CATARACT REMOVAL WITH IMPLANT Right 8-25-15    CHOLECYSTECTOMY      COLONOSCOPY  2016    the ICV was edematous and erythematous but most likely normal variant , bxs taken Large polyp 3 cm, at 50 cm from the anal verge with a very  wide stalk, not removed tattooed needs to be removed with subtotal colectomy    COLONOSCOPY N/A 2019    COLONOSCOPY POLYPECTOMY COLD BIOPSY, HOT SNARE performed by Bryon Brown MD at [address] [place] [address]    foot, neck    EYE SURGERY  12/31/15    laser right eye, left vit    EYE SURGERY Left     torn retina lazer/freezer/hole    EYE SURGERY Left     SILICONE REMOVAL AIR FLUID EXCHANGE    EYE SURGERY      cataracts removed miguel.  EYE SURGERY      total 6 eye surg to left, 2 eye surg. to rt.     HERNIA REPAIR      umbilical    LYMPH NODE BIOPSY Left     BASE OF SKULL    NASAL SEPTUM SURGERY  11-24-15    MD OFFICE/OUTPT VISIT,PROCEDURE ONLY Right 2018    VITRECTOMY 25 GAUGE, AIR FLUID EXCHANGE, AIR GAS EXCHANGE WITH 18% SF6, ENDOLASER, 200 MSECONDS, 200 MWATTS, 377 PULSES performed by Apryl Dyson MD at [address]  2016    w/ ileorectal anastomosis    TONSILLECTOMY  1602    UMBILICAL HERNIA REPAIR      UPPER GASTROINTESTINAL ENDOSCOPY N/A 2019 Isolation/Infection:   Isolation          No Isolation        Patient Infection Status     None to display          Nurse Assessment:  Last Vital Signs: /84   Pulse 77   Temp 98 °F (36.7 °C) (Oral)   Resp 20   Ht 5' 11\" (1.803 m)   Wt 180 lb (81.6 kg)   SpO2 96%   BMI 25.10 kg/m²     Last documented pain score (0-10 scale): Pain Level: 6  Last Weight:   Wt Readings from Last 1 Encounters:   09/30/20 180 lb (81.6 kg)     Mental Status:  {IP PT MENTAL STATUS:20030}    IV Access:  {[facility] IV ACCESS:778930851}    Nursing Mobility/ADLs:  Walking   {[facility] DME CBQU:955708020}  Transfer  {[facility] DME ZSQN:754271614}  Bathing  {[facility] DME EWZH:106182870}  Dressing  {[facility] DME LYPA:938805590}  Toileting  {[facility] DME YRGN:260176214}  Feeding  {[facility] DME LINI:581604689}  Med Admin  {[facility] DME RNDI:162172801}  Med Delivery   {[facility] MED Delivery:432289514}    Wound Care Documentation and Therapy:        Elimination:  Continence:   · Bowel: {YES / EZ:32019}  · Bladder: {YES / LM:19741}  Urinary Catheter: {Urinary Catheter:814023518}   Colostomy/Ileostomy/Ileal Conduit: {YES / YC:99993}       Date of Last BM: ***  No intake or output data in the 24 hours ending 09/30/20 1603  No intake/output data recorded.     Safety Concerns:     [address] [employer] Safety Concerns:406627947}    Impairments/Disabilities:      [address] [employer] Impairments/Disabilities:424541982}    Nutrition Therapy:  Current Nutrition Therapy:   [employer] Diet List:597285696}    Routes of Feeding: {[facility] DME Other Feedings:360712727}  Liquids: {Slp liquid thickness:24219}  Daily Fluid Restriction: {[facility] DME Yes amt example:786178451}  Last Modified Barium Swallow with Video (Video Swallowing Test): {Done Not Done MXJM:610658857}    Treatments at the Time of Hospital Discharge:   Respiratory Treatments: ***  Oxygen Therapy:  {Therapy; copd oxygen:83021}  Ventilator:    { CC Vent AAGY:710070138}    Rehab Therapies: {THERAPEUTIC INTERVENTION:2410830084}  Weight Bearing Status/Restrictions: [address] [facility] Weight Bearin}  Other Medical Equipment (for information only, NOT a DME order):  {EQUIPMENT:069493863}  Other Treatments: ***    Patient's personal belongings (please select all that are sent with patient):  {[facility] DME Belongings:698168126}    RN SIGNATURE:  {Esignature:316687520}    CASE MANAGEMENT/SOCIAL WORK SECTION    Inpatient Status Date: ***    Readmission Risk Assessment Score:  Readmission Risk              Risk of Unplanned Readmission:        0           Discharging to Facility/ Agency   · Name:   · Address:  · Phone:  · Fax:    Dialysis Facility (if applicable)   · Name:  · Address:  · Dialysis Schedule:  · Phone:  · Fax:    / signature: {Esignature:697545286}    PHYSICIAN SECTION    Prognosis: {Prognosis:6045975240}    Condition at Discharge: [address] Patient Condition:759074782}    Rehab Potential (if transferring to Rehab): {Prognosis:5433951308}    Recommended Labs or Other Treatments After Discharge: ***    Physician Certification: I certify the above information and transfer of Verdie Lefort  is necessary for the continuing treatment of the diagnosis listed and that he requires {Admit to Appropriate Level of Care:53750} for {GREATER/LESS:299964660} 30 days.      Update Admission H&P: {CHP DME Changes in [facility]:078174509}    PHYSICIAN SIGNATURE:  {Esignature:848196836}

## 2020-09-30 NOTE — ED PROVIDER NOTES
[address]  ED  Emergency Department Encounter  EmergencyMedicine Resident     Pt Emily Blas  MRN: [id number]   Vargas 1960  Date of evaluation: 9/30/20  PCP:  MD Jono Santos       Chief Complaint   Patient presents with    Back Pain     Josey Terrell 1 week ago. Then lifted a motorcycle a few days later       HISTORY OF PRESENT ILLNESS  (Location/Symptom, Timing/Onset, Context/Setting, Quality, Duration, Modifying Factors, Severity.)      Josefina Heath is a 62 y.o. male who presents with acute on chronic low back pain. Patient states he slipped backwards approximately 1 week ago landing on his tailbone and lower lumbars, rolling back onto his arms and then hitting his head. He denies any loss of consciousness. No anticoagulant use. He denied any neurological symptoms. Patient tolerated the pain well for the next few days and then approximately 2 days ago the patient went to  his 650 pound motorcycle and strained his low back. Patient has osteoarthritis of the lumbar spine and elsewhere and takes Percocet and Tylenol as needed. He states he has been taking these but they have not been helping. He is here today for evaluation and to make sure that nothing is seriously injured. Requesting x-rays. PAST MEDICAL / SURGICAL / SOCIAL / FAMILY HISTORY      has a past medical history of Abnormal EKG, Anxiety, Bipolar disorder ([address] Chronic back pain, Depression, Fracture, clavicle, Hyperplastic polyp of intestine, Hypertension, Legally blind in left eye, as defined in [place], Lumbar radiculopathy, Nicotine dependence, Osteoarthritis, Pain, Retinal detachment, Sleep apnea, Tubular adenoma, Visual floaters, and Wears glasses. has a past surgical history that includes Cholecystectomy (2/13); cyst removal (1970); Tonsillectomy (1969); Umbilical hernia repair (1996); lymph node biopsy (Left, 1971); Cataract removal with implant (Right, 8-25-15);  Nasal Family History   Problem Relation Age of Onset    Heart Failure Mother     Cancer Mother     Heart Disease Mother         CAD-OPEN HEART    Mental Retardation Sister     Seizures Sister     Hypertension Other         maternal history       Allergies:  Patient has no known allergies. Home Medications:  Prior to Admission medications    Medication Sig Start Date End Date Taking? Authorizing Provider   cyclobenzaprine (FLEXERIL) 10 MG tablet Take 0.5 tablets by mouth every 8 hours as needed for Muscle spasms (may take up to 2 pills every 8hours) 9/30/20 10/10/20 Yes Micah Clancy DO   lidocaine (LIDODERM) 5 % Place 1 patch onto the skin every 12 hours as needed for Pain 12 hours on, 12 hours off. 9/30/20  Yes Leda Buckner DO   ibuprofen (ADVIL;MOTRIN) 600 MG tablet Take 1 tablet by mouth every 6 hours as needed for Pain 9/30/20 10/5/20 Yes Micah Clancy DO   diazePAM (VALIUM) 5 MG tablet Take 1 tablet by mouth every 12 hours as needed for Anxiety or Sleep for up to 30 days. 9/29/20 10/29/20  Dieter Kwan MD   sildenafil (VIAGRA) 100 MG tablet Take 1 tablet by mouth daily as needed for Erectile Dysfunction 9/16/20   Dieter Kwan MD   cloNIDine (CATAPRES) 0.1 MG tablet TAKE ONE TABLET BY MOUTH DAILY 9/4/20   Dieter Kwan MD   oxyCODONE-acetaminophen (PERCOCET) 5-325 MG per tablet Take 1 tablet by mouth every 8 hours as needed for Pain for up to 30 days.  8/17/20 9/16/20  Dieter Kwan MD   loperamide (IMODIUM) 2 MG capsule TAKE ONE CAPSULE daily for DIARRHEA 8/4/20   Dieter Kwan MD   cyclobenzaprine (FLEXERIL) 10 MG tablet TAKE ONE TABLET BY MOUTH DAILY 8/4/20   Dieter Kwan MD   atenolol (TENORMIN) 25 MG tablet TAKE ONE TABLET BY MOUTH DAILY 7/7/20   Dieter Kwan MD   tamsulosin [facility]) 0.4 MG capsule TAKE ONE CAPSULE BY MOUTH DAILY 4/8/20   Dieter Kwan MD   metFORMIN (GLUCOPHAGE) 500 MG tablet TAKE ONE TABLET BY MOUTH DAILY WITH BREAKFAST 4/8/20   Frank Unger MD   atorvastatin (LIPITOR) 20 MG tablet TAKE ONE TABLET BY MOUTH DAILY 11/14/19   Frank Unger MD   Blood Glucose Monitoring Suppl (BLOOD GLUCOSE MONITOR SYSTEM) w/Device KIT USE TO MONITOR BLOOD GLUCOSE LEVEL. (TEST ONCE PER DAY) 7/18/19   Frank Unger MD   blood glucose monitor strips Test blood glucose level 1 time per day. 7/18/19   Frank Unger MD   Lancets 30G MISC Inject 1 each into the skin daily 7/18/19   Frank Unger MD   risperiDONE [facility]) 1 MG tablet Take 1 tablet by mouth nightly 4/17/19   Frank Unger MD   lamoTRIgine (LAMICTAL) 200 MG tablet Take 200 mg by mouth daily 9/25/17   Historical Provider, MD   acetaminophen (TYLENOL) 325 MG tablet Take 2 tablets by mouth every 4 hours as needed for Pain 12/15/16   Frank Unger MD   DULoxetine (CYMBALTA) 60 MG extended release capsule Take 60 mg by mouth daily    Historical Provider, MD       REVIEW OF SYSTEMS    (2-9 systems for level 4, 10 or more for level 5)      Review of Systems   Constitutional: Negative for chills and fever. Respiratory: Negative for cough and shortness of breath. Cardiovascular: Negative for chest pain. Gastrointestinal: Negative for abdominal pain, nausea and vomiting. Genitourinary: Negative for difficulty urinating. No incontinence   Musculoskeletal: Positive for back pain. Negative for neck pain and neck stiffness. Skin: Negative for wound. Neurological: Negative for weakness, numbness and headaches.         PHYSICAL EXAM   (up to 7 for level 4, 8 or more for level 5)      INITIAL VITALS:   /84   Pulse 77   Temp 98 °F (36.7 °C) (Oral)   Resp 20   Ht 5' 11\" (1.803 m)   Wt 180 lb (81.6 kg)   SpO2 96%   BMI 25.10 kg/m²      Vitals:    09/30/20 1418   BP: 131/84   Pulse: 77   Resp: 20   Temp: 98 °F (36.7 °C)   TempSrc: Oral   SpO2: 96%   Weight: 180 lb (81.6 kg)   Height: 5' 11\" (1.803 m)        Physical Exam  Vitals signs reviewed. Constitutional:       General: He is not in acute distress. Appearance: He is well-developed. He is not ill-appearing. HENT:      Head: Normocephalic and atraumatic. Eyes:      Extraocular Movements: Extraocular movements intact. Pupils: Pupils are equal, round, and reactive to light. Neck:      Musculoskeletal: Normal range of motion and neck supple. Cardiovascular:      Rate and Rhythm: Normal rate and regular rhythm. Pulmonary:      Effort: Pulmonary effort is normal.      Breath sounds: Normal breath sounds. Abdominal:      General: There is no distension. Palpations: Abdomen is soft. Tenderness: There is no abdominal tenderness. Musculoskeletal: Normal range of motion. Comments: Para spinal and latissimus dorsi muscle tension. Skin:     General: Skin is warm and dry. Findings: No bruising. Neurological:      General: No focal deficit present. Mental Status: He is alert and oriented to person, place, and time. Sensory: No sensory deficit. Motor: No weakness. Gait: Gait normal.      Comments: Ambulates without difficulty. DIFFERENTIAL  DIAGNOSIS     PLAN (LABS / IMAGING / EKG):  Orders Placed This Encounter   Procedures    XR LUMBAR SPINE (2-3 VIEWS)       MEDICATIONS ORDERED:  Orders Placed This Encounter   Medications    ibuprofen (ADVIL;MOTRIN) tablet 400 mg    cyclobenzaprine (FLEXERIL) tablet 10 mg    cyclobenzaprine (FLEXERIL) 10 MG tablet     Sig: Take 0.5 tablets by mouth every 8 hours as needed for Muscle spasms (may take up to 2 pills every 8hours)     Dispense:  20 tablet     Refill:  0    lidocaine (LIDODERM) 5 %     Sig: Place 1 patch onto the skin every 12 hours as needed for Pain 12 hours on, 12 hours off.      Dispense:  20 patch     Refill:  0    ibuprofen (ADVIL;MOTRIN) 600 MG tablet     Sig: Take 1 tablet by mouth every 6 hours as needed for Pain     Dispense:  20 tablet     Refill:  0 DIAGNOSTIC RESULTS / EMERGENCY DEPARTMENT COURSE / [facility]   LAB RESULTS:  No results found for this visit on 09/30/20. IMPRESSION: Muscle strain, chronic degenerative changes of lumbar spine    RADIOLOGY:  XR LUMBAR SPINE (2-3 VIEWS)   Final Result   No acute abnormality lumbosacral spine. Degenerative and other findings, as above. EKG    All EKG's are interpreted by the Emergency Department Physician who either signs or Co-signs this chart in the absence of a cardiologist.    [facility]:    Patient arrives for evaluation of acute on chronic low back pain. All of his pain is located in the paraspinal musculature. Pain is worse with movement. No neurological findings. As he has previous back history appears older than stated age will obtain x-ray to rule out any fracture. Treat with Motrin and Flexeril while awaiting imaging. ED Course as of Sep 30 1809   Wed Sep 30, 2020   1551 Notes degenerative changes but no acute injury. XR LUMBAR SPINE (2-3 VIEWS) [CS]   3656 But with patient states he is not taking Flexeril anymore that was a old prescription. Additionally states he is not taking meloxicam at all and has been for a while. Will prescribe a short course of Motrin. [CS]      ED Course User Index  [CS] Kenna Martínez DO         PROCEDURES:    CONSULTS:  None    CRITICAL CARE:  Please see attending note    FINAL IMPRESSION      1. Strain of lumbar region, initial encounter          DISPOSITION / PLAN     DISPOSITION Decision To Discharge 09/30/2020 03:51:27 PM      PATIENT REFERRED TO:  Clovis Berry.   [address]  [facility] [address]  [phone]    Go to   As needed      DISCHARGE MEDICATIONS:  Discharge Medication List as of 9/30/2020  3:58 PM      START taking these medications    Details   !! cyclobenzaprine (FLEXERIL) 10 MG tablet Take 0.5 tablets by mouth every 8 hours as needed for Muscle spasms (may take up to 2 pills every 8hours), Disp-20 tablet,R-0Print      lidocaine (LIDODERM) 5 % Place 1 patch onto the skin every 12 hours as needed for Pain 12 hours on, 12 hours off., Disp-20 patch,R-0Print       !! - Potential duplicate medications found. Please discuss with provider.           Srinivasa Feng DO  Emergency Medicine Resident    (Please note that portions of thisnote were completed with a voice recognition program.  Efforts were made to edit the dictations but occasionally words are mis-transcribed.)       Srinivasa Feng DO  Resident  09/30/20 0140

## 2020-10-05 RX ORDER — OXYCODONE HYDROCHLORIDE AND ACETAMINOPHEN 5; 325 MG/1; MG/1
1 TABLET ORAL EVERY 8 HOURS PRN
Qty: 90 TABLET | Refills: 0 | Status: SHIPPED | OUTPATIENT
Start: 2020-10-05 | End: 2020-11-20 | Stop reason: SDUPTHER

## 2020-10-20 ENCOUNTER — TELEPHONE (OUTPATIENT)
Dept: PRIMARY CARE CLINIC | Age: 60
End: 2020-10-20

## 2020-10-28 RX ORDER — HYDROXYZINE PAMOATE 25 MG/1
25 CAPSULE ORAL 3 TIMES DAILY PRN
COMMUNITY
Start: 2020-09-20

## 2020-10-28 RX ORDER — QUETIAPINE FUMARATE 50 MG/1
50 TABLET, FILM COATED ORAL NIGHTLY
COMMUNITY
Start: 2020-09-20

## 2020-10-29 ENCOUNTER — TELEPHONE (OUTPATIENT)
Dept: PRIMARY CARE CLINIC | Age: 60
End: 2020-10-29

## 2020-10-29 ENCOUNTER — OFFICE VISIT (OUTPATIENT)
Dept: PRIMARY CARE CLINIC | Age: 60
End: 2020-10-29
Payer: MEDICARE

## 2020-10-29 VITALS
OXYGEN SATURATION: 96 % | WEIGHT: 188 LBS | SYSTOLIC BLOOD PRESSURE: 132 MMHG | DIASTOLIC BLOOD PRESSURE: 78 MMHG | HEART RATE: 78 BPM | BODY MASS INDEX: 26.22 KG/M2 | TEMPERATURE: 97.1 F

## 2020-10-29 LAB
CREATININE URINE POCT: NORMAL
MICROALBUMIN/CREAT 24H UR: NORMAL MG/G{CREAT}
MICROALBUMIN/CREAT UR-RTO: NORMAL

## 2020-10-29 PROCEDURE — 3044F HG A1C LEVEL LT 7.0%: CPT | Performed by: NURSE PRACTITIONER

## 2020-10-29 PROCEDURE — G8427 DOCREV CUR MEDS BY ELIG CLIN: HCPCS | Performed by: NURSE PRACTITIONER

## 2020-10-29 PROCEDURE — G0008 ADMIN INFLUENZA VIRUS VAC: HCPCS | Performed by: NURSE PRACTITIONER

## 2020-10-29 PROCEDURE — 90688 IIV4 VACCINE SPLT 0.5 ML IM: CPT | Performed by: NURSE PRACTITIONER

## 2020-10-29 PROCEDURE — 2022F DILAT RTA XM EVC RTNOPTHY: CPT | Performed by: NURSE PRACTITIONER

## 2020-10-29 PROCEDURE — 99214 OFFICE O/P EST MOD 30 MIN: CPT | Performed by: NURSE PRACTITIONER

## 2020-10-29 PROCEDURE — 3017F COLORECTAL CA SCREEN DOC REV: CPT | Performed by: NURSE PRACTITIONER

## 2020-10-29 PROCEDURE — G8417 CALC BMI ABV UP PARAM F/U: HCPCS | Performed by: NURSE PRACTITIONER

## 2020-10-29 PROCEDURE — G8482 FLU IMMUNIZE ORDER/ADMIN: HCPCS | Performed by: NURSE PRACTITIONER

## 2020-10-29 PROCEDURE — 4004F PT TOBACCO SCREEN RCVD TLK: CPT | Performed by: NURSE PRACTITIONER

## 2020-10-29 PROCEDURE — 82044 UR ALBUMIN SEMIQUANTITATIVE: CPT | Performed by: NURSE PRACTITIONER

## 2020-10-29 RX ORDER — DIAZEPAM 5 MG/1
5 TABLET ORAL EVERY 12 HOURS PRN
Qty: 60 TABLET | Refills: 0 | Status: SHIPPED | OUTPATIENT
Start: 2020-10-29 | End: 2020-11-30 | Stop reason: SDUPTHER

## 2020-10-29 ASSESSMENT — ENCOUNTER SYMPTOMS
ABDOMINAL PAIN: 0
WHEEZING: 0
COUGH: 0
BLOOD IN STOOL: 0
NAUSEA: 0
VOMITING: 0
DIARRHEA: 1
SHORTNESS OF BREATH: 0

## 2020-10-29 NOTE — PATIENT INSTRUCTIONS
Patient Education        Type 2 Diabetes: Care Instructions  Your Care Instructions     Type 2 diabetes is a disease that develops when the body's tissues cannot use insulin properly. Over time, the pancreas cannot make enough insulin. Insulin is a hormone that helps the body's cells use sugar (glucose) for energy. It also helps the body store extra sugar in muscle, fat, and liver cells. Without insulin, the sugar cannot get into the cells to do its work. It stays in the blood instead. This can cause high blood sugar levels. A person has diabetes when the blood sugar stays too high too much of the time. Over time, diabetes can lead to diseases of the heart, blood vessels, nerves, kidneys, and eyes. You may be able to control your blood sugar by losing weight, eating a healthy diet, and getting daily exercise. You may also have to take insulin or other diabetes medicine. Follow-up care is a key part of your treatment and safety. Be sure to make and go to all appointments. Call your doctor if you are having problems. It's also a good idea to know your test results and keep a list of the medicines you take. How can you care for yourself at home? · Keep your blood sugar at a target level (which you set with your doctor). ? Eat a good diet that spreads carbohydrate throughout the day. Carbohydrate--the body's main source of fuel--affects blood sugar more than any other nutrient. Carbohydrate is in fruits, vegetables, milk, and yogurt. It also is in breads, cereals, vegetables such as potatoes and corn, and sugary foods such as candy and cakes. ? Aim for 30 minutes of exercise on most, preferably all, days of the week. Walking is a good choice. You also may want to do other activities, such as running, swimming, cycling, or playing tennis or team sports. If your doctor says it's okay, do muscle-strengthening exercises at least 2 times a week. ? Take your medicines exactly as prescribed.  Call your doctor if you think you are having a problem with your medicine. You will get more details on the specific medicines your doctor prescribes. · Check your blood sugar as often as your doctor recommends. It is important to keep track of any symptoms you have, such as low blood sugar. Also tell your doctor if you have any changes in your activities, diet, or insulin use. · Talk to your doctor before you start taking aspirin every day. Aspirin can help certain people lower their risk of a heart attack or stroke. But taking aspirin isn't right for everyone, because it can cause serious bleeding. · Do not smoke. If you need help quitting, talk to your doctor about stop-smoking programs and medicines. These can increase your chances of quitting for good. · Keep your cholesterol and blood pressure at normal levels. You may need to take one or more medicines to reach your goals. Take them exactly as directed. Do not stop or change a medicine without talking to your doctor first.  When should you call for help? Call 911 anytime you think you may need emergency care. For example, call if:    · You passed out (lost consciousness), or you suddenly become very sleepy or confused. (You may have very low blood sugar.)   Call your doctor now or seek immediate medical care if:    · Your blood sugar is 300 mg/dL or is higher than the level your doctor has set for you.     · You have symptoms of low blood sugar, such as:  ? Sweating. ? Feeling nervous, shaky, and weak. ? Extreme hunger and slight nausea. ? Dizziness and headache.  ? Blurred vision. ? Confusion. Watch closely for changes in your health, and be sure to contact your doctor if:    · You often have problems controlling your blood sugar.     · You have symptoms of long-term diabetes problems, such as:  ? New vision changes. ? New pain, numbness, or tingling in your hands or feet. ? Skin problems. Where can you learn more? Go to https://[email or web address]. org and sign in to your [employer] account. Enter C553 in the Me-Mover box to learn more about \"Type 2 Diabetes: Care Instructions. \"     If you do not have an account, please click on the \"Sign Up Now\" link. Current as of: December 20, 2019               Content Version: 12.6  © 1248-3052 [employer]. Care instructions adapted under license by [facility] ([facility]). If you have questions about a medical condition or this instruction, always ask your healthcare professional. [address] any warranty or liability for your use of this information.

## 2020-10-29 NOTE — PROGRESS NOTES
[address] [facility] PRIMARY CARE  [address] [address] [facility] [address] [place] [address]  Dept: [phone]    Rodolfo Acuna is a 62 y.o. male who presents today for his medical conditions/complaints as noted below. Chief Complaint   Patient presents with    Follow-up     patient is aware A1C is not due - he would prefer to have blood draw       HPI:     HPI Angie Sosa is here today for his diabetic follow up. The patient's appt was moved from 11/4 to today because Dr. Lachelle Erazo will out of the office. He is not eligible for POCT a1c today because of insurance. The patient is aware and will do his labs after 11/4. He is doing well, taking his prescriptions as written. The patient denies any chest pains, difficulty breathing, palpitations or sob. The patient needs a refill on his diazepam. Angie Sosa is also inquiring about diarrhea. He had a colectomy and since then he's had loose stool. The patient hasn't been seen by GI in over a year. No blood in the stool. No nausea or vomiting. No abdominal pain. He is eating and drinking without difficulty. LDL Cholesterol (mg/dL)   Date Value   02/19/2019 84   06/27/2013 Unable to calc. as TRIG>400   05/23/2012 83       (goal LDL is <100)   AST (U/L)   Date Value   05/14/2019 18     ALT (U/L)   Date Value   05/14/2019 29     BUN (mg/dL)   Date Value   05/14/2019 14     BP Readings from Last 3 Encounters:   10/29/20 132/78   09/30/20 131/84   08/04/20 136/88          (goal 120/80)    Past Medical History:   Diagnosis Date    Abnormal EKG 10/27/2016    indicates inferior infarct.     Anxiety     Bipolar disorder ([facility] [address] 2006    on rx    Chronic back pain 11/21/2014    Depression 2006    on rx    Fracture, clavicle 1996    LEFT    Hyperplastic polyp of intestine     Hypertension 2006    on rx    Legally blind in left eye, as defined in [place]      very blurry    Lumbar radiculopathy 4/23/2014    Nicotine dependence     Osteoarthritis     Pain LEFT EYE    Retinal detachment     history miguel    Sleep apnea     doesnt use cpap     Tubular adenoma     Visual floaters     Wears glasses     USES MAGNIFYING GLASS      Past Surgical History:   Procedure Laterality Date    CATARACT REMOVAL Left     CATARACT REMOVAL WITH IMPLANT Right 8-25-15    CHOLECYSTECTOMY  2/13    COLONOSCOPY  09/22/2016    the ICV was edematous and erythematous but most likely normal variant , bxs taken Large polyp 3 cm, at 50 cm from the anal verge with a very  wide stalk, not removed tattooed needs to be removed with subtotal colectomy    COLONOSCOPY N/A 5/2/2019    COLONOSCOPY POLYPECTOMY COLD BIOPSY, HOT SNARE performed by Sagar Orr MD at [address] [place] [address]    foot, neck    EYE SURGERY  12/31/15    laser right eye, left vit    EYE SURGERY Left     torn retina lazer/freezer/hole    EYE SURGERY Left 7/28/74    SILICONE REMOVAL AIR FLUID EXCHANGE    EYE SURGERY      cataracts removed miguel.  EYE SURGERY      total 6 eye surg to left, 2 eye surg. to rt.     HERNIA REPAIR      umbilical    LYMPH NODE BIOPSY Left 1971    BASE OF SKULL    NASAL SEPTUM SURGERY  11-24-15    MD OFFICE/OUTPT VISIT,PROCEDURE ONLY Right 7/12/2018    VITRECTOMY 25 GAUGE, AIR FLUID EXCHANGE, AIR GAS EXCHANGE WITH 18% SF6, ENDOLASER, 200 MSECONDS, 200 MWATTS, 377 PULSES performed by Rosemary Juarez MD at [address]  12/09/2016    w/ ileorectal anastomosis    TONSILLECTOMY  8910    UMBILICAL HERNIA REPAIR  1996    UPPER GASTROINTESTINAL ENDOSCOPY N/A 5/2/2019    EGD BIOPSY performed by Sagar Orr MD at [address] VITRECTOMY Left 12/10/2015    VITRECTOMY Left 10/27/2016    membrane peeling    VITRECTOMY Right 07/12/2018    laser, gas       Family History   Problem Relation Age of Onset    Heart Failure Mother     Cancer Mother     Heart Disease Mother         CAD-OPEN HEART    Mental Retardation Sister     Seizures Sister    [employer] Hypertension Other         maternal history       Social History     Tobacco Use    Smoking status: Current Every Day Smoker     Packs/day: 1.00     Years: 30.00     Pack years: 30.00     Types: Cigarettes    Smokeless tobacco: Never Used   Substance Use Topics    Alcohol use: Yes     Comment: 1 TO 6 BEERS Daily      Current Outpatient Medications   Medication Sig Dispense Refill    diazePAM (VALIUM) 5 MG tablet Take 1 tablet by mouth every 12 hours as needed for Anxiety or Sleep for up to 30 days. 60 tablet 0    hydrOXYzine (VISTARIL) 25 MG capsule       QUEtiapine (SEROQUEL) 50 MG tablet       oxyCODONE-acetaminophen (PERCOCET) 5-325 MG per tablet Take 1 tablet by mouth every 8 hours as needed for Pain for up to 30 days. 90 tablet 0    sildenafil (VIAGRA) 100 MG tablet Take 1 tablet by mouth daily as needed for Erectile Dysfunction 30 tablet 1    cloNIDine (CATAPRES) 0.1 MG tablet TAKE ONE TABLET BY MOUTH DAILY 90 tablet 0    loperamide (IMODIUM) 2 MG capsule TAKE ONE CAPSULE daily for DIARRHEA 90 capsule 5    cyclobenzaprine (FLEXERIL) 10 MG tablet TAKE ONE TABLET BY MOUTH DAILY 30 tablet 5    atenolol (TENORMIN) 25 MG tablet TAKE ONE TABLET BY MOUTH DAILY 30 tablet 5    metFORMIN (GLUCOPHAGE) 500 MG tablet TAKE ONE TABLET BY MOUTH DAILY WITH BREAKFAST 90 tablet 2    atorvastatin (LIPITOR) 20 MG tablet TAKE ONE TABLET BY MOUTH DAILY 90 tablet 3    Blood Glucose Monitoring Suppl (BLOOD GLUCOSE MONITOR SYSTEM) w/Device KIT USE TO MONITOR BLOOD GLUCOSE LEVEL. (TEST ONCE PER DAY) 1 kit 0    blood glucose monitor strips Test blood glucose level 1 time per day.  100 strip 3    Lancets 30G MISC Inject 1 each into the skin daily 100 each 3    risperiDONE (RISPERDAL) 1 MG tablet Take 1 tablet by mouth nightly 90 tablet 3    lamoTRIgine (LAMICTAL) 200 MG tablet Take 200 mg by mouth daily      DULoxetine (CYMBALTA) 60 MG extended release capsule Take 60 mg by mouth daily      lidocaine (LIDODERM) 5 % Place 1 patch onto the skin every 12 hours as needed for Pain 12 hours on, 12 hours off. (Patient not taking: Reported on 10/29/2020) 20 patch 0    ibuprofen (ADVIL;MOTRIN) 600 MG tablet Take 1 tablet by mouth every 6 hours as needed for Pain 20 tablet 0    tamsulosin (FLOMAX) 0.4 MG capsule TAKE ONE CAPSULE BY MOUTH DAILY (Patient not taking: Reported on 10/29/2020) 90 capsule 2    acetaminophen (TYLENOL) 325 MG tablet Take 2 tablets by mouth every 4 hours as needed for Pain (Patient not taking: Reported on 10/29/2020) 120 tablet 3     No current facility-administered medications for this visit. No Known Allergies    Health Maintenance   Topic Date Due    Hepatitis B vaccine (1 of 3 - Risk 3-dose series) [birth year]10/1980    Lipid screen  02/19/2020    Low dose CT lung screening  02/26/2020    Diabetic retinal exam  06/25/2020    Annual Wellness Visit (AWV)  02/27/2021    A1C test (Diabetic or Prediabetic)  08/04/2021    Diabetic foot exam  10/29/2021    Diabetic microalbuminuria test  10/29/2021    Colon cancer screen colonoscopy  05/02/2022    DTaP/Tdap/Td vaccine (2 - Td) 06/21/2028    Flu vaccine  Completed    Shingles Vaccine  Completed    Pneumococcal 0-64 years Vaccine  Completed    Hepatitis C screen  Completed    HIV screen  Completed    Hepatitis A vaccine  Aged Out    Hib vaccine  Aged Out    Meningococcal (ACWY) vaccine  Aged Out       Subjective:      Review of Systems   Constitutional: Negative for chills and fever. Respiratory: Negative for cough, shortness of breath and wheezing. Cardiovascular: Negative for chest pain and palpitations. Gastrointestinal: Positive for diarrhea (since colectomy). Negative for abdominal pain, blood in stool, nausea and vomiting. Musculoskeletal: Negative for myalgias. Neurological: Negative for dizziness and light-headedness. Psychiatric/Behavioral: The patient is not nervous/anxious.       Objective:     /78   Pulse 78   Temp 97.1 °F (36.2 °C)   Wt 188 lb (85.3 kg)   SpO2 96%   BMI 26.22 kg/m²   Physical Exam  Vitals signs and nursing note reviewed. Constitutional:       Appearance: Normal appearance. HENT:      Head: Normocephalic and atraumatic. Eyes:      Extraocular Movements: Extraocular movements intact. Conjunctiva/sclera: Conjunctivae normal.      Pupils: Pupils are equal, round, and reactive to light. Neck:      Musculoskeletal: Normal range of motion and neck supple. Cardiovascular:      Rate and Rhythm: Normal rate and regular rhythm. Heart sounds: Normal heart sounds. Pulmonary:      Effort: Pulmonary effort is normal.      Breath sounds: Normal breath sounds. Musculoskeletal: Normal range of motion. Feet:      Right foot:      Protective Sensation: 10 sites tested. 10 sites sensed. Left foot:      Protective Sensation: 10 sites tested. 10 sites sensed. Skin:     General: Skin is warm and dry. Neurological:      General: No focal deficit present. Mental Status: He is alert and oriented to person, place, and time. Mental status is at baseline. Psychiatric:         Mood and Affect: Mood normal.         Behavior: Behavior normal.         Thought Content: Thought content normal.         Judgment: Judgment normal.       Controlled Substance Monitoring:    Acute and Chronic Pain Monitoring:   RX Monitoring 10/29/2020   Attestation -   Acute Pain Prescriptions -   Periodic Controlled Substance Monitoring No signs of potential drug abuse or diversion identified. ;Assessed functional status. Chronic Pain > 50 MEDD -   Chronic Pain > 80 MEDD -   Chronic Pain > 120 MEDD -     Assessment:       Diagnosis Orders   1. Diabetes mellitus type 2 in obese (HCC)  Hemoglobin A1C    POCT microalbumin    HM DIABETES FOOT EXAM   2. Need for immunization against influenza  INFLUENZA, QUADV, 3 YRS AND OLDER, IM, MDV, 0.5ML (Henna Marquez)   3. Anxiety  diazePAM (VALIUM) 5 MG tablet   4.  Essential hypertension     5. Diarrhea, unspecified type          Plan:    1. Patient will complete blood work after 11/4.  2. Microalbuminuria test and monofilament test completed today. 3. Refill sent in.   4. Flu shot given. 5. Patient will follow up in 3 months. Return in about 3 months (around 1/29/2021) for f/u diabetes, f/u hypertension, f/u depression/anxiety. Orders Placed This Encounter   Medications    diazePAM (VALIUM) 5 MG tablet     Sig: Take 1 tablet by mouth every 12 hours as needed for Anxiety or Sleep for up to 30 days. Dispense:  60 tablet     Refill:  0       Patient given educationalmaterials - see patient instructions. Discussed use, benefit, and side effectsof prescribed medications. All patient questions answered. Pt voiced understanding. Reviewed health maintenance. Instructed to continue current medications, diet andexercise. Patient agreed with treatment plan. Follow up as directed.      Electronicallysigned by LUKASZ Avitia CNP on 10/29/2020 at 3:26 PM

## 2020-11-02 RX ORDER — ATORVASTATIN CALCIUM 20 MG/1
TABLET, FILM COATED ORAL
Qty: 90 TABLET | Refills: 0 | Status: SHIPPED | OUTPATIENT
Start: 2020-11-02 | End: 2021-02-01

## 2020-11-02 RX ORDER — MELOXICAM 7.5 MG/1
TABLET ORAL
Qty: 146 TABLET | Refills: 0 | OUTPATIENT
Start: 2020-11-02

## 2020-11-03 PROBLEM — M47.816 LUMBAR SPONDYLOSIS: Status: RESOLVED | Noted: 2020-11-03 | Resolved: 2020-11-03

## 2020-11-20 RX ORDER — OXYCODONE HYDROCHLORIDE AND ACETAMINOPHEN 5; 325 MG/1; MG/1
1 TABLET ORAL EVERY 8 HOURS PRN
Qty: 90 TABLET | Refills: 0 | Status: SHIPPED | OUTPATIENT
Start: 2020-11-20 | End: 2020-12-31 | Stop reason: SDUPTHER

## 2020-11-30 RX ORDER — DIAZEPAM 5 MG/1
5 TABLET ORAL EVERY 12 HOURS PRN
Qty: 60 TABLET | Refills: 0 | Status: SHIPPED | OUTPATIENT
Start: 2020-11-30 | End: 2020-12-29 | Stop reason: SDUPTHER

## 2020-12-04 RX ORDER — MELOXICAM 7.5 MG/1
TABLET ORAL
Qty: 146 TABLET | Refills: 0 | Status: SHIPPED | OUTPATIENT
Start: 2020-12-04 | End: 2021-02-01

## 2020-12-04 RX ORDER — ATENOLOL 25 MG/1
TABLET ORAL
Qty: 90 TABLET | Refills: 4 | Status: SHIPPED | OUTPATIENT
Start: 2020-12-04 | End: 2021-12-27

## 2020-12-14 RX ORDER — CLONIDINE HYDROCHLORIDE 0.1 MG/1
TABLET ORAL
Qty: 90 TABLET | Refills: 0 | Status: SHIPPED | OUTPATIENT
Start: 2020-12-14 | End: 2021-03-02

## 2020-12-29 RX ORDER — DIAZEPAM 5 MG/1
5 TABLET ORAL EVERY 12 HOURS PRN
Qty: 60 TABLET | Refills: 0 | Status: SHIPPED | OUTPATIENT
Start: 2020-12-29 | End: 2021-02-01 | Stop reason: SDUPTHER

## 2020-12-31 RX ORDER — OXYCODONE HYDROCHLORIDE AND ACETAMINOPHEN 5; 325 MG/1; MG/1
1 TABLET ORAL EVERY 8 HOURS PRN
Qty: 90 TABLET | Refills: 0 | Status: SHIPPED | OUTPATIENT
Start: 2020-12-31 | End: 2021-02-09 | Stop reason: SDUPTHER

## 2021-01-01 DIAGNOSIS — E66.9 DIABETES MELLITUS TYPE 2 IN OBESE (HCC): ICD-10-CM

## 2021-01-01 DIAGNOSIS — E11.69 DIABETES MELLITUS TYPE 2 IN OBESE (HCC): ICD-10-CM

## 2021-01-04 RX ORDER — TAMSULOSIN HYDROCHLORIDE 0.4 MG/1
CAPSULE ORAL
Qty: 90 CAPSULE | Refills: 0 | Status: SHIPPED | OUTPATIENT
Start: 2021-01-04 | End: 2021-04-01

## 2021-01-31 DIAGNOSIS — E78.2 MIXED HYPERLIPIDEMIA: ICD-10-CM

## 2021-02-01 DIAGNOSIS — F41.9 ANXIETY: ICD-10-CM

## 2021-02-01 RX ORDER — DIAZEPAM 5 MG/1
5 TABLET ORAL EVERY 12 HOURS PRN
Qty: 60 TABLET | Refills: 0 | Status: SHIPPED | OUTPATIENT
Start: 2021-02-01 | End: 2021-03-02 | Stop reason: SDUPTHER

## 2021-02-01 RX ORDER — ATORVASTATIN CALCIUM 20 MG/1
TABLET, FILM COATED ORAL
Qty: 90 TABLET | Refills: 0 | Status: SHIPPED | OUTPATIENT
Start: 2021-02-01 | End: 2021-05-03

## 2021-02-01 RX ORDER — MELOXICAM 7.5 MG/1
TABLET ORAL
Qty: 146 TABLET | Refills: 0 | Status: SHIPPED | OUTPATIENT
Start: 2021-02-01 | End: 2021-04-01

## 2021-02-09 ENCOUNTER — OFFICE VISIT (OUTPATIENT)
Dept: PRIMARY CARE CLINIC | Age: 61
End: 2021-02-09
Payer: MEDICARE

## 2021-02-09 VITALS
WEIGHT: 191.6 LBS | HEIGHT: 71 IN | DIASTOLIC BLOOD PRESSURE: 88 MMHG | OXYGEN SATURATION: 97 % | TEMPERATURE: 97.2 F | SYSTOLIC BLOOD PRESSURE: 136 MMHG | BODY MASS INDEX: 26.82 KG/M2 | HEART RATE: 83 BPM

## 2021-02-09 DIAGNOSIS — M47.812 OSTEOARTHRITIS OF CERVICAL SPINE, UNSPECIFIED SPINAL OSTEOARTHRITIS COMPLICATION STATUS: ICD-10-CM

## 2021-02-09 DIAGNOSIS — M47.816 OSTEOARTHRITIS OF LUMBAR SPINE, UNSPECIFIED SPINAL OSTEOARTHRITIS COMPLICATION STATUS: ICD-10-CM

## 2021-02-09 DIAGNOSIS — Z00.00 ANNUAL PHYSICAL EXAM: ICD-10-CM

## 2021-02-09 DIAGNOSIS — M54.16 LUMBAR RADICULOPATHY: ICD-10-CM

## 2021-02-09 DIAGNOSIS — Z12.5 SCREENING PSA (PROSTATE SPECIFIC ANTIGEN): ICD-10-CM

## 2021-02-09 DIAGNOSIS — Z79.899 HIGH RISK MEDICATION USE: ICD-10-CM

## 2021-02-09 DIAGNOSIS — M54.50 CHRONIC MIDLINE LOW BACK PAIN WITHOUT SCIATICA: ICD-10-CM

## 2021-02-09 DIAGNOSIS — E11.69 DIABETES MELLITUS TYPE 2 IN OBESE (HCC): Primary | ICD-10-CM

## 2021-02-09 DIAGNOSIS — Z87.891 PERSONAL HISTORY OF TOBACCO USE: ICD-10-CM

## 2021-02-09 DIAGNOSIS — E66.9 DIABETES MELLITUS TYPE 2 IN OBESE (HCC): Primary | ICD-10-CM

## 2021-02-09 DIAGNOSIS — Z13.220 ENCOUNTER FOR LIPID SCREENING FOR CARDIOVASCULAR DISEASE: ICD-10-CM

## 2021-02-09 DIAGNOSIS — G89.29 CHRONIC MIDLINE LOW BACK PAIN WITHOUT SCIATICA: ICD-10-CM

## 2021-02-09 DIAGNOSIS — Z13.6 ENCOUNTER FOR LIPID SCREENING FOR CARDIOVASCULAR DISEASE: ICD-10-CM

## 2021-02-09 LAB
ALCOHOL URINE: NORMAL
AMPHETAMINE SCREEN, URINE: NEGATIVE
BARBITURATE SCREEN, URINE: NEGATIVE
BENZODIAZEPINE SCREEN, URINE: POSITIVE
BUPRENORPHINE URINE: NEGATIVE
COCAINE METABOLITE SCREEN URINE: NEGATIVE
FENTANYL SCREEN, URINE: NORMAL
GABAPENTIN SCREEN, URINE: NORMAL
HBA1C MFR BLD: 5.7 %
MDMA URINE: NEGATIVE
METHADONE SCREEN, URINE: NEGATIVE
METHAMPHETAMINE, URINE: NEGATIVE
OPIATE SCREEN URINE: NEGATIVE
OXYCODONE SCREEN URINE: POSITIVE
PHENCYCLIDINE SCREEN URINE: NORMAL
PROPOXYPHENE SCREEN, URINE: NORMAL
SYNTHETIC CANNABINOIDS(K2) SCREEN, URINE: NORMAL
THC SCREEN, URINE: NEGATIVE
TRAMADOL SCREEN URINE: NORMAL
TRICYCLIC ANTIDEPRESSANTS, UR: POSITIVE

## 2021-02-09 PROCEDURE — 99213 OFFICE O/P EST LOW 20 MIN: CPT | Performed by: FAMILY MEDICINE

## 2021-02-09 PROCEDURE — G0296 VISIT TO DETERM LDCT ELIG: HCPCS | Performed by: FAMILY MEDICINE

## 2021-02-09 PROCEDURE — 4004F PT TOBACCO SCREEN RCVD TLK: CPT | Performed by: FAMILY MEDICINE

## 2021-02-09 PROCEDURE — G8427 DOCREV CUR MEDS BY ELIG CLIN: HCPCS | Performed by: FAMILY MEDICINE

## 2021-02-09 PROCEDURE — G8482 FLU IMMUNIZE ORDER/ADMIN: HCPCS | Performed by: FAMILY MEDICINE

## 2021-02-09 PROCEDURE — G8417 CALC BMI ABV UP PARAM F/U: HCPCS | Performed by: FAMILY MEDICINE

## 2021-02-09 PROCEDURE — 80305 DRUG TEST PRSMV DIR OPT OBS: CPT | Performed by: FAMILY MEDICINE

## 2021-02-09 PROCEDURE — 3044F HG A1C LEVEL LT 7.0%: CPT | Performed by: FAMILY MEDICINE

## 2021-02-09 PROCEDURE — 2022F DILAT RTA XM EVC RTNOPTHY: CPT | Performed by: FAMILY MEDICINE

## 2021-02-09 PROCEDURE — 3017F COLORECTAL CA SCREEN DOC REV: CPT | Performed by: FAMILY MEDICINE

## 2021-02-09 PROCEDURE — 83036 HEMOGLOBIN GLYCOSYLATED A1C: CPT | Performed by: FAMILY MEDICINE

## 2021-02-09 RX ORDER — OXYCODONE HYDROCHLORIDE AND ACETAMINOPHEN 5; 325 MG/1; MG/1
1 TABLET ORAL EVERY 8 HOURS PRN
Qty: 90 TABLET | Refills: 0 | Status: SHIPPED | OUTPATIENT
Start: 2021-02-09 | End: 2021-03-23 | Stop reason: SDUPTHER

## 2021-02-09 RX ORDER — CYCLOBENZAPRINE HCL 10 MG
TABLET ORAL
Qty: 30 TABLET | Refills: 5 | Status: SHIPPED | OUTPATIENT
Start: 2021-02-09 | End: 2021-03-02 | Stop reason: SDUPTHER

## 2021-02-09 SDOH — ECONOMIC STABILITY: INCOME INSECURITY: HOW HARD IS IT FOR YOU TO PAY FOR THE VERY BASICS LIKE FOOD, HOUSING, MEDICAL CARE, AND HEATING?: SOMEWHAT HARD

## 2021-02-09 SDOH — ECONOMIC STABILITY: TRANSPORTATION INSECURITY
IN THE PAST 12 MONTHS, HAS LACK OF TRANSPORTATION KEPT YOU FROM MEETINGS, WORK, OR FROM GETTING THINGS NEEDED FOR DAILY LIVING?: YES

## 2021-02-09 ASSESSMENT — ENCOUNTER SYMPTOMS
BACK PAIN: 1
ABDOMINAL PAIN: 0
SHORTNESS OF BREATH: 0
COUGH: 0
EYE DISCHARGE: 0
WHEEZING: 0
SORE THROAT: 0
DIARRHEA: 0
NAUSEA: 0
EYE REDNESS: 0
VOMITING: 0
RHINORRHEA: 0

## 2021-02-09 ASSESSMENT — PATIENT HEALTH QUESTIONNAIRE - PHQ9
1. LITTLE INTEREST OR PLEASURE IN DOING THINGS: 0
SUM OF ALL RESPONSES TO PHQ9 QUESTIONS 1 & 2: 0
SUM OF ALL RESPONSES TO PHQ QUESTIONS 1-9: 0
2. FEELING DOWN, DEPRESSED OR HOPELESS: 0

## 2021-02-09 NOTE — PROGRESS NOTES
[address] [facility] PRIMARY CARE  [address] [address] [facility] [address]  Dept: [phone]    Demi Morrissey is a 62 y.o. male Established patient, who presents today for his medical conditions/complaintsas noted below. Chief Complaint   Patient presents with    Diabetes    Hypertension       HPI:     HPI  Patient here for diabetes recheck. States blood sugars been running in the usually 1 10-1 30 range. Denies any low blood sugar reactions. States the lowest he got was 90. Patient continues to use the Percocet for his chronic low back pain as well as neck pain. Patient had seen pain management in the past with no further recommendations. Patient not interested in surgery or injections. Patient denies any street drugs. Patient did not get blood work done or low-dose CT of the chest done. Patient states currently does not drive. Denies any melena or hematochezia. Patient does still smoke. Denies any recent change in weight. Reviewed prior notes None  Reviewed previous Labs    LDL Cholesterol (mg/dL)   Date Value   02/19/2019 84   06/27/2013 Unable to calc. as TRIG>400   05/23/2012 83       (goal LDL is <100)   AST (U/L)   Date Value   05/14/2019 18     ALT (U/L)   Date Value   05/14/2019 29     BUN (mg/dL)   Date Value   05/14/2019 14     Hemoglobin A1C (%)   Date Value   02/09/2021 5.7     TSH (mIU/L)   Date Value   06/27/2013 5.65 (H)     BP Readings from Last 3 Encounters:   02/09/21 136/88   10/29/20 132/78   09/30/20 131/84          (goal 120/80)    Past Medical History:   Diagnosis Date    Abnormal EKG 10/27/2016    indicates inferior infarct.     Anxiety     Bipolar disorder ([facility] [address] 2006    on rx    Chronic back pain 11/21/2014    Depression 2006    on rx    Fracture, clavicle 1996    LEFT    Hyperplastic polyp of intestine     Hypertension 2006    on rx    Legally blind in left eye, as defined in [place]      very blurry    Lumbar radiculopathy 4/23/2014    Nicotine dependence     Osteoarthritis     Pain     LEFT EYE    Retinal detachment     history miguel    Sleep apnea     doesnt use cpap     Tubular adenoma     Visual floaters     Wears glasses     USES MAGNIFYING GLASS      Past Surgical History:   Procedure Laterality Date    CATARACT REMOVAL Left     CATARACT REMOVAL WITH IMPLANT Right 8-25-15    CHOLECYSTECTOMY  2/13    COLONOSCOPY  09/22/2016    the ICV was edematous and erythematous but most likely normal variant , bxs taken Large polyp 3 cm, at 50 cm from the anal verge with a very  wide stalk, not removed tattooed needs to be removed with subtotal colectomy    COLONOSCOPY N/A 5/2/2019    COLONOSCOPY POLYPECTOMY COLD BIOPSY, HOT SNARE performed by Silvina Hamilton MD at [address] [place] [address]    foot, neck    EYE SURGERY  12/31/15    laser right eye, left vit    EYE SURGERY Left     torn retina lazer/freezer/hole    EYE SURGERY Left 6/31/83    SILICONE REMOVAL AIR FLUID EXCHANGE    EYE SURGERY      cataracts removed miguel.  EYE SURGERY      total 6 eye surg to left, 2 eye surg. to rt.     HERNIA REPAIR      umbilical    LYMPH NODE BIOPSY Left 1971    BASE OF SKULL    NASAL SEPTUM SURGERY  11-24-15    MS OFFICE/OUTPT VISIT,PROCEDURE ONLY Right 7/12/2018    VITRECTOMY 25 GAUGE, AIR FLUID EXCHANGE, AIR GAS EXCHANGE WITH 18% SF6, ENDOLASER, 200 MSECONDS, 200 MWATTS, 377 PULSES performed by Ravi Iraheta MD at [address]  12/09/2016    w/ ileorectal anastomosis    TONSILLECTOMY  8794    UMBILICAL HERNIA REPAIR  1996    UPPER GASTROINTESTINAL ENDOSCOPY N/A 5/2/2019    EGD BIOPSY performed by Silvina Hamilton MD at [address] VITRECTOMY Left 12/10/2015    VITRECTOMY Left 10/27/2016    membrane peeling    VITRECTOMY Right 07/12/2018    laser, gas       Family History   Problem Relation Age of Onset    Heart Failure Mother     Cancer Mother     Heart Disease Mother         CAD-OPEN HEART    Mental Retardation Sister     Seizures Sister     Hypertension Other         maternal history       Social History     Tobacco Use    Smoking status: Current Every Day Smoker     Packs/day: 1.00     Years: 30.00     Pack years: 30.00     Types: Cigarettes    Smokeless tobacco: Never Used   Substance Use Topics    Alcohol use: Yes     Comment: 1 TO 6 BEERS Daily      Current Outpatient Medications   Medication Sig Dispense Refill    oxyCODONE-acetaminophen (PERCOCET) 5-325 MG per tablet Take 1 tablet by mouth every 8 hours as needed for Pain for up to 30 days. 90 tablet 0    cyclobenzaprine (FLEXERIL) 10 MG tablet TAKE ONE TABLET BY MOUTH DAILY 30 tablet 5    meloxicam (MOBIC) 7.5 MG tablet TAKE ONE TABLET BY MOUTH TWICE A  tablet 0    atorvastatin (LIPITOR) 20 MG tablet TAKE ONE TABLET BY MOUTH DAILY 90 tablet 0    diazePAM (VALIUM) 5 MG tablet Take 1 tablet by mouth every 12 hours as needed for Anxiety or Sleep for up to 30 days. 60 tablet 0    metFORMIN (GLUCOPHAGE) 500 MG tablet TAKE ONE TABLET BY MOUTH DAILY WITH BREAKFAST 90 tablet 0    tamsulosin (FLOMAX) 0.4 MG capsule TAKE ONE CAPSULE BY MOUTH DAILY 90 capsule 0    cloNIDine (CATAPRES) 0.1 MG tablet TAKE ONE TABLET BY MOUTH DAILY 90 tablet 0    atenolol (TENORMIN) 25 MG tablet TAKE ONE TABLET BY MOUTH DAILY 90 tablet 4    hydrOXYzine (VISTARIL) 25 MG capsule       QUEtiapine (SEROQUEL) 50 MG tablet       lidocaine (LIDODERM) 5 % Place 1 patch onto the skin every 12 hours as needed for Pain 12 hours on, 12 hours off.  20 patch 0    sildenafil (VIAGRA) 100 MG tablet Take 1 tablet by mouth daily as needed for Erectile Dysfunction 30 tablet 1    loperamide (IMODIUM) 2 MG capsule TAKE ONE CAPSULE daily for DIARRHEA 90 capsule 5    Blood Glucose Monitoring Suppl (BLOOD GLUCOSE MONITOR SYSTEM) w/Device KIT USE TO MONITOR BLOOD GLUCOSE LEVEL. (TEST ONCE PER DAY) 1 kit 0    blood glucose monitor strips Test blood glucose level 1 time per day. 100 strip 3    Lancets 30G MISC Inject 1 each into the skin daily 100 each 3    risperiDONE (RISPERDAL) 1 MG tablet Take 1 tablet by mouth nightly 90 tablet 3    lamoTRIgine (LAMICTAL) 200 MG tablet Take 200 mg by mouth daily      acetaminophen (TYLENOL) 325 MG tablet Take 2 tablets by mouth every 4 hours as needed for Pain 120 tablet 3    DULoxetine (CYMBALTA) 60 MG extended release capsule Take 60 mg by mouth daily      ibuprofen (ADVIL;MOTRIN) 600 MG tablet Take 1 tablet by mouth every 6 hours as needed for Pain 20 tablet 0     No current facility-administered medications for this visit. No Known Allergies    Health Maintenance   Topic Date Due    Hepatitis B vaccine (1 of 3 - Risk 3-dose series) 02/10/1980    Lipid screen  02/19/2020    Low dose CT lung screening  02/26/2020    Diabetic retinal exam  06/25/2020    Annual Wellness Visit (AWV)  02/27/2021    Diabetic foot exam  10/29/2021    Diabetic microalbuminuria test  10/29/2021    A1C test (Diabetic or Prediabetic)  02/09/2022    Colon cancer screen colonoscopy  05/02/2022    DTaP/Tdap/Td vaccine (2 - Td) 06/21/2028    Flu vaccine  Completed    Shingles Vaccine  Completed    Pneumococcal 0-64 years Vaccine  Completed    Hepatitis C screen  Completed    HIV screen  Completed    Hepatitis A vaccine  Aged Out    Hib vaccine  Aged Out    Meningococcal (ACWY) vaccine  Aged Out       Subjective:      Review of Systems   Constitutional: Negative for chills and fever. HENT: Negative for rhinorrhea and sore throat. Eyes: Negative for discharge and redness. Respiratory: Negative for cough, shortness of breath and wheezing. Cardiovascular: Negative for chest pain and palpitations. Gastrointestinal: Negative for abdominal pain, diarrhea, nausea and vomiting. Genitourinary: Negative for dysuria and frequency. Musculoskeletal: Positive for back pain and neck pain. Negative for arthralgias and myalgias. Neurological: Negative for dizziness, light-headedness and headaches. Psychiatric/Behavioral: Negative for sleep disturbance. Objective:     /88   Pulse 83   Temp 97.2 °F (36.2 °C)   Ht 5' 11.04\" (1.804 m)   Wt 191 lb 9.6 oz (86.9 kg)   SpO2 97%   BMI 26.69 kg/m²   Physical Exam  Vitals signs and nursing note reviewed. Constitutional:       General: He is not in acute distress. Appearance: He is well-developed. He is not ill-appearing. HENT:      Head: Normocephalic and atraumatic. Right Ear: External ear normal.      Left Ear: External ear normal.   Eyes:      General: No scleral icterus. Right eye: No discharge. Left eye: No discharge. Conjunctiva/sclera: Conjunctivae normal.      Pupils: Pupils are equal, round, and reactive to light. Neck:      Thyroid: No thyromegaly. Trachea: No tracheal deviation. Cardiovascular:      Rate and Rhythm: Normal rate and regular rhythm. Heart sounds: Normal heart sounds. Pulmonary:      Effort: Pulmonary effort is normal. No respiratory distress. Breath sounds: Normal breath sounds. No wheezing. Lymphadenopathy:      Cervical: No cervical adenopathy. Skin:     General: Skin is warm. Findings: No rash. Neurological:      Mental Status: He is alert and oriented to person, place, and time. Psychiatric:         Mood and Affect: Mood normal.         Behavior: Behavior normal.         Thought Content: Thought content normal.       Controlled Substance Monitoring:    Acute and Chronic Pain Monitoring:   RX Monitoring 10/29/2020   Attestation -   Acute Pain Prescriptions -   Periodic Controlled Substance Monitoring No signs of potential drug abuse or diversion identified. ;Assessed functional status. Chronic Pain > 50 MEDD -   Chronic Pain > 80 MEDD -   Chronic Pain > 120 MEDD -         Assessment:       Diagnosis Orders   1.  Diabetes mellitus type 2 in obese (HCC)  [place] COLLECTION CAPILLARY BLOOD SPECIMEN    POCT glycosylated hemoglobin (Hb A1C)   2. Lumbar radiculopathy  oxyCODONE-acetaminophen (PERCOCET) 5-325 MG per tablet   3. Chronic midline low back pain without sciatica  oxyCODONE-acetaminophen (PERCOCET) 5-325 MG per tablet   4. Osteoarthritis of cervical spine, unspecified spinal osteoarthritis complication status  oxyCODONE-acetaminophen (PERCOCET) 5-325 MG per tablet   5. High risk medication use  POCT Rapid Drug Screen   6. Encounter for lipid screening for cardiovascular disease  Lipid, Fasting   7. Annual physical exam  Basic Metabolic Panel, Fasting    Hepatic Function Panel   8. Screening PSA (prostate specific antigen)  PSA screening   9. Personal history of tobacco use  HI VISIT TO DISCUSS LUNG CA SCREEN W LDCT    CT Lung Screen (Annual)   10. Osteoarthritis of lumbar spine, unspecified spinal osteoarthritis complication status  cyclobenzaprine (FLEXERIL) 10 MG tablet        Plan:    Blood work ordered. Low-dose CT chest.  Renew Percocet. Urine drug screen today. Blood pressure reviewed. Patient normally runs a lower blood pressure. Patient states he was very upset with his ride today. Return in about 3 months (around 5/9/2021), or Medicare wellness. Orders Placed This Encounter   Procedures    CT Lung Screen (Annual)     Age: Patient is 62 y.o. Smoking History: Social History    Tobacco Use      Smoking status: Current Every Day Smoker        Packs/day: 1.00        Years: 30.00        Pack years: 30        Types: Cigarettes      Smokeless tobacco: Never Used    Alcohol use: Yes      Comment: 1 TO 6 BEERS Daily    Drug use: No   Pack years: 30    Date of last lung cancer screening: [unfilled]     Standing Status:   Future     Standing Expiration Date:   8/9/2022     Order Specific Question:   Is there documentation of shared decision making? Answer:   Yes     Order Specific Question:   Is this a low dose CT or a routine CT?      Answer:   Low Dose CT [1]     Order Specific Question:   Is this the first (baseline) CT or an annual exam?     Answer: Annual [2]     Order Specific Question:   Does the patient show any signs or symptoms of lung cancer? Answer:   No     Order Specific Question:   Smoking Status? Answer:   Current Every Day Smoker [1]     Order Specific Question:   Smoking packs per day? Answer:   1     Order Specific Question:   Years smoking? Answer:   30    Lipid, Fasting     Standing Status:   Future     Standing Expiration Date:   5/9/2021    Basic Metabolic Panel, Fasting     Standing Status:   Future     Standing Expiration Date:   5/9/2021    Hepatic Function Panel     Standing Status:   Future     Standing Expiration Date:   5/9/2021    PSA screening     Standing Status:   Future     Standing Expiration Date:   5/9/2021    POCT glycosylated hemoglobin (Hb A1C)    POCT Rapid Drug Screen    [place] COLLECTION CAPILLARY BLOOD SPECIMEN    [place] VISIT TO DISCUSS LUNG CA SCREEN W LDCT     Orders Placed This Encounter   Medications    oxyCODONE-acetaminophen (PERCOCET) 5-325 MG per tablet     Sig: Take 1 tablet by mouth every 8 hours as needed for Pain for up to 30 days. Dispense:  90 tablet     Refill:  0     Reduce doses taken as pain becomes manageable    cyclobenzaprine (FLEXERIL) 10 MG tablet     Sig: TAKE ONE TABLET BY MOUTH DAILY     Dispense:  30 tablet     Refill:  5       Patient given educationalmaterials - see patient instructions. Discussed use, benefit, and side effectsof prescribed medications. All patient questions answered. Pt voiced understanding. Reviewed health maintenance. Instructed to continue current medications, diet andexercise. Patient agreed with treatment plan. Follow up as directed.      Electronicallysigned by Cheyenne Fabian MD on 2/9/2021 at 3:21 PM    Low Dose CT (LDCT) Lung Screening criteria met   Age 50-69   Pack year smoking >30   Still smoking or less than 15 year since quit   No sign or symptoms of lung 1

## 2021-03-02 DIAGNOSIS — F41.9 ANXIETY: ICD-10-CM

## 2021-03-02 DIAGNOSIS — M47.816 OSTEOARTHRITIS OF LUMBAR SPINE, UNSPECIFIED SPINAL OSTEOARTHRITIS COMPLICATION STATUS: ICD-10-CM

## 2021-03-02 RX ORDER — DIAZEPAM 5 MG/1
5 TABLET ORAL EVERY 12 HOURS PRN
Qty: 60 TABLET | Refills: 0 | Status: SHIPPED | OUTPATIENT
Start: 2021-03-02 | End: 2021-04-05 | Stop reason: SDUPTHER

## 2021-03-02 RX ORDER — CYCLOBENZAPRINE HCL 10 MG
TABLET ORAL
Qty: 30 TABLET | Refills: 5 | Status: SHIPPED | OUTPATIENT
Start: 2021-03-02 | End: 2021-08-11

## 2021-03-02 RX ORDER — CLONIDINE HYDROCHLORIDE 0.1 MG/1
TABLET ORAL
Qty: 90 TABLET | Refills: 0 | Status: SHIPPED | OUTPATIENT
Start: 2021-03-02 | End: 2021-06-01

## 2021-03-17 ENCOUNTER — HOSPITAL ENCOUNTER (OUTPATIENT)
Age: 61
Discharge: HOME | End: 2021-03-17
Payer: MEDICARE

## 2021-03-17 DIAGNOSIS — Z12.5 SCREENING PSA (PROSTATE SPECIFIC ANTIGEN): ICD-10-CM

## 2021-03-17 DIAGNOSIS — Z00.00 ANNUAL PHYSICAL EXAM: ICD-10-CM

## 2021-03-17 DIAGNOSIS — Z13.220 ENCOUNTER FOR LIPID SCREENING FOR CARDIOVASCULAR DISEASE: ICD-10-CM

## 2021-03-17 DIAGNOSIS — Z13.6 ENCOUNTER FOR LIPID SCREENING FOR CARDIOVASCULAR DISEASE: ICD-10-CM

## 2021-03-17 LAB
ALBUMIN SERPL-MCNC: 4.4 G/DL (ref 3.5–5.2)
ALBUMIN/GLOBULIN RATIO: 1.5 (ref 1–2.5)
ALP BLD-CCNC: 104 U/L (ref 40–129)
ALT SERPL-CCNC: 21 U/L (ref 5–41)
ANION GAP SERPL CALCULATED.3IONS-SCNC: 20 MMOL/L (ref 9–17)
AST SERPL-CCNC: 33 U/L
BILIRUB SERPL-MCNC: 0.39 MG/DL (ref 0.3–1.2)
BILIRUBIN DIRECT: 0.12 MG/DL
BILIRUBIN, INDIRECT: 0.27 MG/DL (ref 0–1)
BUN BLDV-MCNC: 11 MG/DL (ref 8–23)
BUN/CREAT BLD: ABNORMAL (ref 9–20)
CALCIUM SERPL-MCNC: 9.5 MG/DL (ref 8.6–10.4)
CHLORIDE BLD-SCNC: 103 MMOL/L (ref 98–107)
CHOLESTEROL, FASTING: 167 MG/DL
CHOLESTEROL/HDL RATIO: 4.1
CO2: 19 MMOL/L (ref 20–31)
CREAT SERPL-MCNC: 0.85 MG/DL (ref 0.7–1.2)
GFR AFRICAN AMERICAN: >60 ML/MIN
GFR NON-AFRICAN AMERICAN: >60 ML/MIN
GFR SERPL CREATININE-BSD FRML MDRD: ABNORMAL ML/MIN/{1.73_M2}
GFR SERPL CREATININE-BSD FRML MDRD: ABNORMAL ML/MIN/{1.73_M2}
GLOBULIN: NORMAL G/DL (ref 1.5–3.8)
GLUCOSE FASTING: 107 MG/DL (ref 70–99)
HDLC SERPL-MCNC: 41 MG/DL
LDL CHOLESTEROL: 87 MG/DL (ref 0–130)
POTASSIUM SERPL-SCNC: 4.3 MMOL/L (ref 3.7–5.3)
PROSTATE SPECIFIC ANTIGEN: 3.55 UG/L
SODIUM BLD-SCNC: 142 MMOL/L (ref 135–144)
TOTAL PROTEIN: 7.4 G/DL (ref 6.4–8.3)
TRIGLYCERIDE, FASTING: 197 MG/DL
VLDLC SERPL CALC-MCNC: ABNORMAL MG/DL (ref 1–30)

## 2021-03-17 PROCEDURE — G0103 PSA SCREENING: HCPCS

## 2021-03-17 PROCEDURE — 80061 LIPID PANEL: CPT

## 2021-03-17 PROCEDURE — 80048 BASIC METABOLIC PNL TOTAL CA: CPT

## 2021-03-17 PROCEDURE — 36415 COLL VENOUS BLD VENIPUNCTURE: CPT

## 2021-03-17 PROCEDURE — 80076 HEPATIC FUNCTION PANEL: CPT

## 2021-03-23 DIAGNOSIS — M54.50 CHRONIC MIDLINE LOW BACK PAIN WITHOUT SCIATICA: ICD-10-CM

## 2021-03-23 DIAGNOSIS — M54.16 LUMBAR RADICULOPATHY: ICD-10-CM

## 2021-03-23 DIAGNOSIS — M47.812 OSTEOARTHRITIS OF CERVICAL SPINE, UNSPECIFIED SPINAL OSTEOARTHRITIS COMPLICATION STATUS: ICD-10-CM

## 2021-03-23 DIAGNOSIS — G89.29 CHRONIC MIDLINE LOW BACK PAIN WITHOUT SCIATICA: ICD-10-CM

## 2021-03-23 RX ORDER — OXYCODONE HYDROCHLORIDE AND ACETAMINOPHEN 5; 325 MG/1; MG/1
1 TABLET ORAL EVERY 8 HOURS PRN
Qty: 90 TABLET | Refills: 0 | Status: SHIPPED | OUTPATIENT
Start: 2021-03-23 | End: 2021-05-07 | Stop reason: SDUPTHER

## 2021-04-01 DIAGNOSIS — E11.69 DIABETES MELLITUS TYPE 2 IN OBESE (HCC): ICD-10-CM

## 2021-04-01 DIAGNOSIS — E66.9 DIABETES MELLITUS TYPE 2 IN OBESE (HCC): ICD-10-CM

## 2021-04-01 RX ORDER — TAMSULOSIN HYDROCHLORIDE 0.4 MG/1
CAPSULE ORAL
Qty: 90 CAPSULE | Refills: 0 | Status: SHIPPED | OUTPATIENT
Start: 2021-04-01 | End: 2021-06-30

## 2021-04-01 RX ORDER — MELOXICAM 7.5 MG/1
TABLET ORAL
Qty: 146 TABLET | Refills: 0 | Status: SHIPPED | OUTPATIENT
Start: 2021-04-01 | End: 2021-06-01

## 2021-04-05 DIAGNOSIS — F41.9 ANXIETY: ICD-10-CM

## 2021-04-05 RX ORDER — DIAZEPAM 5 MG/1
5 TABLET ORAL EVERY 12 HOURS PRN
Qty: 60 TABLET | Refills: 0 | Status: SHIPPED | OUTPATIENT
Start: 2021-04-05 | End: 2021-05-07

## 2021-04-07 ENCOUNTER — TELEPHONE (OUTPATIENT)
Dept: ONCOLOGY | Age: 61
End: 2021-04-07

## 2021-04-07 NOTE — LETTER
4/7/2021        [address] [address] [facility],[address]  [address]    Dear Neri Gonzalez: Your healthcare provider has ordered a low dose CT scan of the chest for lung cancer screening. You will find enclosed, information about CT lung screening. Please review the statement of understanding, you will be asked to sign a copy of this at the time of your CT scan    If you have not already been contacted to make the appointment for your scan, please call our scheduling department at [phone]    Keep in mind that CT lung screening does not take the place of smoking cessation. If you are a current smoker, you will find enclosed smoking cessation resources. Please do not hesitate to contact me if you have any questions or concerns.     [address] [facility],      [facility]  [phone]

## 2021-05-01 DIAGNOSIS — E78.2 MIXED HYPERLIPIDEMIA: ICD-10-CM

## 2021-05-03 RX ORDER — ATORVASTATIN CALCIUM 20 MG/1
TABLET, FILM COATED ORAL
Qty: 90 TABLET | Refills: 0 | Status: SHIPPED | OUTPATIENT
Start: 2021-05-03 | End: 2021-06-30

## 2021-05-06 DIAGNOSIS — M54.50 CHRONIC MIDLINE LOW BACK PAIN WITHOUT SCIATICA: ICD-10-CM

## 2021-05-06 DIAGNOSIS — M54.16 LUMBAR RADICULOPATHY: ICD-10-CM

## 2021-05-06 DIAGNOSIS — F41.9 ANXIETY: ICD-10-CM

## 2021-05-06 DIAGNOSIS — G89.29 CHRONIC MIDLINE LOW BACK PAIN WITHOUT SCIATICA: ICD-10-CM

## 2021-05-06 DIAGNOSIS — M47.812 OSTEOARTHRITIS OF CERVICAL SPINE, UNSPECIFIED SPINAL OSTEOARTHRITIS COMPLICATION STATUS: ICD-10-CM

## 2021-05-07 RX ORDER — DIAZEPAM 5 MG/1
TABLET ORAL
Qty: 60 TABLET | Refills: 2 | Status: SHIPPED | OUTPATIENT
Start: 2021-05-07 | End: 2021-07-01 | Stop reason: SDUPTHER

## 2021-05-07 RX ORDER — OXYCODONE HYDROCHLORIDE AND ACETAMINOPHEN 5; 325 MG/1; MG/1
1 TABLET ORAL EVERY 8 HOURS PRN
Qty: 90 TABLET | Refills: 0 | Status: SHIPPED | OUTPATIENT
Start: 2021-05-07 | End: 2021-06-21 | Stop reason: SDUPTHER

## 2021-05-11 ENCOUNTER — OFFICE VISIT (OUTPATIENT)
Dept: PRIMARY CARE CLINIC | Age: 61
End: 2021-05-11
Payer: MEDICARE

## 2021-05-11 VITALS
HEIGHT: 71 IN | DIASTOLIC BLOOD PRESSURE: 70 MMHG | SYSTOLIC BLOOD PRESSURE: 126 MMHG | WEIGHT: 200 LBS | HEART RATE: 79 BPM | BODY MASS INDEX: 28 KG/M2 | OXYGEN SATURATION: 96 %

## 2021-05-11 DIAGNOSIS — E11.69 DIABETES MELLITUS TYPE 2 IN OBESE (HCC): ICD-10-CM

## 2021-05-11 DIAGNOSIS — Z00.00 ROUTINE GENERAL MEDICAL EXAMINATION AT A HEALTH CARE FACILITY: Primary | ICD-10-CM

## 2021-05-11 DIAGNOSIS — F31.9 BIPOLAR 1 DISORDER (HCC): ICD-10-CM

## 2021-05-11 DIAGNOSIS — E66.9 DIABETES MELLITUS TYPE 2 IN OBESE (HCC): ICD-10-CM

## 2021-05-11 DIAGNOSIS — K52.9 CHRONIC DIARRHEA: ICD-10-CM

## 2021-05-11 DIAGNOSIS — M46.1 SACROILIITIS, NOT ELSEWHERE CLASSIFIED (HCC): ICD-10-CM

## 2021-05-11 LAB — HBA1C MFR BLD: 5.8 %

## 2021-05-11 PROCEDURE — G0439 PPPS, SUBSEQ VISIT: HCPCS | Performed by: FAMILY MEDICINE

## 2021-05-11 PROCEDURE — 83036 HEMOGLOBIN GLYCOSYLATED A1C: CPT | Performed by: FAMILY MEDICINE

## 2021-05-11 PROCEDURE — 3017F COLORECTAL CA SCREEN DOC REV: CPT | Performed by: FAMILY MEDICINE

## 2021-05-11 PROCEDURE — 3044F HG A1C LEVEL LT 7.0%: CPT | Performed by: FAMILY MEDICINE

## 2021-05-11 RX ORDER — CHOLESTYRAMINE 4 G/9G
1 POWDER, FOR SUSPENSION ORAL 3 TIMES DAILY
Qty: 90 PACKET | Refills: 5 | Status: SHIPPED | OUTPATIENT
Start: 2021-05-11 | End: 2021-08-11 | Stop reason: SDUPTHER

## 2021-05-11 ASSESSMENT — PATIENT HEALTH QUESTIONNAIRE - PHQ9
SUM OF ALL RESPONSES TO PHQ QUESTIONS 1-9: 0
1. LITTLE INTEREST OR PLEASURE IN DOING THINGS: 0
SUM OF ALL RESPONSES TO PHQ QUESTIONS 1-9: 0

## 2021-05-11 NOTE — PATIENT INSTRUCTIONS
Personalized Preventive Plan for Sveta Guthrie - 5/11/2021  Medicare offers a range of preventive health benefits. Some of the tests and screenings are paid in full while other may be subject to a deductible, co-insurance, and/or copay. Some of these benefits include a comprehensive review of your medical history including lifestyle, illnesses that may run in your family, and various assessments and screenings as appropriate. After reviewing your medical record and screening and assessments performed today your provider may have ordered immunizations, labs, imaging, and/or referrals for you. A list of these orders (if applicable) as well as your Preventive Care list are included within your After Visit Summary for your review. Other Preventive Recommendations:    · A preventive eye exam performed by an eye specialist is recommended every 1-2 years to screen for glaucoma; cataracts, macular degeneration, and other eye disorders. · A preventive dental visit is recommended every 6 months. · Try to get at least 150 minutes of exercise per week or 10,000 steps per day on a pedometer . · Order or download the FREE \"Exercise & Physical Activity: Your Everyday Guide\" from [employer]. Call 9-717.181.8769 or search [employer] online. · You need 3077-3035 mg of calcium and 6556-9353 IU of vitamin D per day. It is possible to meet your calcium requirement with diet alone, but a vitamin D supplement is usually necessary to meet this goal.  · When exposed to the sun, use a sunscreen that protects against both UVA and UVB radiation with an SPF of 30 or greater. Reapply every 2 to 3 hours or after sweating, drying off with a towel, or swimming. · Always wear a seat belt when traveling in a car. Always wear a helmet when riding a bicycle or motorcycle.

## 2021-05-11 NOTE — PROGRESS NOTES
Medicare Annual Wellness Visit  Name: Ceci Gordon Date: 2021   MRN: [id number] Sex: Male   Age: 62 y.o. Ethnicity: Non-/Non    : 1960 Race: Fletcher Tirado is here for Medicare AWV    Screenings for behavioral, psychosocial and functional/safety risks, and cognitive dysfunction are all negative except as indicated below. These results, as well as other patient data from the [address] form, are documented in Flowsheets linked to this Encounter. No Known Allergies      Prior to Visit Medications    Medication Sig Taking? Authorizing Provider   diazePAM (VALIUM) 5 MG tablet TAKE ONE TABLET BY MOUTH EVERY 12 HOURS AS NEEDED FOR ANXIETY OR SLEEP Yes Mervin Reagan MD   oxyCODONE-acetaminophen (PERCOCET) 5-325 MG per tablet Take 1 tablet by mouth every 8 hours as needed for Pain for up to 30 days. Yes Mervin Reagan MD   atorvastatin (LIPITOR) 20 MG tablet TAKE ONE TABLET BY MOUTH DAILY Yes Mervin Reagan MD   metFORMIN (GLUCOPHAGE) 500 MG tablet TAKE ONE TABLET BY MOUTH DAILY WITH BREAKFAST Yes Mervin Reagan MD   meloxicam (MOBIC) 7.5 MG tablet TAKE ONE TABLET BY MOUTH TWICE A DAY Yes Mervin Reagan MD   tamsulosin (FLOMAX) 0.4 MG capsule TAKE ONE CAPSULE BY MOUTH DAILY Yes Mervin Reagan MD   cloNIDine (CATAPRES) 0.1 MG tablet TAKE ONE TABLET BY MOUTH DAILY Yes Mervin Reagan MD   cyclobenzaprine (FLEXERIL) 10 MG tablet TAKE ONE TABLET BY MOUTH DAILY Yes Mervin Reagan MD   atenolol (TENORMIN) 25 MG tablet TAKE ONE TABLET BY MOUTH DAILY Yes Mervin Reagan MD   hydrOXYzine (VISTARIL) 25 MG capsule  Yes Historical Provider, MD   QUEtiapine (SEROQUEL) 50 MG tablet  Yes Historical Provider, MD   lidocaine (LIDODERM) 5 % Place 1 patch onto the skin every 12 hours as needed for Pain 12 hours on, 12 hours off.  Yes Moo Clancy DO   sildenafil (VIAGRA) 100 MG tablet Take 1 tablet by mouth daily as needed for Erectile Dysfunction Yes Edyta Car MD   loperamide (IMODIUM) 2 MG capsule TAKE ONE CAPSULE daily for DIARRHEA Yes Edyta Car MD   Blood Glucose Monitoring Suppl (BLOOD GLUCOSE MONITOR SYSTEM) w/Device KIT USE TO MONITOR BLOOD GLUCOSE LEVEL. (TEST ONCE PER DAY) Yes Edyta Car MD   blood glucose monitor strips Test blood glucose level 1 time per day. Yes Edyta Car MD   Lancets 30G MISC Inject 1 each into the skin daily Yes Edyta Car MD   risperiDONE (RISPERDAL) 1 MG tablet Take 1 tablet by mouth nightly Yes Edyta Car MD   lamoTRIgine (LAMICTAL) 200 MG tablet Take 200 mg by mouth daily Yes Historical Provider, MD   acetaminophen (TYLENOL) 325 MG tablet Take 2 tablets by mouth every 4 hours as needed for Pain Yes Edyta Car MD   DULoxetine (CYMBALTA) 60 MG extended release capsule Take 60 mg by mouth daily Yes Historical Provider, MD   ibuprofen (ADVIL;MOTRIN) 600 MG tablet Take 1 tablet by mouth every 6 hours as needed for Pain  Leann Macedo DO         Past Medical History:   Diagnosis Date    Abnormal EKG 10/27/2016    indicates inferior infarct.     Anxiety     Bipolar disorder ([facility] [address] 2006    on rx    Chronic back pain 11/21/2014    Depression 2006    on rx    Fracture, clavicle 1996    LEFT    Hyperplastic polyp of intestine     Hypertension 2006    on rx    Legally blind in left eye, as defined in [place]      very blurry    Lumbar radiculopathy 4/23/2014    Nicotine dependence     Osteoarthritis     Pain     LEFT EYE    Retinal detachment     history miguel    Sleep apnea     doesnt use cpap     Tubular adenoma     Visual floaters     Wears glasses     USES MAGNIFYING GLASS       Past Surgical History:   Procedure Laterality Date    CATARACT REMOVAL Left     CATARACT REMOVAL WITH IMPLANT Right 8-25-15    CHOLECYSTECTOMY  2/13    COLONOSCOPY  09/22/2016    the ICV was edematous and erythematous but most likely normal variant , bxs taken Large polyp 3 cm, at 50 cm from the anal verge with a very  wide stalk, not removed tattooed needs to be removed with subtotal colectomy    COLONOSCOPY N/A 5/2/2019    COLONOSCOPY POLYPECTOMY COLD BIOPSY, HOT SNARE performed by Deny Escobar MD at [address]    foot, neck    EYE SURGERY  12/31/15    laser right eye, left vit    EYE SURGERY Left     torn retina lazer/freezer/hole    EYE SURGERY Left 9/78/04    SILICONE REMOVAL AIR FLUID EXCHANGE    EYE SURGERY      cataracts removed miguel.  EYE SURGERY      total 6 eye surg to left, 2 eye surg. to rt.     HERNIA REPAIR      umbilical    LYMPH NODE BIOPSY Left 1971    BASE OF SKULL    NASAL SEPTUM SURGERY  11-24-15    MD OFFICE/OUTPT VISIT,PROCEDURE ONLY Right 7/12/2018    VITRECTOMY 25 GAUGE, AIR FLUID EXCHANGE, AIR GAS EXCHANGE WITH 18% SF6, ENDOLASER, 200 MSECONDS, 200 MWATTS, 377 PULSES performed by Lucie Camargo MD at [address]  12/09/2016    w/ ileorectal anastomosis    TONSILLECTOMY  1730    UMBILICAL HERNIA REPAIR  1996    UPPER GASTROINTESTINAL ENDOSCOPY N/A 5/2/2019    EGD BIOPSY performed by Deny Escobar MD at [address] VITRECTOMY Left 12/10/2015    VITRECTOMY Left 10/27/2016    membrane peeling    VITRECTOMY Right 07/12/2018    laser, gas         Family History   Problem Relation Age of Onset    Heart Failure Mother     Cancer Mother     Heart Disease Mother         CAD-OPEN HEART    Mental Retardation Sister     Seizures Sister     Hypertension Other         maternal history       CareTeam (Including outside providers/suppliers regularly involved in providing care):   Patient Care Team:  Blair Fox MD as PCP - General (Family Medicine)  Blair Fox MD as PCP - [facility] Empaneled Provider  Deny Escobar MD as Consulting Physician (Gastroenterology)  Yaritza Newton MD as Consulting Physician (Hematology and Oncology)  Levar Hernandez, RN as Nurse Navigator    Wt Readings from Last 3 Encounters:   05/11/21 200 lb (90.7 kg)   02/09/21 191 lb 9.6 oz (86.9 kg)   10/29/20 188 lb (85.3 kg)     Vitals:    05/11/21 1341   BP: 126/70   Pulse: 79   SpO2: 96%   Weight: 200 lb (90.7 kg)   Height: 5' 11.04\" (1.804 m)     Body mass index is 27.86 kg/m². Based upon direct observation of the patient, evaluation of cognition reveals recent and remote memory intact. General Appearance: alert and oriented to person, place and time, well developed and well- nourished, in no acute distress  Skin: warm and dry, no rash or erythema  Head: normocephalic and atraumatic  Eyes: pupils equal, round, and reactive to light, extraocular eye movements intact, conjunctivae normal  ENT: tympanic membrane, external ear and ear canal normal bilaterally, nose without deformity, nasal mucosa and turbinates normal without polyps  Neck: supple and non-tender without mass, no thyromegaly or thyroid nodules, no cervical lymphadenopathy  Pulmonary/Chest: clear to auscultation bilaterally- no wheezes, rales or rhonchi, normal air movement, no respiratory distress  Cardiovascular: normal rate, regular rhythm, normal S1 and S2, no murmurs, rubs, clicks, or gallops, distal pulses intact, no carotid bruits  Abdomen: soft, non-tender, non-distended, normal bowel sounds, no masses or organomegaly  Extremities: no cyanosis, clubbing or edema  Musculoskeletal: normal range of motion, no joint swelling, deformity or tenderness  Neurologic: reflexes normal and symmetric, no cranial nerve deficit, gait, coordination and speech normal    Patient's complete Health Risk Assessment and screening values have been reviewed and are found in Flowsheets. The following problems were reviewed today and where indicated follow up appointments were made and/or referrals ordered.     Positive Risk Factor Screenings with Interventions:         Substance History:  Social History     Tobacco History     Smoking Status  Current Every Day Smoker Smoking Frequency  1 pack/day for 30 years (30 pk yrs) Smoking Tobacco Type  Cigarettes    Smokeless Tobacco Use  Never Used          Alcohol History     Alcohol Use Status  Yes Comment  1 TO 6 BEERS Daily          Drug Use     Drug Use Status  No          Sexual Activity     Sexually Active  Not Asked               Alcohol Screening:       A score of 8 or more is associated with harmful or hazardous drinking. A score of 13 or more in women, and 15 or more in men, is likely to indicate alcohol dependence. Substance Abuse Interventions:  · Tobacco abuse:  patient is not ready to work toward tobacco cessation at this time    [address] [facility] and ACP:  General  In general, how would you say your health is?: Good  In the past 7 days, have you experienced any of the following?  New or Increased Pain, New or Increased Fatigue, Loneliness, Social Isolation, Stress or Anger?: (!) New or Increased Pain  Do you get the social and emotional support that you need?: Yes  Do you have a Living Will?: (!) No  Advance Directives     Power of  Living Will ACP-Advance Directive ACP-Power of     Not on File Not on File Not on File Not on File      General Health Risk Interventions:  · No Living Will: Advance Care Planning addressed with patient today    Health Habits/Nutrition:  Health Habits/Nutrition  Do you exercise for at least 20 minutes 2-3 times per week?: Yes  Have you lost any weight without trying in the past 3 months?: No  Do you eat only one meal per day?: No  Have you seen the dentist within the past year?: (!) No  Body mass index: (!) 27.86  Health Habits/Nutrition Interventions:  · Dental exam overdue:  patient encouraged to make appointment with his/her dentist    Hearing/Vision:  No exam data present  Hearing/Vision  Do you or your family notice any trouble with your hearing that hasn't been managed with hearing aids?: No  Do you have difficulty driving, watching TV, or doing any of your daily activities because of your eyesight?: (!) Yes  Have you had an eye exam within the past year?: Appointment is scheduled  Hearing/Vision Interventions:  · Has eye appointment     Safety:  Safety  Do you have working smoke detectors?: Yes  Have all throw rugs been removed or fastened?: (!) No  Do you have non-slip mats or surfaces in all bathtubs/showers?: (!) No  Do all of your stairways have a railing or banister?: Yes  Are your doorways, halls and stairs free of clutter?: Yes  Do you always fasten your seatbelt when you are in a car?: Yes  Safety Interventions:  · Patient declines any further evaluation/treatment for this issue     Personalized Preventive Plan   Current Health Maintenance Status  Immunization History   Administered Date(s) Administered    Influenza, Quadv, IM, (6 mo and older Fluzone, Flulaval, Fluarix and 3 yrs and older Afluria) 10/29/2020    Influenza, Quadv, IM, PF (6 mo and older Fluzone, Flulaval, Fluarix, and 3 yrs and older Afluria) 11/15/2016, 09/15/2017, 09/27/2018, 11/27/2019    Pneumococcal Conjugate 13-valent ([id number]) 11/15/2016    Pneumococcal Polysaccharide ([id number]) 09/15/2017    Tdap (Boostrix, Adacel) 06/21/2018    Zoster Recombinant (Shingrix) 01/16/2019, 04/17/2019        Health Maintenance   Topic Date Due    COVID-19 Vaccine (1) Never done   [employer] Visit (AWV)  Never done    Low dose CT lung screening  02/26/2020    Diabetic retinal exam  06/25/2020    Diabetic foot exam  10/29/2021    Diabetic microalbuminuria test  10/29/2021    A1C test (Diabetic or Prediabetic)  02/09/2022    Lipid screen  03/17/2022    Colon cancer screen colonoscopy  05/02/2022    DTaP/Tdap/Td vaccine (2 - Td) 06/21/2028    Flu vaccine  Completed    Shingles Vaccine  Completed    Pneumococcal 0-64 years Vaccine  Completed    Hepatitis C screen  Completed    HIV screen  Completed    Hepatitis A vaccine  Aged Out    Hib vaccine  Aged Favio Pa Meningococcal (ACWY) vaccine  Aged Out     Recommendations for Preventive Services Due: see orders and patient instructions/AVS.  . Recommended screening schedule for the next 5-10 years is provided to the patient in written form: see Patient Instructions/AVS.    Martha Sanches was seen today for medicare awv. Diagnoses and all orders for this visit:    Routine general medical examination at a health care facility  Done. Sacroiliitis, not elsewhere classified ([address]  Stable. Patient had received injections without benefit. Still using his Norco.    Bipolar 1 disorder (HCC)  Stable. No thoughts of hurting self or others. Chronic diarrhea ever since partial colectomy. Start Questran 1 scoop 3 times daily. Controlled Substance Monitoring:    Acute and Chronic Pain Monitoring:   RX Monitoring 5/11/2021   Attestation -   Acute Pain Prescriptions -   Periodic Controlled Substance Monitoring Possible medication side effects, risk of tolerance/dependence & alternative treatments discussed. ;No signs of potential drug abuse or diversion identified. Chronic Pain > 50 MEDD Obtained or confirmed \"Consent for Opioid Use\" on file. Chronic Pain > 80 MEDD -   Chronic Pain > 120 MEDD Obtained or confirmed \"Medication Contract\" on file.

## 2021-05-12 ENCOUNTER — HOSPITAL ENCOUNTER (OUTPATIENT)
Dept: CT IMAGING | Age: 61
Discharge: HOME | End: 2021-05-14
Payer: MEDICARE

## 2021-05-12 DIAGNOSIS — Z87.891 PERSONAL HISTORY OF TOBACCO USE: ICD-10-CM

## 2021-05-12 PROCEDURE — 71271 CT THORAX LUNG CANCER SCR C-: CPT

## 2021-06-01 RX ORDER — CLONIDINE HYDROCHLORIDE 0.1 MG/1
TABLET ORAL
Qty: 90 TABLET | Refills: 0 | Status: SHIPPED | OUTPATIENT
Start: 2021-06-01 | End: 2021-07-30

## 2021-06-01 RX ORDER — MELOXICAM 7.5 MG/1
TABLET ORAL
Qty: 146 TABLET | Refills: 0 | Status: SHIPPED | OUTPATIENT
Start: 2021-06-01 | End: 2021-07-30

## 2021-06-18 DIAGNOSIS — M47.812 OSTEOARTHRITIS OF CERVICAL SPINE, UNSPECIFIED SPINAL OSTEOARTHRITIS COMPLICATION STATUS: ICD-10-CM

## 2021-06-18 DIAGNOSIS — G89.29 CHRONIC MIDLINE LOW BACK PAIN WITHOUT SCIATICA: ICD-10-CM

## 2021-06-18 DIAGNOSIS — M54.50 CHRONIC MIDLINE LOW BACK PAIN WITHOUT SCIATICA: ICD-10-CM

## 2021-06-18 DIAGNOSIS — M54.16 LUMBAR RADICULOPATHY: ICD-10-CM

## 2021-06-18 RX ORDER — OXYCODONE HYDROCHLORIDE AND ACETAMINOPHEN 5; 325 MG/1; MG/1
1 TABLET ORAL EVERY 8 HOURS PRN
Qty: 90 TABLET | Refills: 0 | Status: CANCELLED | OUTPATIENT
Start: 2021-06-18 | End: 2021-07-18

## 2021-06-21 DIAGNOSIS — M54.50 CHRONIC MIDLINE LOW BACK PAIN WITHOUT SCIATICA: ICD-10-CM

## 2021-06-21 DIAGNOSIS — M47.812 OSTEOARTHRITIS OF CERVICAL SPINE, UNSPECIFIED SPINAL OSTEOARTHRITIS COMPLICATION STATUS: ICD-10-CM

## 2021-06-21 DIAGNOSIS — M54.16 LUMBAR RADICULOPATHY: ICD-10-CM

## 2021-06-21 DIAGNOSIS — G89.29 CHRONIC MIDLINE LOW BACK PAIN WITHOUT SCIATICA: ICD-10-CM

## 2021-06-21 RX ORDER — OXYCODONE HYDROCHLORIDE AND ACETAMINOPHEN 5; 325 MG/1; MG/1
1 TABLET ORAL EVERY 8 HOURS PRN
Qty: 90 TABLET | Refills: 0 | Status: SHIPPED | OUTPATIENT
Start: 2021-06-21 | End: 2021-08-04 | Stop reason: SDUPTHER

## 2021-06-30 DIAGNOSIS — E11.69 DIABETES MELLITUS TYPE 2 IN OBESE (HCC): ICD-10-CM

## 2021-06-30 DIAGNOSIS — E66.9 DIABETES MELLITUS TYPE 2 IN OBESE (HCC): ICD-10-CM

## 2021-06-30 DIAGNOSIS — E78.2 MIXED HYPERLIPIDEMIA: ICD-10-CM

## 2021-06-30 RX ORDER — ATORVASTATIN CALCIUM 20 MG/1
TABLET, FILM COATED ORAL
Qty: 90 TABLET | Refills: 0 | Status: SHIPPED | OUTPATIENT
Start: 2021-06-30 | End: 2021-10-12

## 2021-06-30 RX ORDER — TAMSULOSIN HYDROCHLORIDE 0.4 MG/1
CAPSULE ORAL
Qty: 90 CAPSULE | Refills: 0 | Status: SHIPPED | OUTPATIENT
Start: 2021-06-30 | End: 2021-10-04

## 2021-07-01 DIAGNOSIS — F41.9 ANXIETY: ICD-10-CM

## 2021-07-01 RX ORDER — DIAZEPAM 5 MG/1
TABLET ORAL
Qty: 60 TABLET | Refills: 0 | Status: SHIPPED | OUTPATIENT
Start: 2021-07-01 | End: 2021-08-04 | Stop reason: SDUPTHER

## 2021-07-30 DIAGNOSIS — N52.9 ERECTILE DYSFUNCTION, UNSPECIFIED ERECTILE DYSFUNCTION TYPE: ICD-10-CM

## 2021-07-30 RX ORDER — CLONIDINE HYDROCHLORIDE 0.1 MG/1
TABLET ORAL
Qty: 90 TABLET | Refills: 0 | Status: SHIPPED | OUTPATIENT
Start: 2021-07-30 | End: 2021-10-27

## 2021-07-30 RX ORDER — MELOXICAM 7.5 MG/1
TABLET ORAL
Qty: 146 TABLET | Refills: 0 | Status: SHIPPED | OUTPATIENT
Start: 2021-07-30 | End: 2021-10-12

## 2021-08-02 RX ORDER — SILDENAFIL 100 MG/1
100 TABLET, FILM COATED ORAL DAILY PRN
Qty: 30 TABLET | Refills: 1 | Status: SHIPPED | OUTPATIENT
Start: 2021-08-02 | End: 2021-08-05 | Stop reason: SDUPTHER

## 2021-08-04 DIAGNOSIS — M54.16 LUMBAR RADICULOPATHY: ICD-10-CM

## 2021-08-04 DIAGNOSIS — F41.9 ANXIETY: ICD-10-CM

## 2021-08-04 DIAGNOSIS — M47.812 OSTEOARTHRITIS OF CERVICAL SPINE, UNSPECIFIED SPINAL OSTEOARTHRITIS COMPLICATION STATUS: ICD-10-CM

## 2021-08-04 DIAGNOSIS — M54.50 CHRONIC MIDLINE LOW BACK PAIN WITHOUT SCIATICA: ICD-10-CM

## 2021-08-04 DIAGNOSIS — G89.29 CHRONIC MIDLINE LOW BACK PAIN WITHOUT SCIATICA: ICD-10-CM

## 2021-08-04 RX ORDER — OXYCODONE HYDROCHLORIDE AND ACETAMINOPHEN 5; 325 MG/1; MG/1
1 TABLET ORAL EVERY 8 HOURS PRN
Qty: 90 TABLET | Refills: 0 | Status: SHIPPED | OUTPATIENT
Start: 2021-08-04 | End: 2021-09-15 | Stop reason: SDUPTHER

## 2021-08-04 RX ORDER — DIAZEPAM 5 MG/1
TABLET ORAL
Qty: 60 TABLET | Refills: 0 | Status: SHIPPED | OUTPATIENT
Start: 2021-08-04 | End: 2021-08-30 | Stop reason: SDUPTHER

## 2021-08-05 DIAGNOSIS — N52.9 ERECTILE DYSFUNCTION, UNSPECIFIED ERECTILE DYSFUNCTION TYPE: ICD-10-CM

## 2021-08-05 RX ORDER — SILDENAFIL 100 MG/1
100 TABLET, FILM COATED ORAL DAILY PRN
Qty: 90 TABLET | Refills: 0 | Status: SHIPPED | OUTPATIENT
Start: 2021-08-05 | End: 2022-06-27

## 2021-08-11 ENCOUNTER — OFFICE VISIT (OUTPATIENT)
Dept: PRIMARY CARE CLINIC | Age: 61
End: 2021-08-11
Payer: MEDICARE

## 2021-08-11 VITALS
HEIGHT: 71 IN | WEIGHT: 184.4 LBS | OXYGEN SATURATION: 95 % | BODY MASS INDEX: 25.81 KG/M2 | SYSTOLIC BLOOD PRESSURE: 132 MMHG | DIASTOLIC BLOOD PRESSURE: 80 MMHG | HEART RATE: 68 BPM

## 2021-08-11 DIAGNOSIS — K52.9 CHRONIC DIARRHEA: ICD-10-CM

## 2021-08-11 DIAGNOSIS — E11.69 DIABETES MELLITUS TYPE 2 IN OBESE (HCC): Primary | ICD-10-CM

## 2021-08-11 DIAGNOSIS — E66.9 DIABETES MELLITUS TYPE 2 IN OBESE (HCC): Primary | ICD-10-CM

## 2021-08-11 DIAGNOSIS — M47.816 OSTEOARTHRITIS OF LUMBAR SPINE, UNSPECIFIED SPINAL OSTEOARTHRITIS COMPLICATION STATUS: ICD-10-CM

## 2021-08-11 LAB — HBA1C MFR BLD: 6 %

## 2021-08-11 PROCEDURE — 2022F DILAT RTA XM EVC RTNOPTHY: CPT | Performed by: FAMILY MEDICINE

## 2021-08-11 PROCEDURE — 99213 OFFICE O/P EST LOW 20 MIN: CPT | Performed by: FAMILY MEDICINE

## 2021-08-11 PROCEDURE — 4004F PT TOBACCO SCREEN RCVD TLK: CPT | Performed by: FAMILY MEDICINE

## 2021-08-11 PROCEDURE — G8417 CALC BMI ABV UP PARAM F/U: HCPCS | Performed by: FAMILY MEDICINE

## 2021-08-11 PROCEDURE — 3017F COLORECTAL CA SCREEN DOC REV: CPT | Performed by: FAMILY MEDICINE

## 2021-08-11 PROCEDURE — 3044F HG A1C LEVEL LT 7.0%: CPT | Performed by: FAMILY MEDICINE

## 2021-08-11 PROCEDURE — 83036 HEMOGLOBIN GLYCOSYLATED A1C: CPT | Performed by: FAMILY MEDICINE

## 2021-08-11 PROCEDURE — G8427 DOCREV CUR MEDS BY ELIG CLIN: HCPCS | Performed by: FAMILY MEDICINE

## 2021-08-11 RX ORDER — CHOLESTYRAMINE 4 G/9G
1 POWDER, FOR SUSPENSION ORAL 3 TIMES DAILY
Qty: 90 PACKET | Refills: 5 | Status: SHIPPED | OUTPATIENT
Start: 2021-08-11 | End: 2021-08-11

## 2021-08-11 RX ORDER — CHOLESTYRAMINE 4 G/9G
1 POWDER, FOR SUSPENSION ORAL 4 TIMES DAILY
Qty: 120 PACKET | Refills: 5 | Status: SHIPPED | OUTPATIENT
Start: 2021-08-11 | End: 2022-04-11

## 2021-08-11 RX ORDER — CYCLOBENZAPRINE HCL 10 MG
10 TABLET ORAL 2 TIMES DAILY PRN
Qty: 60 TABLET | Refills: 5 | Status: SHIPPED | OUTPATIENT
Start: 2021-08-11 | End: 2021-08-16 | Stop reason: SDUPTHER

## 2021-08-11 ASSESSMENT — ENCOUNTER SYMPTOMS
COUGH: 0
DIARRHEA: 0
EYE REDNESS: 0
EYE DISCHARGE: 0
VOMITING: 0
WHEEZING: 0
RHINORRHEA: 0
SHORTNESS OF BREATH: 0
SORE THROAT: 0
NAUSEA: 0
BACK PAIN: 1
ABDOMINAL PAIN: 0

## 2021-08-11 NOTE — PROGRESS NOTES
[facility]  [address] [address]  [facility] [address]  Dept: [phone]    Maribell Aquino is a 62 y.o. male Established patient, who presents today for his medical conditions/complaints as noted below. Chief Complaint   Patient presents with    Diabetes     Follow up       HPI:     HPI  Patient here for diabetic recheck. Denies any chest pain or shortness of breath. No lightheadedness or dizziness. Patient states still having his chronic diarrhea. States he feels that the Questran does help. Has been using 3 times a day. Patient still has his chronic back pain. Using his Percocet twice daily. Wondering if the Flexeril can be increased. Denies any melena or hematochezia. Weight noted to be down somewhat. Reviewed prior notes None  Reviewed previous Labs    LDL Cholesterol (mg/dL)   Date Value   03/17/2021 87   02/19/2019 84   06/27/2013 Unable to calc. as TRIG>400       (goal LDL is <100)   AST (U/L)   Date Value   03/17/2021 33     ALT (U/L)   Date Value   03/17/2021 21     BUN (mg/dL)   Date Value   03/17/2021 11     Hemoglobin A1C (%)   Date Value   08/11/2021 6.0     TSH (mIU/L)   Date Value   06/27/2013 5.65 (H)     BP Readings from Last 3 Encounters:   08/11/21 132/80   05/11/21 126/70   02/09/21 136/88          (goal 120/80)    Past Medical History:   Diagnosis Date    Abnormal EKG 10/27/2016    indicates inferior infarct.     Anxiety     Bipolar disorder ([facility] [address] 2006    on rx    Chronic back pain 11/21/2014    Depression 2006    on rx    Fracture, clavicle 1996    LEFT    Hypertension 2006    on rx    Legally blind in left eye, as defined in [place]      very blurry    Lumbar radiculopathy 04/23/2014    Nicotine dependence     Osteoarthritis     Pain     LEFT EYE    Retinal detachment     history miguel    Sleep apnea     doesnt use cpap     Tubular adenoma     Visual floaters     Wears glasses     USES MAGNIFYING GLASS      Past Surgical History:   Procedure Laterality Date    CATARACT REMOVAL Left     CATARACT REMOVAL WITH IMPLANT Right 8-25-15    CHOLECYSTECTOMY  2/13    COLONOSCOPY  09/22/2016    the ICV was edematous and erythematous but most likely normal variant , bxs taken Large polyp 3 cm, at 50 cm from the anal verge with a very  wide stalk, not removed tattooed needs to be removed with subtotal colectomy    COLONOSCOPY N/A 5/2/2019    COLONOSCOPY POLYPECTOMY COLD BIOPSY, HOT SNARE performed by Hyacinth Alvarez MD at [address] [place] [address]    foot, neck    EYE SURGERY  12/31/15    laser right eye, left vit    EYE SURGERY Left     torn retina lazer/freezer/hole    EYE SURGERY Left 4/70/62    SILICONE REMOVAL AIR FLUID EXCHANGE    EYE SURGERY      cataracts removed miguel.  EYE SURGERY      total 6 eye surg to left, 2 eye surg. to rt.     HERNIA REPAIR      umbilical    LYMPH NODE BIOPSY Left 1971    BASE OF SKULL    NASAL SEPTUM SURGERY  11-24-15    CT OFFICE/OUTPT VISIT,PROCEDURE ONLY Right 7/12/2018    VITRECTOMY 25 GAUGE, AIR FLUID EXCHANGE, AIR GAS EXCHANGE WITH 18% SF6, ENDOLASER, 200 MSECONDS, 200 MWATTS, 377 PULSES performed by Shireen Puga MD at [address]  12/09/2016    w/ ileorectal anastomosis    TONSILLECTOMY  3548    UMBILICAL HERNIA REPAIR  1996    UPPER GASTROINTESTINAL ENDOSCOPY N/A 5/2/2019    EGD BIOPSY performed by Hyacinth Alvarez MD at [address] VITRECTOMY Left 12/10/2015    VITRECTOMY Left 10/27/2016    membrane peeling    VITRECTOMY Right 07/12/2018    laser, gas       Family History   Problem Relation Age of Onset    Heart Failure Mother     Cancer Mother     Heart Disease Mother         CAD-OPEN HEART    Mental Retardation Sister     Seizures Sister     Hypertension Other         maternal history       Social History     Tobacco Use    Smoking status: Current Every Day Smoker     Packs/day: 1.00     Years: 30.00     Pack years: 30.00 Types: Cigarettes    Smokeless tobacco: Never Used   Substance Use Topics    Alcohol use: Yes     Comment: 1 TO 6 BEERS Daily      Current Outpatient Medications   Medication Sig Dispense Refill    metFORMIN (GLUCOPHAGE) 500 MG tablet Take 1 tablet by mouth daily (with breakfast) 90 tablet 3    cholestyramine (QUESTRAN) 4 g packet Take 1 packet by mouth 4 times daily 120 packet 5    cyclobenzaprine (FLEXERIL) 10 MG tablet Take 1 tablet by mouth 2 times daily as needed for Muscle spasms TAKE ONE TABLET BY MOUTH DAILY 60 tablet 5    sildenafil (VIAGRA) 100 MG tablet Take 1 tablet by mouth daily as needed for Erectile Dysfunction 90 tablet 0    oxyCODONE-acetaminophen (PERCOCET) 5-325 MG per tablet Take 1 tablet by mouth every 8 hours as needed for Pain for up to 30 days. 90 tablet 0    diazePAM (VALIUM) 5 MG tablet TAKE ONE TABLET BY MOUTH EVERY 12 HOURS AS NEEDED FOR ANXIETY OR SLEEP 60 tablet 0    cloNIDine (CATAPRES) 0.1 MG tablet TAKE ONE TABLET BY MOUTH DAILY 90 tablet 0    meloxicam (MOBIC) 7.5 MG tablet TAKE ONE TABLET BY MOUTH TWICE A  tablet 0    atorvastatin (LIPITOR) 20 MG tablet TAKE ONE TABLET BY MOUTH DAILY 90 tablet 0    tamsulosin (FLOMAX) 0.4 MG capsule TAKE ONE CAPSULE BY MOUTH DAILY 90 capsule 0    atenolol (TENORMIN) 25 MG tablet TAKE ONE TABLET BY MOUTH DAILY 90 tablet 4    hydrOXYzine (VISTARIL) 25 MG capsule       QUEtiapine (SEROQUEL) 50 MG tablet       lidocaine (LIDODERM) 5 % Place 1 patch onto the skin every 12 hours as needed for Pain 12 hours on, 12 hours off. 20 patch 0    loperamide (IMODIUM) 2 MG capsule TAKE ONE CAPSULE daily for DIARRHEA 90 capsule 5    Blood Glucose Monitoring Suppl (BLOOD GLUCOSE MONITOR SYSTEM) w/Device KIT USE TO MONITOR BLOOD GLUCOSE LEVEL. (TEST ONCE PER DAY) 1 kit 0    blood glucose monitor strips Test blood glucose level 1 time per day.  100 strip 3    Lancets 30G MISC Inject 1 each into the skin daily 100 each 3    lamoTRIgine oz (83.6 kg)   SpO2 95%   BMI 25.69 kg/m²   Physical Exam  Vitals and nursing note reviewed. Constitutional:       General: He is not in acute distress. Appearance: He is well-developed. He is not ill-appearing. HENT:      Head: Normocephalic and atraumatic. Right Ear: External ear normal.      Left Ear: External ear normal.   Eyes:      General: No scleral icterus. Right eye: No discharge. Left eye: No discharge. Conjunctiva/sclera: Conjunctivae normal.      Pupils: Pupils are equal, round, and reactive to light. Neck:      Thyroid: No thyromegaly. Trachea: No tracheal deviation. Cardiovascular:      Rate and Rhythm: Normal rate and regular rhythm. Heart sounds: Normal heart sounds. Pulmonary:      Effort: Pulmonary effort is normal. No respiratory distress. Breath sounds: Normal breath sounds. No wheezing. Lymphadenopathy:      Cervical: No cervical adenopathy. Skin:     General: Skin is warm. Findings: No rash. Neurological:      Mental Status: He is alert and oriented to person, place, and time. Psychiatric:         Mood and Affect: Mood normal.         Behavior: Behavior normal.         Thought Content: Thought content normal.       Controlled Substance Monitoring:    Acute and Chronic Pain Monitoring:   RX Monitoring 8/11/2021   Attestation -   Acute Pain Prescriptions -   Periodic Controlled Substance Monitoring Possible medication side effects, risk of tolerance/dependence & alternative treatments discussed. ;No signs of potential drug abuse or diversion identified. Chronic Pain > 50 MEDD Obtained or confirmed \"Consent for Opioid Use\" on file. Chronic Pain > 80 MEDD -   Chronic Pain > 120 MEDD Obtained or confirmed \"Medication Contract\" on file. Assessment:       Diagnosis Orders   1. Diabetes mellitus type 2 in obese (HCC)  POCT glycosylated hemoglobin (Hb A1C)    metFORMIN (GLUCOPHAGE) 500 MG tablet   2.  Chronic diarrhea cholestyramine (QUESTRAN) 4 g packet    DISCONTINUED: cholestyramine (QUESTRAN) 4 g packet   3. Osteoarthritis of lumbar spine, unspecified spinal osteoarthritis complication status  cyclobenzaprine (FLEXERIL) 10 MG tablet        Plan:    Covid vaccine discussed  Renew meds  Increase questran to qid  Flexeril 10 mg bid prn    Return in about 3 months (around 11/11/2021). Orders Placed This Encounter   Procedures    POCT glycosylated hemoglobin (Hb A1C)     Orders Placed This Encounter   Medications    metFORMIN (GLUCOPHAGE) 500 MG tablet     Sig: Take 1 tablet by mouth daily (with breakfast)     Dispense:  90 tablet     Refill:  3    DISCONTD: cholestyramine (QUESTRAN) 4 g packet     Sig: Take 1 packet by mouth 3 times daily     Dispense:  90 packet     Refill:  5    cholestyramine (QUESTRAN) 4 g packet     Sig: Take 1 packet by mouth 4 times daily     Dispense:  120 packet     Refill:  5     Please cancel TID script    cyclobenzaprine (FLEXERIL) 10 MG tablet     Sig: Take 1 tablet by mouth 2 times daily as needed for Muscle spasms TAKE ONE TABLET BY MOUTH DAILY     Dispense:  60 tablet     Refill:  5       Patient given educational materials - see patient instructions. Discussed use, benefit, and side effects of prescribed medications. All patient questions answered. Pt voiced understanding. Reviewed health maintenance. Instructed to continue current medications, diet andexercise. Patient agreed with treatment plan. Follow up as directed.      Electronicallysigned by Viraj Adame MD on 8/11/2021 at 1:37 PM

## 2021-08-16 DIAGNOSIS — M47.816 OSTEOARTHRITIS OF LUMBAR SPINE, UNSPECIFIED SPINAL OSTEOARTHRITIS COMPLICATION STATUS: ICD-10-CM

## 2021-08-16 RX ORDER — CYCLOBENZAPRINE HCL 10 MG
10 TABLET ORAL 2 TIMES DAILY PRN
Qty: 60 TABLET | Refills: 5 | Status: SHIPPED | OUTPATIENT
Start: 2021-08-16 | End: 2021-09-15 | Stop reason: SDUPTHER

## 2021-08-30 DIAGNOSIS — F41.9 ANXIETY: ICD-10-CM

## 2021-08-30 RX ORDER — DIAZEPAM 5 MG/1
TABLET ORAL
Qty: 60 TABLET | Refills: 0 | Status: SHIPPED | OUTPATIENT
Start: 2021-08-30 | End: 2021-09-27 | Stop reason: SDUPTHER

## 2021-08-30 NOTE — TELEPHONE ENCOUNTER
----- Message from Chantelle Nazario sent at 8/30/2021  4:37 PM EDT -----  Subject: Refill Request    QUESTIONS  Name of Medication? diazePAM (VALIUM) 5 MG tablet  Patient-reported dosage and instructions? TAKE ONE TABLET BY MOUTH EVERY   12 HOURS AS NEEDED FOR ANXIETY OR SLEEP  How many days do you have left? 1  Preferred Pharmacy? [address] [address]  Pharmacy phone number (if available)? [phone]  ---------------------------------------------------------------------------  --------------  CALL BACK INFO  What is the best way for the office to contact you? OK to leave message on   voicemail  Preferred Call Back Phone Number?  [phone]

## 2021-09-15 DIAGNOSIS — M47.812 OSTEOARTHRITIS OF CERVICAL SPINE, UNSPECIFIED SPINAL OSTEOARTHRITIS COMPLICATION STATUS: ICD-10-CM

## 2021-09-15 DIAGNOSIS — M47.816 OSTEOARTHRITIS OF LUMBAR SPINE, UNSPECIFIED SPINAL OSTEOARTHRITIS COMPLICATION STATUS: ICD-10-CM

## 2021-09-15 DIAGNOSIS — M54.50 CHRONIC MIDLINE LOW BACK PAIN WITHOUT SCIATICA: ICD-10-CM

## 2021-09-15 DIAGNOSIS — G89.29 CHRONIC MIDLINE LOW BACK PAIN WITHOUT SCIATICA: ICD-10-CM

## 2021-09-15 DIAGNOSIS — M54.16 LUMBAR RADICULOPATHY: ICD-10-CM

## 2021-09-15 RX ORDER — CYCLOBENZAPRINE HCL 10 MG
10 TABLET ORAL 2 TIMES DAILY PRN
Qty: 60 TABLET | Refills: 5 | Status: SHIPPED | OUTPATIENT
Start: 2021-09-15 | End: 2021-11-15 | Stop reason: SDUPTHER

## 2021-09-15 RX ORDER — OXYCODONE HYDROCHLORIDE AND ACETAMINOPHEN 5; 325 MG/1; MG/1
1 TABLET ORAL EVERY 8 HOURS PRN
Qty: 90 TABLET | Refills: 0 | Status: SHIPPED | OUTPATIENT
Start: 2021-09-15 | End: 2021-10-22 | Stop reason: SDUPTHER

## 2021-09-27 DIAGNOSIS — F41.9 ANXIETY: ICD-10-CM

## 2021-09-27 RX ORDER — DIAZEPAM 5 MG/1
TABLET ORAL
Qty: 60 TABLET | Refills: 0 | Status: SHIPPED | OUTPATIENT
Start: 2021-09-27 | End: 2021-10-28 | Stop reason: SDUPTHER

## 2021-09-29 ENCOUNTER — HOSPITAL ENCOUNTER (EMERGENCY)
Age: 61
Discharge: HOME | End: 2021-09-29
Attending: EMERGENCY MEDICINE
Payer: MEDICARE

## 2021-09-29 VITALS
RESPIRATION RATE: 16 BRPM | OXYGEN SATURATION: 92 % | HEIGHT: 71 IN | DIASTOLIC BLOOD PRESSURE: 92 MMHG | BODY MASS INDEX: 25.9 KG/M2 | SYSTOLIC BLOOD PRESSURE: 133 MMHG | HEART RATE: 73 BPM | WEIGHT: 185 LBS | TEMPERATURE: 97 F

## 2021-09-29 DIAGNOSIS — M54.50 ACUTE LOW BACK PAIN WITHOUT SCIATICA, UNSPECIFIED BACK PAIN LATERALITY: Primary | ICD-10-CM

## 2021-09-29 PROCEDURE — 99285 EMERGENCY DEPT VISIT HI MDM: CPT

## 2021-09-29 ASSESSMENT — ENCOUNTER SYMPTOMS
VOMITING: 0
COUGH: 0
CONSTIPATION: 0
NAUSEA: 0
COLOR CHANGE: 0
PHOTOPHOBIA: 0
TROUBLE SWALLOWING: 0
DIARRHEA: 0
ABDOMINAL PAIN: 0
SHORTNESS OF BREATH: 0
CHEST TIGHTNESS: 0

## 2021-09-29 ASSESSMENT — PAIN DESCRIPTION - PAIN TYPE: TYPE: CHRONIC PAIN

## 2021-09-29 ASSESSMENT — PAIN DESCRIPTION - LOCATION: LOCATION: BACK;NECK

## 2021-09-29 ASSESSMENT — PAIN SCALES - GENERAL: PAINLEVEL_OUTOF10: 6

## 2021-09-29 ASSESSMENT — PAIN DESCRIPTION - DESCRIPTORS: DESCRIPTORS: CONSTANT

## 2021-09-29 NOTE — ED NOTES
Pt presents to the ED with C/O having a fall after showering 4 days ago. Pt stated he doesn't know if he lost Consciousness. Pt has stated the past couple of days he \"feels in a fog\". Pt lives alone. Pt is experiencing pain in the neck and back, but it is chronic pain normally. Pt denies taking OTC medications. Pt concerned he may have a concussion. HX HTN, Diabetes. Will continue to monitor and reassess.               Roxanne Torres RN  09/29/21 [id number]

## 2021-09-29 NOTE — ED PROVIDER NOTES
[address] [facility]  Faculty Attestation    I performed a history and physical examination of the patient and discussed management with the resident. I reviewed the residents note and agree with the documented findings and plan of care. Any areas of disagreement are noted on the chart. I was personally present for the key portions of any procedures. I have documented in the chart those procedures where I was not present during the key portions. I have reviewed the emergency nurses triage note. I agree with the chief complaint, past medical history, past surgical history, allergies, medications, social, and family history as documented unless otherwise noted below.        This is a 70-year-old male who presents to the emergency department requesting evaluation of his lower back  Patient has a history of chronic low back pain  Patient indicates that 4 days ago he slipped while getting out of the tub  He describes that he fell backwards hitting his lower back on the lip of the tub  He does not remember hitting his head  He states that after this incident he felt somewhat \"foggy\" and had some mild nausea  These symptoms have resolved  Patient denies a headache  No acute neck or thoracic pain  No extremity injury  No chest pain, palpitations, or near syncope  No shortness of breath, cough, or wheezing  No abdominal pain  No nausea or vomiting  No genitourinary or stool complaints  Specifically he denies any incontinence or retention of stool or urine  No saddle anesthesia  He denies any lower extremity radiculopathy or sensory/motor loss in the lower extremities  Review of systems otherwise negative  He has no additional concerns at this time    On examination he appears in no acute distress  Heart is regular in rate and rhythm  Lungs are clear to auscultation  Abdomen soft and nontender  Chest wall and ribs are nontender  Patient has no bony discomfort or evidence of acute traumatic injury to the

## 2021-09-29 NOTE — ED PROVIDER NOTES
[address]  ED  Emergency Department Encounter  EmergencyMedicine Resident     Pt Nkechi Iraheta  MRN: [id number]  Davegfdanny 1960  Date of evaluation: 9/29/21  PCP:  Lynn Jarquin MD    This patient was evaluated in the Emergency Department for symptoms described in the history of present illness. The patient was evaluated in the context of the global COVID-19 pandemic, which necessitated consideration that the patient might be at risk for infection with the SARS-CoV-2 virus that causes COVID-19. Institutional protocols and algorithms that pertain to the evaluation of patients at risk for COVID-19 are in a state of rapid change based on information released by regulatory bodies including the CDC and federal and state organizations. These policies and algorithms were followed during the patient's care in the ED. CHIEF COMPLAINT       Chief Complaint   Patient presents with    Fall     Pt fell 4 days ago after taking a shower, possibly hit head unsure of LOC       HISTORY OF PRESENT ILLNESS  (Location/Symptom, Timing/Onset, Context/Setting, Quality, Duration, Modifying Factors, Severity.)      Gem Mariee is a 62 y.o. male history of hypertension, diabetes, chronic neck and back pain who presents with mechanical fall getting out of the bathtub. Patient does not know if he lost consciousness or hit his head. Patient states for the past 3 days he has felt foggy and a little bit more anxious than usual.  Patient also stated after he fell he had a little bit of nausea but no emesis. Patient states he feels like he hit his neck and back however he does not have any pain in those regions. Patient states his lower back just feels weird. On physical exam there is no midline tenderness and he has full passive and active range of motion in the cervical lumbar and thoracic regions.   Patient does not have any focal neurological deficits on exam.  Although low suspicion for acute head, neck or back injury patient was offered imaging to investigate any underlying injury patient denied. PAST MEDICAL / SURGICAL / SOCIAL / FAMILY HISTORY      has a past medical history of Abnormal EKG, Anxiety, Bipolar disorder ([address] Chronic back pain, Depression, Fracture, clavicle, Hypertension, Legally blind in left eye, as defined in [place], Lumbar radiculopathy, Nicotine dependence, Osteoarthritis, Pain, Retinal detachment, Sleep apnea, Tubular adenoma, Visual floaters, and Wears glasses. has a past surgical history that includes Cholecystectomy (2/13); cyst removal (1970); Tonsillectomy (1969); Umbilical hernia repair (1996); lymph node biopsy (Left, 1971); Cataract removal with implant (Right, 8-25-15); Nasal septum surgery (11-24-15); Cataract removal (Left); vitrectomy (Left, 12/10/2015); retinal laser; Eye surgery (12/31/15); Colonoscopy (09/22/2016); vitrectomy (Left, 10/27/2016); eye surgery (Left); eye surgery (Left, 3/31/16); eye surgery; eye surgery; hernia repair; subtotal colectomy (12/09/2016); vitrectomy (Right, 07/12/2018); pr office/outpt visit,procedure only (Right, 7/12/2018); Upper gastrointestinal endoscopy (N/A, 5/2/2019); and Colonoscopy (N/A, 5/2/2019).       Social History     Socioeconomic History    Marital status: Single     Spouse name: Not on file    Number of children: Not on file    Years of education: Not on file    Highest education level: Not on file   Occupational History    Occupation: disabled   Tobacco Use    Smoking status: Current Every Day Smoker     Packs/day: 1.00     Years: 30.00     Pack years: 30.00     Types: Cigarettes    Smokeless tobacco: Never Used   Vaping Use    Vaping Use: Never used   Substance and Sexual Activity    Alcohol use: Yes     Comment: 1 TO 6 BEERS Daily    Drug use: No    Sexual activity: Not on file   Other Topics Concern    Not on file   Social History Narrative    Not on file     Social Determinants of Health     Financial Resource Strain: Medium Risk    Difficulty of Paying Living Expenses: Somewhat hard   Food Insecurity: No Food Insecurity    Worried About Running Out of Food in the Last Year: Never true    Claus of Food in the Last Year: Never true   Transportation Needs: Unmet Transportation Needs    Lack of Transportation (Medical): Yes    Lack of Transportation (Non-Medical): Yes   Physical Activity:     Days of Exercise per Week:     Minutes of Exercise per Session:    Stress:     Feeling of Stress :    Social Connections:     Frequency of Communication with Friends and Family:     Frequency of Social Gatherings with Friends and Family:     Attends Orthodox Services:     Active Member of Clubs or Organizations:     Attends Club or Organization Meetings:     Marital Status:    Intimate Partner Violence:     Fear of Current or Ex-Partner:     Emotionally Abused:     Physically Abused:     Sexually Abused:        Family History   Problem Relation Age of Onset    Heart Failure Mother     Cancer Mother     Heart Disease Mother         CAD-OPEN HEART    Mental Retardation Sister     Seizures Sister     Hypertension Other         maternal history       Allergies:  Patient has no known allergies. Home Medications:  Prior to Admission medications    Medication Sig Start Date End Date Taking? Authorizing Provider   diazePAM (VALIUM) 5 MG tablet TAKE ONE TABLET BY MOUTH EVERY 12 HOURS AS NEEDED FOR ANXIETY OR SLEEP 9/27/21 10/28/21  Mandy Luu MD   cyclobenzaprine (FLEXERIL) 10 MG tablet Take 1 tablet by mouth 2 times daily as needed for Muscle spasms TAKE ONE TABLET BY MOUTH DAILY 9/15/21   Mandy Luu MD   oxyCODONE-acetaminophen (PERCOCET) 5-325 MG per tablet Take 1 tablet by mouth every 8 hours as needed for Pain for up to 30 days.  9/15/21 10/15/21  Mandy Luu MD   metFORMIN (GLUCOPHAGE) 500 MG tablet Take 1 tablet by mouth daily (with breakfast) 8/11/21   Mandy Luu MD cholestyramine (QUESTRAN) 4 g packet Take 1 packet by mouth 4 times daily 8/11/21   Wanda Ortega MD   sildenafil (VIAGRA) 100 MG tablet Take 1 tablet by mouth daily as needed for Erectile Dysfunction 8/5/21   Wanda Ortega MD   cloNIDine (CATAPRES) 0.1 MG tablet TAKE ONE TABLET BY MOUTH DAILY 7/30/21   Wanda Ortega MD   meloxicam ([address]) 7.5 MG tablet TAKE ONE TABLET BY MOUTH TWICE A DAY 7/30/21   Wanda Ortega MD   atorvastatin (LIPITOR) 20 MG tablet TAKE ONE TABLET BY MOUTH DAILY 6/30/21   Wanda Ortega MD   tamsulosin (FLOMAX) 0.4 MG capsule TAKE ONE CAPSULE BY MOUTH DAILY 6/30/21   Wanda Ortega MD   atenolol (TENORMIN) 25 MG tablet TAKE ONE TABLET BY MOUTH DAILY 12/4/20   Wanda Ortega MD   hydrOXYzine (VISTARIL) 25 MG capsule  9/20/20   Historical Provider, MD   QUEtiapine (SEROQUEL) 50 MG tablet  9/20/20   Historical Provider, MD   lidocaine (LIDODERM) 5 % Place 1 patch onto the skin every 12 hours as needed for Pain 12 hours on, 12 hours off. 9/30/20   Lauren Burden,    ibuprofen (ADVIL;MOTRIN) 600 MG tablet Take 1 tablet by mouth every 6 hours as needed for Pain 9/30/20 10/5/20  Lauren Burden DO   loperamide (IMODIUM) 2 MG capsule TAKE ONE CAPSULE daily for DIARRHEA 8/4/20   Wanda Ortega MD   Blood Glucose Monitoring Suppl (BLOOD GLUCOSE MONITOR SYSTEM) w/Device KIT USE TO MONITOR BLOOD GLUCOSE LEVEL. (TEST ONCE PER DAY) 7/18/19   Wanda Ortega MD   blood glucose monitor strips Test blood glucose level 1 time per day.  7/18/19   Wanda Ortega MD   Lancets 30G MISC Inject 1 each into the skin daily 7/18/19   Wanda Ortega MD   lamoTRIgine (LAMICTAL) 200 MG tablet Take 200 mg by mouth daily 9/25/17   Historical Provider, MD   acetaminophen (TYLENOL) 325 MG tablet Take 2 tablets by mouth every 4 hours as needed for Pain 12/15/16   Wanda Ortega MD   DULoxetine (CYMBALTA) 60 MG extended release capsule Take 60 mg by mouth daily    Historical Provider, MD       REVIEW OF SYSTEMS    (2-9 systems for level 4, 10 or more for level 5)      Review of Systems   Constitutional: Negative for appetite change, fatigue and fever. HENT: Negative for congestion and trouble swallowing. Eyes: Negative for photophobia. Respiratory: Negative for cough, chest tightness and shortness of breath. Cardiovascular: Negative for chest pain and leg swelling. Gastrointestinal: Negative for abdominal pain, constipation, diarrhea, nausea and vomiting. Endocrine: Negative for polyuria. Genitourinary: Negative for dysuria. Musculoskeletal: Positive for neck stiffness. Lumbar back stiffness   Skin: Negative for color change and rash. Neurological: Negative for dizziness, weakness and headaches. +Brain fog       PHYSICAL EXAM   (up to 7 for level 4, 8 or more for level 5)      INITIAL VITALS:   BP (!) 133/92   Pulse 73   Temp 97 °F (36.1 °C) (Oral)   Resp 16   Ht 5' 11\" (1.803 m)   Wt 185 lb (83.9 kg)   SpO2 92%   BMI 25.80 kg/m²     Physical Exam  Constitutional:       General: He is not in acute distress. Appearance: He is not ill-appearing. HENT:      Head: Normocephalic and atraumatic. Nose: No congestion. Eyes:      General: No scleral icterus. Pupils: Pupils are equal, round, and reactive to light. Neck:      Comments: Full active/passive ROM  Cardiovascular:      Rate and Rhythm: Normal rate. Heart sounds: No murmur heard. No friction rub. No gallop. Pulmonary:      Breath sounds: No wheezing, rhonchi or rales. Abdominal:      General: Abdomen is flat. There is no distension. Palpations: Abdomen is soft. Tenderness: There is no abdominal tenderness. There is no guarding or rebound. Musculoskeletal:         General: No swelling or tenderness. Cervical back: Neck supple. No rigidity or tenderness.       Comments: No thoracic or lumber midline spinal tenderness   Skin: Findings: No rash. Neurological:      General: No focal deficit present. Mental Status: He is oriented to person, place, and time. Motor: No weakness. DIFFERENTIAL  DIAGNOSIS     PLAN (LABS / IMAGING / EKG):  No orders of the defined types were placed in this encounter. MEDICATIONS ORDERED:  No orders of the defined types were placed in this encounter. DDX:     Concussion, lumbar strain, cervical strain    DIAGNOSTIC RESULTS / EMERGENCY DEPARTMENT COURSE / MDM   LAB RESULTS:  No results found for this visit on 09/29/21. IMPRESSION: lumbar back strain acute on chronic    RADIOLOGY:  None, patient declined    EKG  None    All EKG's are interpreted by the Emergency Department Physician who either signs or Co-signs this chart in the absence of a cardiologist.    EMERGENCY DEPARTMENT COURSE:  ED Course as of Sep 29 1623   Wed Sep 29, 2021   1543 Patient evaluated does not have any midline spinal tenderness patient has full active and passive range of motion cervical lumbar and thoracic regions. Low suspicion for acute spinal injury or head injury at this time. [ZE]   5366 Patient offered work-up for potential head injury cervical lumbar injury however patient declined. Patient is medically cleared for discharge    [ZE]      ED Course User Index  [ZE] Jcarlos Huggins DO         PROCEDURES:  None    CONSULTS:  None    CRITICAL CARE:  None    FINAL IMPRESSION      1.  Acute low back pain without sciatica, unspecified back pain laterality          DISPOSITION / PLAN     DISPOSITION  - discharge home      PATIENT REFERRED TO:  [facility]  [address] [facility] [address] [phone]    As needed, If symptoms worsen      DISCHARGE MEDICATIONS:  Current Discharge Medication List          Tasneem Butts DO  Emergency Medicine Resident    (Please note that portions of thisnote were completed with a voice recognition program.  Efforts were made to edit the dictations but occasionally words are mis-transcribed.)     Alfredo Mir,   Resident  09/29/21 [id number]

## 2021-09-30 ENCOUNTER — TELEPHONE (OUTPATIENT)
Dept: PRIMARY CARE CLINIC | Age: 61
End: 2021-09-30

## 2021-09-30 NOTE — TELEPHONE ENCOUNTER
Their notes don't mention MRI.   Is he having more issues with his back pain or with mental being in a fog?

## 2021-09-30 NOTE — TELEPHONE ENCOUNTER
Pt went to [facility]. V's er yesterday. Chintan Cho they thought he should get an MRi and heather would need to order it, of his back. Pt fell getting out of the shower. Asking, if you would review Hosp er note, under encounters and advise. Pt uses [facility] hosp's.

## 2021-10-01 NOTE — TELEPHONE ENCOUNTER
Lower/mid back pain and has a stiff neck. Pt states when he was at the ER there was people being brought in by [facility] and was told there might be a long wait for an MRI so he was discharged without getting it done.

## 2021-10-04 RX ORDER — TAMSULOSIN HYDROCHLORIDE 0.4 MG/1
CAPSULE ORAL
Qty: 90 CAPSULE | Refills: 0 | Status: SHIPPED | OUTPATIENT
Start: 2021-10-04 | End: 2022-01-17

## 2021-10-12 DIAGNOSIS — E78.2 MIXED HYPERLIPIDEMIA: ICD-10-CM

## 2021-10-12 RX ORDER — MELOXICAM 7.5 MG/1
TABLET ORAL
Qty: 146 TABLET | Refills: 0 | Status: SHIPPED | OUTPATIENT
Start: 2021-10-12 | End: 2021-10-12 | Stop reason: SDUPTHER

## 2021-10-12 RX ORDER — MELOXICAM 7.5 MG/1
7.5 TABLET ORAL DAILY
Qty: 30 TABLET | Refills: 1 | Status: SHIPPED | OUTPATIENT
Start: 2021-10-12 | End: 2021-12-14 | Stop reason: SDUPTHER

## 2021-10-12 RX ORDER — ATORVASTATIN CALCIUM 20 MG/1
TABLET, FILM COATED ORAL
Qty: 90 TABLET | Refills: 0 | Status: SHIPPED | OUTPATIENT
Start: 2021-10-12 | End: 2022-01-24 | Stop reason: SDUPTHER

## 2021-10-22 DIAGNOSIS — E11.69 DIABETES MELLITUS TYPE 2 IN OBESE (HCC): ICD-10-CM

## 2021-10-22 DIAGNOSIS — M54.50 CHRONIC MIDLINE LOW BACK PAIN WITHOUT SCIATICA: ICD-10-CM

## 2021-10-22 DIAGNOSIS — E66.9 DIABETES MELLITUS TYPE 2 IN OBESE (HCC): ICD-10-CM

## 2021-10-22 DIAGNOSIS — M54.16 LUMBAR RADICULOPATHY: ICD-10-CM

## 2021-10-22 DIAGNOSIS — M47.812 OSTEOARTHRITIS OF CERVICAL SPINE, UNSPECIFIED SPINAL OSTEOARTHRITIS COMPLICATION STATUS: ICD-10-CM

## 2021-10-22 DIAGNOSIS — G89.29 CHRONIC MIDLINE LOW BACK PAIN WITHOUT SCIATICA: ICD-10-CM

## 2021-10-22 RX ORDER — DIPHENHYDRAMINE HYDROCHLORIDE 25 MG/1
CAPSULE, LIQUID FILLED ORAL
Qty: 1 KIT | Refills: 0 | Status: SHIPPED | OUTPATIENT
Start: 2021-10-22

## 2021-10-22 RX ORDER — GLUCOSAMINE HCL/CHONDROITIN SU 500-400 MG
CAPSULE ORAL
Qty: 100 STRIP | Refills: 3 | Status: SHIPPED | OUTPATIENT
Start: 2021-10-22

## 2021-10-22 RX ORDER — OXYCODONE HYDROCHLORIDE AND ACETAMINOPHEN 5; 325 MG/1; MG/1
1 TABLET ORAL EVERY 8 HOURS PRN
Qty: 90 TABLET | Refills: 0 | Status: SHIPPED | OUTPATIENT
Start: 2021-10-22 | End: 2021-12-03 | Stop reason: SDUPTHER

## 2021-10-22 NOTE — TELEPHONE ENCOUNTER
[facility] on [facility]. Pt needs new machine and supplies. The one he has, they don't make anymore.

## 2021-10-27 DIAGNOSIS — F41.9 ANXIETY: ICD-10-CM

## 2021-10-28 RX ORDER — CLONIDINE HYDROCHLORIDE 0.1 MG/1
TABLET ORAL
Qty: 90 TABLET | Refills: 3 | Status: SHIPPED | OUTPATIENT
Start: 2021-10-28 | End: 2022-07-14

## 2021-10-28 RX ORDER — DIAZEPAM 5 MG/1
TABLET ORAL
Qty: 60 TABLET | Refills: 0 | Status: SHIPPED | OUTPATIENT
Start: 2021-10-28 | End: 2021-11-15 | Stop reason: SDUPTHER

## 2021-11-09 ENCOUNTER — OFFICE VISIT (OUTPATIENT)
Dept: PRIMARY CARE CLINIC | Age: 61
End: 2021-11-09
Payer: MEDICARE

## 2021-11-09 VITALS
SYSTOLIC BLOOD PRESSURE: 128 MMHG | BODY MASS INDEX: 25.81 KG/M2 | DIASTOLIC BLOOD PRESSURE: 84 MMHG | HEART RATE: 83 BPM | WEIGHT: 184.4 LBS | OXYGEN SATURATION: 96 % | HEIGHT: 71 IN

## 2021-11-09 DIAGNOSIS — M79.605 LUMBAR PAIN WITH RADIATION DOWN BOTH LEGS: Primary | ICD-10-CM

## 2021-11-09 DIAGNOSIS — E11.69 DIABETES MELLITUS TYPE 2 IN OBESE (HCC): ICD-10-CM

## 2021-11-09 DIAGNOSIS — M54.50 LUMBAR PAIN WITH RADIATION DOWN BOTH LEGS: Primary | ICD-10-CM

## 2021-11-09 DIAGNOSIS — Z23 NEED FOR VACCINATION: ICD-10-CM

## 2021-11-09 DIAGNOSIS — M79.604 LUMBAR PAIN WITH RADIATION DOWN BOTH LEGS: Primary | ICD-10-CM

## 2021-11-09 DIAGNOSIS — M54.6 MIDLINE THORACIC BACK PAIN, UNSPECIFIED CHRONICITY: ICD-10-CM

## 2021-11-09 DIAGNOSIS — E66.9 DIABETES MELLITUS TYPE 2 IN OBESE (HCC): ICD-10-CM

## 2021-11-09 PROCEDURE — 99213 OFFICE O/P EST LOW 20 MIN: CPT | Performed by: FAMILY MEDICINE

## 2021-11-09 PROCEDURE — 90674 CCIIV4 VAC NO PRSV 0.5 ML IM: CPT | Performed by: FAMILY MEDICINE

## 2021-11-09 PROCEDURE — 4004F PT TOBACCO SCREEN RCVD TLK: CPT | Performed by: FAMILY MEDICINE

## 2021-11-09 PROCEDURE — 2022F DILAT RTA XM EVC RTNOPTHY: CPT | Performed by: FAMILY MEDICINE

## 2021-11-09 PROCEDURE — 3017F COLORECTAL CA SCREEN DOC REV: CPT | Performed by: FAMILY MEDICINE

## 2021-11-09 PROCEDURE — G8482 FLU IMMUNIZE ORDER/ADMIN: HCPCS | Performed by: FAMILY MEDICINE

## 2021-11-09 PROCEDURE — G8417 CALC BMI ABV UP PARAM F/U: HCPCS | Performed by: FAMILY MEDICINE

## 2021-11-09 PROCEDURE — G0008 ADMIN INFLUENZA VIRUS VAC: HCPCS | Performed by: FAMILY MEDICINE

## 2021-11-09 PROCEDURE — 82044 UR ALBUMIN SEMIQUANTITATIVE: CPT | Performed by: FAMILY MEDICINE

## 2021-11-09 PROCEDURE — 3044F HG A1C LEVEL LT 7.0%: CPT | Performed by: FAMILY MEDICINE

## 2021-11-09 PROCEDURE — G8427 DOCREV CUR MEDS BY ELIG CLIN: HCPCS | Performed by: FAMILY MEDICINE

## 2021-11-09 ASSESSMENT — PATIENT HEALTH QUESTIONNAIRE - PHQ9
SUM OF ALL RESPONSES TO PHQ QUESTIONS 1-9: 0
SUM OF ALL RESPONSES TO PHQ QUESTIONS 1-9: 0
SUM OF ALL RESPONSES TO PHQ9 QUESTIONS 1 & 2: 0
2. FEELING DOWN, DEPRESSED OR HOPELESS: 0
SUM OF ALL RESPONSES TO PHQ QUESTIONS 1-9: 0
1. LITTLE INTEREST OR PLEASURE IN DOING THINGS: 0

## 2021-11-09 ASSESSMENT — ENCOUNTER SYMPTOMS
VOMITING: 0
NAUSEA: 0
SORE THROAT: 0
EYE DISCHARGE: 0
RHINORRHEA: 0
BACK PAIN: 1
SHORTNESS OF BREATH: 0
EYE REDNESS: 0
COUGH: 0
DIARRHEA: 0
ABDOMINAL PAIN: 0
WHEEZING: 0

## 2021-11-09 NOTE — PROGRESS NOTES
[facility] CARE  [address] [address] [address]  Dept: [phone]    Bárbara Mayen is a 62 y.o. male Established patient, who presents today for his medical conditions/complaints as noted below. Chief Complaint   Patient presents with    Medication Check     HTN       HPI:     HPI  Patient here for recheck. Patient states had fall with loss of consciousness. Complaining of back and thoracic back pain. Seen in emergency room but no imaging was done. n patient denies any chest pain or shortness of breath. No lightheadedness or dizziness. States his blood sugars have been running in a good range. Reviewed prior notes None  Reviewed previous Labs    LDL Cholesterol (mg/dL)   Date Value   03/17/2021 87   02/19/2019 84   06/27/2013 Unable to calc. as TRIG>400       (goal LDL is <100)   AST (U/L)   Date Value   03/17/2021 33     ALT (U/L)   Date Value   03/17/2021 21     BUN (mg/dL)   Date Value   03/17/2021 11     Hemoglobin A1C (%)   Date Value   08/11/2021 6.0     TSH (mIU/L)   Date Value   06/27/2013 5.65 (H)     BP Readings from Last 3 Encounters:   11/09/21 128/84   09/29/21 (!) 133/92   08/11/21 132/80          (goal 120/80)    Past Medical History:   Diagnosis Date    Abnormal EKG 10/27/2016    indicates inferior infarct.     Anxiety     Bipolar disorder ([facility] [address] 2006    on rx    Chronic back pain 11/21/2014    Depression 2006    on rx    Fracture, clavicle 1996    LEFT    Hypertension 2006    on rx    Legally blind in left eye, as defined in [place]      very blurry    Lumbar radiculopathy 04/23/2014    Nicotine dependence     Osteoarthritis     Pain     LEFT EYE    Retinal detachment     history miguel    Sleep apnea     doesnt use cpap     Tubular adenoma     Visual floaters     Wears glasses     USES MAGNIFYING GLASS      Past Surgical History:   Procedure Laterality Date    CATARACT REMOVAL Left     CATARACT REMOVAL WITH IMPLANT Right 8-25-15    CHOLECYSTECTOMY  2/13    COLONOSCOPY  09/22/2016    the ICV was edematous and erythematous but most likely normal variant , bxs taken Large polyp 3 cm, at 50 cm from the anal verge with a very  wide stalk, not removed tattooed needs to be removed with subtotal colectomy    COLONOSCOPY N/A 5/2/2019    COLONOSCOPY POLYPECTOMY COLD BIOPSY, HOT SNARE performed by Elizabeth Aleman MD at [facility]    foot, neck    EYE SURGERY  12/31/15    laser right eye, left vit    EYE SURGERY Left     torn retina lazer/freezer/hole    EYE SURGERY Left 8/14/85    SILICONE REMOVAL AIR FLUID EXCHANGE    EYE SURGERY      cataracts removed miguel.  EYE SURGERY      total 6 eye surg to left, 2 eye surg. to rt.     HERNIA REPAIR      umbilical    LYMPH NODE BIOPSY Left 1971    BASE OF SKULL    NASAL SEPTUM SURGERY  11-24-15    AL OFFICE/OUTPT VISIT,PROCEDURE ONLY Right 7/12/2018    VITRECTOMY 25 GAUGE, AIR FLUID EXCHANGE, AIR GAS EXCHANGE WITH 18% SF6, ENDOLASER, 200 MSECONDS, 200 MWATTS, 377 PULSES performed by Hattie Kang MD at [address]  12/09/2016    w/ ileorectal anastomosis    TONSILLECTOMY  4940    UMBILICAL HERNIA REPAIR  1996    UPPER GASTROINTESTINAL ENDOSCOPY N/A 5/2/2019    EGD BIOPSY performed by Elizabeth Aleman MD at [address] VITRECTOMY Left 12/10/2015    VITRECTOMY Left 10/27/2016    membrane peeling    VITRECTOMY Right 07/12/2018    laser, gas       Family History   Problem Relation Age of Onset    Heart Failure Mother     Cancer Mother     Heart Disease Mother         CAD-OPEN HEART    Mental Retardation Sister     Seizures Sister     Hypertension Other         maternal history       Social History     Tobacco Use    Smoking status: Current Every Day Smoker     Packs/day: 1.00     Years: 30.00     Pack years: 30.00     Types: Cigarettes    Smokeless tobacco: Never Used   Substance Use Topics    Alcohol use: Yes     Comment: 1 TO 6 BEERS Daily      Current Outpatient Medications   Medication Sig Dispense Refill    cloNIDine (CATAPRES) 0.1 MG tablet TAKE ONE TABLET BY MOUTH DAILY 90 tablet 3    diazePAM (VALIUM) 5 MG tablet TAKE ONE TABLET BY MOUTH EVERY 12 HOURS AS NEEDED FOR ANXIETY OR SLEEP 60 tablet 0    oxyCODONE-acetaminophen (PERCOCET) 5-325 MG per tablet Take 1 tablet by mouth every 8 hours as needed for Pain for up to 30 days. 90 tablet 0    Blood Glucose Monitoring Suppl (BLOOD GLUCOSE MONITOR SYSTEM) w/Device KIT USE TO MONITOR BLOOD GLUCOSE LEVEL. (TEST ONCE PER DAY) 1 kit 0    blood glucose monitor strips Test blood glucose level 1 time per day. 100 strip 3    Lancets 30G MISC Inject 1 each into the skin daily 100 each 3    atorvastatin (LIPITOR) 20 MG tablet TAKE ONE TABLET BY MOUTH DAILY 90 tablet 0    meloxicam (MOBIC) 7.5 MG tablet Take 1 tablet by mouth daily 30 tablet 1    tamsulosin (FLOMAX) 0.4 MG capsule TAKE ONE CAPSULE BY MOUTH DAILY 90 capsule 0    cyclobenzaprine (FLEXERIL) 10 MG tablet Take 1 tablet by mouth 2 times daily as needed for Muscle spasms TAKE ONE TABLET BY MOUTH DAILY 60 tablet 5    metFORMIN (GLUCOPHAGE) 500 MG tablet Take 1 tablet by mouth daily (with breakfast) 90 tablet 3    cholestyramine (QUESTRAN) 4 g packet Take 1 packet by mouth 4 times daily 120 packet 5    sildenafil (VIAGRA) 100 MG tablet Take 1 tablet by mouth daily as needed for Erectile Dysfunction 90 tablet 0    atenolol (TENORMIN) 25 MG tablet TAKE ONE TABLET BY MOUTH DAILY 90 tablet 4    hydrOXYzine (VISTARIL) 25 MG capsule       QUEtiapine (SEROQUEL) 50 MG tablet       lidocaine (LIDODERM) 5 % Place 1 patch onto the skin every 12 hours as needed for Pain 12 hours on, 12 hours off.  20 patch 0    loperamide (IMODIUM) 2 MG capsule TAKE ONE CAPSULE daily for DIARRHEA 90 capsule 5    lamoTRIgine (LAMICTAL) 200 MG tablet Take 200 mg by mouth daily      acetaminophen (TYLENOL) 325 MG tablet Take 2 tablets by mouth every 4 hours as needed for Pain 120 tablet 3    DULoxetine (CYMBALTA) 60 MG extended release capsule Take 60 mg by mouth daily      ibuprofen (ADVIL;MOTRIN) 600 MG tablet Take 1 tablet by mouth every 6 hours as needed for Pain 20 tablet 0     No current facility-administered medications for this visit. No Known Allergies    Health Maintenance   Topic Date Due    COVID-19 Vaccine (1) Never done    Diabetic microalbuminuria test  10/29/2021    Lipid screen  03/17/2022    Colon cancer screen colonoscopy  05/02/2022    Annual Wellness Visit (AWV)  05/12/2022    Low dose CT lung screening  05/12/2022    Diabetic retinal exam  06/30/2022    A1C test (Diabetic or Prediabetic)  08/11/2022    Diabetic foot exam  11/09/2022    Pneumococcal 0-64 years Vaccine (2 of 2 - PPSV23) 02/10/2026    DTaP/Tdap/Td vaccine (2 - Td or Tdap) 06/21/2028    Flu vaccine  Completed    Shingles Vaccine  Completed    Hepatitis C screen  Completed    HIV screen  Completed    Hepatitis A vaccine  Aged Out    Hib vaccine  Aged Out    Meningococcal (ACWY) vaccine  Aged Out       Subjective:      Review of Systems   Constitutional: Negative for chills and fever. HENT: Negative for rhinorrhea and sore throat. Eyes: Negative for discharge and redness. Respiratory: Negative for cough, shortness of breath and wheezing. Cardiovascular: Negative for chest pain and palpitations. Gastrointestinal: Negative for abdominal pain, diarrhea, nausea and vomiting. Genitourinary: Negative for dysuria and frequency. Musculoskeletal: Positive for back pain. Negative for arthralgias and myalgias. Neurological: Negative for dizziness, light-headedness and headaches. Psychiatric/Behavioral: Negative for sleep disturbance. Objective:     /84   Pulse 83   Ht 5' 11.04\" (1.804 m)   Wt 184 lb 6.4 oz (83.6 kg)   SpO2 96%   BMI 25.69 kg/m²   Physical Exam  Vitals and nursing note reviewed.    Constitutional: General: He is not in acute distress. Appearance: He is well-developed. He is not ill-appearing. HENT:      Head: Normocephalic and atraumatic. Right Ear: External ear normal.      Left Ear: External ear normal.   Eyes:      General: No scleral icterus. Right eye: No discharge. Left eye: No discharge. Conjunctiva/sclera: Conjunctivae normal.      Pupils: Pupils are equal, round, and reactive to light. Neck:      Thyroid: No thyromegaly. Trachea: No tracheal deviation. Cardiovascular:      Rate and Rhythm: Normal rate and regular rhythm. Heart sounds: Normal heart sounds. Comments: No carotid bruits  Pulmonary:      Effort: Pulmonary effort is normal. No respiratory distress. Breath sounds: Normal breath sounds. No wheezing. Lymphadenopathy:      Cervical: No cervical adenopathy. Skin:     General: Skin is warm and dry. Findings: No rash. Neurological:      Mental Status: He is alert and oriented to person, place, and time. Comments: Diabetic foot check: Bunions: none . Hammertoes none. Plantar calluses none. No cyanosis or clubbing. sensation intact on 6 of 6 test points with the 10 gram filament. Dorsalis pedis pulses intact bilaterally. Capillary refill at the toes was less than 2 seconds. No skin breakdown, erythema, blisters, scaling, or ulcers. No evidence of fungal infection. Psychiatric:         Mood and Affect: Mood normal.         Behavior: Behavior normal.         Thought Content: Thought content normal.       Controlled Substance Monitoring:    Acute and Chronic Pain Monitoring:   RX Monitoring 11/9/2021   Attestation -   Acute Pain Prescriptions -   Periodic Controlled Substance Monitoring Possible medication side effects, risk of tolerance/dependence & alternative treatments discussed. ;No signs of potential drug abuse or diversion identified. Chronic Pain > 50 MEDD Obtained or confirmed \"Consent for Opioid Use\" on file. Chronic Pain > 80 MEDD -   Chronic Pain > 120 MEDD Obtained or confirmed \"Medication Contract\" on file. Assessment:       Diagnosis Orders   1. Lumbar pain with radiation down both legs  MRI LUMBAR SPINE WO CONTRAST   2. Need for vaccination  INFLUENZA, MDCK QUADV, 2 YRS AND OLDER, IM, PF, PREFILL SYR OR SDV, 0.5ML (FLUCELVAX QUADV, PF)   3. Midline thoracic back pain, unspecified chronicity  MRI THORACIC SPINE WO CONTRAST   4. Diabetes mellitus type 2 in obese (HCC)  POCT microalbumin    HM DIABETES FOOT EXAM        Plan:    MRI thoracic and lumbar spine. If not improved by insurance company would do x-rays. Urine drug screen today. Continue present medications as is. Flu shot today. Return in about 3 months (around 2/9/2022). Orders Placed This Encounter   Procedures    MRI THORACIC SPINE WO CONTRAST     Standing Status:   Future     Standing Expiration Date:   11/9/2022     Order Specific Question:   Reason for exam:     Answer:   fall , pain mid scapular    MRI LUMBAR SPINE WO CONTRAST     Standing Status:   Future     Standing Expiration Date:   11/9/2022     Order Specific Question:   Reason for exam:     Answer:   lumbar pain, s/p fall    INFLUENZA, MDCK QUADV, 2 YRS AND OLDER, IM, PF, PREFILL SYR OR SDV, 0.5ML (FLUCELVAX QUADV, PF)    POCT microalbumin    HM DIABETES FOOT EXAM     No orders of the defined types were placed in this encounter. Patient given educational materials - see patient instructions. Discussed use, benefit, and side effects of prescribed medications. All patient questions answered. Pt voiced understanding. Reviewed health maintenance. Instructed to continue current medications, diet andexercise. Patient agreed with treatment plan. Follow up as directed.      Electronicallysigned by Marquita Arevalo MD on 11/9/2021 at 2:23 PM

## 2021-11-15 DIAGNOSIS — M47.816 OSTEOARTHRITIS OF LUMBAR SPINE, UNSPECIFIED SPINAL OSTEOARTHRITIS COMPLICATION STATUS: ICD-10-CM

## 2021-11-15 DIAGNOSIS — F41.9 ANXIETY: ICD-10-CM

## 2021-11-15 RX ORDER — CYCLOBENZAPRINE HCL 10 MG
10 TABLET ORAL 2 TIMES DAILY PRN
Qty: 60 TABLET | Refills: 5 | Status: SHIPPED | OUTPATIENT
Start: 2021-11-15 | End: 2022-02-11 | Stop reason: SDUPTHER

## 2021-11-15 RX ORDER — DIAZEPAM 5 MG/1
TABLET ORAL
Qty: 60 TABLET | Refills: 0 | Status: SHIPPED | OUTPATIENT
Start: 2021-11-15 | End: 2021-12-22 | Stop reason: SDUPTHER

## 2021-12-02 DIAGNOSIS — G89.29 CHRONIC MIDLINE LOW BACK PAIN WITHOUT SCIATICA: ICD-10-CM

## 2021-12-02 DIAGNOSIS — M54.16 LUMBAR RADICULOPATHY: ICD-10-CM

## 2021-12-02 DIAGNOSIS — M47.812 OSTEOARTHRITIS OF CERVICAL SPINE, UNSPECIFIED SPINAL OSTEOARTHRITIS COMPLICATION STATUS: ICD-10-CM

## 2021-12-02 DIAGNOSIS — M54.50 CHRONIC MIDLINE LOW BACK PAIN WITHOUT SCIATICA: ICD-10-CM

## 2021-12-03 RX ORDER — OXYCODONE HYDROCHLORIDE AND ACETAMINOPHEN 5; 325 MG/1; MG/1
1 TABLET ORAL EVERY 8 HOURS PRN
Qty: 90 TABLET | Refills: 0 | Status: SHIPPED | OUTPATIENT
Start: 2021-12-03 | End: 2022-01-13 | Stop reason: SDUPTHER

## 2021-12-09 ENCOUNTER — HOSPITAL ENCOUNTER (OUTPATIENT)
Dept: MRI IMAGING | Age: 61
Discharge: HOME | End: 2021-12-11
Payer: MEDICARE

## 2021-12-09 DIAGNOSIS — M54.50 LUMBAR PAIN WITH RADIATION DOWN BOTH LEGS: ICD-10-CM

## 2021-12-09 DIAGNOSIS — M79.605 LUMBAR PAIN WITH RADIATION DOWN BOTH LEGS: ICD-10-CM

## 2021-12-09 DIAGNOSIS — M54.6 MIDLINE THORACIC BACK PAIN, UNSPECIFIED CHRONICITY: ICD-10-CM

## 2021-12-09 DIAGNOSIS — M79.604 LUMBAR PAIN WITH RADIATION DOWN BOTH LEGS: ICD-10-CM

## 2021-12-09 PROCEDURE — 72148 MRI LUMBAR SPINE W/O DYE: CPT

## 2021-12-09 PROCEDURE — 72146 MRI CHEST SPINE W/O DYE: CPT

## 2021-12-14 RX ORDER — MELOXICAM 7.5 MG/1
7.5 TABLET ORAL DAILY
Qty: 30 TABLET | Refills: 1 | Status: SHIPPED | OUTPATIENT
Start: 2021-12-14 | End: 2022-02-11 | Stop reason: SDUPTHER

## 2021-12-22 DIAGNOSIS — F41.9 ANXIETY: ICD-10-CM

## 2021-12-22 RX ORDER — DIAZEPAM 5 MG/1
TABLET ORAL
Qty: 60 TABLET | Refills: 0 | Status: SHIPPED | OUTPATIENT
Start: 2021-12-22 | End: 2022-01-25

## 2021-12-26 DIAGNOSIS — I10 ESSENTIAL HYPERTENSION: ICD-10-CM

## 2021-12-27 RX ORDER — ATENOLOL 25 MG/1
TABLET ORAL
Qty: 90 TABLET | Refills: 4 | Status: SHIPPED | OUTPATIENT
Start: 2021-12-27 | End: 2022-05-03 | Stop reason: SDUPTHER

## 2022-01-12 DIAGNOSIS — G89.29 CHRONIC MIDLINE LOW BACK PAIN WITHOUT SCIATICA: ICD-10-CM

## 2022-01-12 DIAGNOSIS — M54.16 LUMBAR RADICULOPATHY: ICD-10-CM

## 2022-01-12 DIAGNOSIS — N52.9 ERECTILE DYSFUNCTION, UNSPECIFIED ERECTILE DYSFUNCTION TYPE: ICD-10-CM

## 2022-01-12 DIAGNOSIS — M54.50 CHRONIC MIDLINE LOW BACK PAIN WITHOUT SCIATICA: ICD-10-CM

## 2022-01-12 DIAGNOSIS — M47.812 OSTEOARTHRITIS OF CERVICAL SPINE, UNSPECIFIED SPINAL OSTEOARTHRITIS COMPLICATION STATUS: ICD-10-CM

## 2022-01-13 RX ORDER — OXYCODONE HYDROCHLORIDE AND ACETAMINOPHEN 5; 325 MG/1; MG/1
1 TABLET ORAL EVERY 8 HOURS PRN
Qty: 90 TABLET | Refills: 0 | Status: ON HOLD
Start: 2022-01-13 | End: 2022-01-31 | Stop reason: HOSPADM

## 2022-01-17 RX ORDER — TAMSULOSIN HYDROCHLORIDE 0.4 MG/1
CAPSULE ORAL
Qty: 90 CAPSULE | Refills: 0 | Status: SHIPPED | OUTPATIENT
Start: 2022-01-17 | End: 2022-06-27 | Stop reason: SDUPTHER

## 2022-01-17 RX ORDER — TAMSULOSIN HYDROCHLORIDE 0.4 MG/1
CAPSULE ORAL
Qty: 90 CAPSULE | Refills: 0 | Status: SHIPPED | OUTPATIENT
Start: 2022-01-17 | End: 2022-01-17

## 2022-01-18 ENCOUNTER — APPOINTMENT (OUTPATIENT)
Dept: GENERAL RADIOLOGY | Age: 62
End: 2022-01-18
Payer: MEDICARE

## 2022-01-18 ENCOUNTER — HOSPITAL ENCOUNTER (EMERGENCY)
Age: 62
Discharge: HOME | End: 2022-01-18
Attending: EMERGENCY MEDICINE
Payer: MEDICARE

## 2022-01-18 VITALS
TEMPERATURE: 98.5 F | DIASTOLIC BLOOD PRESSURE: 80 MMHG | HEART RATE: 97 BPM | RESPIRATION RATE: 18 BRPM | SYSTOLIC BLOOD PRESSURE: 133 MMHG | OXYGEN SATURATION: 93 %

## 2022-01-18 DIAGNOSIS — S69.91XA INJURY OF RIGHT WRIST, INITIAL ENCOUNTER: ICD-10-CM

## 2022-01-18 DIAGNOSIS — S69.91XA INJURY OF RIGHT HAND, INITIAL ENCOUNTER: Primary | ICD-10-CM

## 2022-01-18 PROCEDURE — 73110 X-RAY EXAM OF WRIST: CPT

## 2022-01-18 PROCEDURE — 73130 X-RAY EXAM OF HAND: CPT

## 2022-01-18 PROCEDURE — 99283 EMERGENCY DEPT VISIT LOW MDM: CPT

## 2022-01-18 ASSESSMENT — PAIN SCALES - GENERAL: PAINLEVEL_OUTOF10: 5

## 2022-01-18 NOTE — ED PROVIDER NOTES
edematous and erythematous but most likely normal variant , bxs taken Large polyp 3 cm, at 50 cm from the anal verge with a very  wide stalk, not removed tattooed needs to be removed with subtotal colectomy    COLONOSCOPY N/A 5/2/2019    COLONOSCOPY POLYPECTOMY COLD BIOPSY, HOT SNARE performed by Kamar Anthony MD at [facility]    foot, neck    EYE SURGERY  12/31/15    laser right eye, left vit    EYE SURGERY Left     torn retina lazer/freezer/hole    EYE SURGERY Left 3/42/36    SILICONE REMOVAL AIR FLUID EXCHANGE    EYE SURGERY      cataracts removed miguel.  EYE SURGERY      total 6 eye surg to left, 2 eye surg. to rt.  HERNIA REPAIR      umbilical    LYMPH NODE BIOPSY Left 1971    BASE OF SKULL    NASAL SEPTUM SURGERY  11-24-15    ID OFFICE/OUTPT VISIT,PROCEDURE ONLY Right 7/12/2018    VITRECTOMY 25 GAUGE, AIR FLUID EXCHANGE, AIR GAS EXCHANGE WITH 18% SF6, ENDOLASER, 200 MSECONDS, 200 MWATTS, 377 PULSES performed by Meghan Godfrey MD at [address]  12/09/2016    w/ ileorectal anastomosis    TONSILLECTOMY  8008    UMBILICAL HERNIA REPAIR  1996    UPPER GASTROINTESTINAL ENDOSCOPY N/A 5/2/2019    EGD BIOPSY performed by Kamar Anthony MD at [address] VITRECTOMY Left 12/10/2015    VITRECTOMY Left 10/27/2016    membrane peeling    VITRECTOMY Right 07/12/2018    laser, gas     None otherwise stated in nurses notes    CURRENT MEDICATIONS       Previous Medications    ACETAMINOPHEN (TYLENOL) 325 MG TABLET    Take 2 tablets by mouth every 4 hours as needed for Pain    ATENOLOL (TENORMIN) 25 MG TABLET    TAKE ONE TABLET BY MOUTH DAILY    ATORVASTATIN (LIPITOR) 20 MG TABLET    TAKE ONE TABLET BY MOUTH DAILY    BLOOD GLUCOSE MONITOR STRIPS    Test blood glucose level 1 time per day.     BLOOD GLUCOSE MONITORING SUPPL (BLOOD GLUCOSE MONITOR SYSTEM) W/DEVICE KIT    USE TO MONITOR BLOOD GLUCOSE LEVEL. (TEST ONCE PER DAY)    CHOLESTYRAMINE ([place]) 4 G PACKET    Take 1 packet by mouth 4 times daily    CLONIDINE (CATAPRES) 0.1 MG TABLET    TAKE ONE TABLET BY MOUTH DAILY    CYCLOBENZAPRINE (FLEXERIL) 10 MG TABLET    Take 1 tablet by mouth 2 times daily as needed for Muscle spasms TAKE ONE TABLET BY MOUTH DAILY    DIAZEPAM (VALIUM) 5 MG TABLET    TAKE ONE TABLET BY MOUTH EVERY 12 HOURS AS NEEDED FOR ANXIETY OR SLEEP    DULOXETINE (CYMBALTA) 60 MG EXTENDED RELEASE CAPSULE    Take 60 mg by mouth daily    HYDROXYZINE (VISTARIL) 25 MG CAPSULE        IBUPROFEN (ADVIL;MOTRIN) 600 MG TABLET    Take 1 tablet by mouth every 6 hours as needed for Pain    LAMOTRIGINE (LAMICTAL) 200 MG TABLET    Take 200 mg by mouth daily    LANCETS 30G MISC    Inject 1 each into the skin daily    LIDOCAINE (LIDODERM) 5 %    Place 1 patch onto the skin every 12 hours as needed for Pain 12 hours on, 12 hours off. LOPERAMIDE (IMODIUM) 2 MG CAPSULE    TAKE ONE CAPSULE daily for DIARRHEA    MELOXICAM (MOBIC) 7.5 MG TABLET    Take 1 tablet by mouth daily    METFORMIN (GLUCOPHAGE) 500 MG TABLET    Take 1 tablet by mouth daily (with breakfast)    OXYCODONE-ACETAMINOPHEN (PERCOCET) 5-325 MG PER TABLET    Take 1 tablet by mouth every 8 hours as needed for Pain for up to 30 days. QUETIAPINE (SEROQUEL) 50 MG TABLET        SILDENAFIL (VIAGRA) 100 MG TABLET    Take 1 tablet by mouth daily as needed for Erectile Dysfunction    TAMSULOSIN (FLOMAX) 0.4 MG CAPSULE    TAKE ONE CAPSULE BY MOUTH DAILY       ALLERGIES     Patient has no known allergies.     FAMILY HISTORY           Problem Relation Age of Onset    Heart Failure Mother     Cancer Mother     Heart Disease Mother         CAD-OPEN HEART    Mental Retardation Sister     Seizures Sister     Hypertension Other         maternal history     Family Status   Relation Name Status    Mother Oral Quezada    Bethanie Olmos  at age 50    Father Johana Guardado         [facility] [address]      MGF     PGM      PGF      Other  (Not Specified)      None otherwise stated in nurses notes    SOCIAL HISTORY      reports that he has been smoking cigarettes. He has a 30.00 pack-year smoking history. He has never used smokeless tobacco. He reports current alcohol use. He reports that he does not use drugs. lives at home with others     PHYSICAL EXAM    (up to 7 for level 4, 8 or more for level 5)     ED Triage Vitals [22 1617]   BP Temp Temp Source Pulse Resp SpO2 Height Weight   133/80 98.5 °F (36.9 °C) Oral 97 18 93 % -- --       Physical Exam   Nursing note and vitals reviewed. Constitutional: well-developed, well-nourished, nontoxic, well appearing, not distressed  HEENT:  normocephalic atraumatic, external ears normal appearance, no nasal deformity, no neck masses or edema, patient protecting airway, no stridor, phonating well  Eyes: pupils equal, sclera non-icteric, no discharge  Cardiovascular: no JVD  Respiratory: non-labored breathing, effort normal, no accessory muscle use visulized, no audible wheezing  Gastrointestinal: Abdomen not distended  Musculoskeletal: Examination of right hand and wrist reveals no visible deformities, bruising, swelling, abrasions, erythema. Patient has tenderness over the center of his wrist, third finger, second through fourth distal metacarpals. No snuffbox tenderness. Full range of motion. Good  strength. Brisk cap refill. Distal sensation intact. 2/2 radial pulse.   Skin: no pallor, no rashes visible  Neuro: alert and oriented times 3, GCS 15, normal coordination, no dysarthria or aphasia  Psych: normal mood and affect, cooperative, normal thought content            DIAGNOSTIC RESULTS     EKG: All EKG's are interpreted by the Emergency Department Physician who either signs or Co-signs this chart in the absence of a cardiologist.        RADIOLOGY:   All plain film, CT, MRI, and formal ultrasound images (except ED bedside ultrasound) are read by the radiologist, see reports below, unless otherwise noted in MDM or here. XR WRIST RIGHT (MIN 3 VIEWS)   Final Result   Right wrist without radiologic evidence of acute/subacute osseous   abnormality. Note: Scaphoid fractures are often occult. Mild osteoarthritis of the right hand. XR HAND RIGHT (MIN 3 VIEWS)   Final Result   Right wrist without radiologic evidence of acute/subacute osseous   abnormality. Note: Scaphoid fractures are often occult. Mild osteoarthritis of the right hand. No results found. LABS:  Labs Reviewed - No data to display    All other labs were within normal range or not returned as of this dictation. EMERGENCY DEPARTMENT COURSE and DIFFERENTIAL DIAGNOSIS/MDM:   Vitals:    Vitals:    01/18/22 1617   BP: 133/80   Pulse: 97   Resp: 18   Temp: 98.5 °F (36.9 °C)   TempSrc: Oral   SpO2: 93%         Patient instructed to return to the emergency room if symptoms worsen, return, or any other concern right away which is agreed by the patient    ED MEDS:  No orders of the defined types were placed in this encounter. CONSULTS:  None    PROCEDURES:  None      FINAL IMPRESSION      1. Injury of right hand, initial encounter    2. Injury of right wrist, initial encounter          DISPOSITION/PLAN   DISPOSITION Decision To Discharge    PATIENT REFERRED TO:  Gianna Cruz MD  [address]  [id number] [place] [address] 31, [address] [address] [address] [address]  [phone]    Call         DISCHARGE MEDICATIONS:  New Prescriptions    No medications on file         Summation      Patient Course:  Fall, wrist and hand pain. No deformities. Neurovascularly intact. Will get imaging. Imaging is unremarkable. Low concern for scaphoid fracture. No snuff box tenderness. Recommend heat, ice.

## 2022-01-18 NOTE — ED NOTES
Ambulatory to ED with right wrist and hand pain, reports falling 4 days ago and landing on wrist. Extremity pink and warm, able to move all fingers.      Shyann Julian RN  01/18/22 2156

## 2022-01-22 ENCOUNTER — APPOINTMENT (OUTPATIENT)
Dept: GENERAL RADIOLOGY | Age: 62
End: 2022-01-22
Payer: MEDICARE

## 2022-01-22 ENCOUNTER — HOSPITAL ENCOUNTER (EMERGENCY)
Age: 62
Discharge: HOME | End: 2022-01-23
Attending: EMERGENCY MEDICINE
Payer: MEDICARE

## 2022-01-22 DIAGNOSIS — E86.0 DEHYDRATION: Primary | ICD-10-CM

## 2022-01-22 DIAGNOSIS — S52.601A CLOSED FRACTURE OF DISTAL ENDS OF RIGHT RADIUS AND ULNA, INITIAL ENCOUNTER: ICD-10-CM

## 2022-01-22 DIAGNOSIS — S52.501A CLOSED FRACTURE OF DISTAL ENDS OF RIGHT RADIUS AND ULNA, INITIAL ENCOUNTER: ICD-10-CM

## 2022-01-22 PROCEDURE — 99285 EMERGENCY DEPT VISIT HI MDM: CPT

## 2022-01-22 PROCEDURE — 73110 X-RAY EXAM OF WRIST: CPT

## 2022-01-22 PROCEDURE — 93005 ELECTROCARDIOGRAM TRACING: CPT | Performed by: EMERGENCY MEDICINE

## 2022-01-22 PROCEDURE — 96361 HYDRATE IV INFUSION ADD-ON: CPT

## 2022-01-22 PROCEDURE — 29125 APPL SHORT ARM SPLINT STATIC: CPT

## 2022-01-22 PROCEDURE — 96360 HYDRATION IV INFUSION INIT: CPT

## 2022-01-22 RX ORDER — HYDROCODONE BITARTRATE AND ACETAMINOPHEN 5; 325 MG/1; MG/1
1 TABLET ORAL ONCE
Status: COMPLETED | OUTPATIENT
Start: 2022-01-22 | End: 2022-01-23

## 2022-01-22 ASSESSMENT — PAIN DESCRIPTION - FREQUENCY
FREQUENCY: CONTINUOUS
FREQUENCY: CONTINUOUS

## 2022-01-22 ASSESSMENT — PAIN DESCRIPTION - PAIN TYPE
TYPE: ACUTE PAIN
TYPE: ACUTE PAIN

## 2022-01-22 ASSESSMENT — PAIN DESCRIPTION - ONSET
ONSET: ON-GOING
ONSET: ON-GOING

## 2022-01-22 ASSESSMENT — PAIN DESCRIPTION - PROGRESSION: CLINICAL_PROGRESSION: NOT CHANGED

## 2022-01-22 ASSESSMENT — PAIN DESCRIPTION - DESCRIPTORS: DESCRIPTORS: THROBBING;TENDER;ACHING

## 2022-01-22 ASSESSMENT — PAIN SCALES - GENERAL: PAINLEVEL_OUTOF10: 10

## 2022-01-22 ASSESSMENT — PAIN DESCRIPTION - ORIENTATION
ORIENTATION: RIGHT
ORIENTATION: RIGHT

## 2022-01-22 ASSESSMENT — PAIN DESCRIPTION - LOCATION
LOCATION: WRIST
LOCATION: WRIST

## 2022-01-23 VITALS
HEIGHT: 71 IN | DIASTOLIC BLOOD PRESSURE: 75 MMHG | OXYGEN SATURATION: 94 % | SYSTOLIC BLOOD PRESSURE: 126 MMHG | RESPIRATION RATE: 20 BRPM | WEIGHT: 170 LBS | BODY MASS INDEX: 23.8 KG/M2 | HEART RATE: 98 BPM | TEMPERATURE: 99.2 F

## 2022-01-23 LAB
ABSOLUTE EOS #: 0.3 K/UL (ref 0–0.4)
ABSOLUTE IMMATURE GRANULOCYTE: ABNORMAL K/UL (ref 0–0.3)
ABSOLUTE LYMPH #: 2.4 K/UL (ref 1–4.8)
ABSOLUTE MONO #: 0.8 K/UL (ref 0.1–1.3)
ANION GAP SERPL CALCULATED.3IONS-SCNC: 13 MMOL/L (ref 9–17)
BASOPHILS # BLD: 1 % (ref 0–2)
BASOPHILS ABSOLUTE: 0.1 K/UL (ref 0–0.2)
BUN BLDV-MCNC: 14 MG/DL (ref 8–23)
BUN/CREAT BLD: NORMAL (ref 9–20)
CALCIUM SERPL-MCNC: 9 MG/DL (ref 8.6–10.4)
CHLORIDE BLD-SCNC: 102 MMOL/L (ref 98–107)
CO2: 25 MMOL/L (ref 20–31)
CREAT SERPL-MCNC: 0.73 MG/DL (ref 0.7–1.2)
DIFFERENTIAL TYPE: ABNORMAL
EOSINOPHILS RELATIVE PERCENT: 3 % (ref 0–4)
GFR AFRICAN AMERICAN: >60 ML/MIN
GFR NON-AFRICAN AMERICAN: >60 ML/MIN
GFR SERPL CREATININE-BSD FRML MDRD: NORMAL ML/MIN/{1.73_M2}
GFR SERPL CREATININE-BSD FRML MDRD: NORMAL ML/MIN/{1.73_M2}
GLUCOSE BLD-MCNC: 92 MG/DL (ref 70–99)
HCT VFR BLD CALC: 38.9 % (ref 41–53)
HEMOGLOBIN: 13.4 G/DL (ref 13.5–17.5)
IMMATURE GRANULOCYTES: ABNORMAL %
LYMPHOCYTES # BLD: 25 % (ref 24–44)
MCH RBC QN AUTO: 30.5 PG (ref 26–34)
MCHC RBC AUTO-ENTMCNC: 34.5 G/DL (ref 31–37)
MCV RBC AUTO: 88.4 FL (ref 80–100)
MONOCYTES # BLD: 8 % (ref 1–7)
NRBC AUTOMATED: ABNORMAL PER 100 WBC
PDW BLD-RTO: 14 % (ref 11.5–14.9)
PLATELET # BLD: 305 K/UL (ref 150–450)
PLATELET ESTIMATE: ABNORMAL
PMV BLD AUTO: 6.7 FL (ref 6–12)
POTASSIUM SERPL-SCNC: 3.9 MMOL/L (ref 3.7–5.3)
RBC # BLD: 4.4 M/UL (ref 4.5–5.9)
RBC # BLD: ABNORMAL 10*6/UL
SARS-COV-2, RAPID: NOT DETECTED
SEG NEUTROPHILS: 63 % (ref 36–66)
SEGMENTED NEUTROPHILS ABSOLUTE COUNT: 6.1 K/UL (ref 1.3–9.1)
SODIUM BLD-SCNC: 140 MMOL/L (ref 135–144)
SPECIMEN DESCRIPTION: NORMAL
WBC # BLD: 9.6 K/UL (ref 3.5–11)
WBC # BLD: ABNORMAL 10*3/UL

## 2022-01-23 PROCEDURE — 87635 SARS-COV-2 COVID-19 AMP PRB: CPT

## 2022-01-23 PROCEDURE — 96360 HYDRATION IV INFUSION INIT: CPT

## 2022-01-23 PROCEDURE — 85025 COMPLETE CBC W/AUTO DIFF WBC: CPT

## 2022-01-23 PROCEDURE — 2580000003 HC RX 258: Performed by: EMERGENCY MEDICINE

## 2022-01-23 PROCEDURE — 80048 BASIC METABOLIC PNL TOTAL CA: CPT

## 2022-01-23 PROCEDURE — 96361 HYDRATE IV INFUSION ADD-ON: CPT

## 2022-01-23 PROCEDURE — 36415 COLL VENOUS BLD VENIPUNCTURE: CPT

## 2022-01-23 PROCEDURE — 6370000000 HC RX 637 (ALT 250 FOR IP): Performed by: EMERGENCY MEDICINE

## 2022-01-23 RX ORDER — 0.9 % SODIUM CHLORIDE 0.9 %
1000 INTRAVENOUS SOLUTION INTRAVENOUS ONCE
Status: COMPLETED | OUTPATIENT
Start: 2022-01-23 | End: 2022-01-23

## 2022-01-23 RX ORDER — DOXYCYCLINE 100 MG/1
100 CAPSULE ORAL ONCE
Status: COMPLETED | OUTPATIENT
Start: 2022-01-23 | End: 2022-01-23

## 2022-01-23 RX ORDER — DOXYCYCLINE HYCLATE 100 MG
100 TABLET ORAL 2 TIMES DAILY
Qty: 14 TABLET | Refills: 0 | Status: SHIPPED | OUTPATIENT
Start: 2022-01-23 | End: 2022-01-30

## 2022-01-23 RX ADMIN — SODIUM CHLORIDE 1000 ML: 9 INJECTION, SOLUTION INTRAVENOUS at 03:34

## 2022-01-23 RX ADMIN — DOXYCYCLINE 100 MG: 100 CAPSULE ORAL at 05:18

## 2022-01-23 RX ADMIN — SODIUM CHLORIDE 1000 ML: 9 INJECTION, SOLUTION INTRAVENOUS at 01:33

## 2022-01-23 RX ADMIN — HYDROCODONE BITARTRATE AND ACETAMINOPHEN 1 TABLET: 5; 325 TABLET ORAL at 00:21

## 2022-01-23 ASSESSMENT — PAIN DESCRIPTION - ONSET: ONSET: ON-GOING

## 2022-01-23 ASSESSMENT — PAIN DESCRIPTION - LOCATION: LOCATION: WRIST

## 2022-01-23 ASSESSMENT — PAIN SCALES - GENERAL
PAINLEVEL_OUTOF10: 10
PAINLEVEL_OUTOF10: 3
PAINLEVEL_OUTOF10: 2

## 2022-01-23 ASSESSMENT — PAIN DESCRIPTION - ORIENTATION: ORIENTATION: RIGHT

## 2022-01-23 ASSESSMENT — PAIN DESCRIPTION - FREQUENCY: FREQUENCY: INTERMITTENT

## 2022-01-23 ASSESSMENT — PAIN DESCRIPTION - PAIN TYPE: TYPE: ACUTE PAIN

## 2022-01-23 NOTE — ED PROVIDER NOTES
[facility] EMERGENCY DEPARTMENT ENCOUNTER    Pt Name: Brock Garcia  MRN: [id number]  [place] 1960  Date of evaluation: 1/23/22  CHIEF COMPLAINT       Chief Complaint   Patient presents with    Wrist Pain     HISTORY OF PRESENT ILLNESS     Arm Injury  Location:  Wrist  Wrist location:  R wrist  Injury: yes    Mechanism of injury: fall    Fall:     Fall occurred:  Unable to specify    Point of impact:  Hands  Pain details:     Quality:  Aching    Radiates to:  Does not radiate  Relieved by:  Nothing  Worsened by: Movement  Ineffective treatments:  None tried    Patient notes he gets up and is lightheaded and he passes out momentarily. Patient was noted to be mildly hypoxic. Patient notes right wrist pain    REVIEW OF SYSTEMS     Review of Systems   All other systems reviewed and are negative. PASTMEDICAL HISTORY     Past Medical History:   Diagnosis Date    Abnormal EKG 10/27/2016    indicates inferior infarct.  Anxiety     Bipolar disorder ([facility] [address] 2006    on rx    Chronic back pain 11/21/2014    Depression 2006    on rx    Fracture, clavicle 1996    LEFT    Hypertension 2006    on rx    Legally blind in left eye, as defined in [place]      very blurry    Lumbar radiculopathy 04/23/2014    Nicotine dependence     Osteoarthritis     Pain     LEFT EYE    Retinal detachment     history miguel    Sleep apnea     doesnt use cpap     Tubular adenoma     Visual floaters     Wears glasses     USES MAGNIFYING GLASS     Past Problem List  Patient Active Problem List   Diagnosis Code    Depression F32. A    Bipolar 1 disorder (HCC) F31.9    Hypertension I10    Anxiety F41.9    Nicotine dependence F17.200    Osteoarthritis M19.90    Obstructive sleep apnea on CPAP G47.33, Z99.89    Lumbar radiculopathy M54.16    DDD (degenerative disc disease), lumbar M51.36    Chronic, continuous use of opioids F11.90    724.8 M47.817    Sacroiliitis, not elsewhere classified ([facility] [address] M46.1    Chronic back pain M54.9, G89.29    Medication monitoring encounter Z51.81    Lumbar facet arthropathy M47.816    Cervical spondylosis M47.812    Facet arthropathy, cervical M47.812    Retinal detachment with multiple breaks, left eye H33.022    Degenerative disc disease, cervical M50.30    Tubular adenoma D36.9    Hyperplastic polyp of intestine K63.5    Macular puckering of retina, left eye H35.372    Encounter for genetic counseling [id number]    Colon polyposis K63.5    Erectile dysfunction N52.9    Colon polyp K63.5    History of colon surgery Z98.890    Mild obesity E66.9    Gastroesophageal reflux disease without esophagitis K21.9    Retinal detachment with multiple breaks, right H33.021    Gastritis K29.70    Diabetes mellitus type 2 in obese (HCC) E11.69, E66.9     SURGICAL HISTORY       Past Surgical History:   Procedure Laterality Date    CATARACT REMOVAL Left     CATARACT REMOVAL WITH IMPLANT Right 8-25-15    CHOLECYSTECTOMY  2/13    COLONOSCOPY  09/22/2016    the ICV was edematous and erythematous but most likely normal variant , bxs taken Large polyp 3 cm, at 50 cm from the anal verge with a very  wide stalk, not removed tattooed needs to be removed with subtotal colectomy    COLONOSCOPY N/A 5/2/2019    COLONOSCOPY POLYPECTOMY COLD BIOPSY, HOT SNARE performed by Sandra Jung MD at [facility]    foot, neck    EYE SURGERY  12/31/15    laser right eye, left vit    EYE SURGERY Left     torn retina lazer/freezer/hole    EYE SURGERY Left 3/39/93    SILICONE REMOVAL AIR FLUID EXCHANGE    EYE SURGERY      cataracts removed miguel.  EYE SURGERY      total 6 eye surg to left, 2 eye surg. to rt.     HERNIA REPAIR      umbilical    LYMPH NODE BIOPSY Left 1971    BASE OF SKULL    NASAL SEPTUM SURGERY  11-24-15    IN OFFICE/OUTPT VISIT,PROCEDURE ONLY Right 7/12/2018    VITRECTOMY 25 GAUGE, AIR FLUID EXCHANGE, AIR GAS EXCHANGE WITH 18% SF6, ENDOLASER, 200 MSECONDS, 200 MWATTS, 377 PULSES performed by Lalitha Elliott MD at [address]  12/09/2016    w/ ileorectal anastomosis    TONSILLECTOMY  2629    UMBILICAL HERNIA REPAIR  1996    UPPER GASTROINTESTINAL ENDOSCOPY N/A 5/2/2019    EGD BIOPSY performed by Jose Luis Thomas MD at [facility] VITRECTOMY Left 12/10/2015    VITRECTOMY Left 10/27/2016    membrane peeling    VITRECTOMY Right 07/12/2018    laser, gas     CURRENT MEDICATIONS       Previous Medications    ACETAMINOPHEN (TYLENOL) 325 MG TABLET    Take 2 tablets by mouth every 4 hours as needed for Pain    ATENOLOL (TENORMIN) 25 MG TABLET    TAKE ONE TABLET BY MOUTH DAILY    ATORVASTATIN (LIPITOR) 20 MG TABLET    TAKE ONE TABLET BY MOUTH DAILY    BLOOD GLUCOSE MONITOR STRIPS    Test blood glucose level 1 time per day. BLOOD GLUCOSE MONITORING SUPPL (BLOOD GLUCOSE MONITOR SYSTEM) W/DEVICE KIT    USE TO MONITOR BLOOD GLUCOSE LEVEL. (TEST ONCE PER DAY)    CHOLESTYRAMINE (QUESTRAN) 4 G PACKET    Take 1 packet by mouth 4 times daily    CLONIDINE (CATAPRES) 0.1 MG TABLET    TAKE ONE TABLET BY MOUTH DAILY    CYCLOBENZAPRINE (FLEXERIL) 10 MG TABLET    Take 1 tablet by mouth 2 times daily as needed for Muscle spasms TAKE ONE TABLET BY MOUTH DAILY    DULOXETINE (CYMBALTA) 60 MG EXTENDED RELEASE CAPSULE    Take 60 mg by mouth daily    HYDROXYZINE (VISTARIL) 25 MG CAPSULE        IBUPROFEN (ADVIL;MOTRIN) 600 MG TABLET    Take 1 tablet by mouth every 6 hours as needed for Pain    LAMOTRIGINE (LAMICTAL) 200 MG TABLET    Take 200 mg by mouth daily    LANCETS 30G MISC    Inject 1 each into the skin daily    LIDOCAINE (LIDODERM) 5 %    Place 1 patch onto the skin every 12 hours as needed for Pain 12 hours on, 12 hours off.     LOPERAMIDE (IMODIUM) 2 MG CAPSULE    TAKE ONE CAPSULE daily for DIARRHEA    MELOXICAM (MOBIC) 7.5 MG TABLET    Take 1 tablet by mouth daily    METFORMIN (GLUCOPHAGE) 500 MG TABLET    Take 1 tablet by mouth daily (with breakfast) Pupils are equal, round, and reactive to light. Neck:      Trachea: No tracheal deviation. Cardiovascular:      Rate and Rhythm: Normal rate and regular rhythm. Pulses: Normal pulses. Heart sounds: Normal heart sounds. Pulmonary:      Effort: Pulmonary effort is normal. No respiratory distress. Breath sounds: Normal breath sounds. No stridor. No wheezing or rales. Abdominal:      Palpations: Abdomen is soft. Tenderness: There is no abdominal tenderness. There is no guarding or rebound. Musculoskeletal:         General: Tenderness present. No deformity. Normal range of motion. Cervical back: Normal range of motion and neck supple. Skin:     General: Skin is warm and dry. Capillary Refill: Capillary refill takes less than 2 seconds. Findings: No erythema or rash. Neurological:      General: No focal deficit present. Mental Status: He is alert and oriented to person, place, and time. Coordination: Coordination normal.   Psychiatric:         Mood and Affect: Mood normal.         Behavior: Behavior normal.         Thought Content: Thought content normal.         Judgment: Judgment normal.         MEDICAL DECISION MAKING:          Patient was seen and examined at bedside soon after arrival to emergency department. Chart was reviewed include history and physical examination was performed. Presentation is consistent with wrist fracture patient x-ray performed as well as COVID test due to mild hypoxia     DIAGNOSTIC RESULTS   EKG:All EKG's are interpreted by the Emergency Department Physician who either signs or Co-signs this chart in the absence of a cardiologist.        RADIOLOGY:All plain film, CT, MRI, and formal ultrasound images (except ED bedside ultrasound) are read by the radiologist, see reports below, unless otherwisenoted in MDM or here.   XR WRIST RIGHT (MIN 3 VIEWS)   Final Result   Nondisplaced fracture distal radius           LABS: All lab results were reviewed by myself, and all abnormals are listed below. Labs Reviewed   CBC WITH AUTO DIFFERENTIAL - Abnormal; Notable for the following components:       Result Value    RBC 4.40 (*)     Hemoglobin 13.4 (*)     Hematocrit 38.9 (*)     Monocytes 8 (*)     All other components within normal limits   COVID-19, RAPID   BASIC METABOLIC PANEL W/ REFLEX TO MG FOR LOW K       EMERGENCY DEPARTMENTCOURSE:     Patient will be given antibiotic for possible pneumonia. The patient was placed in a splint regarding nondisplaced fracture and we given follow-up with orthopedics. Patient will follow-up with his primary care physician. Patient was orthostatic and was given 2 L of IV fluid prior to discharge and advised on caution with standing    Vitals:    Vitals:    01/23/22 0100 01/23/22 0115 01/23/22 0130 01/23/22 0300   BP: 120/81  126/75    Pulse:    98   Resp:       Temp:       TempSrc:       SpO2: (!) 88% 94% (!) 87%    Weight:       Height:           The patient was given the following medications while in the emergency department:  Orders Placed This Encounter   Medications    HYDROcodone-acetaminophen (NORCO) 5-325 MG per tablet 1 tablet    0.9 % sodium chloride bolus    0.9 % sodium chloride bolus    doxycycline hyclate (VIBRA-TABS) 100 MG tablet     Sig: Take 1 tablet by mouth 2 times daily for 7 days     Dispense:  14 tablet     Refill:  0    doxycycline monohydrate (MONODOX) capsule 100 mg     Order Specific Question:   Antimicrobial Indications     Answer:   Pneumonia (CAP)     CONSULTS:  None    FINAL IMPRESSION      1. Dehydration    2. Closed fracture of distal ends of right radius and ulna, initial encounter          DISPOSITION/PLAN   DISPOSITION Decision To Discharge 01/23/2022 05:00:02 AM      PATIENT REFERRED TO:  Lilia Meier MD  [address]   [address]  [place] [facility] [address], [address] [address]  [phone]    In 1 day      DISCHARGE MEDICATIONS:  New Prescriptions    DOXYCYCLINE HYCLATE (VIBRA-TABS) 100 MG TABLET    Take 1 tablet by mouth 2 times daily for 7 days     The care is provided during an unprecedented national emergency due to the novel coronavirus, COVID [facility],   Attending Emergency Physician                 Lexi Sanches DO  01/23/22 1756

## 2022-01-23 NOTE — ED TRIAGE NOTES
Pt presents to ED c/o R wrist pain, swelling that has been progressively worsening for the past few days; pt states that he was recently evaluated for this complaint in the last week but states \"it was only bruised. \" Pt reports that he has been \"tripping and falling\" for the past 2-3 days because \"things get gray and I just fall down. \" Pt states that he has struck his wrist several times in the past two days and reports increasing pain.

## 2022-01-24 DIAGNOSIS — E78.2 MIXED HYPERLIPIDEMIA: ICD-10-CM

## 2022-01-24 DIAGNOSIS — F41.9 ANXIETY: ICD-10-CM

## 2022-01-24 LAB
EKG ATRIAL RATE: 69 BPM
EKG P AXIS: 48 DEGREES
EKG P-R INTERVAL: 164 MS
EKG Q-T INTERVAL: 418 MS
EKG QRS DURATION: 92 MS
EKG QTC CALCULATION (BAZETT): 447 MS
EKG R AXIS: -9 DEGREES
EKG T AXIS: 33 DEGREES
EKG VENTRICULAR RATE: 69 BPM

## 2022-01-24 RX ORDER — ATORVASTATIN CALCIUM 20 MG/1
TABLET, FILM COATED ORAL
Qty: 90 TABLET | Refills: 3 | Status: SHIPPED | OUTPATIENT
Start: 2022-01-24

## 2022-01-25 ENCOUNTER — OFFICE VISIT (OUTPATIENT)
Dept: ORTHOPEDIC SURGERY | Age: 62
End: 2022-01-25
Payer: MEDICARE

## 2022-01-25 VITALS — RESPIRATION RATE: 16 BRPM | WEIGHT: 170 LBS | BODY MASS INDEX: 23.8 KG/M2 | HEIGHT: 71 IN

## 2022-01-25 DIAGNOSIS — S52.541A CLOSED SMITH'S FRACTURE OF RIGHT RADIUS, INITIAL ENCOUNTER: ICD-10-CM

## 2022-01-25 DIAGNOSIS — M25.531 WRIST PAIN, RIGHT: Primary | ICD-10-CM

## 2022-01-25 PROCEDURE — 4004F PT TOBACCO SCREEN RCVD TLK: CPT | Performed by: ORTHOPAEDIC SURGERY

## 2022-01-25 PROCEDURE — G8482 FLU IMMUNIZE ORDER/ADMIN: HCPCS | Performed by: ORTHOPAEDIC SURGERY

## 2022-01-25 PROCEDURE — G8420 CALC BMI NORM PARAMETERS: HCPCS | Performed by: ORTHOPAEDIC SURGERY

## 2022-01-25 PROCEDURE — 3017F COLORECTAL CA SCREEN DOC REV: CPT | Performed by: ORTHOPAEDIC SURGERY

## 2022-01-25 PROCEDURE — G8427 DOCREV CUR MEDS BY ELIG CLIN: HCPCS | Performed by: ORTHOPAEDIC SURGERY

## 2022-01-25 PROCEDURE — 99203 OFFICE O/P NEW LOW 30 MIN: CPT | Performed by: ORTHOPAEDIC SURGERY

## 2022-01-25 RX ORDER — DIAZEPAM 5 MG/1
TABLET ORAL
Qty: 60 TABLET | Refills: 0 | Status: SHIPPED | OUTPATIENT
Start: 2022-01-25 | End: 2022-02-22 | Stop reason: SDUPTHER

## 2022-01-25 NOTE — PROGRESS NOTES
Patient ID: Ruthann Schilder is a 62 y.o. male    Chief Compliant:  No chief complaint on file. Diagnostic imaging:  Diagnostic x-rays [facility] reviewed right wrist 4 days apart initial x-rays largely negative follow-up x-rays volarly displaced distal radius fracture    Assessment and Plan:  1. Wrist pain, right    2. Closed Kang's fracture of right radius, initial encounter      Patient with displaced right distal radius fracture     We will proceed with surgical treatment right wrist    We discussed risks of surgery and recovery process. Risks include infection, bleeding, neurologic injury, systemic and anesthetic complications, no relief of pain, non/malunion need for future surgery. Follow up 2 weeks after surgery    HPI:  This is a 62 y.o. male who presents to the clinic today for right wrist evaluation. Patient was evaluated at the ED on 11/18 with acute onset of right wrist and hand pain and again on 11/21 after falling on the wrist with arms outstretched    Patient notes ongoing severe right wrist pain. He is trying to keep his right wrist elevated. Patient denies being on anticoagulants. Review of Systems   All other systems reviewed and are negative. Past History:    Current Outpatient Medications:     diazePAM (VALIUM) 5 MG tablet, TAKE ONE TABLET BY MOUTH EVERY 12 HOURS AS NEEDED FOR ANXIETY OR SLEEP, Disp: 60 tablet, Rfl: 0    atorvastatin (LIPITOR) 20 MG tablet, TAKE ONE TABLET BY MOUTH DAILY, Disp: 90 tablet, Rfl: 3    doxycycline hyclate (VIBRA-TABS) 100 MG tablet, Take 1 tablet by mouth 2 times daily for 7 days, Disp: 14 tablet, Rfl: 0    tamsulosin (FLOMAX) 0.4 MG capsule, TAKE ONE CAPSULE BY MOUTH DAILY, Disp: 90 capsule, Rfl: 0    oxyCODONE-acetaminophen (PERCOCET) 5-325 MG per tablet, Take 1 tablet by mouth every 8 hours as needed for Pain for up to 30 days. , Disp: 90 tablet, Rfl: 0    atenolol (TENORMIN) 25 MG tablet, TAKE ONE TABLET BY MOUTH DAILY, Disp: 90 tablet, Rfl: 4    meloxicam (MOBIC) 7.5 MG tablet, Take 1 tablet by mouth daily, Disp: 30 tablet, Rfl: 1    cyclobenzaprine (FLEXERIL) 10 MG tablet, Take 1 tablet by mouth 2 times daily as needed for Muscle spasms TAKE ONE TABLET BY MOUTH DAILY, Disp: 60 tablet, Rfl: 5    cloNIDine (CATAPRES) 0.1 MG tablet, TAKE ONE TABLET BY MOUTH DAILY, Disp: 90 tablet, Rfl: 3    Blood Glucose Monitoring Suppl (BLOOD GLUCOSE MONITOR SYSTEM) w/Device KIT, USE TO MONITOR BLOOD GLUCOSE LEVEL. (TEST ONCE PER DAY), Disp: 1 kit, Rfl: 0    blood glucose monitor strips, Test blood glucose level 1 time per day., Disp: 100 strip, Rfl: 3    Lancets 30G MISC, Inject 1 each into the skin daily, Disp: 100 each, Rfl: 3    metFORMIN (GLUCOPHAGE) 500 MG tablet, Take 1 tablet by mouth daily (with breakfast), Disp: 90 tablet, Rfl: 3    cholestyramine (QUESTRAN) 4 g packet, Take 1 packet by mouth 4 times daily, Disp: 120 packet, Rfl: 5    sildenafil (VIAGRA) 100 MG tablet, Take 1 tablet by mouth daily as needed for Erectile Dysfunction, Disp: 90 tablet, Rfl: 0    hydrOXYzine (VISTARIL) 25 MG capsule, , Disp: , Rfl:     QUEtiapine (SEROQUEL) 50 MG tablet, , Disp: , Rfl:     lidocaine (LIDODERM) 5 %, Place 1 patch onto the skin every 12 hours as needed for Pain 12 hours on, 12 hours off., Disp: 20 patch, Rfl: 0    ibuprofen (ADVIL;MOTRIN) 600 MG tablet, Take 1 tablet by mouth every 6 hours as needed for Pain, Disp: 20 tablet, Rfl: 0    loperamide (IMODIUM) 2 MG capsule, TAKE ONE CAPSULE daily for DIARRHEA, Disp: 90 capsule, Rfl: 5    lamoTRIgine (LAMICTAL) 200 MG tablet, Take 200 mg by mouth daily, Disp: , Rfl:     acetaminophen (TYLENOL) 325 MG tablet, Take 2 tablets by mouth every 4 hours as needed for Pain, Disp: 120 tablet, Rfl: 3    DULoxetine (CYMBALTA) 60 MG extended release capsule, Take 60 mg by mouth daily, Disp: , Rfl:   No Known Allergies  Social History     Socioeconomic History    Marital status: Single     Spouse name: Not on file    Number of children: Not on file    Years of education: Not on file    Highest education level: Not on file   Occupational History    Occupation: disabled   Tobacco Use    Smoking status: Current Every Day Smoker     Packs/day: 1.00     Years: 30.00     Pack years: 30.00     Types: Cigarettes    Smokeless tobacco: Never Used   Vaping Use    Vaping Use: Never used   Substance and Sexual Activity    Alcohol use: Yes     Comment: 6 drinks per week    Drug use: No    Sexual activity: Not on file   Other Topics Concern    Not on file   Social History Narrative    Not on file     Social Determinants of Health     Financial Resource Strain: Medium Risk    Difficulty of Paying Living Expenses: Somewhat hard   Food Insecurity: No Food Insecurity    Worried About Running Out of Food in the Last Year: Never true    Claus of Food in the Last Year: Never true   Transportation Needs: Unmet Transportation Needs    Lack of Transportation (Medical):  Yes    Lack of Transportation (Non-Medical): Yes   Physical Activity:     Days of Exercise per Week: Not on file    Minutes of Exercise per Session: Not on file   Stress:     Feeling of Stress : Not on file   Social Connections:     Frequency of Communication with Friends and Family: Not on file    Frequency of Social Gatherings with Friends and Family: Not on file    Attends [employer] Services: Not on file    Active Member of [address] [place] [address] [employer] or Organizations: Not on file    Attends Club or Organization Meetings: Not on file    Marital Status: Not on file   Intimate Partner Violence:     Fear of Current or Ex-Partner: Not on file    Emotionally Abused: Not on file    Physically Abused: Not on file    Sexually Abused: Not on file   Housing Stability:     Unable to Pay for Housing in the Last Year: Not on file    Number of [place] in the Last Year: Not on file    Unstable Housing in the Last Year: Not on file     Past Medical History:   Diagnosis Date  Abnormal EKG 10/27/2016    indicates inferior infarct.  Anxiety     Bipolar disorder ([facility] [address] 2006    on rx    Chronic back pain 11/21/2014    Depression 2006    on rx    Fracture, clavicle 1996    LEFT    Hypertension 2006    on rx    Legally blind in left eye, as defined in [place]      very blurry    Lumbar radiculopathy 04/23/2014    Nicotine dependence     Osteoarthritis     Pain     LEFT EYE    Retinal detachment     history miguel    Sleep apnea     doesnt use cpap     Tubular adenoma     Visual floaters     Wears glasses     USES MAGNIFYING GLASS     Past Surgical History:   Procedure Laterality Date    CATARACT REMOVAL Left     CATARACT REMOVAL WITH IMPLANT Right 8-25-15    CHOLECYSTECTOMY  2/13    COLONOSCOPY  09/22/2016    the ICV was edematous and erythematous but most likely normal variant , bxs taken Large polyp 3 cm, at 50 cm from the anal verge with a very  wide stalk, not removed tattooed needs to be removed with subtotal colectomy    COLONOSCOPY N/A 5/2/2019    COLONOSCOPY POLYPECTOMY COLD BIOPSY, HOT SNARE performed by Hira Fritz MD at [address] [place] [address]    foot, neck    EYE SURGERY  12/31/15    laser right eye, left vit    EYE SURGERY Left     torn retina lazer/freezer/hole    EYE SURGERY Left 0/18/46    SILICONE REMOVAL AIR FLUID EXCHANGE    EYE SURGERY      cataracts removed miguel.  EYE SURGERY      total 6 eye surg to left, 2 eye surg. to rt.     HERNIA REPAIR      umbilical    LYMPH NODE BIOPSY Left 1971    BASE OF SKULL    NASAL SEPTUM SURGERY  11-24-15    MA OFFICE/OUTPT VISIT,PROCEDURE ONLY Right 7/12/2018    VITRECTOMY 25 GAUGE, AIR FLUID EXCHANGE, AIR GAS EXCHANGE WITH 18% SF6, ENDOLASER, 200 MSECONDS, 200 MWATTS, 377 PULSES performed by Kendell Rojas MD at [address]  12/09/2016    w/ ileorectal anastomosis    TONSILLECTOMY  8040    UMBILICAL HERNIA REPAIR  1996    UPPER GASTROINTESTINAL ENDOSCOPY N/A 5/2/2019    EGD BIOPSY performed by Hira Fritz MD at [address] [place] [address] Drive VITRECTOMY Left 12/10/2015    VITRECTOMY Left 10/27/2016    membrane peeling    VITRECTOMY Right 07/12/2018    laser, gas     Family History   Problem Relation Age of Onset    Heart Failure Mother     Cancer Mother     Heart Disease Mother         CAD-OPEN HEART    Mental Retardation Sister     Seizures Sister     Hypertension Other         maternal history        Physical Exam:  Vitals signs and nursing note reviewed. Constitutional:       Appearance: well-developed. HENT:      Head: Normocephalic and atraumatic. Nose: Nose normal.   Eyes:      Conjunctiva/sclera: Conjunctivae normal.   Neck:      Musculoskeletal: Normal range of motion and neck supple. Pulmonary:      Effort: Pulmonary effort is normal. No respiratory distress. Musculoskeletal:      Comments: Normal gait     Skin:     General: Skin is warm and dry. Neurological:      Mental Status: Alert and oriented to person, place, and time. Sensory: No sensory deficit. Psychiatric:         Behavior: Behavior normal.         Thought Content: Thought content normal.        Provider Attestation:  Tho Root, personally performed the services described in this documentation. All medical record entries made by the scribe were at my direction and in my presence. I have reviewed the chart and discharge instructions and agree that the records reflect my personal performance and is accurate and complete. Dong Sunshine MD 1/25/22     Scribe Attestation:  By signing my name below, Kelvin Ahmetlizy, attest that this documentation has been prepared under the direction and in the presence of Dr. Celi Sahu. Electronically signed: Taz Mata, 1/25/22     Please note that this chart was generated using voice recognition Dragon dictation software.   Although every effort was made to ensure the accuracy of this automated transcription, some errors in transcription may have occurred.

## 2022-01-26 NOTE — H&P (VIEW-ONLY)
HISTORY and [facility]reshank[facility] [id number]       NAME:  Pema Mendiola  MRN: [id number]   YOB: 1960   Date: 1/27/2022   Age: 62 y.o. Gender: male     COMPLAINT AND PRESENT HISTORY:   Pema Mendiola is 62 y.o.,  male, presents for pre-anesthesia/admission testing for DISTAL RADIUS OPEN REDUCTION INTERNAL FIXATION Right per Dr. Tiarra Pizano. Primary dx: RIGHT DISTAL RADIUS FRACTURE    HPI:  Pt states , on 1/13 /22 he fall on his on his right hand and wrist , he was trying to wear his clothes and his feet got caught and he fell . Five days later he  went to the ER on 1/18/22 due to right wrist pain increased . He had  X- RAY which showed nondisplaced intra-articular fracture distal radius. Right wrist pain    The injury occurred a few weeks ago. Since that time he has been experiencing right wrist pain and swelling with numbness and tingling in his fingers   The pain is currently rated  severe pain, as 10/10. Which 10 is bad pian  Pt describe his pain as aching, does not radiate. Pt states he is on oxycodone oral pain medication PRN, pain aggravated with movement and he trying o keep his right wrist elevated. Pt denies fever/chills, chest pain or SOB. Testing completed r/t condition:  XR WRIST RIGHT (MIN 3 VIEWS)  FINDINGS:   Nondisplaced intra-articular fracture distal radius.  Ulna appears intact. Carpal bones normal.  Diffuse soft tissue swelling.           Impression   Nondisplaced fracture distal radius           Review of additional significant medical hx:  HTN   ( pt denies chest pain , SOB, Palpitation , or headache )  MEDICATION R/T CONDITION : LIPITOR, ATENOLOL , CATAPRES.     BP Readings from Last 3 Encounters:   01/27/22 101/62   01/23/22 126/75   01/18/22 133/80       EKG  Normal sinus rhythm  Possible Inferior infarct (cited on or before 23-MAY-2012)  Cannot rule out Anterior infarct , age undetermined  Abnormal ECG  When compared with ECG of 14-MAY-2019 21:40,  Nonspecific T wave abnormality no longer evident in Lateral leads  Specimen Collected: 01/22/22 23:08 Last Resulted: 01/24/22 10:09    DMII  MEDICATION R/T CONDITION : metformin, insulin     Lab Results   Component Value Date    LABA1C 6.0 08/11/2021     Lab Results   Component Value Date     03/18/2019       SLEEP APNEA   Pt does not use CPAP    Denies hx of MRSA infection. Denies hx of blood clots. Denies hx of any personal or family hx of complications w/anesthesia. PAST MEDICAL HISTORY     Past Medical History:   Diagnosis Date    Abnormal EKG 10/27/2016    indicates inferior infarct.     Anxiety     Bipolar disorder ([facility] 2006    on rx    Chronic back pain 11/21/2014    Depression 2006    on rx    Diabetes mellitus ([facility] [address]     borderline    Fracture, clavicle 1996    LEFT    Hyperlipidemia     Hypertension 2006    on rx    Legally blind in left eye, as defined in [place]      very blurry    Lumbar radiculopathy 04/23/2014    Nicotine dependence     Osteoarthritis     Pain     LEFT EYE    Retinal detachment     history miguel    Sleep apnea     doesnt use cpap     Tubular adenoma     Visual floaters     Wears glasses     USES MAGNIFYING GLASS       SURGICAL HISTORY       Past Surgical History:   Procedure Laterality Date    CATARACT REMOVAL Left     CATARACT REMOVAL WITH IMPLANT Right 8-25-15    CHOLECYSTECTOMY  2/13    COLONOSCOPY  09/22/2016    the ICV was edematous and erythematous but most likely normal variant , bxs taken Large polyp 3 cm, at 50 cm from the anal verge with a very  wide stalk, not removed tattooed needs to be removed with subtotal colectomy    COLONOSCOPY N/A 5/2/2019    COLONOSCOPY POLYPECTOMY COLD BIOPSY, HOT SNARE performed by Cristobal Heimlich, MD at [address] [place] [address], base of [facility]-left side    EYE SURGERY  12/31/15    laser right eye, left vit    EYE SURGERY Left     torn retina lazer/freezer/hole    EYE SURGERY Left 3/31/16 SILICONE REMOVAL AIR FLUID EXCHANGE    EYE SURGERY      cataracts removed miguel.  EYE SURGERY      total 6 eye surg to left, 2 eye surg. to rt.  HERNIA REPAIR      umbilical    LYMPH NODE BIOPSY Left 1971    BASE OF SKULL    NASAL SEPTUM SURGERY  11-24-15    [place] OFFICE/OUTPT VISIT,PROCEDURE ONLY Right 7/12/2018    VITRECTOMY 25 GAUGE, AIR FLUID EXCHANGE, AIR GAS EXCHANGE WITH 18% SF6, ENDOLASER, 200 MSECONDS, 200 MWATTS, 377 PULSES performed by Trish Miranda MD at [address]  12/09/2016    w/ ileorectal anastomosis    TONSILLECTOMY  6955    UMBILICAL HERNIA REPAIR  1996    UPPER GASTROINTESTINAL ENDOSCOPY N/A 5/2/2019    EGD BIOPSY performed by Cristobal Heimlich, MD at [address] [place] [address] VITRECTOMY Left 12/10/2015    VITRECTOMY Left 10/27/2016    membrane peeling    VITRECTOMY Right 07/12/2018    laser, gas       SOCIAL HISTORY       Social History     Socioeconomic History    Marital status: Single     Spouse name: None    Number of children: None    Years of education: None    Highest education level: None   Occupational History    Occupation: disabled   Tobacco Use    Smoking status: Current Every Day Smoker     Packs/day: 1.00     Years: 30.00     Pack years: 30.00     Types: Cigarettes    Smokeless tobacco: Never Used   Vaping Use    Vaping Use: Never used   Substance and Sexual Activity    Alcohol use: Yes     Comment: 6 drinks per weekly- weekends only now (1/27/22)    Drug use: No    Sexual activity: None   Other Topics Concern    None   Social History Narrative    None     Social Determinants of Health     Financial Resource Strain: Medium Risk    Difficulty of Paying Living Expenses: Somewhat hard   Food Insecurity: No Food Insecurity    Worried About Running Out of Food in the Last Year: Never true    Claus of Food in the Last Year: Never true   Transportation Needs: Unmet Transportation Needs    Lack of Transportation (Medical):  Yes    Lack of Transportation (Non-Medical): Yes   Physical Activity:     Days of Exercise per Week: Not on file    Minutes of Exercise per Session: Not on file   Stress:     Feeling of Stress : Not on file   Social Connections:     Frequency of Communication with Friends and Family: Not on file    Frequency of Social Gatherings with Friends and Family: Not on file    Attends Shinto Services: Not on file    Active Member of [address] [place] [address] or Organizations: Not on file    Attends Club or Organization Meetings: Not on file    Marital Status: Not on file   Intimate Partner Violence:     Fear of Current or Ex-Partner: Not on file    Emotionally Abused: Not on file    Physically Abused: Not on file    Sexually Abused: Not on file   Housing Stability:     Unable to Pay for Housing in the Last Year: Not on file    Number of Places Lived in the Last Year: Not on file    Unstable Housing in the Last Year: Not on file       REVIEW OF SYSTEMS    No Known Allergies    Current Outpatient Medications on File Prior to Encounter   Medication Sig Dispense Refill    diazePAM (VALIUM) 5 MG tablet TAKE ONE TABLET BY MOUTH EVERY 12 HOURS AS NEEDED FOR ANXIETY OR SLEEP 60 tablet 0    atorvastatin (LIPITOR) 20 MG tablet TAKE ONE TABLET BY MOUTH DAILY 90 tablet 3    doxycycline hyclate (VIBRA-TABS) 100 MG tablet Take 1 tablet by mouth 2 times daily for 7 days 14 tablet 0    tamsulosin (FLOMAX) 0.4 MG capsule TAKE ONE CAPSULE BY MOUTH DAILY 90 capsule 0    oxyCODONE-acetaminophen (PERCOCET) 5-325 MG per tablet Take 1 tablet by mouth every 8 hours as needed for Pain for up to 30 days.  90 tablet 0    atenolol (TENORMIN) 25 MG tablet TAKE ONE TABLET BY MOUTH DAILY 90 tablet 4    meloxicam (MOBIC) 7.5 MG tablet Take 1 tablet by mouth daily 30 tablet 1    cyclobenzaprine (FLEXERIL) 10 MG tablet Take 1 tablet by mouth 2 times daily as needed for Muscle spasms TAKE ONE TABLET BY MOUTH DAILY 60 tablet 5    cloNIDine (CATAPRES) 0.1 MG tablet TAKE ONE TABLET BY MOUTH DAILY 90 tablet 3    metFORMIN (GLUCOPHAGE) 500 MG tablet Take 1 tablet by mouth daily (with breakfast) 90 tablet 3    cholestyramine (QUESTRAN) 4 g packet Take 1 packet by mouth 4 times daily 120 packet 5    sildenafil (VIAGRA) 100 MG tablet Take 1 tablet by mouth daily as needed for Erectile Dysfunction 90 tablet 0    hydrOXYzine (VISTARIL) 25 MG capsule       QUEtiapine (SEROQUEL) 50 MG tablet       loperamide (IMODIUM) 2 MG capsule TAKE ONE CAPSULE daily for DIARRHEA 90 capsule 5    lamoTRIgine (LAMICTAL) 200 MG tablet Take 200 mg by mouth daily      acetaminophen (TYLENOL) 325 MG tablet Take 2 tablets by mouth every 4 hours as needed for Pain 120 tablet 3    DULoxetine (CYMBALTA) 60 MG extended release capsule Take 60 mg by mouth daily      Blood Glucose Monitoring Suppl (BLOOD GLUCOSE MONITOR SYSTEM) w/Device KIT USE TO MONITOR BLOOD GLUCOSE LEVEL. (TEST ONCE PER DAY) 1 kit 0    blood glucose monitor strips Test blood glucose level 1 time per day. 100 strip 3    Lancets 30G MISC Inject 1 each into the skin daily 100 each 3     No current facility-administered medications on file prior to encounter. Review of Systems   Constitutional: Negative. Negative for fever. HENT: Negative. Eyes: Negative. Respiratory: Negative. Cardiovascular: Negative. Gastrointestinal: Negative. Genitourinary: Negative. Musculoskeletal: Positive for back pain and neck pain. Right hip pain, right wrist pain with swelling    Skin: Negative. Dry skin , right shoulder drug burn,  right elbow abrasion from the fall. Neurological: Negative. Hematological: Negative. Psychiatric/Behavioral: Negative.         GENERAL PHYSICAL EXAM     Vitals: /62   Pulse 73   Temp 97.9 °F (36.6 °C) (Infrared)   Resp 16   Ht 5' 11\" (1.803 m)   Wt 170 lb (77.1 kg)   SpO2 100%   BMI 23.71 kg/m²               Physical Exam  Constitutional:       Appearance: Normal appearance. HENT:      Head: Normocephalic. Right Ear: External ear normal.      Left Ear: External ear normal.      Nose: Nose normal.      Mouth/Throat:      Mouth: Mucous membranes are dry. Eyes:      General:         Right eye: No discharge. Left eye: No discharge. Cardiovascular:      Rate and Rhythm: Normal rate and regular rhythm. Pulses: Normal pulses. Radial pulses are 2+ on the left side. Dorsalis pedis pulses are 2+ on the right side and 2+ on the left side. Posterior tibial pulses are 2+ on the right side and 2+ on the left side. Heart sounds: Normal heart sounds. No murmur heard. No gallop. Pulmonary:      Effort: Pulmonary effort is normal. No respiratory distress. Breath sounds: Normal breath sounds. No wheezing, rhonchi or rales. Abdominal:      General: Bowel sounds are normal.      Palpations: Abdomen is soft. There is no mass. Tenderness: There is no abdominal tenderness. There is no guarding. Musculoskeletal:         General: Normal range of motion. Right wrist: Swelling, effusion and tenderness present. Cervical back: Normal range of motion and neck supple. Right lower leg: No edema. Left lower leg: No edema. Comments: Pt had ecchymosis on his right upper arm from his wrist to his elbow. Pt place his right wrist on a brace,Brisk cap refill. Distal sensation intact. There is abrasion over his elbow and on his thumb    Skin:     General: Skin is warm and dry. Findings: Bruising and erythema present. Comments: On the right arm      Neurological:      General: No focal deficit present. Mental Status: He is alert and oriented to person, place, and time. Motor: No weakness.       Gait: Gait normal.   Psychiatric:         Mood and Affect: Mood normal.         Behavior: Behavior normal.         LAB REVIEW     Basic Metabolic Panel    Ref Range & Units 1/23/22 0012 3/17/21 1325 5/14/19 6334 2/19/19 0910 12/15/16 0550 12/14/16 0525 12/13/16 0807   Glucose 70 - 99 mg/dL 92   111 High    121 High  [facility]  139 High   141 High  [facility]    BUN 8 - 23 mg/dL 14  11  14 R  15 R  14 [facility]  13 R  12 [facility]    CREATININE 0.70 - 1.20 mg/dL 0.73  0.85  0.96  0.86  0.66 Low  [facility]  0.57 Low   0.57 Low  [facility]    Bun/Cre Ratio 9 - 20 NOT REPORTED  NOT REPORTED  NOT REPORTED  NOT REPORTED  NOT REPORTED [1]  NOT REPORTED  NOT REPORTED [1]    Calcium 8.6 - 10.4 mg/dL 9.0  9.5  9.0  9.5  8.8 [facility]  8.7  8.8 [facility]    Sodium 135 - 144 mmol/L 140  142  136  144  140 [facility]  137  136 [facility]    Potassium 3.7 - 5.3 mmol/L 3.9  4.3  4.0  4.4  4.1 [facility]  4.0  3.7 [facility]    Chloride 98 - 107 mmol/L 102  103  99  103  101 [facility]  99  98 [facility]    CO2 20 - 31 mmol/L 25  19 Low   25  29  26 [facility]  24  27 [facility]    Anion Gap 9 - 17 mmol/L 13  20 High   12  12  13 [facility]  14  11 [facility]    GFR Non-African American >60 mL/min >60  >60  >60  >60  >60 [facility]  >60  >60 [facility]    GFR African American >60 mL/min >60  >60  >60  >60  >60 [facility]  >60  >60 [facility]    GFR Comment             CM       CM       CM       [facility]          Contains abnormal data CBC Auto Differential    Ref Range & Units 1/23/22 0012 5/14/19 2125 12/15/16 0550 12/14/16 0525 12/13/16 0807 12/12/16 0506 12/11/16 0442    WBC 3.5 - 11.0 k/uL 9.6  8.4  9.8 [facility]  8.2  9.0 [facility]  10.0  11.4 High  [facility]    RBC 4.5 - 5.9 m/uL 4.40 Low   4.75  4.02 Low  [facility]  4.15 Low   4.41 Low  [facility]  3.94 Low   4.19 Low  [facility]    Hemoglobin 13.5 - 17.5 g/dL 13.4 Low   15.3  12.2 Low  [facility]  12.8 Low   13.6 [facility]  12.3 Low   12.9 Low  [facility]    Hematocrit 41 - 53 % 38.9 Low   43.1  36.7 Low  [facility]  37.8 Low   40.2 Low  [facility]  35.8 Low   37.7 Low  [facility]    MCV 80 - 100 fL 88.4  90.7  91.3 [facility]  90.9  91.1 [facility]  90.8  89.9 [facility]    MCH 26 - 34 pg 30.5  32.3  30.4 [facility]  30.8  30.8 [facility]  31.3  30.9 [facility]    MCHC 31 - 37 g/dL 34.5  35.6  33.4 [facility]  33.9  33.8 [facility]  34.4  34.3 [facility]    RDW 11.5 - 14.9 % 14.0  13.3  13.9 [facility]  13.8  14.0 [facility]  13.8  14.1 [facility]    Platelets 925 - 151 k/uL 305  288  371 [facility]  330  338 [facility]  235  229 [facility]    MPV 6.0 - 12.0 fL 6.7  8.2  8.3 [facility]  7.5  8.0 [facility]  7.6  7.6 [facility]    NRBC Automated per 100 WBC NOT REPORTED  NOT REPORTED         Differential Type  NOT REPORTED  NOT REPORTED  NOT REPORTED [1]  NOT REPORTED  NOT REPORTED [1]  NOT REPORTED  NOT REPORTED [1]    Seg Neutrophils 36 - 66 % 63  50  65 [facility]  66  70 High  [facility]  75 High   79 High  [facility]    Lymphocytes 24 - 44 % 25  39  21 Low  [facility]  19 Low   15 Low  [facility]  14 Low   12 Low  [facility]    Monocytes 1 - 7 % 8 High   6  10 High  [facility]  11 High   10 High  [facility]  8 High   8 High  [facility]    Eosinophils % 0 - 4 % 3  4  3 [facility]  4  4 [facility]  2  0 [facility]    Basophils 0 - 2 % 1  1  1 [facility]  0  1 [facility]  1  1 [facility]    Immature Granulocytes 0 % NOT REPORTED  NOT REPORTED         Segs Absolute 1.3 - 9.1 k/uL 6.10  4.10  6.40 [facility]  5.50  6.40 [facility]  7.60  8.90 [facility]    Absolute Lymph # 1.0 - 4.8 k/uL 2.40  3.30  2.00 [facility]  1.50  1.30 [facility]  1.40  1.40 [facility]    Absolute Mono # 0.1 - 1.3 k/uL 0.80  0.50  0.90 [facility]  0.90  0.90 [facility]  0.80  0.90 [facility]    Absolute Eos # 0.0 - 0.4 k/uL 0.30  0.40  0.30 [facility]  0.30  0.40 [facility]  0.20  0.00 [facility]    Basophils Absolute 0.0 - 0.2 k/uL 0.10  0.10  0.10 [facility]  0.00 CM  0.10 [facility]  0.10 CM  0.10 [facility]    Absolute Immature Granulocyte 0.00 - 0.30 k/uL NOT REPORTED  NOT REPORTED         WBC Morphology  NOT REPORTED  NOT REPORTED  NOT REPORTED [1]  NOT REPORTED  NOT REPORTED [1]  NOT REPORTED  NOT REPORTED [1]    RBC Morphology  NOT REPORTED  NOT REPORTED  NOT REPORTED [1]  NOT REPORTED  NOT REPORTED [1]  NOT REPORTED  NOT REPORTED [1]    Platelet Estimate  NOT REPORTED  NOT REPORTED  NOT REPORTED [1]  NOT REPORTED  NOT REPORTED [1]  NOT REPORTED  NOT REPORTED [1]    Resulting Agency  [address] [facility] [address]   [address]   [address]   [address]   [address]   [address] [facility]          PRELIMINARY EKG REVIEW, DATE:      EKG  Normal sinus rhythm  Possible Inferior infarct (cited on or before 23-MAY-2012)  Cannot rule out Anterior infarct , age undetermined  Abnormal ECG  When compared with ECG of 14-MAY-2019 21:40,  Nonspecific T wave abnormality no longer evident in Lateral leads  Specimen Collected: 01/22/22 23:08 Last Resulted: 01/24/22 10:09      SURGERY / PROVISIONAL DIAGNOSES:    RIGHT DISTAL RADIUS FRACTURE  DISTAL RADIUS OPEN REDUCTION INTERNAL FIXATION Right    Patient Active Problem List    Diagnosis Date Noted    Diabetes mellitus type 2 in obese ([facility] [address] 02/27/2020    Gastritis 10/29/2019    Retinal detachment with multiple breaks, right 07/12/2018    Gastroesophageal reflux disease without esophagitis 12/14/2017    Colon polyp     History of colon surgery     Mild obesity     Colon polyposis 11/15/2016    Erectile dysfunction 11/15/2016    Encounter for genetic counseling 10/29/2016    Macular puckering of retina, left eye 10/27/2016    Tubular adenoma     Hyperplastic polyp of intestine     Degenerative disc disease, cervical     Retinal detachment with multiple breaks, left eye 12/10/2015    Facet arthropathy, cervical     Cervical spondylosis     Lumbar facet arthropathy     Medication monitoring encounter 11/23/2014    Chronic back pain 11/21/2014    724.8 07/31/2014    Sacroiliitis, not elsewhere classified ([facility] [facility] 07/31/2014    Chronic, continuous use of opioids 05/23/2014    Lumbar radiculopathy 04/23/2014    DDD (degenerative disc disease), lumbar 04/23/2014    Hypertension     Anxiety     Nicotine dependence     Osteoarthritis     Obstructive sleep apnea on CPAP     Depression 06/07/2012    Bipolar 1 disorder ([facility] [address] 06/07/2012               Total time spent on encounter- PAT provider minutes: 11-20 minutes     LUKASZ Wan CNP on 1/27/2022 at 4:29 PM

## 2022-01-26 NOTE — H&P
HISTORY and Donte Ramirez [id number]       NAME:  Michael Greene  MRN: [id number]   YOB: 1960   Date: 1/27/2022   Age: 62 y.o. Gender: male     COMPLAINT AND PRESENT HISTORY:   Michael Greene is 62 y.o.,  male, presents for pre-anesthesia/admission testing for DISTAL RADIUS OPEN REDUCTION INTERNAL FIXATION Right per Dr. Bárbara Dangelo. Primary dx: RIGHT DISTAL RADIUS FRACTURE    HPI:  Pt states , on 1/13 /22 he fall on his on his right hand and wrist , he was trying to wear his clothes and his feet got caught and he fell . Five days later he  went to the ER on 1/18/22 due to right wrist pain increased . He had  X- RAY which showed nondisplaced intra-articular fracture distal radius. Right wrist pain    The injury occurred a few weeks ago. Since that time he has been experiencing right wrist pain and swelling with numbness and tingling in his fingers   The pain is currently rated  severe pain, as 10/10. Which 10 is bad pian  Pt describe his pain as aching, does not radiate. Pt states he is on oxycodone oral pain medication PRN, pain aggravated with movement and he trying o keep his right wrist elevated. Pt denies fever/chills, chest pain or SOB. Testing completed r/t condition:  XR WRIST RIGHT (MIN 3 VIEWS)  FINDINGS:   Nondisplaced intra-articular fracture distal radius.  Ulna appears intact. Carpal bones normal.  Diffuse soft tissue swelling.           Impression   Nondisplaced fracture distal radius           Review of additional significant medical hx:  HTN   ( pt denies chest pain , SOB, Palpitation , or headache )  MEDICATION R/T CONDITION : LIPITOR, ATENOLOL , CATAPRES.     BP Readings from Last 3 Encounters:   01/27/22 101/62   01/23/22 126/75   01/18/22 133/80       EKG  Normal sinus rhythm  Possible Inferior infarct (cited on or before 23-MAY-2012)  Cannot rule out Anterior infarct , age undetermined  Abnormal ECG  When compared with ECG of 14-MAY-2019 21:40,  Nonspecific T wave abnormality no longer evident in Lateral leads  Specimen Collected: 01/22/22 23:08 Last Resulted: 01/24/22 10:09    DMII  MEDICATION R/T CONDITION : metformin, insulin     Lab Results   Component Value Date    LABA1C 6.0 08/11/2021     Lab Results   Component Value Date     03/18/2019       SLEEP APNEA   Pt does not use CPAP    Denies hx of MRSA infection. Denies hx of blood clots. Denies hx of any personal or family hx of complications w/anesthesia. PAST MEDICAL HISTORY     Past Medical History:   Diagnosis Date    Abnormal EKG 10/27/2016    indicates inferior infarct.     Anxiety     Bipolar disorder ([facility] 2006    on rx    Chronic back pain 11/21/2014    Depression 2006    on rx    Diabetes mellitus ([facility]     borderline    Fracture, clavicle 1996    LEFT    Hyperlipidemia     Hypertension 2006    on rx    Legally blind in left eye, as defined in [place]      very blurry    Lumbar radiculopathy 04/23/2014    Nicotine dependence     Osteoarthritis     Pain     LEFT EYE    Retinal detachment     history miguel    Sleep apnea     doesnt use cpap     Tubular adenoma     Visual floaters     Wears glasses     USES MAGNIFYING GLASS       SURGICAL HISTORY       Past Surgical History:   Procedure Laterality Date    CATARACT REMOVAL Left     CATARACT REMOVAL WITH IMPLANT Right 8-25-15    CHOLECYSTECTOMY  2/13    COLONOSCOPY  09/22/2016    the ICV was edematous and erythematous but most likely normal variant , bxs taken Large polyp 3 cm, at 50 cm from the anal verge with a very  wide stalk, not removed tattooed needs to be removed with subtotal colectomy    COLONOSCOPY N/A 5/2/2019    COLONOSCOPY POLYPECTOMY COLD BIOPSY, HOT SNARE performed by Janes Peña MD at [address] [place] [address], base of [facility]-left side    EYE SURGERY  12/31/15    laser right eye, left vit    EYE SURGERY Left     torn retina lazer/freezer/hole    EYE SURGERY Left 3/31/16 SILICONE REMOVAL AIR FLUID EXCHANGE    EYE SURGERY      cataracts removed miguel.  EYE SURGERY      total 6 eye surg to left, 2 eye surg. to rt.  HERNIA REPAIR      umbilical    LYMPH NODE BIOPSY Left 1971    BASE OF SKULL    NASAL SEPTUM SURGERY  11-24-15    AL OFFICE/OUTPT VISIT,PROCEDURE ONLY Right 7/12/2018    VITRECTOMY 25 GAUGE, AIR FLUID EXCHANGE, AIR GAS EXCHANGE WITH 18% SF6, ENDOLASER, 200 MSECONDS, 200 MWATTS, 377 PULSES performed by Marquis Sheldon MD at [address]  12/09/2016    w/ ileorectal anastomosis    TONSILLECTOMY  0123    UMBILICAL HERNIA REPAIR  1996    UPPER GASTROINTESTINAL ENDOSCOPY N/A 5/2/2019    EGD BIOPSY performed by Sandra Jung MD at [address] [facility] VITRECTOMY Left 12/10/2015    VITRECTOMY Left 10/27/2016    membrane peeling    VITRECTOMY Right 07/12/2018    laser, gas       SOCIAL HISTORY       Social History     Socioeconomic History    Marital status: Single     Spouse name: None    Number of children: None    Years of education: None    Highest education level: None   Occupational History    Occupation: disabled   Tobacco Use    Smoking status: Current Every Day Smoker     Packs/day: 1.00     Years: 30.00     Pack years: 30.00     Types: Cigarettes    Smokeless tobacco: Never Used   Vaping Use    Vaping Use: Never used   Substance and Sexual Activity    Alcohol use: Yes     Comment: 6 drinks per weekly- weekends only now (1/27/22)    Drug use: No    Sexual activity: None   Other Topics Concern    None   Social History Narrative    None     Social Determinants of Health     Financial Resource Strain: Medium Risk    Difficulty of Paying Living Expenses: Somewhat hard   Food Insecurity: No Food Insecurity    Worried About Running Out of Food in the Last Year: Never true    Claus of Food in the Last Year: Never true   Transportation Needs: Unmet Transportation Needs    Lack of Transportation (Medical):  Yes    Lack of Transportation (Non-Medical): Yes   Physical Activity:     Days of Exercise per Week: Not on file    Minutes of Exercise per Session: Not on file   Stress:     Feeling of Stress : Not on file   Social Connections:     Frequency of Communication with Friends and Family: Not on file    Frequency of Social Gatherings with Friends and Family: Not on file    Attends [employer] Services: Not on file    Active Member of [address] [place] [address] or Organizations: Not on file    Attends Club or Organization Meetings: Not on file    Marital Status: Not on file   Intimate Partner Violence:     Fear of Current or Ex-Partner: Not on file    Emotionally Abused: Not on file    Physically Abused: Not on file    Sexually Abused: Not on file   Housing Stability:     Unable to Pay for Housing in the Last Year: Not on file    Number of Places Lived in the Last Year: Not on file    Unstable Housing in the Last Year: Not on file       REVIEW OF SYSTEMS    No Known Allergies    Current Outpatient Medications on File Prior to Encounter   Medication Sig Dispense Refill    diazePAM (VALIUM) 5 MG tablet TAKE ONE TABLET BY MOUTH EVERY 12 HOURS AS NEEDED FOR ANXIETY OR SLEEP 60 tablet 0    atorvastatin (LIPITOR) 20 MG tablet TAKE ONE TABLET BY MOUTH DAILY 90 tablet 3    doxycycline hyclate (VIBRA-TABS) 100 MG tablet Take 1 tablet by mouth 2 times daily for 7 days 14 tablet 0    tamsulosin (FLOMAX) 0.4 MG capsule TAKE ONE CAPSULE BY MOUTH DAILY 90 capsule 0    oxyCODONE-acetaminophen (PERCOCET) 5-325 MG per tablet Take 1 tablet by mouth every 8 hours as needed for Pain for up to 30 days.  90 tablet 0    atenolol (TENORMIN) 25 MG tablet TAKE ONE TABLET BY MOUTH DAILY 90 tablet 4    meloxicam (MOBIC) 7.5 MG tablet Take 1 tablet by mouth daily 30 tablet 1    cyclobenzaprine (FLEXERIL) 10 MG tablet Take 1 tablet by mouth 2 times daily as needed for Muscle spasms TAKE ONE TABLET BY MOUTH DAILY 60 tablet 5    cloNIDine (CATAPRES) 0.1 MG tablet TAKE ONE TABLET BY MOUTH DAILY 90 tablet 3    metFORMIN (GLUCOPHAGE) 500 MG tablet Take 1 tablet by mouth daily (with breakfast) 90 tablet 3    cholestyramine (QUESTRAN) 4 g packet Take 1 packet by mouth 4 times daily 120 packet 5    sildenafil (VIAGRA) 100 MG tablet Take 1 tablet by mouth daily as needed for Erectile Dysfunction 90 tablet 0    hydrOXYzine (VISTARIL) 25 MG capsule       QUEtiapine (SEROQUEL) 50 MG tablet       loperamide (IMODIUM) 2 MG capsule TAKE ONE CAPSULE daily for DIARRHEA 90 capsule 5    lamoTRIgine (LAMICTAL) 200 MG tablet Take 200 mg by mouth daily      acetaminophen (TYLENOL) 325 MG tablet Take 2 tablets by mouth every 4 hours as needed for Pain 120 tablet 3    DULoxetine (CYMBALTA) 60 MG extended release capsule Take 60 mg by mouth daily      Blood Glucose Monitoring Suppl (BLOOD GLUCOSE MONITOR SYSTEM) w/Device KIT USE TO MONITOR BLOOD GLUCOSE LEVEL. (TEST ONCE PER DAY) 1 kit 0    blood glucose monitor strips Test blood glucose level 1 time per day. 100 strip 3    Lancets 30G MISC Inject 1 each into the skin daily 100 each 3     No current facility-administered medications on file prior to encounter. Review of Systems   Constitutional: Negative. Negative for fever. HENT: Negative. Eyes: Negative. Respiratory: Negative. Cardiovascular: Negative. Gastrointestinal: Negative. Genitourinary: Negative. Musculoskeletal: Positive for back pain and neck pain. Right hip pain, right wrist pain with swelling    Skin: Negative. Dry skin , right shoulder drug burn,  right elbow abrasion from the fall. Neurological: Negative. Hematological: Negative. Psychiatric/Behavioral: Negative.         GENERAL PHYSICAL EXAM     Vitals: /62   Pulse 73   Temp 97.9 °F (36.6 °C) (Infrared)   Resp 16   Ht 5' 11\" (1.803 m)   Wt 170 lb (77.1 kg)   SpO2 100%   BMI 23.71 kg/m²               Physical Exam  Constitutional:       Appearance: Normal appearance. HENT:      Head: Normocephalic. Right Ear: External ear normal.      Left Ear: External ear normal.      Nose: Nose normal.      Mouth/Throat:      Mouth: Mucous membranes are dry. Eyes:      General:         Right eye: No discharge. Left eye: No discharge. Cardiovascular:      Rate and Rhythm: Normal rate and regular rhythm. Pulses: Normal pulses. Radial pulses are 2+ on the left side. Dorsalis pedis pulses are 2+ on the right side and 2+ on the left side. Posterior tibial pulses are 2+ on the right side and 2+ on the left side. Heart sounds: Normal heart sounds. No murmur heard. No gallop. Pulmonary:      Effort: Pulmonary effort is normal. No respiratory distress. Breath sounds: Normal breath sounds. No wheezing, rhonchi or rales. Abdominal:      General: Bowel sounds are normal.      Palpations: Abdomen is soft. There is no mass. Tenderness: There is no abdominal tenderness. There is no guarding. Musculoskeletal:         General: Normal range of motion. Right wrist: Swelling, effusion and tenderness present. Cervical back: Normal range of motion and neck supple. Right lower leg: No edema. Left lower leg: No edema. Comments: Pt had ecchymosis on his right upper arm from his wrist to his elbow. Pt place his right wrist on a brace,Brisk cap refill. Distal sensation intact. There is abrasion over his elbow and on his thumb    Skin:     General: Skin is warm and dry. Findings: Bruising and erythema present. Comments: On the right arm      Neurological:      General: No focal deficit present. Mental Status: He is alert and oriented to person, place, and time. Motor: No weakness.       Gait: Gait normal.   Psychiatric:         Mood and Affect: Mood normal.         Behavior: Behavior normal.         LAB REVIEW     Basic Metabolic Panel    Ref Range & Units 1/23/22 0012 3/17/21 1325 5/14/19 8746 2/19/19 0910 12/15/16 0550 12/14/16 0525 12/13/16 0807   Glucose 70 - 99 mg/dL 92   111 High    121 High  [facility]  139 High   141 High  [facility]    BUN 8 - 23 mg/dL 14  11  14 R  15 R  14 [facility]  13 R  12 [facility]    CREATININE 0.70 - 1.20 mg/dL 0.73  0.85  0.96  0.86  0.66 Low  [facility]  0.57 Low   0.57 Low  [facility]    Bun/Cre Ratio 9 - 20 NOT REPORTED  NOT REPORTED  NOT REPORTED  NOT REPORTED  NOT REPORTED [1]  NOT REPORTED  NOT REPORTED [1]    Calcium 8.6 - 10.4 mg/dL 9.0  9.5  9.0  9.5  8.8 [facility]  8.7  8.8 [facility]    Sodium 135 - 144 mmol/L 140  142  136  144  140 [facility]  137  136 [facility]    Potassium 3.7 - 5.3 mmol/L 3.9  4.3  4.0  4.4  4.1 [facility]  4.0  3.7 [facility]    Chloride 98 - 107 mmol/L 102  103  99  103  101 [facility]  99  98 [facility]    CO2 20 - 31 mmol/L 25  19 Low   25  29  26 [facility]  24  27 [facility]    Anion Gap 9 - 17 mmol/L 13  20 High   12  12  13 [facility]  14  11 [facility]    GFR Non-African American >60 mL/min >60  >60  >60  >60  >60 [facility]  >60  >60 [facility]    GFR African American >60 mL/min >60  >60  >60  >60  >60 [facility]  >60  >60 [facility]    GFR Comment             CM       CM       CM       [facility]          Contains abnormal data CBC Auto Differential    Ref Range & Units 1/23/22 0012 5/14/19 2125 12/15/16 0550 12/14/16 0525 12/13/16 0807 12/12/16 0506 12/11/16 0442    WBC 3.5 - 11.0 k/uL 9.6  8.4  9.8 [facility]  8.2  9.0 [facility]  10.0  11.4 High  [facility]    RBC 4.5 - 5.9 m/uL 4.40 Low   4.75  4.02 Low  [facility]  4.15 Low   4.41 Low  [facility]  3.94 Low   4.19 Low  [facility]    Hemoglobin 13.5 - 17.5 g/dL 13.4 Low   15.3  12.2 Low  [facility]  12.8 Low   13.6 [facility]  12.3 Low   12.9 Low  [facility]    Hematocrit 41 - 53 % 38.9 Low   43.1  36.7 Low  [facility]  37.8 Low   40.2 Low  [facility]  35.8 Low   37.7 Low  [facility]    MCV 80 - 100 fL 88.4  90.7  91.3 [facility]  90.9  91.1 [facility]  90.8  89.9 [facility]    MCH 26 - 34 pg 30.5  32.3  30.4 [facility]  30.8  30.8 [facility]  31.3  30.9 [facility]    MCHC 31 - 37 g/dL 34.5  35.6  33.4 [facility]  33.9  33.8 [facility]  34.4  34.3 [facility]    RDW 11.5 - 14.9 % 14.0  13.3  13.9 [facility]  13.8  14.0 [facility]  13.8  14.1 [facility]    Platelets 348 - 994 k/uL 305  288  371 [facility]  330  338 [facility]  235  229 [facility]    MPV 6.0 - 12.0 fL 6.7  8.2  8.3 [facility]  7.5  8.0 [facility]  7.6  7.6 [facility]    NRBC Automated per 100 WBC NOT REPORTED  NOT REPORTED         Differential Type  NOT REPORTED  NOT REPORTED  NOT REPORTED [1]  NOT REPORTED  NOT REPORTED [1]  NOT REPORTED  NOT REPORTED [1]    Seg Neutrophils 36 - 66 % 63  50  65 [facility]  66  70 High  [facility]  75 High   79 High  [facility]    Lymphocytes 24 - 44 % 25  39  21 Low  [facility]  19 Low   15 Low  [facility]  14 Low   12 Low  [facility]    Monocytes 1 - 7 % 8 High   6  10 High  [facility]  11 High   10 High  [facility]  8 High   8 High  [facility]    Eosinophils % 0 - 4 % 3  4  3 [facility]  4  4 [facility]  2  0 [facility]    Basophils 0 - 2 % 1  1  1 [facility]  0  1 [facility]  1  1 [facility]    Immature Granulocytes 0 % NOT REPORTED  NOT REPORTED         Segs Absolute 1.3 - 9.1 k/uL 6.10  4.10  6.40 [facility]  5.50  6.40 [facility]  7.60  8.90 [facility]    Absolute Lymph # 1.0 - 4.8 k/uL 2.40  3.30  2.00 [facility]  1.50  1.30 [facility]  1.40  1.40 [facility]    Absolute Mono # 0.1 - 1.3 k/uL 0.80  0.50  0.90 [facility]  0.90  0.90 [facility]  0.80  0.90 [facility]    Absolute Eos # 0.0 - 0.4 k/uL 0.30  0.40  0.30 [facility]  0.30  0.40 [facility]  0.20  0.00 [facility]    Basophils Absolute 0.0 - 0.2 k/uL 0.10  0.10  0.10 [facility]  0.00 CM  0.10 [facility]  0.10 CM  0.10 [facility]    Absolute Immature Granulocyte 0.00 - 0.30 k/uL NOT REPORTED  NOT REPORTED         WBC Morphology  NOT REPORTED  NOT REPORTED  NOT REPORTED [1]  NOT REPORTED  NOT REPORTED [1]  NOT REPORTED  NOT REPORTED [1]    RBC Morphology  NOT REPORTED  NOT REPORTED  NOT REPORTED [1]  NOT REPORTED  NOT REPORTED [1]  NOT REPORTED  NOT REPORTED [1]    Platelet Estimate  NOT REPORTED  NOT REPORTED  NOT REPORTED [1]  NOT REPORTED  NOT REPORTED [1]  NOT REPORTED  NOT REPORTED [1]    Resulting Agency  [address] [facility] [address]   [address]   [address]   [address]   [address]   [address] [facility]          PRELIMINARY EKG REVIEW, DATE:      EKG  Normal sinus rhythm  Possible Inferior infarct (cited on or before 23-MAY-2012)  Cannot rule out Anterior infarct , age undetermined  Abnormal ECG  When compared with ECG of 14-MAY-2019 21:40,  Nonspecific T wave abnormality no longer evident in Lateral leads  Specimen Collected: 01/22/22 23:08 Last Resulted: 01/24/22 10:09      SURGERY / PROVISIONAL DIAGNOSES:    RIGHT DISTAL RADIUS FRACTURE  DISTAL RADIUS OPEN REDUCTION INTERNAL FIXATION Right    Patient Active Problem List    Diagnosis Date Noted    Diabetes mellitus type 2 in obese ([facility] 02/27/2020    Gastritis 10/29/2019    Retinal detachment with multiple breaks, right 07/12/2018    Gastroesophageal reflux disease without esophagitis 12/14/2017    Colon polyp     History of colon surgery     Mild obesity     Colon polyposis 11/15/2016    Erectile dysfunction 11/15/2016    Encounter for genetic counseling 10/29/2016    Macular puckering of retina, left eye 10/27/2016    Tubular adenoma     Hyperplastic polyp of intestine     Degenerative disc disease, cervical     Retinal detachment with multiple breaks, left eye 12/10/2015    Facet arthropathy, cervical     Cervical spondylosis     Lumbar facet arthropathy     Medication monitoring encounter 11/23/2014    Chronic back pain 11/21/2014    724.8 07/31/2014    Sacroiliitis, not elsewhere classified ([facility] 07/31/2014    Chronic, continuous use of opioids 05/23/2014    Lumbar radiculopathy 04/23/2014    DDD (degenerative disc disease), lumbar 04/23/2014    Hypertension     Anxiety     Nicotine dependence     Osteoarthritis     Obstructive sleep apnea on CPAP     Depression 06/07/2012    Bipolar 1 disorder ([facility] 06/07/2012               Total time spent on encounter- PAT provider minutes: 11-20 minutes     LUKASZ Wan CNP on 1/27/2022 at 4:29 PM

## 2022-01-27 ENCOUNTER — HOSPITAL ENCOUNTER (OUTPATIENT)
Dept: PREADMISSION TESTING | Age: 62
Discharge: HOME | End: 2022-01-31
Payer: MEDICARE

## 2022-01-27 ENCOUNTER — HOSPITAL ENCOUNTER (OUTPATIENT)
Dept: LAB | Age: 62
Setting detail: SPECIMEN
Discharge: HOME | End: 2022-01-27

## 2022-01-27 VITALS
RESPIRATION RATE: 16 BRPM | TEMPERATURE: 97.9 F | SYSTOLIC BLOOD PRESSURE: 101 MMHG | BODY MASS INDEX: 23.8 KG/M2 | OXYGEN SATURATION: 100 % | DIASTOLIC BLOOD PRESSURE: 62 MMHG | HEART RATE: 73 BPM | WEIGHT: 170 LBS | HEIGHT: 71 IN

## 2022-01-27 DIAGNOSIS — Z01.818 PRE-OP TESTING: Primary | ICD-10-CM

## 2022-01-27 DIAGNOSIS — Z01.818 PREOP EXAMINATION: ICD-10-CM

## 2022-01-27 PROCEDURE — U0005 INFEC AGEN DETEC AMPLI PROBE: HCPCS

## 2022-01-27 PROCEDURE — 99202 OFFICE O/P NEW SF 15 MIN: CPT | Performed by: NURSE PRACTITIONER

## 2022-01-27 PROCEDURE — U0003 INFECTIOUS AGENT DETECTION BY NUCLEIC ACID (DNA OR RNA); SEVERE ACUTE RESPIRATORY SYNDROME CORONAVIRUS 2 (SARS-COV-2) (CORONAVIRUS DISEASE [COVID-19]), AMPLIFIED PROBE TECHNIQUE, MAKING USE OF HIGH THROUGHPUT TECHNOLOGIES AS DESCRIBED BY CMS-2020-01-R: HCPCS

## 2022-01-27 ASSESSMENT — PAIN DESCRIPTION - DESCRIPTORS: DESCRIPTORS: THROBBING;ACHING

## 2022-01-27 ASSESSMENT — ENCOUNTER SYMPTOMS
BACK PAIN: 1
EYES NEGATIVE: 1
RESPIRATORY NEGATIVE: 1
GASTROINTESTINAL NEGATIVE: 1

## 2022-01-27 ASSESSMENT — PAIN DESCRIPTION - LOCATION: LOCATION: WRIST

## 2022-01-27 ASSESSMENT — PAIN DESCRIPTION - ORIENTATION: ORIENTATION: RIGHT

## 2022-01-27 ASSESSMENT — PAIN DESCRIPTION - PAIN TYPE: TYPE: ACUTE PAIN

## 2022-01-27 ASSESSMENT — PAIN SCALES - GENERAL: PAINLEVEL_OUTOF10: 10

## 2022-01-28 LAB
SARS-COV-2: NORMAL
SARS-COV-2: NOT DETECTED
SOURCE: NORMAL

## 2022-01-30 ENCOUNTER — ANESTHESIA EVENT (OUTPATIENT)
Dept: OPERATING ROOM | Age: 62
End: 2022-01-30
Payer: MEDICARE

## 2022-01-31 ENCOUNTER — ANESTHESIA (OUTPATIENT)
Dept: OPERATING ROOM | Age: 62
End: 2022-01-31
Payer: MEDICARE

## 2022-01-31 ENCOUNTER — APPOINTMENT (OUTPATIENT)
Dept: GENERAL RADIOLOGY | Age: 62
End: 2022-01-31
Attending: ORTHOPAEDIC SURGERY
Payer: MEDICARE

## 2022-01-31 ENCOUNTER — HOSPITAL ENCOUNTER (OUTPATIENT)
Age: 62
Setting detail: OUTPATIENT SURGERY
Discharge: HOME | End: 2022-01-31
Attending: ORTHOPAEDIC SURGERY | Admitting: ORTHOPAEDIC SURGERY
Payer: MEDICARE

## 2022-01-31 VITALS
WEIGHT: 170 LBS | DIASTOLIC BLOOD PRESSURE: 71 MMHG | HEART RATE: 71 BPM | TEMPERATURE: 96.6 F | RESPIRATION RATE: 14 BRPM | HEIGHT: 71 IN | SYSTOLIC BLOOD PRESSURE: 129 MMHG | BODY MASS INDEX: 23.8 KG/M2 | OXYGEN SATURATION: 93 %

## 2022-01-31 VITALS — DIASTOLIC BLOOD PRESSURE: 57 MMHG | TEMPERATURE: 95.4 F | OXYGEN SATURATION: 99 % | SYSTOLIC BLOOD PRESSURE: 98 MMHG

## 2022-01-31 DIAGNOSIS — S52.541D CLOSED SMITH'S FRACTURE OF RIGHT RADIUS WITH ROUTINE HEALING: Primary | ICD-10-CM

## 2022-01-31 LAB
GLUCOSE BLD-MCNC: 102 MG/DL (ref 75–110)
GLUCOSE BLD-MCNC: 110 MG/DL (ref 75–110)

## 2022-01-31 PROCEDURE — 2580000003 HC RX 258: Performed by: ANESTHESIOLOGY

## 2022-01-31 PROCEDURE — 2709999900 HC NON-CHARGEABLE SUPPLY: Performed by: ORTHOPAEDIC SURGERY

## 2022-01-31 PROCEDURE — 7100000010 HC PHASE II RECOVERY - FIRST 15 MIN: Performed by: ORTHOPAEDIC SURGERY

## 2022-01-31 PROCEDURE — 7100000031 HC ASPR PHASE II RECOVERY - ADDTL 15 MIN: Performed by: ORTHOPAEDIC SURGERY

## 2022-01-31 PROCEDURE — 7100000000 HC PACU RECOVERY - FIRST 15 MIN: Performed by: ORTHOPAEDIC SURGERY

## 2022-01-31 PROCEDURE — 7100000011 HC PHASE II RECOVERY - ADDTL 15 MIN: Performed by: ORTHOPAEDIC SURGERY

## 2022-01-31 PROCEDURE — 7100000001 HC PACU RECOVERY - ADDTL 15 MIN: Performed by: ORTHOPAEDIC SURGERY

## 2022-01-31 PROCEDURE — 82947 ASSAY GLUCOSE BLOOD QUANT: CPT

## 2022-01-31 PROCEDURE — 2720000010 HC SURG SUPPLY STERILE: Performed by: ORTHOPAEDIC SURGERY

## 2022-01-31 PROCEDURE — 7100000030 HC ASPR PHASE II RECOVERY - FIRST 15 MIN: Performed by: ORTHOPAEDIC SURGERY

## 2022-01-31 PROCEDURE — 6360000002 HC RX W HCPCS: Performed by: ANESTHESIOLOGY

## 2022-01-31 PROCEDURE — 3600000013 HC SURGERY LEVEL 3 ADDTL 15MIN: Performed by: ORTHOPAEDIC SURGERY

## 2022-01-31 PROCEDURE — 3700000001 HC ADD 15 MINUTES (ANESTHESIA): Performed by: ORTHOPAEDIC SURGERY

## 2022-01-31 PROCEDURE — C1713 ANCHOR/SCREW BN/BN,TIS/BN: HCPCS | Performed by: ORTHOPAEDIC SURGERY

## 2022-01-31 PROCEDURE — 3700000000 HC ANESTHESIA ATTENDED CARE: Performed by: ORTHOPAEDIC SURGERY

## 2022-01-31 PROCEDURE — 3209999900 FLUORO FOR SURGICAL PROCEDURES

## 2022-01-31 PROCEDURE — 64415 NJX AA&/STRD BRCH PLXS IMG: CPT | Performed by: ANESTHESIOLOGY

## 2022-01-31 PROCEDURE — 2500000003 HC RX 250 WO HCPCS: Performed by: NURSE ANESTHETIST, CERTIFIED REGISTERED

## 2022-01-31 PROCEDURE — 6360000002 HC RX W HCPCS: Performed by: NURSE ANESTHETIST, CERTIFIED REGISTERED

## 2022-01-31 PROCEDURE — 3600000003 HC SURGERY LEVEL 3 BASE: Performed by: ORTHOPAEDIC SURGERY

## 2022-01-31 PROCEDURE — 2500000003 HC RX 250 WO HCPCS: Performed by: ANESTHESIOLOGY

## 2022-01-31 DEVICE — SCREW BNE L16MM DIA2.4MM DST RAD VOLAR S STL ST VAR ANG LOK: Type: IMPLANTABLE DEVICE | Site: WRIST | Status: FUNCTIONAL

## 2022-01-31 DEVICE — SCREW BNE L16MM DIA2.7MM CORT S STL ST T8 STARDRV RECESS: Type: IMPLANTABLE DEVICE | Site: WRIST | Status: FUNCTIONAL

## 2022-01-31 DEVICE — SCREW BNE L20MM DIA2.4MM DST RAD VOLAR S STL ST VAR ANG LOK: Type: IMPLANTABLE DEVICE | Site: WRIST | Status: FUNCTIONAL

## 2022-01-31 DEVICE — SCREW BNE L22MM DIA2.4MM DST RAD VOLAR S STL ST VAR ANG LOK: Type: IMPLANTABLE DEVICE | Site: WRIST | Status: FUNCTIONAL

## 2022-01-31 DEVICE — PLATE BNE L45MM 6X2 H R VOLAR DST RAD S STL VAR ANG LOK: Type: IMPLANTABLE DEVICE | Site: WRIST | Status: FUNCTIONAL

## 2022-01-31 RX ORDER — ONDANSETRON 2 MG/ML
4 INJECTION INTRAMUSCULAR; INTRAVENOUS
Status: DISCONTINUED | OUTPATIENT
Start: 2022-01-31 | End: 2022-01-31 | Stop reason: HOSPADM

## 2022-01-31 RX ORDER — OXYCODONE HYDROCHLORIDE AND ACETAMINOPHEN 5; 325 MG/1; MG/1
1 TABLET ORAL EVERY 6 HOURS PRN
Qty: 28 TABLET | Refills: 0 | Status: SHIPPED | OUTPATIENT
Start: 2022-01-31 | End: 2022-03-01 | Stop reason: SDUPTHER

## 2022-01-31 RX ORDER — LIDOCAINE HYDROCHLORIDE 20 MG/ML
INJECTION, SOLUTION INFILTRATION; PERINEURAL
Status: DISCONTINUED | OUTPATIENT
Start: 2022-01-31 | End: 2022-01-31 | Stop reason: SDUPTHER

## 2022-01-31 RX ORDER — LABETALOL HYDROCHLORIDE 5 MG/ML
5 INJECTION, SOLUTION INTRAVENOUS EVERY 10 MIN PRN
Status: DISCONTINUED | OUTPATIENT
Start: 2022-01-31 | End: 2022-01-31 | Stop reason: HOSPADM

## 2022-01-31 RX ORDER — DIPHENHYDRAMINE HYDROCHLORIDE 50 MG/ML
12.5 INJECTION INTRAMUSCULAR; INTRAVENOUS
Status: DISCONTINUED | OUTPATIENT
Start: 2022-01-31 | End: 2022-01-31 | Stop reason: HOSPADM

## 2022-01-31 RX ORDER — PROPOFOL 10 MG/ML
INJECTION, EMULSION INTRAVENOUS PRN
Status: DISCONTINUED | OUTPATIENT
Start: 2022-01-31 | End: 2022-01-31 | Stop reason: SDUPTHER

## 2022-01-31 RX ORDER — LIDOCAINE HYDROCHLORIDE 10 MG/ML
INJECTION, SOLUTION EPIDURAL; INFILTRATION; INTRACAUDAL; PERINEURAL PRN
Status: DISCONTINUED | OUTPATIENT
Start: 2022-01-31 | End: 2022-01-31 | Stop reason: SDUPTHER

## 2022-01-31 RX ORDER — OXYCODONE HYDROCHLORIDE AND ACETAMINOPHEN 5; 325 MG/1; MG/1
2 TABLET ORAL PRN
Status: DISCONTINUED | OUTPATIENT
Start: 2022-01-31 | End: 2022-01-31 | Stop reason: HOSPADM

## 2022-01-31 RX ORDER — OXYCODONE HYDROCHLORIDE AND ACETAMINOPHEN 5; 325 MG/1; MG/1
1 TABLET ORAL PRN
Status: DISCONTINUED | OUTPATIENT
Start: 2022-01-31 | End: 2022-01-31 | Stop reason: HOSPADM

## 2022-01-31 RX ORDER — CEFAZOLIN SODIUM 1 G/3ML
INJECTION, POWDER, FOR SOLUTION INTRAMUSCULAR; INTRAVENOUS PRN
Status: DISCONTINUED | OUTPATIENT
Start: 2022-01-31 | End: 2022-01-31 | Stop reason: SDUPTHER

## 2022-01-31 RX ORDER — ROPIVACAINE HYDROCHLORIDE 5 MG/ML
INJECTION, SOLUTION EPIDURAL; INFILTRATION; PERINEURAL
Status: DISCONTINUED | OUTPATIENT
Start: 2022-01-31 | End: 2022-01-31 | Stop reason: SDUPTHER

## 2022-01-31 RX ORDER — LIDOCAINE HYDROCHLORIDE 10 MG/ML
1 INJECTION, SOLUTION EPIDURAL; INFILTRATION; INTRACAUDAL; PERINEURAL
Status: DISCONTINUED | OUTPATIENT
Start: 2022-01-31 | End: 2022-01-31 | Stop reason: HOSPADM

## 2022-01-31 RX ORDER — SODIUM CHLORIDE 0.9 % (FLUSH) 0.9 %
5-40 SYRINGE (ML) INJECTION PRN
Status: DISCONTINUED | OUTPATIENT
Start: 2022-01-31 | End: 2022-01-31 | Stop reason: HOSPADM

## 2022-01-31 RX ORDER — SODIUM CHLORIDE 9 MG/ML
25 INJECTION, SOLUTION INTRAVENOUS PRN
Status: DISCONTINUED | OUTPATIENT
Start: 2022-01-31 | End: 2022-01-31 | Stop reason: HOSPADM

## 2022-01-31 RX ORDER — ONDANSETRON 2 MG/ML
INJECTION INTRAMUSCULAR; INTRAVENOUS PRN
Status: DISCONTINUED | OUTPATIENT
Start: 2022-01-31 | End: 2022-01-31 | Stop reason: SDUPTHER

## 2022-01-31 RX ORDER — DEXAMETHASONE SODIUM PHOSPHATE 4 MG/ML
INJECTION, SOLUTION INTRA-ARTICULAR; INTRALESIONAL; INTRAMUSCULAR; INTRAVENOUS; SOFT TISSUE PRN
Status: DISCONTINUED | OUTPATIENT
Start: 2022-01-31 | End: 2022-01-31 | Stop reason: SDUPTHER

## 2022-01-31 RX ORDER — SODIUM CHLORIDE 9 MG/ML
INJECTION, SOLUTION INTRAVENOUS CONTINUOUS
Status: DISCONTINUED | OUTPATIENT
Start: 2022-01-31 | End: 2022-01-31 | Stop reason: HOSPADM

## 2022-01-31 RX ORDER — FENTANYL CITRATE 50 UG/ML
INJECTION, SOLUTION INTRAMUSCULAR; INTRAVENOUS PRN
Status: DISCONTINUED | OUTPATIENT
Start: 2022-01-31 | End: 2022-01-31 | Stop reason: SDUPTHER

## 2022-01-31 RX ORDER — HYDROMORPHONE HCL 110MG/55ML
PATIENT CONTROLLED ANALGESIA SYRINGE INTRAVENOUS PRN
Status: DISCONTINUED | OUTPATIENT
Start: 2022-01-31 | End: 2022-01-31 | Stop reason: SDUPTHER

## 2022-01-31 RX ORDER — SODIUM CHLORIDE 0.9 % (FLUSH) 0.9 %
5-40 SYRINGE (ML) INJECTION EVERY 12 HOURS SCHEDULED
Status: DISCONTINUED | OUTPATIENT
Start: 2022-01-31 | End: 2022-01-31 | Stop reason: HOSPADM

## 2022-01-31 RX ADMIN — HYDROMORPHONE HYDROCHLORIDE 2 MG: 2 INJECTION, SOLUTION INTRAMUSCULAR; INTRAVENOUS; SUBCUTANEOUS at 10:04

## 2022-01-31 RX ADMIN — SODIUM CHLORIDE: 9 INJECTION, SOLUTION INTRAVENOUS at 10:18

## 2022-01-31 RX ADMIN — SODIUM CHLORIDE: 9 INJECTION, SOLUTION INTRAVENOUS at 09:32

## 2022-01-31 RX ADMIN — ONDANSETRON 4 MG: 2 INJECTION INTRAMUSCULAR; INTRAVENOUS at 10:00

## 2022-01-31 RX ADMIN — FENTANYL CITRATE 100 MCG: 50 INJECTION, SOLUTION INTRAMUSCULAR; INTRAVENOUS at 09:50

## 2022-01-31 RX ADMIN — DEXAMETHASONE SODIUM PHOSPHATE 8 MG: 4 INJECTION, SOLUTION INTRAMUSCULAR; INTRAVENOUS at 10:00

## 2022-01-31 RX ADMIN — HYDROMORPHONE HYDROCHLORIDE 0.5 MG: 1 INJECTION, SOLUTION INTRAMUSCULAR; INTRAVENOUS; SUBCUTANEOUS at 11:35

## 2022-01-31 RX ADMIN — ROPIVACAINE HYDROCHLORIDE 20 ML: 5 INJECTION, SOLUTION EPIDURAL; INFILTRATION; PERINEURAL at 12:39

## 2022-01-31 RX ADMIN — PROPOFOL 200 MG: 10 INJECTION, EMULSION INTRAVENOUS at 09:54

## 2022-01-31 RX ADMIN — HYDROMORPHONE HYDROCHLORIDE 0.5 MG: 1 INJECTION, SOLUTION INTRAMUSCULAR; INTRAVENOUS; SUBCUTANEOUS at 11:49

## 2022-01-31 RX ADMIN — LIDOCAINE HYDROCHLORIDE 10 ML: 20 INJECTION, SOLUTION INFILTRATION; PERINEURAL at 12:39

## 2022-01-31 RX ADMIN — CEFAZOLIN 2000 MG: 1 INJECTION, POWDER, FOR SOLUTION INTRAMUSCULAR; INTRAVENOUS at 10:00

## 2022-01-31 RX ADMIN — LIDOCAINE HYDROCHLORIDE 50 MG: 10 INJECTION, SOLUTION EPIDURAL; INFILTRATION; INTRACAUDAL; PERINEURAL at 09:54

## 2022-01-31 ASSESSMENT — PULMONARY FUNCTION TESTS
PIF_VALUE: 4
PIF_VALUE: 8
PIF_VALUE: 1
PIF_VALUE: 7
PIF_VALUE: 5
PIF_VALUE: 1
PIF_VALUE: 7
PIF_VALUE: 1
PIF_VALUE: 5
PIF_VALUE: 6
PIF_VALUE: 9
PIF_VALUE: 5
PIF_VALUE: 19
PIF_VALUE: 1
PIF_VALUE: 5
PIF_VALUE: 17
PIF_VALUE: 7
PIF_VALUE: 7
PIF_VALUE: 5
PIF_VALUE: 15
PIF_VALUE: 18
PIF_VALUE: 6
PIF_VALUE: 6
PIF_VALUE: 7
PIF_VALUE: 31
PIF_VALUE: 8
PIF_VALUE: 7
PIF_VALUE: 7
PIF_VALUE: 6
PIF_VALUE: 7
PIF_VALUE: 22
PIF_VALUE: 7
PIF_VALUE: 6
PIF_VALUE: 13
PIF_VALUE: 5
PIF_VALUE: 2
PIF_VALUE: 6
PIF_VALUE: 6
PIF_VALUE: 9
PIF_VALUE: 15
PIF_VALUE: 9
PIF_VALUE: 7
PIF_VALUE: 6
PIF_VALUE: 7
PIF_VALUE: 7
PIF_VALUE: 6
PIF_VALUE: 1
PIF_VALUE: 6
PIF_VALUE: 4
PIF_VALUE: 6
PIF_VALUE: 6
PIF_VALUE: 15
PIF_VALUE: 2
PIF_VALUE: 7
PIF_VALUE: 6
PIF_VALUE: 7
PIF_VALUE: 8
PIF_VALUE: 6
PIF_VALUE: 8
PIF_VALUE: 7
PIF_VALUE: 12
PIF_VALUE: 5
PIF_VALUE: 7
PIF_VALUE: 6
PIF_VALUE: 3
PIF_VALUE: 1
PIF_VALUE: 6
PIF_VALUE: 5
PIF_VALUE: 7
PIF_VALUE: 6
PIF_VALUE: 6
PIF_VALUE: 5

## 2022-01-31 ASSESSMENT — PAIN DESCRIPTION - PAIN TYPE
TYPE: SURGICAL PAIN

## 2022-01-31 ASSESSMENT — PAIN SCALES - GENERAL
PAINLEVEL_OUTOF10: 2
PAINLEVEL_OUTOF10: 10
PAINLEVEL_OUTOF10: 7
PAINLEVEL_OUTOF10: 0
PAINLEVEL_OUTOF10: 10

## 2022-01-31 ASSESSMENT — PAIN DESCRIPTION - ORIENTATION
ORIENTATION: RIGHT

## 2022-01-31 ASSESSMENT — PAIN - FUNCTIONAL ASSESSMENT: PAIN_FUNCTIONAL_ASSESSMENT: 0-10

## 2022-01-31 ASSESSMENT — PAIN DESCRIPTION - LOCATION
LOCATION: ARM

## 2022-01-31 ASSESSMENT — LIFESTYLE VARIABLES: SMOKING_STATUS: 1

## 2022-01-31 NOTE — INTERVAL H&P NOTE
Update History & Physical    The patient's History and Physical of January 27, 2022 was reviewed with the patient and I examined the patient. I concur with findings. There was no change. Here today for right distal radius open reduction internal fixation. NPO since before midnight. Took atenolol and clonidine this am with sip of water. Last dose of mobic was two days ago. Pt does have chronic diarrhea. Denies taking any other blood thinning medications. Denies chest pain/pressure, palpitations, SOB, N/V and constipation, recent URI, fever or chills. Review vitals per RN flowsheet.        Electronically signed by LUKASZ Huynh CNP on 1/31/2022 at 8:30 AM

## 2022-01-31 NOTE — ANESTHESIA PROCEDURE NOTES
Peripheral Block    Patient location during procedure: PACU  Start time: 1/31/2022 12:33 PM  End time: 1/31/2022 12:39 PM  Staffing  Performed: anesthesiologist   Anesthesiologist: Edilia Ng MD  Preanesthetic Checklist  Completed: patient identified, IV checked, site marked, risks and benefits discussed, surgical consent, monitors and equipment checked, pre-op evaluation, timeout performed, anesthesia consent given, oxygen available and patient being monitored  Peripheral Block  Patient position: supine  Prep: ChloraPrep  Patient monitoring: cardiac monitor, continuous pulse ox, frequent blood pressure checks and IV access  Block type: Brachial plexus  Laterality: right  Injection technique: single-shot  Guidance: ultrasound guided  Local infiltration: lidocaine  Infiltration strength: 1 %  Dose: 3 mL  Supraclavicular  Provider prep: mask and sterile gloves  Local infiltration: lidocaine  Needle  Needle type: short-bevel   Needle gauge: 20 G  Needle length: 10 cm  Needle localization: ultrasound guidance  Test dose: negative  Assessment  Injection assessment: negative aspiration for heme, no paresthesia on injection and local visualized surrounding nerve on ultrasound  Paresthesia pain: none  Slow fractionated injection: yes  Hemodynamics: stable  Additional Notes  Nerve stimulator used during procedure  Lidocaine  2% with Epi 1:200,000 used in local anesthetic mixture  Medications Administered  Ropivacaine (NAROPIN) injection 0.5%, 20 mL  lidocaine injection 2%, 10 mL  Reason for block: post-op pain management and at surgeon's request

## 2022-01-31 NOTE — ANESTHESIA POSTPROCEDURE EVALUATION
Department of Anesthesiology  Postprocedure Note    Patient: Julio Cesar Hewittr  MRN: [id number]  YOB: 1960  Date of evaluation: 1/31/2022  Time:  1:20 PM     Procedure Summary     Date: 01/31/22 Room / Location: [address] [place] [address] Marleny Hernández 01 / [address]    Anesthesia Start: 0901 Anesthesia Stop: 1110    Procedure: DISTAL RADIUS OPEN REDUCTION INTERNAL FIXATION (Right Wrist) Diagnosis: (RIGHT DISTAL RADIUS FRACTURE)    Surgeons: Kush Valentin MD Responsible Provider: Noemi Anderson MD    Anesthesia Type: general, regional ASA Status: 3          Anesthesia Type: general, regional    Swetha Phase I: Swetha Score: 9    Swetha Phase II: Swetha Score: 10    Last vitals: Reviewed and per EMR flowsheets.        Anesthesia Post Evaluation    Comments: POST- ANESTHESIA EVALUATION       Pt Name: Julio Cesar Bridges  MRN: [id number]  YOB: 1960  Date of evaluation: 1/31/2022  Time:  1:20 PM      /81   Pulse 69   Temp 96.6 °F (35.9 °C) (Infrared)   Resp 14   Ht 5' 11\" (1.803 m)   Wt 170 lb (77.1 kg)   SpO2 93%   BMI 23.71 kg/m²      Consciousness Level  Awake  Cardiopulmonary Status  Stable  Pain Adequately Treated YES  Nausea / Vomiting  NO  Adequate Hydration  YES  Anesthesia Related Complications NONE      Electronically signed by Noemi Anderson MD on 1/31/2022 at 1:20 PM         Pain control adequate VAS - 4/10

## 2022-01-31 NOTE — ANESTHESIA PRE PROCEDURE
Department of Anesthesiology  Preprocedure Note       Name:  Prabha Welch   Age:  62 y.o.  :  1960                                          MRN:  [id number]         Date:  2022      Surgeon: Danny Sharpe):  Xenia Man MD    Procedure: Procedure(s):  DISTAL RADIUS OPEN REDUCTION INTERNAL FIXATION    Medications prior to admission:   Prior to Admission medications    Medication Sig Start Date End Date Taking? Authorizing Provider   diazePAM (VALIUM) 5 MG tablet TAKE ONE TABLET BY MOUTH EVERY 12 HOURS AS NEEDED FOR ANXIETY OR SLEEP 22 Yes Doug Ling MD   atorvastatin (LIPITOR) 20 MG tablet TAKE ONE TABLET BY MOUTH DAILY 22  Yes Doug Ling MD   tamsulosin [facility]) 0.4 MG capsule TAKE ONE CAPSULE BY MOUTH DAILY 22  Yes Doug Ling MD   oxyCODONE-acetaminophen (PERCOCET) 5-325 MG per tablet Take 1 tablet by mouth every 8 hours as needed for Pain for up to 30 days.  22 Yes Doug Ling MD   atenolol (TENORMIN) 25 MG tablet TAKE ONE TABLET BY MOUTH DAILY 21  Yes Doug Ling MD   meloxicam [facility]) 7.5 MG tablet Take 1 tablet by mouth daily 21 Yes Doug Ling MD   cyclobenzaprine (FLEXERIL) 10 MG tablet Take 1 tablet by mouth 2 times daily as needed for Muscle spasms TAKE ONE TABLET BY MOUTH DAILY 11/15/21  Yes Doug Ling MD   cloNIDine (CATAPRES) 0.1 MG tablet TAKE ONE TABLET BY MOUTH DAILY 10/28/21  Yes Doug Ling MD   metFORMIN (GLUCOPHAGE) 500 MG tablet Take 1 tablet by mouth daily (with breakfast) 21  Yes Doug Ling MD   cholestyramine [facility]) 4 g packet Take 1 packet by mouth 4 times daily 21  Yes Doug Ling MD   hydrOXYzine (VISTARIL) 25 MG capsule  20  Yes Historical Provider, MD   QUEtiapine (SEROQUEL) 50 MG tablet  20  Yes Historical Provider, MD   lamoTRIgine (LAMICTAL) 200 MG tablet Take 200 mg by mouth daily 17  Yes Historical Provider, MD   acetaminophen (TYLENOL) 325 MG tablet Take 2 tablets by mouth every 4 hours as needed for Pain 12/15/16  Yes Remberto Shay MD   DULoxetine (CYMBALTA) 60 MG extended release capsule Take 60 mg by mouth daily   Yes Historical Provider, MD   Blood Glucose Monitoring Suppl (BLOOD GLUCOSE MONITOR SYSTEM) w/Device KIT USE TO MONITOR BLOOD GLUCOSE LEVEL. (TEST ONCE PER DAY) 10/22/21   Remberto Shay MD   blood glucose monitor strips Test blood glucose level 1 time per day. 10/22/21   Remberto Shay MD   Lancets 30G MISC Inject 1 each into the skin daily 10/22/21   Remberto Shay MD   sildenafil (VIAGRA) 100 MG tablet Take 1 tablet by mouth daily as needed for Erectile Dysfunction 8/5/21   Remberto Shay MD   loperamide (IMODIUM) 2 MG capsule TAKE ONE CAPSULE daily for DIARRHEA 8/4/20   Remberto Shay MD       Current medications:    Current Facility-Administered Medications   Medication Dose Route Frequency Provider Last Rate Last Admin    0.9 % sodium chloride infusion   IntraVENous Continuous Roopa Sanchez MD        sodium chloride flush 0.9 % injection 5-40 mL  5-40 mL IntraVENous 2 times per day Roopa Sanchez MD        sodium chloride flush 0.9 % injection 5-40 mL  5-40 mL IntraVENous PRN Roopa Sanchez MD        0.9 % sodium chloride infusion  25 mL IntraVENous PRN Roopa Sanchez MD        lidocaine PF 1 % injection 1 mL  1 mL IntraDERmal Once PRN Roopa Sanchez MD           Allergies:  No Known Allergies    Problem List:    Patient Active Problem List   Diagnosis Code    Depression F32. A    Bipolar 1 disorder (HCC) F31.9    Hypertension I10    Anxiety F41.9    Nicotine dependence F17.200    Osteoarthritis M19.90    Obstructive sleep apnea on CPAP G47.33, Z99.89    Lumbar radiculopathy M54.16    DDD (degenerative disc disease), lumbar M51.36    Chronic, continuous use of opioids F11.90    724.8 M47.817    Sacroiliitis, not elsewhere classified ([facility] M46.1    Chronic back pain M54.9, G89.29    Medication monitoring encounter Z51.81    Lumbar facet arthropathy M47.816    Cervical spondylosis M47.812    Facet arthropathy, cervical M47.812    Retinal detachment with multiple breaks, left eye H33.022    Degenerative disc disease, cervical M50.30    Tubular adenoma D36.9    Hyperplastic polyp of intestine K63.5    Macular puckering of retina, left eye H35.372    Encounter for genetic counseling [id number]    Colon polyposis K63.5    Erectile dysfunction N52.9    Colon polyp K63.5    History of colon surgery Z98.890    Mild obesity E66.9    Gastroesophageal reflux disease without esophagitis K21.9    Retinal detachment with multiple breaks, right H33.021    Gastritis K29.70    Diabetes mellitus type 2 in obese (HCC) E11.69, E66.9    Preop examination Z01.818       Past Medical History:        Diagnosis Date    Abnormal EKG 10/27/2016    indicates inferior infarct.     Anxiety     [facility] ([facility] 2006    on rx    Chronic back pain 11/21/2014    Depression 2006    on rx    Diabetes mellitus ([facility]     borderline    Fracture, clavicle 1996    LEFT    Hyperlipidemia     Hypertension 2006    on rx    Legally blind in left eye, as defined in [place]      very blurry    Lumbar radiculopathy 04/23/2014    Nicotine dependence     Osteoarthritis     Pain     LEFT EYE    Retinal detachment     history miguel    Sleep apnea     doesnt use cpap     Tubular adenoma     Visual floaters     Wears glasses     USES MAGNIFYING GLASS       Past Surgical History:        Procedure Laterality Date    CATARACT REMOVAL Left     CATARACT REMOVAL WITH IMPLANT Right 8-25-15    CHOLECYSTECTOMY  2/13    COLONOSCOPY  09/22/2016    the ICV was edematous and erythematous but most likely normal variant , bxs taken Large polyp 3 cm, at 50 cm from the anal verge with a very  wide stalk, not removed tattooed needs to be removed with subtotal colectomy    COLONOSCOPY N/A 5/2/2019    COLONOSCOPY POLYPECTOMY COLD BIOPSY, HOT SNARE performed by Dudley Hirsch MD at [address]    foot, base of skull-left side    EYE SURGERY  12/31/15    laser right eye, left vit    EYE SURGERY Left     torn retina lazer/freezer/hole    EYE SURGERY Left 7/84/09    SILICONE REMOVAL AIR FLUID EXCHANGE    EYE SURGERY      cataracts removed miguel.  EYE SURGERY      total 6 eye surg to left, 2 eye surg. to rt.     HERNIA REPAIR      umbilical    LYMPH NODE BIOPSY Left 1971    BASE OF SKULL    NASAL SEPTUM SURGERY  11-24-15    DC OFFICE/OUTPT VISIT,PROCEDURE ONLY Right 7/12/2018    VITRECTOMY 25 GAUGE, AIR FLUID EXCHANGE, AIR GAS EXCHANGE WITH 18% SF6, ENDOLASER, 200 MSECONDS, 200 MWATTS, 377 PULSES performed by Adan Irving MD at [address]  12/09/2016    w/ ileorectal anastomosis    TONSILLECTOMY  1090    UMBILICAL HERNIA REPAIR  1996    UPPER GASTROINTESTINAL ENDOSCOPY N/A 5/2/2019    EGD BIOPSY performed by Dudley Hirsch MD at [address] VITRECTOMY Left 12/10/2015    VITRECTOMY Left 10/27/2016    membrane peeling    VITRECTOMY Right 07/12/2018    laser, gas       Social History:    Social History     Tobacco Use    Smoking status: Current Every Day Smoker     Packs/day: 1.00     Years: 30.00     Pack years: 30.00     Types: Cigarettes    Smokeless tobacco: Never Used   Substance Use Topics    Alcohol use: Yes     Comment: 6 drinks per weekly- weekends only now (1/27/22)                                Ready to quit: Not Answered  Counseling given: Not Answered      Vital Signs (Current):   Vitals:    01/31/22 0847 01/31/22 0855   BP:  135/83   Pulse:  80   Resp:  18   Temp:  97.1 °F (36.2 °C)   TempSrc:  Infrared   SpO2:  95%   Weight: 170 lb (77.1 kg)    Height: 5' 11\" (1.803 m)                                               BP Readings from Last 3 Encounters:   01/31/22 135/83   01/27/22 101/62 01/23/22 126/75       NPO Status: Time of last liquid consumption: 2300 (sip this AM)                        Time of last solid consumption: 2130                        Date of last liquid consumption: 01/30/22                        Date of last solid food consumption: 01/30/22    BMI:   Wt Readings from Last 3 Encounters:   01/31/22 170 lb (77.1 kg)   01/27/22 170 lb (77.1 kg)   01/25/22 170 lb (77.1 kg)     Body mass index is 23.71 kg/m². CBC:   Lab Results   Component Value Date    WBC 9.6 01/23/2022    RBC 4.40 01/23/2022    RBC 5.02 05/22/2012    HGB 13.4 01/23/2022    HCT 38.9 01/23/2022    MCV 88.4 01/23/2022    RDW 14.0 01/23/2022     01/23/2022     05/22/2012       CMP:   Lab Results   Component Value Date     01/23/2022    K 3.9 01/23/2022     01/23/2022    CO2 25 01/23/2022    BUN 14 01/23/2022    CREATININE 0.73 01/23/2022    GFRAA >60 01/23/2022    LABGLOM >60 01/23/2022    GLUCOSE 92 01/23/2022    GLUCOSE 154 05/22/2012    PROT 7.4 03/17/2021    CALCIUM 9.0 01/23/2022    BILITOT 0.39 03/17/2021    ALKPHOS 104 03/17/2021    AST 33 03/17/2021    ALT 21 03/17/2021       POC Tests: No results for input(s): POCGLU, POCNA, POCK, POCCL, POCBUN, POCHEMO, POCHCT in the last 72 hours.     Coags:   Lab Results   Component Value Date    PROTIME 13.4 05/23/2012    INR 1.3 05/23/2012    APTT 37.8 05/23/2012       HCG (If Applicable): No results found for: PREGTESTUR, PREGSERUM, HCG, HCGQUANT     ABGs:   Lab Results   Component Value Date    PO2ART 56.9 05/22/2012    VIW5HIS 22.8 05/22/2012    J9RCVZKB 87.1 05/22/2012        Type & Screen (If Applicable):  No results found for: LABABO, LABRH    Drug/Infectious Status (If Applicable):  No results found for: HIV, HEPCAB    COVID-19 Screening (If Applicable):   Lab Results   Component Value Date    COVID19 Not Detected 01/27/2022           Anesthesia Evaluation  Patient summary reviewed and Nursing notes reviewed no history of anesthetic complications:   Airway: Mallampati: III  TM distance: >3 FB   Neck ROM: full  Mouth opening: > = 3 FB Dental: normal exam         Pulmonary: breath sounds clear to auscultation  (+) sleep apnea: on noncompliant,  current smoker                           Cardiovascular:    (+) hypertension:,       ECG reviewed  Rhythm: regular  Rate: normal                    Neuro/Psych:   (+) neuromuscular disease:, psychiatric history:depression/anxiety              ROS comment: DDD - lumbar  Cervical spodylosis  Legally blind in left eye GI/Hepatic/Renal:   (+) GERD:,           Endo/Other:    (+) Diabetes, : arthritis: OA., .                 Abdominal:             Vascular: negative vascular ROS. Other Findings:           Anesthesia Plan      general and regional     ASA 3     (Brachial plexus block if requested by surgeon)  Induction: intravenous. MIPS: Postoperative opioids intended and Prophylactic antiemetics administered. Anesthetic plan and risks discussed with patient. Plan discussed with CRNA.                   Zak Fajardo MD   1/31/2022

## 2022-01-31 NOTE — OP NOTE
[address]                 [address] [place], [address]                                OPERATIVE REPORT    PATIENT NAME: Lorene Watkins                    :        1960  MED REC NO:   [id number]                              ROOM:  ACCOUNT NO:   [de-identified]                           ADMIT DATE: 2022  PROVIDER:     Juan R Ramey    DATE OF PROCEDURE:  2022    PREOPERATIVE DIAGNOSIS:  Distal radius Kang's fracture. POSTOPERATIVE DIAGNOSIS:  Distal radius Kang's fracture. PROCEDURE PERFORMED:  Open reduction and internal fixation with  fluoroscopic assistance. OPERATING SURGEON:  Juan R Ramey MD    ASSISTANT:  None. ANESTHESIA:  General.    BLOOD LOSS:  Minimal.    COMPLICATIONS:  None. SPECIMEN:  None. IMPLANTS:  Synthes locking distal radius plate. DRAINS:  None. FINDINGS:  The patient with acceptable fracture reduction and hardware  placement on final AP and lateral fluoroscopic views. PROCEDURE IN DETAIL:  The patient was taken to the operating room,  placed under general anesthesia. Right upper extremity was prepped and  draped in the usual sterile fashion. Arm was exsanguinated. Pneumatic  tourniquet was elevated. Volar approach of [address] [place] [address] utilized and the  distal radius subperiosteally exposed. The fracture was largely  reduced. Plate was placed into position and affixed cortically  proximally. This largely buttressed the fragment very stably. Nevertheless, we did go ahead and put in for distal locking screws for  added security. Final AP and lateral fluoroscopic images obtained  showed satisfactory fracture reduction and hardware placement. Subcuticular layer reapproximated with 2-0 Vicryl suture after  tourniquet was released. Skin was reapproximated with 3-0 nylon suture. Sterile dressing was applied.   The patient was awakened from anesthesia,  taken to recovery room in stable condition. COMPLICATIONS ARISING DURING THE OPERATION:  None noted.         JANNA Raphael    D: 01/31/2022 11:11:19       T: 01/31/2022 11:14:40     [email or web address]  Job#: [id number]     Doc#: [id number]    CC:

## 2022-01-31 NOTE — BRIEF OP NOTE
Brief Postoperative Note      Patient: Kayla Darnell  YOB: 1960  MRN: [id number]    Date of Procedure: 1/31/2022    Pre-Op Diagnosis: RIGHT DISTAL RADIUS FRACTURE mcgrath    Post-Op Diagnosis: Same       Procedure(s):  DISTAL RADIUS OPEN REDUCTION INTERNAL FIXATION    Surgeon(s):  Dean Lainez MD    Assistant:  * No surgical staff found *    Anesthesia: General    Estimated Blood Loss (mL): Minimal    Complications: None    Specimens:   * No specimens in log *    Implants:  Implant Name Type Inv.  Item Serial No.  Lot No. LRB No. Used Action   SCREW BNE L16MM DIA2.7MM MATT S STL ST T8 STARDRV RECESS  SCREW BNE L16MM DIA2.7MM MATT S STL ST T8 STARDRV RECESS  [facility] [facility]  Right 2 Implanted   PLATE BNE [id number] 6X2 H R VOLAR DST RAD S STL KENYA ANG YARELI  PLATE BNE [id number] 6X2 H R VOLAR DST RAD S STL KENYA ANG YARELI  DEPUY saperatec [employer]WD  Right 1 Implanted   SCREW BNE L16MM DIA2.4MM DST RAD VOLAR S STL ST KENYA ANG YARELI  SCREW BNE L16MM DIA2.4MM DST RAD VOLAR S STL ST KENYA ANG YARELI  DEPUY SYNTHES [employer]WD  Right 1 Implanted   SCREW BNE L20MM DIA2.4MM DST RAD VOLAR S STL ST KENYA ANG YARELI  SCREW BNE L20MM DIA2.4MM DST RAD VOLAR S STL ST KENYA ANG YARELI  DEPUY SYNTHES [employer]  Right 1 Implanted   SCREW BNE L22MM DIA2.4MM DST RAD VOLAR S STL ST KENYA ANG YARELI  SCREW BNE L22MM DIA2.4MM DST RAD VOLAR S STL ST KENYA ANG YARELI  DEPUY [employer] [facility]  Right 2 Implanted         Drains: * No LDAs found *    Findings: see dictation    Electronically signed by Dean Lainez MD on 1/31/2022 at 10:58 AM

## 2022-02-07 NOTE — ED PROVIDER NOTES
[address]  Emergency Department  Faculty Attestation     Pt Name: Jeffry Woody  MRN: [id number]  [place] 1960  Date of evaluation: 2/7/22    I was personally available for consultation by the MLP in the Emergency Department for chart review, as well as discussion about the assessment, treatment plan and disposition. Jeffry Woody is a 62 y.o. male who presents with Wrist Injury and Hand Injury      Vitals:   Vitals:    01/18/22 1617   BP: 133/80   Pulse: 97   Resp: 18   Temp: 98.5 °F (36.9 °C)   TempSrc: Oral   SpO2: 93%       Impression:   1. Injury of right hand, initial encounter    2.  Injury of right wrist, initial encounter        Syd Frias MD  Attending Emergency  Physician    (Please note that portions of this note were completed with a voice recognition program.  Efforts were made to edit the dictations but occasionally words are mis-transcribed.)        Morenita Fleming MD  02/07/22 [id number]

## 2022-02-10 DIAGNOSIS — M47.816 OSTEOARTHRITIS OF LUMBAR SPINE, UNSPECIFIED SPINAL OSTEOARTHRITIS COMPLICATION STATUS: ICD-10-CM

## 2022-02-11 RX ORDER — CYCLOBENZAPRINE HCL 10 MG
10 TABLET ORAL 2 TIMES DAILY PRN
Qty: 60 TABLET | Refills: 5 | Status: SHIPPED | OUTPATIENT
Start: 2022-02-11 | End: 2022-02-14

## 2022-02-11 RX ORDER — MELOXICAM 7.5 MG/1
7.5 TABLET ORAL DAILY
Qty: 30 TABLET | Refills: 1 | Status: SHIPPED | OUTPATIENT
Start: 2022-02-11 | End: 2022-03-02 | Stop reason: SDUPTHER

## 2022-02-14 ENCOUNTER — TELEPHONE (OUTPATIENT)
Dept: PRIMARY CARE CLINIC | Age: 62
End: 2022-02-14

## 2022-02-14 DIAGNOSIS — M47.816 OSTEOARTHRITIS OF LUMBAR SPINE, UNSPECIFIED SPINAL OSTEOARTHRITIS COMPLICATION STATUS: ICD-10-CM

## 2022-02-14 RX ORDER — CYCLOBENZAPRINE HCL 10 MG
10 TABLET ORAL 2 TIMES DAILY PRN
Qty: 60 TABLET | Refills: 5 | Status: SHIPPED | OUTPATIENT
Start: 2022-02-14 | End: 2022-06-01 | Stop reason: SDUPTHER

## 2022-02-14 NOTE — TELEPHONE ENCOUNTER
[facility] faxed asking for clarification on flexeril rx, there is 2 sets of directions.

## 2022-02-17 ENCOUNTER — OFFICE VISIT (OUTPATIENT)
Dept: ORTHOPEDIC SURGERY | Age: 62
End: 2022-02-17

## 2022-02-17 VITALS — RESPIRATION RATE: 14 BRPM | WEIGHT: 170 LBS | BODY MASS INDEX: 23.8 KG/M2 | HEIGHT: 71 IN

## 2022-02-17 DIAGNOSIS — S52.541A CLOSED SMITH'S FRACTURE OF RIGHT RADIUS, INITIAL ENCOUNTER: Primary | ICD-10-CM

## 2022-02-17 DIAGNOSIS — M25.521 RIGHT ELBOW PAIN: ICD-10-CM

## 2022-02-17 PROCEDURE — 99024 POSTOP FOLLOW-UP VISIT: CPT | Performed by: ORTHOPAEDIC SURGERY

## 2022-02-17 NOTE — PROGRESS NOTES
Patient ID: Danyell Adan is a 65 y.o. male    Chief Compliant:  Chief Complaint   Patient presents with    Post-Op Check     ORIF Rt Distal 1/31/22        Diagnostic imaging:    AP lateral right wrist status post volar plating Kang fracture very acceptable alignment    AP lateral right elbow no obvious fractures age-appropriate    Assessment and Plan:  1. Closed Kang's fracture of right radius, initial encounter    2. Right elbow pain        Cock-up wrist splint provided    OT for finger ROM    Follow up 3 weeks    HPI:  This is a 65 y.o. male who presents to the clinic today 2 weeks post right distal radius ORIF 1/31/22. Patient is recovering well post fracture. He notes moderate right wrist pain    Patient also notes pain over his right elbow    Patient is a current smoker    Patient notes chronic right hip, back and neck pain. Review of Systems   All other systems reviewed and are negative. Past History:    Current Outpatient Medications:     cyclobenzaprine (FLEXERIL) 10 MG tablet, Take 1 tablet by mouth 2 times daily as needed for Muscle spasms, Disp: 60 tablet, Rfl: 5    meloxicam (MOBIC) 7.5 MG tablet, Take 1 tablet by mouth daily, Disp: 30 tablet, Rfl: 1    oxyCODONE-acetaminophen (PERCOCET) 5-325 MG per tablet, Take 1 tablet by mouth every 6 hours as needed for Pain for up to 7 days. Intended supply: 7 days.  Take lowest dose possible to manage pain, Disp: 28 tablet, Rfl: 0    diazePAM (VALIUM) 5 MG tablet, TAKE ONE TABLET BY MOUTH EVERY 12 HOURS AS NEEDED FOR ANXIETY OR SLEEP, Disp: 60 tablet, Rfl: 0    atorvastatin (LIPITOR) 20 MG tablet, TAKE ONE TABLET BY MOUTH DAILY, Disp: 90 tablet, Rfl: 3    tamsulosin (FLOMAX) 0.4 MG capsule, TAKE ONE CAPSULE BY MOUTH DAILY, Disp: 90 capsule, Rfl: 0    atenolol (TENORMIN) 25 MG tablet, TAKE ONE TABLET BY MOUTH DAILY, Disp: 90 tablet, Rfl: 4    cloNIDine (CATAPRES) 0.1 MG tablet, TAKE ONE TABLET BY MOUTH DAILY, Disp: 90 tablet, Rfl: 3    Blood Determinants of Health     Financial Resource Strain:     Difficulty of Paying Living Expenses: Not on file   Food Insecurity:     Worried About Running Out of Food in the Last Year: Not on file    Claus of Food in the Last Year: Not on file   Transportation Needs:     Lack of Transportation (Medical): Not on file    Lack of Transportation (Non-Medical): Not on file   Physical Activity:     Days of Exercise per Week: Not on file    Minutes of Exercise per Session: Not on file   Stress:     Feeling of Stress : Not on file   Social Connections:     Frequency of Communication with Friends and Family: Not on file    Frequency of Social Gatherings with Friends and Family: Not on file    Attends Jain Services: Not on file    Active Member of [address] [place] [address] [employer] or Organizations: Not on file    Attends Club or Organization Meetings: Not on file    Marital Status: Not on file   Intimate Partner Violence:     Fear of Current or Ex-Partner: Not on file    Emotionally Abused: Not on file    Physically Abused: Not on file    Sexually Abused: Not on file   Housing Stability:     Unable to Pay for Housing in the Last Year: Not on file    Number of [place] in the Last Year: Not on file    Unstable Housing in the Last Year: Not on file     Past Medical History:   Diagnosis Date    Abnormal EKG 10/27/2016    indicates inferior infarct.     Anxiety     Bipolar disorder ([facility] 2006    on rx    Chronic back pain 11/21/2014    Depression 2006    on rx    Diabetes mellitus ([facility]     borderline    Fracture, clavicle 1996    LEFT    Hyperlipidemia     Hypertension 2006    on rx    Legally blind in left eye, as defined in [place]      very blurry    Lumbar radiculopathy 04/23/2014    Nicotine dependence     Osteoarthritis     Pain     LEFT EYE    Retinal detachment     history miguel    Sleep apnea     doesnt use cpap     Tubular adenoma     Visual floaters     Wears glasses     USES MAGNIFYING GLASS     Past Surgical History:   Procedure Laterality Date    CATARACT REMOVAL Left     CATARACT REMOVAL WITH IMPLANT Right 8-25-15    CHOLECYSTECTOMY  2/13    COLONOSCOPY  09/22/2016    the ICV was edematous and erythematous but most likely normal variant , bxs taken Large polyp 3 cm, at 50 cm from the anal verge with a very  wide stalk, not removed tattooed needs to be removed with subtotal colectomy    COLONOSCOPY N/A 5/2/2019    COLONOSCOPY POLYPECTOMY COLD BIOPSY, HOT SNARE performed by Missy Almaraz MD at [address] [place] [address], base of skull-left side    EYE SURGERY  12/31/15    laser right eye, left vit    EYE SURGERY Left     torn retina lazer/freezer/hole    EYE SURGERY Left 5/03/55    SILICONE REMOVAL AIR FLUID EXCHANGE    EYE SURGERY      cataracts removed miguel.  EYE SURGERY      total 6 eye surg to left, 2 eye surg. to rt.     HERNIA REPAIR      umbilical    LYMPH NODE BIOPSY Left 1971    BASE OF SKULL    NASAL SEPTUM SURGERY  11-24-15    CO OFFICE/OUTPT VISIT,PROCEDURE ONLY Right 7/12/2018    VITRECTOMY 25 GAUGE, AIR FLUID EXCHANGE, AIR GAS EXCHANGE WITH 18% SF6, ENDOLASER, 200 MSECONDS, 200 MWATTS, 377 PULSES performed by Tammy Lundberg MD at [address]  12/09/2016    w/ ileorectal anastomosis    TONSILLECTOMY  1589    UMBILICAL HERNIA REPAIR  1996    UPPER GASTROINTESTINAL ENDOSCOPY N/A 5/2/2019    EGD BIOPSY performed by Missy Almaraz MD at [address] [place] [address] VITRECTOMY Left 12/10/2015    VITRECTOMY Left 10/27/2016    membrane peeling    VITRECTOMY Right 07/12/2018    laser, gas    WRIST FRACTURE SURGERY Right 1/31/2022    DISTAL RADIUS OPEN REDUCTION INTERNAL FIXATION performed by [facility] Fuentes MD at [facility] History   Problem Relation Age of Onset    Heart Failure Mother     Cancer Mother     Heart Disease Mother         CAD-OPEN HEART    Mental Retardation Sister     Seizures Sister     Hypertension Other         maternal history        Physical Exam:  Vitals signs and nursing note reviewed. Constitutional:       Appearance: well-developed. HENT:      Head: Normocephalic and atraumatic. Nose: Nose normal.   Eyes:      Conjunctiva/sclera: Conjunctivae normal.   Neck:      Musculoskeletal: Normal range of motion and neck supple. Pulmonary:      Effort: Pulmonary effort is normal. No respiratory distress. Musculoskeletal:      Comments: Normal gait     Skin:     General: Skin is warm and dry. Neurological:      Mental Status: Alert and oriented to person, place, and time. Sensory: No sensory deficit. Psychiatric:         Behavior: Behavior normal.         Thought Content: Thought content normal.    Sutures discontinued Incision is healing well. No signs of infection      Examination right elbow reveals some tenderness to the tip of the olecranon there triceps tendon is intact the patient with no significant tenderness over the radial neck    Provider Attestation:  Balta Root, personally performed the services described in this documentation. All medical record entries made by the scribe were at my direction and in my presence. I have reviewed the chart and discharge instructions and agree that the records reflect my personal performance and is accurate and complete. Annette Radha Hines MD 2/17/22     Scribe Attestation:  By signing my name below, Og Durham, attest that this documentation has been prepared under the direction and in the presence of Dr. Jessica Betts. Electronically signed: Taz Ramirez, 2/17/22     Please note that this chart was generated using voice recognition Dragon dictation software. Although every effort was made to ensure the accuracy of this automated transcription, some errors in transcription may have occurred.

## 2022-02-22 DIAGNOSIS — F41.9 ANXIETY: ICD-10-CM

## 2022-02-22 RX ORDER — DIAZEPAM 5 MG/1
TABLET ORAL
Qty: 60 TABLET | Refills: 0 | OUTPATIENT
Start: 2022-02-22 | End: 2022-03-25

## 2022-02-22 RX ORDER — DIAZEPAM 5 MG/1
TABLET ORAL
Qty: 60 TABLET | Refills: 0 | Status: CANCELLED | OUTPATIENT
Start: 2022-02-22 | End: 2022-03-25

## 2022-02-24 RX ORDER — DIAZEPAM 5 MG/1
TABLET ORAL
Qty: 60 TABLET | Refills: 0 | Status: SHIPPED | OUTPATIENT
Start: 2022-02-24 | End: 2022-03-22

## 2022-02-26 PROBLEM — Z01.818 PREOP EXAMINATION: Status: RESOLVED | Noted: 2022-01-27 | Resolved: 2022-02-26

## 2022-02-28 DIAGNOSIS — S52.541D CLOSED SMITH'S FRACTURE OF RIGHT RADIUS WITH ROUTINE HEALING: ICD-10-CM

## 2022-02-28 NOTE — TELEPHONE ENCOUNTER
----- Message from Anderson Ko sent at 2/28/2022  4:38 PM EST -----  Subject: Refill Request    QUESTIONS  Name of Medication? oxyCODONE-acetaminophen (PERCOCET) 5-325 MG per tablet  Patient-reported dosage and instructions? 2 times a day   How many days do you have left? 0  Preferred Pharmacy? [address] [address]  Pharmacy phone number (if available)? [phone]  ---------------------------------------------------------------------------  --------------  CALL BACK INFO  What is the best way for the office to contact you? OK to leave message on   voicemail  Preferred Call Back Phone Number?  [phone]

## 2022-03-01 RX ORDER — OXYCODONE HYDROCHLORIDE AND ACETAMINOPHEN 5; 325 MG/1; MG/1
1 TABLET ORAL EVERY 6 HOURS PRN
Qty: 28 TABLET | Refills: 0 | Status: SHIPPED | OUTPATIENT
Start: 2022-03-01 | End: 2022-03-10

## 2022-03-02 ENCOUNTER — HOSPITAL ENCOUNTER (OUTPATIENT)
Dept: OCCUPATIONAL THERAPY | Age: 62
Setting detail: THERAPIES SERIES
Discharge: HOME | End: 2022-03-02
Payer: MEDICARE

## 2022-03-02 PROCEDURE — 97110 THERAPEUTIC EXERCISES: CPT

## 2022-03-02 PROCEDURE — 97166 OT EVAL MOD COMPLEX 45 MIN: CPT

## 2022-03-02 RX ORDER — MELOXICAM 7.5 MG/1
7.5 TABLET ORAL DAILY
Qty: 30 TABLET | Refills: 1 | Status: SHIPPED | OUTPATIENT
Start: 2022-03-02 | End: 2022-05-03 | Stop reason: SDUPTHER

## 2022-03-02 ASSESSMENT — PAIN SCALES - GENERAL: PAINLEVEL_OUTOF10: 5

## 2022-03-02 ASSESSMENT — 9 HOLE PEG TEST
TEST_RESULT: IMPAIRED
TESTTIME_SECONDS: 30

## 2022-03-02 ASSESSMENT — PAIN DESCRIPTION - FREQUENCY: FREQUENCY: CONTINUOUS

## 2022-03-02 ASSESSMENT — PAIN DESCRIPTION - DESCRIPTORS: DESCRIPTORS: SHARP;STABBING;THROBBING

## 2022-03-02 ASSESSMENT — PAIN DESCRIPTION - PAIN TYPE: TYPE: ACUTE PAIN;SURGICAL PAIN

## 2022-03-02 ASSESSMENT — PAIN DESCRIPTION - LOCATION: LOCATION: WRIST

## 2022-03-02 ASSESSMENT — PAIN DESCRIPTION - PROGRESSION: CLINICAL_PROGRESSION: GRADUALLY IMPROVING

## 2022-03-02 ASSESSMENT — PAIN DESCRIPTION - ORIENTATION: ORIENTATION: RIGHT;POSTERIOR

## 2022-03-02 NOTE — PROGRESS NOTES
[address]  Rehabilitation Services   Occupational Therapy Evaluation  Date: 3/2/22  Patient Name: Ashley Cook      MRN: [id number]  Account: [de-identified]   : 1960  (65 y.o.)  Gender: male     Referring Practitioner: Dr. Emelia Scales  Diagnosis: Closed Smith's fracture of R radius S52.541A     OT Visit Information  Onset Date: 22  OT Insurance Information: [facility] Medicare/Medicaid  Total # of Visits Approved: 12  Total # of Visits to Date: 1  Progress Note Counter: MD appalexander 3/10/22    Past Medical History:  has a past medical history of Abnormal EKG, Anxiety, Bipolar disorder ([facility] [address] Chronic back pain, Depression, Diabetes mellitus ([facility] [address] Fracture, clavicle, Hyperlipidemia, Hypertension, Legally blind in left eye, as defined in [place], Lumbar radiculopathy, Nicotine dependence, Osteoarthritis, Pain, Retinal detachment, Sleep apnea, Tubular adenoma, Visual floaters, and Wears glasses. Past Surgical History:   has a past surgical history that includes Cholecystectomy (); cyst removal (); Tonsillectomy (); Umbilical hernia repair (); lymph node biopsy (Left, ); Cataract removal with implant (Right, 8-25-15); Nasal septum surgery (11-24-15); Cataract removal (Left); vitrectomy (Left, 12/10/2015); retinal laser; Eye surgery (12/31/15); Colonoscopy (2016); vitrectomy (Left, 10/27/2016); eye surgery (Left); eye surgery (Left, 3/31/16); eye surgery; eye surgery; hernia repair; subtotal colectomy (2016); vitrectomy (Right, 2018); pr office/outpt visit,procedure only (Right, 2018); Upper gastrointestinal endoscopy (N/A, 2019); Colonoscopy (N/A, 2019); and Wrist fracture surgery (Right, 2022). Problem List: does not have any pertinent problems on file.     Restrictions  Implants present? : Metal implants (Volar plate R wrist)   Pain Assessment  Patient Currently in Pain: Yes  Pain Assessment: 0-10  Pain Level: 5  Pain Type: Acute pain,Surgical pain  Pain Location: Wrist  Pain Orientation: Right,Posterior  Pain Descriptors: Sharp,Stabbing,Throbbing  Pain Frequency: Continuous  Clinical Progression: Gradually improving    Subjective  Subjective: \"I am going to do what I need to do\"  Comments: Pt pleasant, cooperative, and motivated to work with therapy. Social/Functional History  Lives With: Alone  Type of Home: Apartment (4th floor)  Home Layout: One level  Home Access: Elevator  Bathroom Shower/Tub: Tub/Shower unit  Bathroom Toilet: Standard  ADL Assistance: Independent  Homemaking Assistance: Needs assistance (Abimael Carnes has been helping with cleaning since his injury)  Homemaking Responsibilities: Yes (Pt does his own laundry, meal prep, and shopping)  Ambulation Assistance: Independent  Transfer Assistance: Independent  Active : No (Not since injury)  Patient's  Info: Friend  Occupation: On disability  IADL Comments: Pt reports that he is usually IND with all IADL's but that a friend has been helping him with sweeping, dusting, and washing his dishes. Additional Comments: Pt reports that he typically rides a motorcycle in the summer. IADL History  Homemaking Responsibilities: Yes (Pt does his own laundry, meal prep, and shopping)  Active : No (Not since injury)  Occupation: On disability  IADL Comments: Pt reports that he is usually IND with all IADL's but that a friend has been helping him with sweeping, dusting, and washing his dishes.     Objective  Sensation  Overall Sensation Status: Impaired  Additional Comments: Pt reports occasional tingling in R hand     ADL  Feeding: Modified independent  (Using non-dominant hand)  Grooming: Modified independent  (Using non-dominant hand)  UE Bathing: Modified independent   LE Bathing: Modified independent   UE Dressing: Modified independent   LE Dressing: Modified independent  (Wearing slip-ons only since injury)  Toileting: Modified independent   Additional Comments: Pt completing all tasks with increased time and modified techniques. Upper Extremity Functional Scale -   Instruction to patient  - We are interested in knowing whether you are having any difficulty at all with the activities listed below because of your upper limb problem for which you are currently seeking attention. Key:  0 = Extreme Difficulty or Unable to Perform Activity   1 = Quite a Bit of Difficulty   2 = Moderate difficulty   3 = A Little Bit of Difficulty   4 = No Difficulty      03/02/22 4141   The Upper Extremity Functional Scale   Any of your usual work, housework, or school activities 1   Your usual hobbies, recreational, or sporting activities 2   Lifting a bag of groceries to waist level 1   Lifting a bag of groceries above your head 0   Grooming your hair 2   Pushing up on your hands (eg from bathtub/chair) 1   Preparing food (eg peeling, cutting) 0   Driving 0   Vacuuming, sweeping, or raking 1   Dressing 2   Doing up buttons 3   Using tools or appliances 2   Opening doors 3  (Has to lock/unlock with L hand)   Cleaning 1   Tying or lacing shoes 0   Sleeping 2   Laundering clothes (eg washing, ironing, folding)  2   Opening a jar 0   Throwing a ball 0   Carrying a small suitcase with your affected limb 0   UEFS Score 28.75   UEFS Disability Index 60-79%   UEFS CMS Modifier CL     UE Function  Hand Dominance  Hand Dominance: Right     Right Hand PROM (degrees)  R Thumb MCP 0-50: 0-45  R Thumb IP 0-80: 0-75    RUE Strength  RUE Strength Comment: Strength NT at this time, orders for ROM only.    RUE Tone: Normotonic  RUE PROM (degrees)  R Wrist Flex 0-80: 0-45  R Wrist Ext 0-70: 0-32  R Wrist Radial Deviation 0-20: 0-20  R Wrist Ulnar Deviation 0-45: 0-40  RUE AROM (degrees)  R Wrist Flexion 0-80: 0-24  R Wrist Extension 0-70: 0-25  R Wrist Radial Deviation 0-20: 0-8  R Wrist Ulnar Deviation 0-45: 0-30  Right Hand PROM (degrees)  R Thumb MCP 0-50: 0-45  R Thumb IP 0-80: 0-75  Right Hand AROM (degrees)  Right Hand General AROM: Pt can make composite fist and oppose to all fingers. Pt unable to fully oppose to base of small finger, ~2cm away. Pt lacking DIP flexion in small finger but reports that this is baseline from previous injury. R Thumb MCP 0-50: 0-35  R Thumb IP 0-80: 0-65    Coordination  Movements Are Fluid And Coordinated: No  Coordination and Movement description: Fine motor impairments,Gross motor impairments,Right UE      Fine Motor Skills  Right 9-Hole Peg Test: Impaired  Right 9 Hole Peg Test Time (secs): 30     Other exercises  Other exercises?: Yes  Other exercises 1: PROM/scar massage - R wrist/thumb  Other exercises 2: Plyoball stretch R wrist forward/backward, side<>side, and circles x10 each  Other exercises 3: HEP: AROM wrist, towel crunches  Assessment  Performance deficits / Impairments: Decreased ROM,Decreased fine motor control,Decreased strength  Assessment: OT evaluation completed - HEP/treatment initiated, see OT exercise sheet for detailed report. Treatment Diagnosis: R hand/wrist pain M25.53, Impaired coordination R27.9, Weakness M62.81  Prognosis: Good  Decision Making: Medium Complexity  REQUIRES OT FOLLOW UP: Yes  Discharge Recommendations: Outpatient OT    Goals  Patient Goals   Patient goals : Regain full use of R hand/wrist and be able to ride his motorcycle this summer. Short term goals  Time Frame for Short term goals: 2-4 weeks  Short term goal 1: Pt will increased AROM of R wrist flexion/extension by 10-15 degrees and RD/UD by 5 degrees. Short term goal 2: Pt will increase AROM of R thumb MCP by 5 degrees. Short term goal 3: Pt will report decreased pain in R hand/wrist to 3/10 during functional activities. Short term goal 4: Pt will demo IND with HEP for R wrist/hand ROM. Short term goal 5: Pt will demo increased [address] by completing 9 hole peg test in 25 seconds or less.   Long term goals  Time Frame for Long term goals : 4-6 weeks  Long term goal 1: Pt will increased AROM of R wrist flexion/extension by 25-30 degrees and RD/UD by 10 degrees. Long term goal 2: Pt will increase AROM of R thumb MCP by 10 degrees. Long term goal 3: Pt will report decreased pain in R hand/wrist to 0-1/10 during functional activities. Long term goal 4: Pt will report improved function on UEFS by rating dressing, grooming, and laundry tasks at 3 - Able to complete with a little bit of difficulty. Long term goal 5: OT to assess strength and set appropriate goals when ok'd by ordering physician. Plan  REQUIRES OT FOLLOW UP: Yes  Plan  Times per week: 2  Plan weeks: 4-6  Current Treatment Recommendations: ROM,Patient/Caregiver Education & [place] / ADL,Home Management Training  Plan Comment: OT will address strength when ok'd by ordering physician. OT Individual Minutes  Time In: 1250  Time Out: 1408  Minutes: 78  Time Code Minutes   Timed Code Treatment Minutes: 20 Minutes  Rehab Potential:  [x] Good  [] Fair  [] Poor   Suggested Professional Referral:  [x] No  [] Yes:  Barriers to Goal Achievement:  [x] No  [] Yes: Domestic Concerns:  [x] No  [] Yes:  Treatment Plan:  [x] Therapeutic Exercise    [] Modalities:  [x] Therapeutic Activity     [] Ultrasound   [] Neuromuscular Re-education   [x] Massage         [x] ADL     [] Electrical Stimulation  [x] Instruction in HEP         Frequency:       2    X/wk x    4-6      wk's    [x] Plans/Goals, Risk/Benefits discussed with pt/family  Comprehension of Education [x] D/V Understanding  [] Needs Review  Pt/Family Education: [x] Verbal  [x] Demo  [x] Written    Medicare/Regulatory Requirements:   I have reviewed this plan of care and certify a need for Medically necessary rehabilitation services.         [x] Physician Signature                                      Date:   [address]  [address] [facility] [place] [address] [facility] [address]   [address], [address]  Phone: [phone]  Fax: [phone]  Electronically signed by Andres Sahu on 3/2/22 at 2:34 PM EST    Treatment Charges:  Minutes Units Time In-Out   Ultrasound      Electrical Stim      Iontophoresis      Paraffin       Massage      Eval 58 1 4120-8026   ADL       Ther Exercise 20 3 [id number]   Ther Activities      Neuro Re-Ed      Splinting       Other      Total Treatment Time:  78 2

## 2022-03-08 ENCOUNTER — HOSPITAL ENCOUNTER (OUTPATIENT)
Dept: OCCUPATIONAL THERAPY | Age: 62
Setting detail: THERAPIES SERIES
End: 2022-03-08
Payer: MEDICARE

## 2022-03-10 ENCOUNTER — OFFICE VISIT (OUTPATIENT)
Dept: PRIMARY CARE CLINIC | Age: 62
End: 2022-03-10
Payer: MEDICARE

## 2022-03-10 ENCOUNTER — OFFICE VISIT (OUTPATIENT)
Dept: ORTHOPEDIC SURGERY | Age: 62
End: 2022-03-10

## 2022-03-10 VITALS — BODY MASS INDEX: 26.32 KG/M2 | HEIGHT: 71 IN | RESPIRATION RATE: 14 BRPM | WEIGHT: 188 LBS

## 2022-03-10 VITALS
HEIGHT: 71 IN | BODY MASS INDEX: 26.32 KG/M2 | WEIGHT: 188 LBS | SYSTOLIC BLOOD PRESSURE: 130 MMHG | DIASTOLIC BLOOD PRESSURE: 80 MMHG | HEART RATE: 93 BPM | OXYGEN SATURATION: 96 %

## 2022-03-10 DIAGNOSIS — M25.521 RIGHT ELBOW PAIN: ICD-10-CM

## 2022-03-10 DIAGNOSIS — E66.9 DIABETES MELLITUS TYPE 2 IN OBESE (HCC): Primary | ICD-10-CM

## 2022-03-10 DIAGNOSIS — M25.531 WRIST PAIN, RIGHT: ICD-10-CM

## 2022-03-10 DIAGNOSIS — M54.50 CHRONIC MIDLINE LOW BACK PAIN WITHOUT SCIATICA: ICD-10-CM

## 2022-03-10 DIAGNOSIS — M54.16 LUMBAR RADICULOPATHY: ICD-10-CM

## 2022-03-10 DIAGNOSIS — G89.29 CHRONIC MIDLINE LOW BACK PAIN WITHOUT SCIATICA: ICD-10-CM

## 2022-03-10 DIAGNOSIS — M46.1 SACROILIITIS, NOT ELSEWHERE CLASSIFIED (HCC): ICD-10-CM

## 2022-03-10 DIAGNOSIS — Z79.899 HIGH RISK MEDICATION USE: ICD-10-CM

## 2022-03-10 DIAGNOSIS — S52.541A CLOSED SMITH'S FRACTURE OF RIGHT RADIUS, INITIAL ENCOUNTER: Primary | ICD-10-CM

## 2022-03-10 DIAGNOSIS — M47.812 OSTEOARTHRITIS OF CERVICAL SPINE, UNSPECIFIED SPINAL OSTEOARTHRITIS COMPLICATION STATUS: ICD-10-CM

## 2022-03-10 DIAGNOSIS — E11.69 DIABETES MELLITUS TYPE 2 IN OBESE (HCC): Primary | ICD-10-CM

## 2022-03-10 DIAGNOSIS — F31.9 BIPOLAR 1 DISORDER (HCC): ICD-10-CM

## 2022-03-10 LAB
ALCOHOL URINE: NORMAL
AMPHETAMINE SCREEN, URINE: NEGATIVE
BARBITURATE SCREEN, URINE: NEGATIVE
BENZODIAZEPINE SCREEN, URINE: POSITIVE
BUPRENORPHINE URINE: NEGATIVE
COCAINE METABOLITE SCREEN URINE: NEGATIVE
FENTANYL SCREEN, URINE: NEGATIVE
GABAPENTIN SCREEN, URINE: NORMAL
HBA1C MFR BLD: 6.1 %
MDMA URINE: NORMAL
METHADONE SCREEN, URINE: NEGATIVE
METHAMPHETAMINE, URINE: NEGATIVE
OPIATE SCREEN URINE: NEGATIVE
OXYCODONE SCREEN URINE: POSITIVE
PHENCYCLIDINE SCREEN URINE: NORMAL
PROPOXYPHENE SCREEN, URINE: NORMAL
SYNTHETIC CANNABINOIDS(K2) SCREEN, URINE: NORMAL
THC SCREEN, URINE: NEGATIVE
TRAMADOL SCREEN URINE: NORMAL
TRICYCLIC ANTIDEPRESSANTS, UR: POSITIVE

## 2022-03-10 PROCEDURE — 99024 POSTOP FOLLOW-UP VISIT: CPT | Performed by: ORTHOPAEDIC SURGERY

## 2022-03-10 PROCEDURE — G8482 FLU IMMUNIZE ORDER/ADMIN: HCPCS | Performed by: FAMILY MEDICINE

## 2022-03-10 PROCEDURE — 80305 DRUG TEST PRSMV DIR OPT OBS: CPT | Performed by: FAMILY MEDICINE

## 2022-03-10 PROCEDURE — G8427 DOCREV CUR MEDS BY ELIG CLIN: HCPCS | Performed by: FAMILY MEDICINE

## 2022-03-10 PROCEDURE — 3046F HEMOGLOBIN A1C LEVEL >9.0%: CPT | Performed by: FAMILY MEDICINE

## 2022-03-10 PROCEDURE — 83036 HEMOGLOBIN GLYCOSYLATED A1C: CPT | Performed by: FAMILY MEDICINE

## 2022-03-10 PROCEDURE — 2022F DILAT RTA XM EVC RTNOPTHY: CPT | Performed by: FAMILY MEDICINE

## 2022-03-10 PROCEDURE — 4004F PT TOBACCO SCREEN RCVD TLK: CPT | Performed by: FAMILY MEDICINE

## 2022-03-10 PROCEDURE — G8417 CALC BMI ABV UP PARAM F/U: HCPCS | Performed by: FAMILY MEDICINE

## 2022-03-10 PROCEDURE — 3017F COLORECTAL CA SCREEN DOC REV: CPT | Performed by: FAMILY MEDICINE

## 2022-03-10 PROCEDURE — 99214 OFFICE O/P EST MOD 30 MIN: CPT | Performed by: FAMILY MEDICINE

## 2022-03-10 RX ORDER — OXYCODONE HYDROCHLORIDE AND ACETAMINOPHEN 5; 325 MG/1; MG/1
1 TABLET ORAL EVERY 8 HOURS PRN
Qty: 90 TABLET | Refills: 0 | Status: SHIPPED | OUTPATIENT
Start: 2022-03-10 | End: 2022-04-20 | Stop reason: SDUPTHER

## 2022-03-10 SDOH — ECONOMIC STABILITY: FOOD INSECURITY: WITHIN THE PAST 12 MONTHS, THE FOOD YOU BOUGHT JUST DIDN'T LAST AND YOU DIDN'T HAVE MONEY TO GET MORE.: NEVER TRUE

## 2022-03-10 SDOH — ECONOMIC STABILITY: FOOD INSECURITY: WITHIN THE PAST 12 MONTHS, YOU WORRIED THAT YOUR FOOD WOULD RUN OUT BEFORE YOU GOT MONEY TO BUY MORE.: NEVER TRUE

## 2022-03-10 ASSESSMENT — SOCIAL DETERMINANTS OF HEALTH (SDOH): HOW HARD IS IT FOR YOU TO PAY FOR THE VERY BASICS LIKE FOOD, HOUSING, MEDICAL CARE, AND HEATING?: NOT HARD AT ALL

## 2022-03-10 ASSESSMENT — ENCOUNTER SYMPTOMS
EYE REDNESS: 0
WHEEZING: 0
SORE THROAT: 0
SHORTNESS OF BREATH: 0
NAUSEA: 0
ABDOMINAL PAIN: 0
BACK PAIN: 1
COUGH: 0
VOMITING: 0
RHINORRHEA: 0
EYE DISCHARGE: 0
DIARRHEA: 0

## 2022-03-10 NOTE — PROGRESS NOTES
Patient ID: Rito Cervantes is a 65 y.o. male    Chief Compliant:  Chief Complaint   Patient presents with    Post-Op Check        Diagnostic imaging:  AP lateral right wrist status post volar plating Kang fracture very acceptable alignment      Assessment and Plan:  1. Closed Kang's fracture of right radius, initial encounter    2. Right elbow pain    3. Wrist pain, right      Continue cock-up wrist splint    Follow up 3 weeks    HPI:  This is a 65 y.o. male who presents to the clinic today for 5 weeks post right distal radius ORIF 1/31/22. Patient is recovering well post fracture. Patient has underwent OT with improvement in his finger ROM. Review of Systems   All other systems reviewed and are negative. Past History:    Current Outpatient Medications:     oxyCODONE-acetaminophen (PERCOCET) 5-325 MG per tablet, Take 1 tablet by mouth every 8 hours as needed for Pain for up to 30 days. , Disp: 90 tablet, Rfl: 0    meloxicam (MOBIC) 7.5 MG tablet, Take 1 tablet by mouth daily, Disp: 30 tablet, Rfl: 1    diazePAM (VALIUM) 5 MG tablet, TAKE ONE TABLET BY MOUTH EVERY 12 HOURS AS NEEDED FOR ANXIETY OR SLEEP, Disp: 60 tablet, Rfl: 0    cyclobenzaprine (FLEXERIL) 10 MG tablet, Take 1 tablet by mouth 2 times daily as needed for Muscle spasms, Disp: 60 tablet, Rfl: 5    atorvastatin (LIPITOR) 20 MG tablet, TAKE ONE TABLET BY MOUTH DAILY, Disp: 90 tablet, Rfl: 3    tamsulosin (FLOMAX) 0.4 MG capsule, TAKE ONE CAPSULE BY MOUTH DAILY, Disp: 90 capsule, Rfl: 0    atenolol (TENORMIN) 25 MG tablet, TAKE ONE TABLET BY MOUTH DAILY, Disp: 90 tablet, Rfl: 4    cloNIDine (CATAPRES) 0.1 MG tablet, TAKE ONE TABLET BY MOUTH DAILY, Disp: 90 tablet, Rfl: 3    Blood Glucose Monitoring Suppl (BLOOD GLUCOSE MONITOR SYSTEM) w/Device KIT, USE TO MONITOR BLOOD GLUCOSE LEVEL. (TEST ONCE PER DAY), Disp: 1 kit, Rfl: 0    blood glucose monitor strips, Test blood glucose level 1 time per day., Disp: 100 strip, Rfl: 3   Lancets 30G MISC, Inject 1 each into the skin daily, Disp: 100 each, Rfl: 3    metFORMIN (GLUCOPHAGE) 500 MG tablet, Take 1 tablet by mouth daily (with breakfast), Disp: 90 tablet, Rfl: 3    cholestyramine (QUESTRAN) 4 g packet, Take 1 packet by mouth 4 times daily, Disp: 120 packet, Rfl: 5    sildenafil (VIAGRA) 100 MG tablet, Take 1 tablet by mouth daily as needed for Erectile Dysfunction, Disp: 90 tablet, Rfl: 0    hydrOXYzine (VISTARIL) 25 MG capsule, , Disp: , Rfl:     QUEtiapine (SEROQUEL) 50 MG tablet, , Disp: , Rfl:     loperamide (IMODIUM) 2 MG capsule, TAKE ONE CAPSULE daily for DIARRHEA, Disp: 90 capsule, Rfl: 5    lamoTRIgine (LAMICTAL) 200 MG tablet, Take 200 mg by mouth daily, Disp: , Rfl:     acetaminophen (TYLENOL) 325 MG tablet, Take 2 tablets by mouth every 4 hours as needed for Pain, Disp: 120 tablet, Rfl: 3    DULoxetine (CYMBALTA) 60 MG extended release capsule, Take 60 mg by mouth daily, Disp: , Rfl:   No Known Allergies  Social History     Socioeconomic History    Marital status: Single     Spouse name: Not on file    Number of children: Not on file    Years of education: Not on file    Highest education level: Not on file   Occupational History    Occupation: disabled   Tobacco Use    Smoking status: Current Every Day Smoker     Packs/day: 1.00     Years: 30.00     Pack years: 30.00     Types: Cigarettes    Smokeless tobacco: Never Used   Vaping Use    Vaping Use: Never used   Substance and Sexual Activity    Alcohol use: Yes     Comment: 6 drinks per weekly- weekends only now (1/27/22)    Drug use: No    Sexual activity: Not on file   Other Topics Concern    Not on file   Social History Narrative    Not on file     Social Determinants of Health     Financial Resource Strain: Low Risk     Difficulty of Paying Living Expenses: Not hard at all   Food Insecurity: No Food Insecurity    Worried About Running Out of Food in the Last Year: Never true    Claus of Food in the Last Year: Never true   Transportation Needs:     Lack of Transportation (Medical): Not on file    Lack of Transportation (Non-Medical): Not on file   Physical Activity:     Days of Exercise per Week: Not on file    Minutes of Exercise per Session: Not on file   Stress:     Feeling of Stress : Not on file   Social Connections:     Frequency of Communication with Friends and Family: Not on file    Frequency of Social Gatherings with Friends and Family: Not on file    Attends Zoroastrianism Services: Not on file    Active Member of [address] [place] [address] [employer] or Organizations: Not on file    Attends Club or Organization Meetings: Not on file    Marital Status: Not on file   Intimate Partner Violence:     Fear of Current or Ex-Partner: Not on file    Emotionally Abused: Not on file    Physically Abused: Not on file    Sexually Abused: Not on file   Housing Stability:     Unable to Pay for Housing in the Last Year: Not on file    Number of [place] in the Last Year: Not on file    Unstable Housing in the Last Year: Not on file     Past Medical History:   Diagnosis Date    Abnormal EKG 10/27/2016    indicates inferior infarct.     Anxiety     Bipolar disorder ([facility] [address] 2006    on rx    Chronic back pain 11/21/2014    Depression 2006    on rx    Diabetes mellitus ([facility] [address]     borderline    Fracture, clavicle 1996    LEFT    Hyperlipidemia     Hypertension 2006    on rx    Legally blind in left eye, as defined in [place]      very blurry    Lumbar radiculopathy 04/23/2014    Nicotine dependence     Osteoarthritis     Pain     LEFT EYE    Retinal detachment     history miguel    Sleep apnea     doesnt use cpap     Tubular adenoma     Visual floaters     Wears glasses     USES MAGNIFYING GLASS     Past Surgical History:   Procedure Laterality Date    CATARACT REMOVAL Left     CATARACT REMOVAL WITH IMPLANT Right 8-25-15    CHOLECYSTECTOMY  2/13    COLONOSCOPY  09/22/2016    the ICV was edematous and erythematous but Conjunctiva/sclera: Conjunctivae normal.   Neck:      Musculoskeletal: Normal range of motion and neck supple. Pulmonary:      Effort: Pulmonary effort is normal. No respiratory distress. Musculoskeletal:      Comments: Normal gait     Skin:     General: Skin is warm and dry. Neurological:      Mental Status: Alert and oriented to person, place, and time. Sensory: No sensory deficit. Psychiatric:         Behavior: Behavior normal.         Thought Content: Thought content normal.        Provider Attestation:  Vinicio Root, personally performed the services described in this documentation. All medical record entries made by the scribe were at my direction and in my presence. I have reviewed the chart and discharge instructions and agree that the records reflect my personal performance and is accurate and complete. Mallika Calle MD 3/10/22     Scribe Attestation:  By signing my name below, Aniak Akin, attest that this documentation has been prepared under the direction and in the presence of Dr. Katherin Smith. Electronically signed: Taz Ley, 3/10/22     Please note that this chart was generated using voice recognition Dragon dictation software. Although every effort was made to ensure the accuracy of this automated transcription, some errors in transcription may have occurred.

## 2022-03-10 NOTE — PROGRESS NOTES
[facility] PRIMARY CARE  [address]  [facility] [address]  Dept: [phone]    Young Horne is a 65 y.o. male Established patient, who presents today for his medical conditions/complaints as noted below. Chief Complaint   Patient presents with    Medication Check     Back pain    Diabetes       HPI:     HPI  Pt here for follow up of back pain. States unchanged. Using pain meds as prescribed. Depression doing well. No change in mood. No thoughts of hurting self or others. No change in medication. Pt had fall with fracture to the right wrist. Pt had surgery. Pt checking his blood sugars, getting around 100. No low blood sugar reactions. Reviewed prior notes None  Reviewed previous Labs    LDL Cholesterol (mg/dL)   Date Value   03/17/2021 87   02/19/2019 84   06/27/2013 Unable to calc. as TRIG>400       (goal LDL is <100)   AST (U/L)   Date Value   03/17/2021 33     ALT (U/L)   Date Value   03/17/2021 21     BUN (mg/dL)   Date Value   01/23/2022 14     Hemoglobin A1C (%)   Date Value   08/11/2021 6.0     TSH (mIU/L)   Date Value   06/27/2013 5.65 (H)     BP Readings from Last 3 Encounters:   03/10/22 130/80   01/31/22 (!) 98/57   01/27/22 101/62          (goal 120/80)    Past Medical History:   Diagnosis Date    Abnormal EKG 10/27/2016    indicates inferior infarct.     Anxiety     Bipolar disorder ([address] 2006    on rx    Chronic back pain 11/21/2014    Depression 2006    on rx    Diabetes mellitus ([address]     borderline    Fracture, clavicle 1996    LEFT    Hyperlipidemia     Hypertension 2006    on rx    Legally blind in left eye, as defined in [place]      very blurry    Lumbar radiculopathy 04/23/2014    Nicotine dependence     Osteoarthritis     Pain     LEFT EYE    Retinal detachment     history miguel    Sleep apnea     doesnt use cpap     Tubular adenoma     Visual floaters     Wears glasses     USES MAGNIFYING GLASS      Past Surgical History:   Procedure Laterality Date    CATARACT REMOVAL Left     CATARACT REMOVAL WITH IMPLANT Right 8-25-15    CHOLECYSTECTOMY  2/13    COLONOSCOPY  09/22/2016    the ICV was edematous and erythematous but most likely normal variant , bxs taken Large polyp 3 cm, at 50 cm from the anal verge with a very  wide stalk, not removed tattooed needs to be removed with subtotal colectomy    COLONOSCOPY N/A 5/2/2019    COLONOSCOPY POLYPECTOMY COLD BIOPSY, HOT SNARE performed by Chely Newman MD at [address] [place] [address], base of skull-left side    EYE SURGERY  12/31/15    laser right eye, left vit    EYE SURGERY Left     torn retina lazer/freezer/hole    EYE SURGERY Left 0/34/29    SILICONE REMOVAL AIR FLUID EXCHANGE    EYE SURGERY      cataracts removed miguel.  EYE SURGERY      total 6 eye surg to left, 2 eye surg. to rt.     HERNIA REPAIR      umbilical    LYMPH NODE BIOPSY Left 1971    BASE OF SKULL    NASAL SEPTUM SURGERY  11-24-15    [place] OFFICE/OUTPT VISIT,PROCEDURE ONLY Right 7/12/2018    VITRECTOMY 25 GAUGE, AIR FLUID EXCHANGE, AIR GAS EXCHANGE WITH 18% SF6, ENDOLASER, 200 MSECONDS, 200 MWATTS, 377 PULSES performed by Ishaan Kearney MD at [address]  12/09/2016    w/ ileorectal anastomosis    TONSILLECTOMY  5811    UMBILICAL HERNIA REPAIR  1996    UPPER GASTROINTESTINAL ENDOSCOPY N/A 5/2/2019    EGD BIOPSY performed by Chely Newman MD at [address] [place] [address] VITRECTOMY Left 12/10/2015    VITRECTOMY Left 10/27/2016    membrane peeling    VITRECTOMY Right 07/12/2018    laser, gas    WRIST FRACTURE SURGERY Right 1/31/2022    DISTAL RADIUS OPEN REDUCTION INTERNAL FIXATION performed by James Rudd MD at [place] History   Problem Relation Age of Onset    Heart Failure Mother     Cancer Mother     Heart Disease Mother         CAD-OPEN HEART    Mental Retardation Sister     Seizures Sister     Hypertension Other         maternal history       Social History     Tobacco Use    Smoking status: Current Every Day Smoker     Packs/day: 1.00     Years: 30.00     Pack years: 30.00     Types: Cigarettes    Smokeless tobacco: Never Used   Substance Use Topics    Alcohol use: Yes     Comment: 6 drinks per weekly- weekends only now (1/27/22)      Current Outpatient Medications   Medication Sig Dispense Refill    oxyCODONE-acetaminophen (PERCOCET) 5-325 MG per tablet Take 1 tablet by mouth every 8 hours as needed for Pain for up to 30 days. 90 tablet 0    meloxicam (MOBIC) 7.5 MG tablet Take 1 tablet by mouth daily 30 tablet 1    diazePAM (VALIUM) 5 MG tablet TAKE ONE TABLET BY MOUTH EVERY 12 HOURS AS NEEDED FOR ANXIETY OR SLEEP 60 tablet 0    cyclobenzaprine (FLEXERIL) 10 MG tablet Take 1 tablet by mouth 2 times daily as needed for Muscle spasms 60 tablet 5    atorvastatin (LIPITOR) 20 MG tablet TAKE ONE TABLET BY MOUTH DAILY 90 tablet 3    tamsulosin (FLOMAX) 0.4 MG capsule TAKE ONE CAPSULE BY MOUTH DAILY 90 capsule 0    atenolol (TENORMIN) 25 MG tablet TAKE ONE TABLET BY MOUTH DAILY 90 tablet 4    cloNIDine (CATAPRES) 0.1 MG tablet TAKE ONE TABLET BY MOUTH DAILY 90 tablet 3    Blood Glucose Monitoring Suppl (BLOOD GLUCOSE MONITOR SYSTEM) w/Device KIT USE TO MONITOR BLOOD GLUCOSE LEVEL. (TEST ONCE PER DAY) 1 kit 0    blood glucose monitor strips Test blood glucose level 1 time per day.  100 strip 3    Lancets 30G MISC Inject 1 each into the skin daily 100 each 3    metFORMIN (GLUCOPHAGE) 500 MG tablet Take 1 tablet by mouth daily (with breakfast) 90 tablet 3    cholestyramine (QUESTRAN) 4 g packet Take 1 packet by mouth 4 times daily 120 packet 5    sildenafil (VIAGRA) 100 MG tablet Take 1 tablet by mouth daily as needed for Erectile Dysfunction 90 tablet 0    hydrOXYzine (VISTARIL) 25 MG capsule       QUEtiapine (SEROQUEL) 50 MG tablet       loperamide (IMODIUM) 2 MG capsule TAKE ONE CAPSULE daily for DIARRHEA 90 capsule 5    lamoTRIgine (LAMICTAL) 200 MG tablet Take 200 mg by mouth daily      acetaminophen (TYLENOL) 325 MG tablet Take 2 tablets by mouth every 4 hours as needed for Pain 120 tablet 3    DULoxetine (CYMBALTA) 60 MG extended release capsule Take 60 mg by mouth daily       No current facility-administered medications for this visit. No Known Allergies    Health Maintenance   Topic Date Due    COVID-19 Vaccine (1) Never done    Lipid screen  03/17/2022    Colorectal Cancer Screen  05/02/2022    Annual Wellness Visit (AWV)  05/12/2022    Low dose CT lung screening  05/12/2022    Diabetic retinal exam  06/30/2022    A1C test (Diabetic or Prediabetic)  08/11/2022    Diabetic foot exam  11/09/2022    Diabetic microalbuminuria test  11/09/2022    Depression Monitoring  11/09/2022    Pneumococcal 0-64 years Vaccine (2 of 2 - PPSV23) 02/10/2026    DTaP/Tdap/Td vaccine (2 - Td or Tdap) 06/21/2028    Flu vaccine  Completed    Shingles Vaccine  Completed    Hepatitis C screen  Completed    HIV screen  Completed    Hepatitis A vaccine  Aged Out    Hib vaccine  Aged Out    Meningococcal (ACWY) vaccine  Aged Out       Subjective:      Review of Systems   Constitutional: Negative for chills and fever. HENT: Negative for rhinorrhea and sore throat. Eyes: Negative for discharge and redness. Respiratory: Negative for cough, shortness of breath and wheezing. Cardiovascular: Negative for chest pain and palpitations. Gastrointestinal: Negative for abdominal pain, diarrhea, nausea and vomiting. Genitourinary: Negative for dysuria and frequency. Musculoskeletal: Positive for back pain. Negative for arthralgias and myalgias. Neurological: Negative for dizziness, light-headedness and headaches. Psychiatric/Behavioral: Negative for sleep disturbance.      Controlled Substance Monitoring:    Acute and Chronic Pain Monitoring:   RX Monitoring 3/10/2022   Attestation -   Acute Pain Prescriptions -   Periodic Lumbar radiculopathy  oxyCODONE-acetaminophen (PERCOCET) 5-325 MG per tablet   6. Chronic midline low back pain without sciatica  oxyCODONE-acetaminophen (PERCOCET) 5-325 MG per tablet   7. Osteoarthritis of cervical spine, unspecified spinal osteoarthritis complication status  oxyCODONE-acetaminophen (PERCOCET) 5-325 MG per tablet        Plan:    In drug screen today  Blood work reviewed  Patient to see orthopedic surgery today regarding fracture wrist  Patient offered referral to pain management for second opinion regarding other options for his chronic low back pain. States will decide  Continue diabetic medications as is    Return in about 3 months (around 6/10/2022). Orders Placed This Encounter   Procedures    POCT Rapid Drug Screen     Orders Placed This Encounter   Medications    oxyCODONE-acetaminophen (PERCOCET) 5-325 MG per tablet     Sig: Take 1 tablet by mouth every 8 hours as needed for Pain for up to 30 days. Dispense:  90 tablet     Refill:  0     Reduce doses taken as pain becomes manageable       Patient given educational materials - see patient instructions. Discussed use, benefit, and side effects of prescribed medications. All patient questions answered. Pt voiced understanding. Reviewed health maintenance. Instructed to continue current medications, diet andexercise. Patient agreed with treatment plan. Follow up as directed.      Electronicallysigned by Meena Mckeon MD on 3/10/2022 at 11:00 AM

## 2022-03-11 ENCOUNTER — HOSPITAL ENCOUNTER (OUTPATIENT)
Dept: OCCUPATIONAL THERAPY | Age: 62
Setting detail: THERAPIES SERIES
Discharge: HOME | End: 2022-03-11
Payer: MEDICARE

## 2022-03-11 NOTE — PROGRESS NOTES
[address]   OCCUPATIONAL THERAPY MISSED TREATMENT NOTE   OUTPATIENT   Date: 3/11/22  Patient Name: Pema Mendiola       MRN: [id number]   Account #: [de-identified]    : 1960  (65 y.o.)  Gender: male   Referring Practitioner: Dr. Tiarra Pizano  Diagnosis: Closed Smith's fracture of R radius S52.541A  OT Visit Information  OT Insurance Information: [facility] Medicare/Medicaid  Total # of Visits Approved: 12  No Show: 1  Progress Note Counter: MD appt 3/10/22       REASON FOR MISSED TREATMENT:     -  No Show/No Call - Patient called with next scheduled appointment. Pt no show no call. OT called pt at 2:07p.m and left message that he had appt today and to call therapy at [phone] to schedule more appt.     Dave Eddy OT

## 2022-03-21 DIAGNOSIS — F41.9 ANXIETY: ICD-10-CM

## 2022-03-22 RX ORDER — DIAZEPAM 5 MG/1
TABLET ORAL
Qty: 60 TABLET | Refills: 0 | Status: SHIPPED | OUTPATIENT
Start: 2022-03-22 | End: 2022-04-21

## 2022-04-05 ENCOUNTER — TELEPHONE (OUTPATIENT)
Dept: PRIMARY CARE CLINIC | Age: 62
End: 2022-04-05

## 2022-04-05 ENCOUNTER — HOSPITAL ENCOUNTER (OUTPATIENT)
Dept: OCCUPATIONAL THERAPY | Age: 62
Setting detail: THERAPIES SERIES
Discharge: HOME | End: 2022-04-05

## 2022-04-05 NOTE — TELEPHONE ENCOUNTER
Pt states that he called [employer] to report his Oxycodone stolen and made a police report. Calling today to let us know that he found his rx, was not stolen. Just an FYI.

## 2022-04-10 DIAGNOSIS — K52.9 CHRONIC DIARRHEA: ICD-10-CM

## 2022-04-11 RX ORDER — CHOLESTYRAMINE 4 G/9G
POWDER, FOR SUSPENSION ORAL
Qty: 120 PACKET | Refills: 5 | Status: SHIPPED | OUTPATIENT
Start: 2022-04-11 | End: 2022-10-03

## 2022-04-19 ENCOUNTER — TELEPHONE (OUTPATIENT)
Dept: ONCOLOGY | Age: 62
End: 2022-04-19

## 2022-04-19 DIAGNOSIS — Z87.891 PERSONAL HISTORY OF NICOTINE DEPENDENCE: Primary | ICD-10-CM

## 2022-04-19 NOTE — TELEPHONE ENCOUNTER
Our records indicate that your patient is coming due for their annual lung cancer screening follow up testing. For your convenience, we have pended the order for the scan for you. If you do not agree with the need for the test, please cancel the order and let us know. Sincerely,    [address] [place] [address] Screening Program    Auto printed reminder letter sent to patient.

## 2022-04-20 DIAGNOSIS — M54.16 LUMBAR RADICULOPATHY: ICD-10-CM

## 2022-04-20 DIAGNOSIS — M47.812 OSTEOARTHRITIS OF CERVICAL SPINE, UNSPECIFIED SPINAL OSTEOARTHRITIS COMPLICATION STATUS: ICD-10-CM

## 2022-04-20 DIAGNOSIS — M54.50 CHRONIC MIDLINE LOW BACK PAIN WITHOUT SCIATICA: ICD-10-CM

## 2022-04-20 DIAGNOSIS — G89.29 CHRONIC MIDLINE LOW BACK PAIN WITHOUT SCIATICA: ICD-10-CM

## 2022-04-20 RX ORDER — OXYCODONE HYDROCHLORIDE AND ACETAMINOPHEN 5; 325 MG/1; MG/1
1 TABLET ORAL EVERY 8 HOURS PRN
Qty: 90 TABLET | Refills: 0 | Status: SHIPPED | OUTPATIENT
Start: 2022-04-20 | End: 2022-05-27 | Stop reason: SDUPTHER

## 2022-04-26 DIAGNOSIS — F41.9 ANXIETY: ICD-10-CM

## 2022-04-26 RX ORDER — DIAZEPAM 5 MG/1
TABLET ORAL
Qty: 60 TABLET | Refills: 0 | Status: SHIPPED | OUTPATIENT
Start: 2022-04-26 | End: 2022-05-23 | Stop reason: SDUPTHER

## 2022-04-27 ENCOUNTER — APPOINTMENT (OUTPATIENT)
Dept: GENERAL RADIOLOGY | Age: 62
End: 2022-04-27
Payer: MEDICARE

## 2022-04-27 ENCOUNTER — HOSPITAL ENCOUNTER (EMERGENCY)
Age: 62
Discharge: HOME | End: 2022-04-27
Attending: EMERGENCY MEDICINE
Payer: MEDICARE

## 2022-04-27 VITALS
DIASTOLIC BLOOD PRESSURE: 102 MMHG | RESPIRATION RATE: 15 BRPM | BODY MASS INDEX: 25.9 KG/M2 | SYSTOLIC BLOOD PRESSURE: 155 MMHG | HEART RATE: 74 BPM | TEMPERATURE: 98 F | WEIGHT: 185 LBS | OXYGEN SATURATION: 98 % | HEIGHT: 71 IN

## 2022-04-27 DIAGNOSIS — S20.211A RIB CONTUSION, RIGHT, INITIAL ENCOUNTER: ICD-10-CM

## 2022-04-27 DIAGNOSIS — L03.114 CELLULITIS OF LEFT HAND: Primary | ICD-10-CM

## 2022-04-27 PROCEDURE — 99283 EMERGENCY DEPT VISIT LOW MDM: CPT

## 2022-04-27 PROCEDURE — 71101 X-RAY EXAM UNILAT RIBS/CHEST: CPT

## 2022-04-27 RX ORDER — DOXYCYCLINE HYCLATE 100 MG
100 TABLET ORAL 2 TIMES DAILY
Qty: 10 TABLET | Refills: 0 | Status: SHIPPED | OUTPATIENT
Start: 2022-04-27 | End: 2022-05-02

## 2022-04-27 RX ORDER — LIDOCAINE 50 MG/G
1 PATCH TOPICAL DAILY
Qty: 10 PATCH | Refills: 0 | Status: SHIPPED | OUTPATIENT
Start: 2022-04-27 | End: 2022-05-07

## 2022-04-27 ASSESSMENT — PAIN DESCRIPTION - DESCRIPTORS: DESCRIPTORS: ACHING

## 2022-04-27 ASSESSMENT — PAIN DESCRIPTION - FREQUENCY: FREQUENCY: INTERMITTENT

## 2022-04-27 ASSESSMENT — PAIN DESCRIPTION - LOCATION: LOCATION: HAND

## 2022-04-27 ASSESSMENT — PAIN DESCRIPTION - ORIENTATION: ORIENTATION: LEFT

## 2022-04-27 ASSESSMENT — PAIN - FUNCTIONAL ASSESSMENT: PAIN_FUNCTIONAL_ASSESSMENT: 0-10

## 2022-04-27 ASSESSMENT — PAIN DESCRIPTION - PAIN TYPE: TYPE: ACUTE PAIN

## 2022-04-27 NOTE — ED PROVIDER NOTES
[address] ED  eMERGENCY dEPARTMENT eNCOUnter   Independent Attestation     Pt Name: Kandice Iraheta  MRN: [id number]  [place] 1960  Date of evaluation: 4/27/22       Kandice Iraheta is a 65 y.o. male who presents with Hand Pain        Based on the medical record, the care appears appropriate. I was personally available for consultation in the Emergency Department.     Tre Gomez MD  Attending Emergency  Physician                 Tre Gomez MD  04/27/22 [id number]

## 2022-04-27 NOTE — ED TRIAGE NOTES
Lt hand swelling with redness and itching started last night. Pt states it looked like a little pimple so he popped it and it got worse. Pt denies fever.

## 2022-04-27 NOTE — ED PROVIDER NOTES
[address] ED  eMERGENCY dEPARTMENT eNCOUnter      Pt Name: Brittney Jasso  MRN: [id number]  [place] 1960  Date of evaluation: 4/27/2022  Provider: Shantelle Truong PA-C    CHIEF COMPLAINT       Chief Complaint   Patient presents with    Hand Pain           HISTORY OF PRESENT ILLNESS  (Location/Symptom, Timing/Onset, Context/Setting, Quality, Duration, Modifying Factors, Severity.)   Brittney Jasso is a 65 y.o. male who presents to the emergency department for evaluation of left hand pain and right rib pain. Pt states 3 weeks ago he fell jump roping injuring his right ribs. Reports the pain is still present and he has some discomfort with deep breath, movement. Denies sob, abdominal pain. Additionally, pt reports left hand pain, swelling and redness. States last night the back of his had with pruritic. Pt scratched the area. States when he woke up today the back of his hand was swollen, red, and painful. He noticed a small spot over 5th knuckle. Denies numbness, fever, chills, n/v.  Denies IV drug use. He is diabetic. No other complaints. Nursing Notes were reviewed. REVIEW OF SYSTEMS    (2-9 systems for level 4, 10 or more for level 5)     Review of Systems   Rib pain  Hand pain  Redness   Swelling      Except as noted above the remainder of the review of systems was reviewed and negative. PAST MEDICAL HISTORY     Past Medical History:   Diagnosis Date    Abnormal EKG 10/27/2016    indicates inferior infarct.     Anxiety     Bipolar disorder ([address] 2006    on rx    Chronic back pain 11/21/2014    Depression 2006    on rx    Diabetes mellitus ([address]     borderline    Fracture, clavicle 1996    LEFT    Hyperlipidemia     Hypertension 2006    on rx    Legally blind in left eye, as defined in [place]      very blurry    Lumbar radiculopathy 04/23/2014    Nicotine dependence     Osteoarthritis     Pain     LEFT EYE    Retinal detachment     history miguel    Sleep apnea doesnt use cpap     Tubular adenoma     Visual floaters     Wears glasses     USES MAGNIFYING GLASS     None otherwise stated in nurses notes    SURGICAL HISTORY       Past Surgical History:   Procedure Laterality Date    CATARACT REMOVAL Left     CATARACT REMOVAL WITH IMPLANT Right 8-25-15    CHOLECYSTECTOMY  2/13    COLONOSCOPY  09/22/2016    the ICV was edematous and erythematous but most likely normal variant , bxs taken Large polyp 3 cm, at 50 cm from the anal verge with a very  wide stalk, not removed tattooed needs to be removed with subtotal colectomy    COLONOSCOPY N/A 5/2/2019    COLONOSCOPY POLYPECTOMY COLD BIOPSY, HOT SNARE performed by Sagar Orr MD at [address] [place] [address], base of skull-left side    EYE SURGERY  12/31/15    laser right eye, left vit    EYE SURGERY Left     torn retina lazer/freezer/hole    EYE SURGERY Left 3/86/21    SILICONE REMOVAL AIR FLUID EXCHANGE    EYE SURGERY      cataracts removed mgiuel.  EYE SURGERY      total 6 eye surg to left, 2 eye surg. to rt.     HERNIA REPAIR      umbilical    LYMPH NODE BIOPSY Left 1971    BASE OF SKULL    NASAL SEPTUM SURGERY  11-24-15    [place] OFFICE/OUTPT VISIT,PROCEDURE ONLY Right 7/12/2018    VITRECTOMY 25 GAUGE, AIR FLUID EXCHANGE, AIR GAS EXCHANGE WITH 18% SF6, ENDOLASER, 200 MSECONDS, 200 MWATTS, 377 PULSES performed by Rosemary Juarez MD at [address]  12/09/2016    w/ ileorectal anastomosis    TONSILLECTOMY  4929    UMBILICAL HERNIA REPAIR  1996    UPPER GASTROINTESTINAL ENDOSCOPY N/A 5/2/2019    EGD BIOPSY performed by Sagar Orr MD at [facility] VITRECTOMY Left 12/10/2015    VITRECTOMY Left 10/27/2016    membrane peeling    VITRECTOMY Right 07/12/2018    laser, gas    WRIST FRACTURE SURGERY Right 1/31/2022    DISTAL RADIUS OPEN REDUCTION INTERNAL FIXATION performed by Soo Francois MD at [address]     None otherwise stated in nurses notes    CURRENT MEDICATIONS       Discharge Medication List as of 4/27/2022  4:44 PM      CONTINUE these medications which have NOT CHANGED    Details   diazePAM (VALIUM) 5 MG tablet TAKE ONE TABLET BY MOUTH EVERY 12 HOURS AS NEEDED FOR ANXIETY OR SLEEP, Disp-60 tablet, R-0Normal      oxyCODONE-acetaminophen (PERCOCET) 5-325 MG per tablet Take 1 tablet by mouth every 8 hours as needed for Pain for up to 30 days. , Disp-90 tablet, R-0Normal      cholestyramine (QUESTRAN) 4 g packet TAKE ONE PACKET BY MOUTH FOUR TIMES A DAY, Disp-120 packet, R-5Normal      meloxicam (MOBIC) 7.5 MG tablet Take 1 tablet by mouth daily, Disp-30 tablet, R-1Normal      cyclobenzaprine (FLEXERIL) 10 MG tablet Take 1 tablet by mouth 2 times daily as needed for Muscle spasms, Disp-60 tablet, R-5Normal      atorvastatin (LIPITOR) 20 MG tablet TAKE ONE TABLET BY MOUTH DAILY, Disp-90 tablet, R-3Normal      tamsulosin (FLOMAX) 0.4 MG capsule TAKE ONE CAPSULE BY MOUTH DAILY, Disp-90 capsule, R-0Normal      atenolol (TENORMIN) 25 MG tablet TAKE ONE TABLET BY MOUTH DAILY, Disp-90 tablet, R-4Normal      cloNIDine (CATAPRES) 0.1 MG tablet TAKE ONE TABLET BY MOUTH DAILY, Disp-90 tablet, R-3Normal      Blood Glucose Monitoring Suppl (BLOOD GLUCOSE MONITOR SYSTEM) w/Device KIT Disp-1 kit, R-0, NormalUSE TO MONITOR BLOOD GLUCOSE LEVEL. (TEST ONCE PER DAY)      blood glucose monitor strips Test blood glucose level 1 time per day., Disp-100 strip, R-3, Normal      Lancets 30G MISC DAILY Starting Fri 10/22/2021, Disp-100 each, R-3, Normal      metFORMIN (GLUCOPHAGE) 500 MG tablet Take 1 tablet by mouth daily (with breakfast), Disp-90 tablet, R-3Normal      sildenafil (VIAGRA) 100 MG tablet Take 1 tablet by mouth daily as needed for Erectile Dysfunction, Disp-90 tablet, R-0Normal      hydrOXYzine (VISTARIL) 25 MG capsule Historical Med      QUEtiapine (SEROQUEL) 50 MG tablet Historical Med      loperamide (IMODIUM) 2 MG capsule TAKE ONE CAPSULE daily for DIARRHEA, Disp-90 capsule,R-5NO PRINT      lamoTRIgine (LAMICTAL) 200 MG tablet Take 200 mg by mouth dailyHistorical Med      acetaminophen (TYLENOL) 325 MG tablet Take 2 tablets by mouth every 4 hours as needed for Pain, Disp-120 tablet, R-3      DULoxetine (CYMBALTA) 60 MG extended release capsule Take 60 mg by mouth daily             ALLERGIES     Patient has no known allergies. FAMILY HISTORY           Problem Relation Age of Onset    Heart Failure Mother     Cancer Mother     Heart Disease Mother         CAD-OPEN HEART    Mental Retardation Sister     Seizures Sister     Hypertension Other         maternal history     Family Status   Relation Name Status    Mother Trinidad Wade    Isacc Baptism  at age 50    Father Deyvi Vazquez         [address] Brother DIMPLE Alive    MGM      MGF      [address]      PGF      Other  (Not Specified)      None otherwise stated in nurses notes    SOCIAL HISTORY      reports that he has been smoking cigarettes. He has a 30.00 pack-year smoking history. He has never used smokeless tobacco. He reports current alcohol use. He reports that he does not use drugs. lives at home with others     PHYSICAL EXAM    (up to 7 for level 4, 8 or more for level 5)     ED Triage Vitals [22 1528]   BP Temp Temp Source Pulse Resp SpO2 Height Weight   (!) 155/102 98 °F (36.7 °C) Oral 74 15 98 % 5' 11\" (1.803 m) 185 lb (83.9 kg)       Physical Exam   Nursing note and vitals reviewed. Constitutional: Oriented to person, place, and time and well-developed, well-nourished. Head: Normocephalic and atraumatic. Ear: External ears normal.   Nose: Nose normal and midline. Eyes: Conjunctivae and EOM are normal. Pupils are equal, round, and reactive to light. Neck: Normal range of motion. Neck supple. Cardiovascular: Normal rate, regular rhythm, normal heart sounds and intact distal pulses.    Pulmonary/Chest: Effort normal and breath sounds normal. No respiratory distress. No wheezes. No rales. Tenderness over right lateral ribs with no swelling, erythema, bruising, crepitus. Abdominal: Soft. No distension and no mass. There is no tenderness. There is no rebound and no guarding. No rigidity. Musculoskeletal: examination of left hand reveals erythema and swelling over the dorsal aspect. There is a small feng over area of 4th/5th MCP. No fluctuance or drainage. No streaking. FROM of hand. No swelling of fingers. Good  strength. Brisk cap refill. Distal sensation intact. 2/2 radial pulse. No streaking. Neurological: Alert and oriented to person, place, and time. GCS score is 15. Skin: Skin is warm and dry. No rash noted. No erythema. No pallor. Psychiatric: Mood, memory, affect and judgment normal.           DIAGNOSTIC RESULTS     EKG: All EKG's are interpreted by the Emergency Department Physician who either signs or Co-signs this chart in the absence of a cardiologist.        RADIOLOGY:   All plain film, CT, MRI, and formal ultrasound images (except ED bedside ultrasound) are read by the radiologist, see reports below, unless otherwise noted in MDM or here. XR RIBS RIGHT INCLUDE CHEST (MIN 3 VIEWS)   Final Result   No acute cardiopulmonary findings. No acute rib fracture is visualized. No results found. LABS:  Labs Reviewed - No data to display    All other labs were within normal range or not returned as of this dictation.     EMERGENCY DEPARTMENT COURSE and DIFFERENTIAL DIAGNOSIS/MDM:   Vitals:    Vitals:    04/27/22 1528   BP: (!) 155/102   Pulse: 74   Resp: 15   Temp: 98 °F (36.7 °C)   TempSrc: Oral   SpO2: 98%   Weight: 185 lb (83.9 kg)   Height: 5' 11\" (1.803 m)         Patient instructed to return to the emergency room if symptoms worsen, return, or any other concern right away which is agreed by the patient    ED MEDS:  Orders Placed This Encounter   Medications    doxycycline hyclate (VIBRA-TABS) 100 MG tablet     Sig: Take 1 tablet by mouth 2 times daily for 5 days     Dispense:  10 tablet     Refill:  0    lidocaine (LIDODERM) 5 %     Sig: Place 1 patch onto the skin daily for 10 days 12 hours on, 12 hours off. Dispense:  10 patch     Refill:  0         CONSULTS:  None    PROCEDURES:  None      FINAL IMPRESSION      1. Cellulitis of left hand    2. Rib contusion, right, initial encounter          DISPOSITION/PLAN   DISPOSITION Decision To Discharge    PATIENT REFERRED TO:  Clovis Moreau. [address]  [place] [address] [facility]  [place] [address]  [phone]          DISCHARGE MEDICATIONS:  Discharge Medication List as of 4/27/2022  4:44 PM      START taking these medications    Details   doxycycline hyclate (VIBRA-TABS) 100 MG tablet Take 1 tablet by mouth 2 times daily for 5 days, Disp-10 tablet, R-0Normal      lidocaine (LIDODERM) 5 % Place 1 patch onto the skin daily for 10 days 12 hours on, 12 hours off., Disp-10 patch, R-0Normal               Summation      Patient Course:    1) right rib pain. Fell 3 weeks ago. No signs of injury noted on exam.  Xrays are unremarkable. Suspect contusion. Lidoderm patches. 2) cellulitis over dorsal aspect of left hand. No palpable abscess. No streaking. No signs of compartment syndrome or flexor tenosynovitis. Will start on doxycycline. Recommend warm compresses. May return in 24-48 hours for recheck. Strict return precautions discussed at bedside. He is agreeable. Discussed results and plan with the pt. They expressed appropriate understanding. Pt given close follow up, supportive care instructions and strict return instructions at the bedside. The care is provided during an unprecedented national emergency due to the novel coronavirus, COVID-19.       ED Medications administered this visit:  Medications - No data to display    New Prescriptions from this visit:    Discharge Medication List as of 4/27/2022  4:44 PM      START taking these medications    Details   doxycycline hyclate (VIBRA-TABS) 100 MG tablet Take 1 tablet by mouth 2 times daily for 5 days, Disp-10 tablet, R-0Normal      lidocaine (LIDODERM) 5 % Place 1 patch onto the skin daily for 10 days 12 hours on, 12 hours off., Disp-10 patch, R-0Normal             Follow-up:  Mat Salazar MD  [address]  [address]            Final Impression:   1.  Cellulitis of left hand    2. Rib contusion, right, initial encounter               (Please note that portions of this note were completed with a voice recognition program )        Desiree Zimmer 83, PA-C  04/27/22 5006

## 2022-05-02 ENCOUNTER — HOSPITAL ENCOUNTER (EMERGENCY)
Age: 62
Discharge: HOME | End: 2022-05-02
Attending: EMERGENCY MEDICINE
Payer: MEDICARE

## 2022-05-02 VITALS
BODY MASS INDEX: 25.9 KG/M2 | SYSTOLIC BLOOD PRESSURE: 124 MMHG | RESPIRATION RATE: 16 BRPM | HEART RATE: 84 BPM | HEIGHT: 71 IN | TEMPERATURE: 98 F | DIASTOLIC BLOOD PRESSURE: 72 MMHG | WEIGHT: 185 LBS | OXYGEN SATURATION: 98 %

## 2022-05-02 DIAGNOSIS — L02.91 ABSCESS: Primary | ICD-10-CM

## 2022-05-02 DIAGNOSIS — L03.113 CELLULITIS OF RIGHT UPPER EXTREMITY: ICD-10-CM

## 2022-05-02 PROCEDURE — 87205 SMEAR GRAM STAIN: CPT

## 2022-05-02 PROCEDURE — 2500000003 HC RX 250 WO HCPCS: Performed by: EMERGENCY MEDICINE

## 2022-05-02 PROCEDURE — 87186 SC STD MICRODIL/AGAR DIL: CPT

## 2022-05-02 PROCEDURE — 87070 CULTURE OTHR SPECIMN AEROBIC: CPT

## 2022-05-02 PROCEDURE — 10061 I&D ABSCESS COMP/MULTIPLE: CPT

## 2022-05-02 PROCEDURE — 86403 PARTICLE AGGLUT ANTBDY SCRN: CPT

## 2022-05-02 PROCEDURE — 99283 EMERGENCY DEPT VISIT LOW MDM: CPT

## 2022-05-02 RX ORDER — SULFAMETHOXAZOLE AND TRIMETHOPRIM 800; 160 MG/1; MG/1
1 TABLET ORAL ONCE
Status: DISCONTINUED | OUTPATIENT
Start: 2022-05-02 | End: 2022-05-02 | Stop reason: HOSPADM

## 2022-05-02 RX ORDER — CEPHALEXIN 500 MG/1
500 CAPSULE ORAL 4 TIMES DAILY
Qty: 12 CAPSULE | Refills: 0 | Status: SHIPPED | OUTPATIENT
Start: 2022-05-02 | End: 2022-05-09

## 2022-05-02 RX ORDER — LIDOCAINE HYDROCHLORIDE 20 MG/ML
5 INJECTION, SOLUTION INFILTRATION; PERINEURAL ONCE
Status: COMPLETED | OUTPATIENT
Start: 2022-05-02 | End: 2022-05-02

## 2022-05-02 RX ORDER — SULFAMETHOXAZOLE AND TRIMETHOPRIM 800; 160 MG/1; MG/1
1 TABLET ORAL 2 TIMES DAILY
Qty: 14 TABLET | Refills: 0 | Status: SHIPPED | OUTPATIENT
Start: 2022-05-02 | End: 2022-05-09

## 2022-05-02 RX ORDER — CEPHALEXIN 250 MG/1
500 CAPSULE ORAL ONCE
Status: DISCONTINUED | OUTPATIENT
Start: 2022-05-02 | End: 2022-05-02 | Stop reason: HOSPADM

## 2022-05-02 RX ADMIN — LIDOCAINE HYDROCHLORIDE 5 ML: 20 INJECTION, SOLUTION INFILTRATION; PERINEURAL at 19:52

## 2022-05-02 ASSESSMENT — PAIN DESCRIPTION - ORIENTATION: ORIENTATION: LEFT

## 2022-05-02 ASSESSMENT — PAIN - FUNCTIONAL ASSESSMENT: PAIN_FUNCTIONAL_ASSESSMENT: 0-10

## 2022-05-02 ASSESSMENT — PAIN SCALES - GENERAL: PAINLEVEL_OUTOF10: 8

## 2022-05-02 ASSESSMENT — PAIN DESCRIPTION - DESCRIPTORS: DESCRIPTORS: PRESSURE

## 2022-05-02 ASSESSMENT — PAIN DESCRIPTION - LOCATION: LOCATION: HAND

## 2022-05-03 ENCOUNTER — TELEPHONE (OUTPATIENT)
Dept: PRIMARY CARE CLINIC | Age: 62
End: 2022-05-03

## 2022-05-03 DIAGNOSIS — I10 ESSENTIAL HYPERTENSION: ICD-10-CM

## 2022-05-03 RX ORDER — MELOXICAM 7.5 MG/1
7.5 TABLET ORAL DAILY
Qty: 30 TABLET | Refills: 1 | Status: SHIPPED | OUTPATIENT
Start: 2022-05-03 | End: 2022-06-27 | Stop reason: SDUPTHER

## 2022-05-03 NOTE — ED PROVIDER NOTES
[address]  EMERGENCY DEPARTMENT ENCOUNTER      Pt Name: Kandice Iraheta  MRN: [id number]  [place] 1960  Date of evaluation: 5/2/22      CHIEF COMPLAINT       Chief Complaint   Patient presents with    Wound Check    Cellulitis     HISTORY OF PRESENT ILLNESS   HPI 65 y.o. male presents with c/o presents with complaints of redness swelling and pain to the dorsal aspect of the left hand. Symptoms have been worsening over 5 to 6 days. He was seen in the emergency department on 27 April, he was diagnosed with a cellulitis, got put on doxycycline. He took that for 5 days, the redness is persisting and he is developed some worsening swelling on the hand. He rates the pain as severe in severity constant in course. Khadijah Grav REVIEW OF SYSTEMS       Review of Systems   Constitutional: Negative for fever. Skin: Positive for rash and wound. Neurological: Negative for weakness and numbness. PAST MEDICAL HISTORY     Past Medical History:   Diagnosis Date    Abnormal EKG 10/27/2016    indicates inferior infarct.     Anxiety     Bipolar disorder ([address] 2006    on rx    Chronic back pain 11/21/2014    Depression 2006    on rx    Diabetes mellitus ([address]     borderline    Fracture, clavicle 1996    LEFT    Hyperlipidemia     Hypertension 2006    on rx    Legally blind in left eye, as defined in [place]      very blurry    Lumbar radiculopathy 04/23/2014    Nicotine dependence     Osteoarthritis     Pain     LEFT EYE    Retinal detachment     history miguel    Sleep apnea     doesnt use cpap     Tubular adenoma     Visual floaters     Wears glasses     USES MAGNIFYING GLASS       SURGICAL HISTORY       Past Surgical History:   Procedure Laterality Date    CATARACT REMOVAL Left     CATARACT REMOVAL WITH IMPLANT Right 8-25-15    CHOLECYSTECTOMY  2/13    COLONOSCOPY  09/22/2016    the ICV was edematous and erythematous but most likely normal variant , bxs taken Large polyp 3 cm, at 50 cm from the anal verge with a very  wide stalk, not removed tattooed needs to be removed with subtotal colectomy    COLONOSCOPY N/A 5/2/2019    COLONOSCOPY POLYPECTOMY COLD BIOPSY, HOT SNARE performed by Maxwell Pimentel MD at [address] [place] [address], base of skull-left side    EYE SURGERY  12/31/15    laser right eye, left vit    EYE SURGERY Left     torn retina lazer/freezer/hole    EYE SURGERY Left 9/21/18    SILICONE REMOVAL AIR FLUID EXCHANGE    EYE SURGERY      cataracts removed miguel.  EYE SURGERY      total 6 eye surg to left, 2 eye surg. to rt.     HERNIA REPAIR      umbilical    LYMPH NODE BIOPSY Left 1971    BASE OF SKULL    NASAL SEPTUM SURGERY  11-24-15    [place] OFFICE/OUTPT VISIT,PROCEDURE ONLY Right 7/12/2018    VITRECTOMY 25 GAUGE, AIR FLUID EXCHANGE, AIR GAS EXCHANGE WITH 18% SF6, ENDOLASER, 200 MSECONDS, 200 MWATTS, 377 PULSES performed by Yamile David MD at [address]  12/09/2016    w/ ileorectal anastomosis    TONSILLECTOMY  7610    UMBILICAL HERNIA REPAIR  1996    UPPER GASTROINTESTINAL ENDOSCOPY N/A 5/2/2019    EGD BIOPSY performed by Maxwell Pimentel MD at [facility] VITRECTOMY Left 12/10/2015    VITRECTOMY Left 10/27/2016    membrane peeling    VITRECTOMY Right 07/12/2018    laser, gas    WRIST FRACTURE SURGERY Right 1/31/2022    DISTAL RADIUS OPEN REDUCTION INTERNAL FIXATION performed by Mohan Shea MD at [facility]       Discharge Medication List as of 5/2/2022  8:10 PM      CONTINUE these medications which have NOT CHANGED    Details   doxycycline hyclate (VIBRA-TABS) 100 MG tablet Take 1 tablet by mouth 2 times daily for 5 days, Disp-10 tablet, R-0Normal      lidocaine (LIDODERM) 5 % Place 1 patch onto the skin daily for 10 days 12 hours on, 12 hours off., Disp-10 patch, R-0Normal      diazePAM (VALIUM) 5 MG tablet TAKE ONE TABLET BY MOUTH EVERY 12 HOURS AS NEEDED FOR ANXIETY OR SLEEP, Disp-60 tablet, R-0Normal oxyCODONE-acetaminophen (PERCOCET) 5-325 MG per tablet Take 1 tablet by mouth every 8 hours as needed for Pain for up to 30 days. , Disp-90 tablet, R-0Normal      cholestyramine (QUESTRAN) 4 g packet TAKE ONE PACKET BY MOUTH FOUR TIMES A DAY, Disp-120 packet, R-5Normal      meloxicam (MOBIC) 7.5 MG tablet Take 1 tablet by mouth daily, Disp-30 tablet, R-1Normal      cyclobenzaprine (FLEXERIL) 10 MG tablet Take 1 tablet by mouth 2 times daily as needed for Muscle spasms, Disp-60 tablet, R-5Normal      atorvastatin (LIPITOR) 20 MG tablet TAKE ONE TABLET BY MOUTH DAILY, Disp-90 tablet, R-3Normal      tamsulosin (FLOMAX) 0.4 MG capsule TAKE ONE CAPSULE BY MOUTH DAILY, Disp-90 capsule, R-0Normal      atenolol (TENORMIN) 25 MG tablet TAKE ONE TABLET BY MOUTH DAILY, Disp-90 tablet, R-4Normal      cloNIDine (CATAPRES) 0.1 MG tablet TAKE ONE TABLET BY MOUTH DAILY, Disp-90 tablet, R-3Normal      Blood Glucose Monitoring Suppl (BLOOD GLUCOSE MONITOR SYSTEM) w/Device KIT Disp-1 kit, R-0, NormalUSE TO MONITOR BLOOD GLUCOSE LEVEL. (TEST ONCE PER DAY)      blood glucose monitor strips Test blood glucose level 1 time per day., Disp-100 strip, R-3, Normal      Lancets 30G MISC DAILY Starting Fri 10/22/2021, Disp-100 each, R-3, Normal      metFORMIN (GLUCOPHAGE) 500 MG tablet Take 1 tablet by mouth daily (with breakfast), Disp-90 tablet, R-3Normal      sildenafil (VIAGRA) 100 MG tablet Take 1 tablet by mouth daily as needed for Erectile Dysfunction, Disp-90 tablet, R-0Normal      hydrOXYzine (VISTARIL) 25 MG capsule Historical Med      QUEtiapine (SEROQUEL) 50 MG tablet Historical Med      loperamide (IMODIUM) 2 MG capsule TAKE ONE CAPSULE daily for DIARRHEA, Disp-90 capsule,R-5NO PRINT      lamoTRIgine (LAMICTAL) 200 MG tablet Take 200 mg by mouth dailyHistorical Med      acetaminophen (TYLENOL) 325 MG tablet Take 2 tablets by mouth every 4 hours as needed for Pain, Disp-120 tablet, R-3      DULoxetine (CYMBALTA) 60 MG extended release capsule Take 60 mg by mouth daily             ALLERGIES     has No Known Allergies. FAMILY HISTORY     He indicated that his mother is . He indicated that his father is . He indicated that his sister is . He indicated that his brother is alive. He indicated that his maternal grandmother is . He indicated that his maternal grandfather is . He indicated that his paternal grandmother is . He indicated that his paternal grandfather is . He indicated that the status of his other is unknown. SOCIAL HISTORY      reports that he has been smoking cigarettes. He has a 30.00 pack-year smoking history. He has never used smokeless tobacco. He reports current alcohol use. He reports that he does not use drugs. PHYSICAL EXAM     INITIAL VITALS: /72   Pulse 84   Temp 98 °F (36.7 °C)   Resp 16   Ht 5' 11\" (1.803 m)   Wt 185 lb (83.9 kg)   SpO2 98%   BMI 25.80 kg/m²   General: NAD  Cardiovascular: RRR  Pulmonary: CTA  Skin: On the dorsal aspect of the hand there is a 3 cm area of erythema with a 1 cm area of fluctuance in the center. MSK: The patient has full range of motion at the wrist MCP PIP and DIP joints. Neuro: The patient has appropriate sensation over the median radial and ulnar dermatomes. MEDICAL DECISION MAKING:     MDM  65 y.o. male presenting with a cellulitis and abscess on the back of the hand. An I&D was performed. wound culture sent. Resuming antibiotic therapy for the patient. D/w pt treatment plan, warning precautions for prompt ED return and importance of close OP FU, he verbalizes understanding and agrees with the treatment plan. DIAGNOSTIC RESULTS     EKGLABS: All lab results were reviewed by myself, and all abnormals are listed below.   Labs Reviewed   CULTURE, WOUND       EMERGENCY DEPARTMENT COURSE:   Vitals:    Vitals:    22 1738   BP: 124/72   Pulse: 84   Resp: 16   Temp: 98 °F (36.7 °C)   SpO2: 98% Weight: 185 lb (83.9 kg)   Height: 5' 11\" (1.803 m)       The patient was given the following medications while in the emergency department:  Orders Placed This Encounter   Medications    lidocaine 2 % injection 5 mL    sulfamethoxazole-trimethoprim (BACTRIM DS;SEPTRA DS) 800-160 MG per tablet 1 tablet     Order Specific Question:   Antimicrobial Indications     Answer:   Skin and Soft Tissue Infection    cephALEXin (KEFLEX) capsule 500 mg     Order Specific Question:   Antimicrobial Indications     Answer:   Skin and Soft Tissue Infection    sulfamethoxazole-trimethoprim (BACTRIM DS) 800-160 MG per tablet     Sig: Take 1 tablet by mouth 2 times daily for 7 days     Dispense:  14 tablet     Refill:  0    cephALEXin (KEFLEX) 500 MG capsule     Sig: Take 1 capsule by mouth 4 times daily for 7 days     Dispense:  12 capsule     Refill:  0     -------------------------  CRITICAL CARE:   CONSULTS: None  PROCEDURES: Incision/Drainage    Date/Time: 5/2/2022 8:30 PM  Performed by: Kris Tucker MD  Authorized by: Kris Tucker MD     Consent:     Consent obtained:  Verbal    Consent given by:  Patient  Location:     Type:  Abscess    Size:  1.5    Location:  Upper extremity    Upper extremity location:  Hand    Hand location:  L hand  Pre-procedure details:     Skin preparation:  Antiseptic wash and Betadine  Anesthesia (see MAR for exact dosages): Anesthesia method:  Local infiltration    Local anesthetic:  Lidocaine 1% w/o epi  Procedure type:     Complexity:  Simple  Procedure details:     Needle aspiration: no      Incision types:  Stab incision    Incision depth:  Subcutaneous    Scalpel blade:  11    Wound management:  Probed and deloculated and irrigated with saline    Drainage:  Purulent    Drainage amount: Moderate    Packing materials:  None  Post-procedure details:     Patient tolerance of procedure: Tolerated well, no immediate complications         FINAL IMPRESSION      1.  Abscess 2. Cellulitis of right upper extremity          DISPOSITION/PLAN   DISPOSITION Decision To Discharge 05/02/2022 08:07:53 PM      PATIENT REFERRED TO:  Clovis Koo.   [address] [place] [id number]  [phone]    In 3 days        DISCHARGE MEDICATIONS:  Discharge Medication List as of 5/2/2022  8:10 PM      START taking these medications    Details   sulfamethoxazole-trimethoprim (BACTRIM DS) 800-160 MG per tablet Take 1 tablet by mouth 2 times daily for 7 days, Disp-14 tablet, R-0Normal      cephALEXin (KEFLEX) 500 MG capsule Take 1 capsule by mouth 4 times daily for 7 days, Disp-12 capsule, R-0Normal               Christina Zapata MD  Attending Emergency Physician                     Christina Zapata MD  05/02/22 [id number]

## 2022-05-03 NOTE — TELEPHONE ENCOUNTER
----- Message from Gianna Medina sent at 5/3/2022  4:47 PM EDT -----  Subject: Refill Request    QUESTIONS  Name of Medication? blood glucose monitor strips  Patient-reported dosage and instructions? \"OneTouch test strips,\" once   daily  How many days do you have left? 1  Preferred Pharmacy? [facility] [id number]  Pharmacy phone number (if available)? [phone]  ---------------------------------------------------------------------------  --------------  CALL BACK INFO  What is the best way for the office to contact you? OK to leave message on   voicemail, OK to respond with electronic message via [email or web address] portal (only   for patients who have registered [email or web address] account)  Preferred Call Back Phone Number? [phone]  ---------------------------------------------------------------------------  --------------  SCRIPT ANSWERS  Relationship to Patient?  Self

## 2022-05-04 RX ORDER — ATENOLOL 25 MG/1
TABLET ORAL
Qty: 90 TABLET | Refills: 0 | Status: SHIPPED | OUTPATIENT
Start: 2022-05-04 | End: 2022-10-05 | Stop reason: SDUPTHER

## 2022-05-05 LAB
CULTURE: ABNORMAL
DIRECT EXAM: ABNORMAL
DIRECT EXAM: ABNORMAL
SPECIMEN DESCRIPTION: ABNORMAL

## 2022-05-23 DIAGNOSIS — F41.9 ANXIETY: ICD-10-CM

## 2022-05-23 RX ORDER — DIAZEPAM 5 MG/1
TABLET ORAL
Qty: 60 TABLET | Refills: 0 | Status: SHIPPED | OUTPATIENT
Start: 2022-05-23 | End: 2022-06-21 | Stop reason: SDUPTHER

## 2022-05-27 DIAGNOSIS — M47.812 OSTEOARTHRITIS OF CERVICAL SPINE, UNSPECIFIED SPINAL OSTEOARTHRITIS COMPLICATION STATUS: ICD-10-CM

## 2022-05-27 DIAGNOSIS — G89.29 CHRONIC MIDLINE LOW BACK PAIN WITHOUT SCIATICA: ICD-10-CM

## 2022-05-27 DIAGNOSIS — M54.50 CHRONIC MIDLINE LOW BACK PAIN WITHOUT SCIATICA: ICD-10-CM

## 2022-05-27 DIAGNOSIS — M54.16 LUMBAR RADICULOPATHY: ICD-10-CM

## 2022-05-27 RX ORDER — OXYCODONE HYDROCHLORIDE AND ACETAMINOPHEN 5; 325 MG/1; MG/1
1 TABLET ORAL EVERY 8 HOURS PRN
Qty: 90 TABLET | Refills: 0 | Status: SHIPPED | OUTPATIENT
Start: 2022-05-27 | End: 2022-06-27 | Stop reason: SDUPTHER

## 2022-06-01 DIAGNOSIS — M47.816 OSTEOARTHRITIS OF LUMBAR SPINE, UNSPECIFIED SPINAL OSTEOARTHRITIS COMPLICATION STATUS: ICD-10-CM

## 2022-06-01 RX ORDER — CYCLOBENZAPRINE HCL 10 MG
10 TABLET ORAL 2 TIMES DAILY PRN
Qty: 60 TABLET | Refills: 5 | Status: SHIPPED | OUTPATIENT
Start: 2022-06-01 | End: 2022-09-29 | Stop reason: SDUPTHER

## 2022-06-01 NOTE — TELEPHONE ENCOUNTER
LAST VISIT:   3/10/2022     Future Appointments   Date Time Provider Jessica Ordaz   6/13/2022  2:40 PM MD TULIO Gonzalez

## 2022-06-21 DIAGNOSIS — E11.69 DIABETES MELLITUS TYPE 2 IN OBESE (HCC): ICD-10-CM

## 2022-06-21 DIAGNOSIS — F41.9 ANXIETY: ICD-10-CM

## 2022-06-21 DIAGNOSIS — E66.9 DIABETES MELLITUS TYPE 2 IN OBESE (HCC): ICD-10-CM

## 2022-06-21 RX ORDER — DIAZEPAM 5 MG/1
TABLET ORAL
Qty: 60 TABLET | Refills: 0 | Status: SHIPPED | OUTPATIENT
Start: 2022-06-21 | End: 2022-07-22 | Stop reason: SDUPTHER

## 2022-06-21 NOTE — TELEPHONE ENCOUNTER
LAST VISIT:   3/10/2022     Future Appointments   Date Time Provider Jessica Ordaz   6/27/2022  1:20 PM MD TULIO Berry

## 2022-06-27 ENCOUNTER — OFFICE VISIT (OUTPATIENT)
Dept: PRIMARY CARE CLINIC | Age: 62
End: 2022-06-27
Payer: MEDICARE

## 2022-06-27 VITALS
BODY MASS INDEX: 25.51 KG/M2 | SYSTOLIC BLOOD PRESSURE: 120 MMHG | OXYGEN SATURATION: 95 % | WEIGHT: 182.2 LBS | DIASTOLIC BLOOD PRESSURE: 76 MMHG | HEART RATE: 86 BPM | HEIGHT: 71 IN

## 2022-06-27 DIAGNOSIS — E66.9 DIABETES MELLITUS TYPE 2 IN OBESE (HCC): Primary | ICD-10-CM

## 2022-06-27 DIAGNOSIS — M54.16 LUMBAR RADICULOPATHY: ICD-10-CM

## 2022-06-27 DIAGNOSIS — M47.812 OSTEOARTHRITIS OF CERVICAL SPINE, UNSPECIFIED SPINAL OSTEOARTHRITIS COMPLICATION STATUS: ICD-10-CM

## 2022-06-27 DIAGNOSIS — G89.29 CHRONIC MIDLINE LOW BACK PAIN WITHOUT SCIATICA: ICD-10-CM

## 2022-06-27 DIAGNOSIS — M50.30 DEGENERATIVE DISC DISEASE, CERVICAL: ICD-10-CM

## 2022-06-27 DIAGNOSIS — N52.9 ERECTILE DYSFUNCTION, UNSPECIFIED ERECTILE DYSFUNCTION TYPE: ICD-10-CM

## 2022-06-27 DIAGNOSIS — M54.50 CHRONIC MIDLINE LOW BACK PAIN WITHOUT SCIATICA: ICD-10-CM

## 2022-06-27 DIAGNOSIS — Z12.11 ENCOUNTER FOR SCREENING FOR MALIGNANT NEOPLASM OF COLON: ICD-10-CM

## 2022-06-27 DIAGNOSIS — E11.69 DIABETES MELLITUS TYPE 2 IN OBESE (HCC): Primary | ICD-10-CM

## 2022-06-27 LAB — HBA1C MFR BLD: 5.9 %

## 2022-06-27 PROCEDURE — G8417 CALC BMI ABV UP PARAM F/U: HCPCS | Performed by: FAMILY MEDICINE

## 2022-06-27 PROCEDURE — 3044F HG A1C LEVEL LT 7.0%: CPT | Performed by: FAMILY MEDICINE

## 2022-06-27 PROCEDURE — 3017F COLORECTAL CA SCREEN DOC REV: CPT | Performed by: FAMILY MEDICINE

## 2022-06-27 PROCEDURE — 83036 HEMOGLOBIN GLYCOSYLATED A1C: CPT | Performed by: FAMILY MEDICINE

## 2022-06-27 PROCEDURE — 4004F PT TOBACCO SCREEN RCVD TLK: CPT | Performed by: FAMILY MEDICINE

## 2022-06-27 PROCEDURE — 99213 OFFICE O/P EST LOW 20 MIN: CPT | Performed by: FAMILY MEDICINE

## 2022-06-27 PROCEDURE — 2022F DILAT RTA XM EVC RTNOPTHY: CPT | Performed by: FAMILY MEDICINE

## 2022-06-27 PROCEDURE — G8427 DOCREV CUR MEDS BY ELIG CLIN: HCPCS | Performed by: FAMILY MEDICINE

## 2022-06-27 RX ORDER — TAMSULOSIN HYDROCHLORIDE 0.4 MG/1
0.4 CAPSULE ORAL DAILY
Qty: 90 CAPSULE | Refills: 3 | Status: SHIPPED | OUTPATIENT
Start: 2022-06-27

## 2022-06-27 RX ORDER — SILDENAFIL 100 MG/1
100 TABLET, FILM COATED ORAL DAILY PRN
Qty: 90 TABLET | Refills: 2 | Status: SHIPPED | OUTPATIENT
Start: 2022-06-27

## 2022-06-27 RX ORDER — MELOXICAM 15 MG/1
15 TABLET ORAL DAILY
Qty: 30 TABLET | Refills: 5 | Status: SHIPPED | OUTPATIENT
Start: 2022-06-27 | End: 2022-06-29 | Stop reason: SDUPTHER

## 2022-06-27 RX ORDER — OXYCODONE HYDROCHLORIDE AND ACETAMINOPHEN 5; 325 MG/1; MG/1
1 TABLET ORAL EVERY 8 HOURS PRN
Qty: 90 TABLET | Refills: 0 | Status: SHIPPED | OUTPATIENT
Start: 2022-06-27 | End: 2022-08-05 | Stop reason: SDUPTHER

## 2022-06-27 ASSESSMENT — PATIENT HEALTH QUESTIONNAIRE - PHQ9
7. TROUBLE CONCENTRATING ON THINGS, SUCH AS READING THE NEWSPAPER OR WATCHING TELEVISION: 0
8. MOVING OR SPEAKING SO SLOWLY THAT OTHER PEOPLE COULD HAVE NOTICED. OR THE OPPOSITE, BEING SO FIGETY OR RESTLESS THAT YOU HAVE BEEN MOVING AROUND A LOT MORE THAN USUAL: 0
5. POOR APPETITE OR OVEREATING: 0
6. FEELING BAD ABOUT YOURSELF - OR THAT YOU ARE A FAILURE OR HAVE LET YOURSELF OR YOUR FAMILY DOWN: 0
3. TROUBLE FALLING OR STAYING ASLEEP: 0
SUM OF ALL RESPONSES TO PHQ QUESTIONS 1-9: 0
SUM OF ALL RESPONSES TO PHQ9 QUESTIONS 1 & 2: 0
2. FEELING DOWN, DEPRESSED OR HOPELESS: 0
4. FEELING TIRED OR HAVING LITTLE ENERGY: 0
9. THOUGHTS THAT YOU WOULD BE BETTER OFF DEAD, OR OF HURTING YOURSELF: 0
10. IF YOU CHECKED OFF ANY PROBLEMS, HOW DIFFICULT HAVE THESE PROBLEMS MADE IT FOR YOU TO DO YOUR WORK, TAKE CARE OF THINGS AT HOME, OR GET ALONG WITH OTHER PEOPLE: 0
SUM OF ALL RESPONSES TO PHQ QUESTIONS 1-9: 0
1. LITTLE INTEREST OR PLEASURE IN DOING THINGS: 0

## 2022-06-27 ASSESSMENT — ENCOUNTER SYMPTOMS
SHORTNESS OF BREATH: 0
BACK PAIN: 1
RHINORRHEA: 0
WHEEZING: 0
NAUSEA: 0
DIARRHEA: 0
SORE THROAT: 0
ABDOMINAL PAIN: 0
VOMITING: 0
EYE DISCHARGE: 0
COUGH: 0
EYE REDNESS: 0

## 2022-06-27 NOTE — PROGRESS NOTES
EYE    Retinal detachment     history miguel    Sleep apnea     doesnt use cpap     Tubular adenoma     Visual floaters     Wears glasses     USES MAGNIFYING GLASS      Past Surgical History:   Procedure Laterality Date    CATARACT REMOVAL Left     CATARACT REMOVAL WITH IMPLANT Right 8-25-15    CHOLECYSTECTOMY  2/13    COLONOSCOPY  09/22/2016    the ICV was edematous and erythematous but most likely normal variant , bxs taken Large polyp 3 cm, at 50 cm from the anal verge with a very  wide stalk, not removed tattooed needs to be removed with subtotal colectomy    COLONOSCOPY N/A 5/2/2019    COLONOSCOPY POLYPECTOMY COLD BIOPSY, HOT SNARE performed by Maxwell Pimentel MD at [address] [place] [address], base of skull-left side    EYE SURGERY  12/31/15    laser right eye, left vit    EYE SURGERY Left     torn retina lazer/freezer/hole    EYE SURGERY Left 0/35/78    SILICONE REMOVAL AIR FLUID EXCHANGE    EYE SURGERY      cataracts removed miguel.  EYE SURGERY      total 6 eye surg to left, 2 eye surg. to rt.     HERNIA REPAIR      umbilical    LYMPH NODE BIOPSY Left 1971    BASE OF SKULL    NASAL SEPTUM SURGERY  11-24-15    [place] OFFICE/OUTPT VISIT,PROCEDURE ONLY Right 7/12/2018    VITRECTOMY 25 GAUGE, AIR FLUID EXCHANGE, AIR GAS EXCHANGE WITH 18% SF6, ENDOLASER, 200 MSECONDS, 200 MWATTS, 377 PULSES performed by Yamile David MD at [address]  12/09/2016    w/ ileorectal anastomosis    TONSILLECTOMY  2020    UMBILICAL HERNIA REPAIR  1996    UPPER GASTROINTESTINAL ENDOSCOPY N/A 5/2/2019    EGD BIOPSY performed by Maxwell Pimentel MD at [address] VITRECTOMY Left 12/10/2015    VITRECTOMY Left 10/27/2016    membrane peeling    VITRECTOMY Right 07/12/2018    laser, gas    WRIST FRACTURE SURGERY Right 1/31/2022    DISTAL RADIUS OPEN REDUCTION INTERNAL FIXATION performed by Mohan Shea MD at [address]       Family History   Problem Relation Age of Onset    Heart Failure Mother     Cancer Mother     Heart Disease Mother         CAD-OPEN HEART    Mental Retardation Sister     Seizures Sister     Hypertension Other         maternal history       Social History     Tobacco Use    Smoking status: Current Every Day Smoker     Packs/day: 1.00     Years: 30.00     Pack years: 30.00     Types: Cigarettes    Smokeless tobacco: Never Used   Substance Use Topics    Alcohol use: Yes     Comment: 6 drinks per weekly- weekends only now (1/27/22)      Current Outpatient Medications   Medication Sig Dispense Refill    meloxicam (MOBIC) 15 MG tablet Take 1 tablet by mouth daily 30 tablet 5    oxyCODONE-acetaminophen (PERCOCET) 5-325 MG per tablet Take 1 tablet by mouth every 8 hours as needed for Pain for up to 30 days. 90 tablet 0    sildenafil (VIAGRA) 100 MG tablet Take 1 tablet by mouth daily as needed for Erectile Dysfunction 90 tablet 2    tamsulosin (FLOMAX) 0.4 MG capsule Take 1 capsule by mouth daily 90 capsule 3    diazePAM (VALIUM) 5 MG tablet TAKE ONE TABLET BY MOUTH EVERY 12 HOURS AS NEEDED FOR ANXIETY OR SLEEP 60 tablet 0    metFORMIN (GLUCOPHAGE) 500 MG tablet Take 1 tablet by mouth daily (with breakfast) 90 tablet 3    cyclobenzaprine (FLEXERIL) 10 mg tablet Take 1 tablet by mouth 2 times daily as needed for Muscle spasms 60 tablet 5    atenolol (TENORMIN) 25 MG tablet TAKE ONE TABLET BY MOUTH DAILY 90 tablet 0    cholestyramine (QUESTRAN) 4 g packet TAKE ONE PACKET BY MOUTH FOUR TIMES A  packet 5    atorvastatin (LIPITOR) 20 MG tablet TAKE ONE TABLET BY MOUTH DAILY 90 tablet 3    cloNIDine (CATAPRES) 0.1 MG tablet TAKE ONE TABLET BY MOUTH DAILY 90 tablet 3    Blood Glucose Monitoring Suppl (BLOOD GLUCOSE MONITOR SYSTEM) w/Device KIT USE TO MONITOR BLOOD GLUCOSE LEVEL. (TEST ONCE PER DAY) 1 kit 0    blood glucose monitor strips Test blood glucose level 1 time per day.  100 strip 3    Lancets 30G MISC Inject 1 each into the skin daily 100 each 3    hydrOXYzine (VISTARIL) 25 MG capsule       QUEtiapine (SEROQUEL) 50 MG tablet       loperamide (IMODIUM) 2 MG capsule TAKE ONE CAPSULE daily for DIARRHEA 90 capsule 5    lamoTRIgine (LAMICTAL) 200 MG tablet Take 200 mg by mouth daily      acetaminophen (TYLENOL) 325 MG tablet Take 2 tablets by mouth every 4 hours as needed for Pain 120 tablet 3    DULoxetine (CYMBALTA) 60 MG extended release capsule Take 60 mg by mouth daily       No current facility-administered medications for this visit. No Known Allergies    Health Maintenance   Topic Date Due    Lipids  03/17/2022    Prostate Specific Antigen (PSA) Screening or Monitoring  03/17/2022    Colorectal Cancer Screen  05/02/2022    Annual Wellness Visit (AWV)  05/12/2022    Low dose CT lung screening  05/12/2022    Diabetic retinal exam  06/30/2022    COVID-19 Vaccine (1) 06/27/2023 (Originally 2/10/1965)    Diabetic foot exam  11/09/2022    Diabetic microalbuminuria test  11/09/2022    Depression Monitoring  11/09/2022    A1C test (Diabetic or Prediabetic)  06/27/2023    Pneumococcal 0-64 years Vaccine (3 - PPSV23 or PCV20) 02/10/2026    DTaP/Tdap/Td vaccine (2 - Td or Tdap) 06/21/2028    Flu vaccine  Completed    Shingles vaccine  Completed    Hepatitis C screen  Completed    HIV screen  Completed    Hepatitis A vaccine  Aged Out    Hib vaccine  Aged Out    Meningococcal (ACWY) vaccine  Aged Out       Subjective:      Review of Systems   Constitutional: Negative for chills and fever. HENT: Negative for rhinorrhea and sore throat. Eyes: Negative for discharge and redness. Respiratory: Negative for cough, shortness of breath and wheezing. Cardiovascular: Negative for chest pain and palpitations. Gastrointestinal: Negative for abdominal pain, diarrhea, nausea and vomiting. Genitourinary: Negative for dysuria and frequency. Musculoskeletal: Positive for arthralgias, back pain and neck pain. Negative for myalgias. Neurological: Negative for dizziness, light-headedness and headaches. Psychiatric/Behavioral: Negative for sleep disturbance. Objective:     /76   Pulse 86   Ht 5' 11\" (1.803 m)   Wt 182 lb 3.2 oz (82.6 kg)   SpO2 95%   BMI 25.41 kg/m²   Physical Exam  Vitals and nursing note reviewed. Constitutional:       General: He is not in acute distress. Appearance: He is well-developed. He is not ill-appearing. HENT:      Head: Normocephalic and atraumatic. Right Ear: External ear normal.      Left Ear: External ear normal.   Eyes:      General: No scleral icterus. Right eye: No discharge. Left eye: No discharge. Conjunctiva/sclera: Conjunctivae normal.   Neck:      Thyroid: No thyromegaly. Trachea: No tracheal deviation. Cardiovascular:      Rate and Rhythm: Normal rate and regular rhythm. Heart sounds: Normal heart sounds. Pulmonary:      Effort: Pulmonary effort is normal. No respiratory distress. Breath sounds: Normal breath sounds. No wheezing. Lymphadenopathy:      Cervical: No cervical adenopathy. Skin:     General: Skin is warm. Findings: No rash. Neurological:      Mental Status: He is alert and oriented to person, place, and time. Psychiatric:         Mood and Affect: Mood normal.         Behavior: Behavior normal.         Thought Content: Thought content normal.         Assessment:       Diagnosis Orders   1. Diabetes mellitus type 2 in obese (HCC)  POCT glycosylated hemoglobin (Hb A1C)   2. Lumbar radiculopathy  meloxicam (MOBIC) 15 MG tablet    oxyCODONE-acetaminophen (PERCOCET) 5-325 MG per tablet   3. Degenerative disc disease, cervical     4. Chronic midline low back pain without sciatica  oxyCODONE-acetaminophen (PERCOCET) 5-325 MG per tablet   5. Osteoarthritis of cervical spine, unspecified spinal osteoarthritis complication status  oxyCODONE-acetaminophen (PERCOCET) 5-325 MG per tablet   6.  Encounter for screening for malignant neoplasm of colon  Chayo Woodall MD, Gastroenterology, [place]   7. Erectile dysfunction, unspecified erectile dysfunction type  sildenafil (VIAGRA) 100 MG tablet        Plan:    Renew medications  Referral to GI for repeat colonoscopy  If right hip subluxation should become more problematic, will refer to orthopedic surgery for second opinion  Increase Mobic to 15 mg daily    Return in about 3 months (around 9/27/2022). Orders Placed This Encounter   Procedures   Austin Rocha MD, Gastroenterology, [place]     Referral Priority:   Routine     Referral Type:   Eval and Treat     Referral Reason:   Specialty Services Required     Referred to Provider:   Hien Palacios MD     Requested Specialty:   Gastroenterology     Number of Visits Requested:   1    POCT glycosylated hemoglobin (Hb A1C)     Orders Placed This Encounter   Medications    meloxicam (MOBIC) 15 MG tablet     Sig: Take 1 tablet by mouth daily     Dispense:  30 tablet     Refill:  5    oxyCODONE-acetaminophen (PERCOCET) 5-325 MG per tablet     Sig: Take 1 tablet by mouth every 8 hours as needed for Pain for up to 30 days. Dispense:  90 tablet     Refill:  0     Reduce doses taken as pain becomes manageable    sildenafil (VIAGRA) 100 MG tablet     Sig: Take 1 tablet by mouth daily as needed for Erectile Dysfunction     Dispense:  90 tablet     Refill:  2    tamsulosin (FLOMAX) 0.4 MG capsule     Sig: Take 1 capsule by mouth daily     Dispense:  90 capsule     Refill:  3       Patient given educational materials - see patient instructions. Discussed use, benefit, and side effects of prescribed medications. All patient questions answered. Pt voiced understanding. Reviewed health maintenance. Instructed to continue current medications, diet andexercise. Patient agreed with treatment plan. Follow up as directed.      Electronicallysigned by Yrn Tirado MD on 6/27/2022 at 1:45 PM

## 2022-06-29 DIAGNOSIS — M54.16 LUMBAR RADICULOPATHY: ICD-10-CM

## 2022-06-29 RX ORDER — MELOXICAM 15 MG/1
15 TABLET ORAL DAILY
Qty: 30 TABLET | Refills: 5 | Status: SHIPPED
Start: 2022-06-29 | End: 2022-08-17

## 2022-06-30 ENCOUNTER — TELEPHONE (OUTPATIENT)
Dept: GASTROENTEROLOGY | Age: 62
End: 2022-06-30

## 2022-06-30 DIAGNOSIS — K63.5 POLYP OF COLON, UNSPECIFIED PART OF COLON, UNSPECIFIED TYPE: Primary | ICD-10-CM

## 2022-06-30 RX ORDER — SODIUM, POTASSIUM,MAG SULFATES 17.5-3.13G
SOLUTION, RECONSTITUTED, ORAL ORAL
Qty: 1 EACH | Refills: 0 | Status: ON HOLD | OUTPATIENT
Start: 2022-06-30 | End: 2022-08-11 | Stop reason: ALTCHOICE

## 2022-06-30 NOTE — TELEPHONE ENCOUNTER
Writer rec'd referral from pt PCP to schedule pt for colon. Writer notes pt is est pt of [facility], pt last colon was 5/2/19 and per [facility] 3 yr recall due to polyp found. Per colon screen questionnaire pt can be scheduled for colon. [facility] [email or web address] recall [email or web address]    Writer reviewed all dates/times of PAT/procedure with pt, pt voices understanding. Reviewed bowel prep instructions with patient over phone and mailed to home address, copy also sent to pt mychart.

## 2022-07-06 ENCOUNTER — TELEPHONE (OUTPATIENT)
Dept: PRIMARY CARE CLINIC | Age: 62
End: 2022-07-06

## 2022-07-06 RX ORDER — MINOXIDIL 5 %
FOAM (GRAM) TOPICAL
Qty: 60 G | Refills: 5 | Status: SHIPPED | OUTPATIENT
Start: 2022-07-06 | End: 2022-08-23

## 2022-07-14 RX ORDER — CLONIDINE HYDROCHLORIDE 0.1 MG/1
TABLET ORAL
Qty: 90 TABLET | Refills: 3 | Status: SHIPPED | OUTPATIENT
Start: 2022-07-14

## 2022-07-21 DIAGNOSIS — F41.9 ANXIETY: ICD-10-CM

## 2022-07-22 RX ORDER — DIAZEPAM 5 MG/1
TABLET ORAL
Qty: 60 TABLET | Refills: 0 | Status: SHIPPED | OUTPATIENT
Start: 2022-07-22 | End: 2022-08-22 | Stop reason: SDUPTHER

## 2022-07-22 RX ORDER — DIAZEPAM 5 MG/1
TABLET ORAL
Qty: 60 TABLET | OUTPATIENT
Start: 2022-07-22

## 2022-07-26 RX ORDER — DULOXETIN HYDROCHLORIDE 60 MG/1
60 CAPSULE, DELAYED RELEASE ORAL DAILY
Qty: 30 CAPSULE | Refills: 0 | Status: SHIPPED | OUTPATIENT
Start: 2022-07-26

## 2022-07-27 ENCOUNTER — HOSPITAL ENCOUNTER (EMERGENCY)
Age: 62
Discharge: HOME | End: 2022-07-27
Attending: EMERGENCY MEDICINE
Payer: MEDICARE

## 2022-07-27 ENCOUNTER — APPOINTMENT (OUTPATIENT)
Dept: GENERAL RADIOLOGY | Age: 62
End: 2022-07-27
Payer: MEDICARE

## 2022-07-27 VITALS
OXYGEN SATURATION: 95 % | HEART RATE: 78 BPM | TEMPERATURE: 98.7 F | SYSTOLIC BLOOD PRESSURE: 142 MMHG | DIASTOLIC BLOOD PRESSURE: 93 MMHG | RESPIRATION RATE: 12 BRPM

## 2022-07-27 DIAGNOSIS — M70.22 OLECRANON BURSITIS OF LEFT ELBOW: Primary | ICD-10-CM

## 2022-07-27 PROCEDURE — 6360000002 HC RX W HCPCS: Performed by: STUDENT IN AN ORGANIZED HEALTH CARE EDUCATION/TRAINING PROGRAM

## 2022-07-27 PROCEDURE — 73080 X-RAY EXAM OF ELBOW: CPT

## 2022-07-27 PROCEDURE — 99284 EMERGENCY DEPT VISIT MOD MDM: CPT

## 2022-07-27 PROCEDURE — 96372 THER/PROPH/DIAG INJ SC/IM: CPT

## 2022-07-27 RX ORDER — IBUPROFEN 800 MG/1
800 TABLET ORAL 2 TIMES DAILY PRN
Qty: 15 TABLET | Refills: 1 | Status: SHIPPED | OUTPATIENT
Start: 2022-07-27 | End: 2022-08-17

## 2022-07-27 RX ORDER — KETOROLAC TROMETHAMINE 30 MG/ML
30 INJECTION, SOLUTION INTRAMUSCULAR; INTRAVENOUS ONCE
Status: COMPLETED | OUTPATIENT
Start: 2022-07-27 | End: 2022-07-27

## 2022-07-27 RX ADMIN — KETOROLAC TROMETHAMINE 30 MG: 30 INJECTION, SOLUTION INTRAMUSCULAR at 17:16

## 2022-07-27 ASSESSMENT — ENCOUNTER SYMPTOMS
ABDOMINAL PAIN: 0
VOMITING: 0

## 2022-07-27 NOTE — ED PROVIDER NOTES
[facility] ED     Emergency Department     Faculty Attestation    I performed a history and physical examination of the patient and discussed management with the resident. I reviewed the residents note and agree with the documented findings and plan of care. Any areas of disagreement are noted on the chart. I was personally present for the key portions of any procedures. I have documented in the chart those procedures where I was not present during the key portions. I have reviewed the emergency nurses triage note. I agree with the chief complaint, past medical history, past surgical history, allergies, medications, social and family history as documented unless otherwise noted below. For Physician Assistant/ Nurse Practitioner cases/documentation I have personally evaluated this patient and have completed at least one if not all key elements of the E/M (history, physical exam, and MDM). Additional findings are as noted. Left elbow olecranon bursitis fall several months ago first time being seen. States swelling is significantly increased in the last week. Distally intact strong pulses full range of motion. No warmth, minimal erythema with the bursitis.   Will image given no prior imaging, plan discharge      Critical Care     none    Manas Clement MD, [facility]  Attending Emergency  Physician           Manas Clement MD  07/27/22 [id number]

## 2022-07-27 NOTE — DISCHARGE INSTRUCTIONS
You will be given ibuprofen for pain for the olecranon bursitis. You will also be given information follow-up with surgery clinic for follow-up of olecranon bursitis. If it anytime you start developing fever, chills, fatigue, please return to emergency room as soon as possible. XR ELBOW LEFT (MIN 3 VIEWS)   Final Result   No fracture or dislocation.   Large amount of soft tissue swelling over the   olecranon consistent with either bursitis or sequela of soft tissue trauma

## 2022-07-27 NOTE — ED PROVIDER NOTES
[facility] ED  Emergency Department Encounter  Emergency Medicine Resident     Pt Name: Neo Fitzpatrick  MRN: [id number]  [place] 1960  Date of evaluation: 7/27/22  PCP:  Deirdre Jara MD    [address] [place] [address]       Chief Complaint   Patient presents with    Elbow Pain     Left    Bursitis     Left elbow       HISTORY OFPRESENT ILLNESS  (Location/Symptom, Timing/Onset, Context/Setting, Quality, Duration, Modifying Factors,Severity.)      Neo Fitzpatrick is a 65 y. o.yo male who presents with complaints of left arm elbow swelling. Patient reports that symptoms are ongoing for the past month however the swelling has getting worse. He does state that he fell several months ago symptoms when initially started however there has not been any recent fall. Denies any numbness or tingling, no fever, chills, no weight loss. PAST MEDICAL / SURGICAL / SOCIAL / FAMILY HISTORY      has a past medical history of Abnormal EKG, Anxiety, Bipolar disorder ([address] Chronic back pain, Depression, Diabetes mellitus ([address] Fracture, clavicle, Hyperlipidemia, Hypertension, Legally blind in left eye, as defined in [place], Lumbar radiculopathy, Nicotine dependence, Osteoarthritis, Pain, Retinal detachment, Sleep apnea, Tubular adenoma, Visual floaters, and Wears glasses. has a past surgical history that includes Cholecystectomy (2/13); cyst removal (1970); Tonsillectomy (1969); Umbilical hernia repair (1996); lymph node biopsy (Left, 1971); Cataract removal with implant (Right, 8-25-15); Nasal septum surgery (11-24-15); Cataract removal (Left); vitrectomy (Left, 12/10/2015); retinal laser; Eye surgery (12/31/15); Colonoscopy (09/22/2016); vitrectomy (Left, 10/27/2016); eye surgery (Left); eye surgery (Left, 3/31/16); eye surgery; eye surgery; hernia repair; subtotal colectomy (12/09/2016); vitrectomy (Right, 07/12/2018); pr office/outpt visit,procedure only (Right, 7/12/2018);  Upper gastrointestinal endoscopy (N/A, 5/2/2019); Colonoscopy (N/A, 5/2/2019); and Wrist fracture surgery (Right, 1/31/2022). Social History     Socioeconomic History    Marital status: Single     Spouse name: Not on file    Number of children: Not on file    Years of education: Not on file    Highest education level: Not on file   Occupational History    Occupation: disabled   Tobacco Use    Smoking status: Every Day     Packs/day: 1.00     Years: 30.00     Pack years: 30.00     Types: Cigarettes    Smokeless tobacco: Never   Vaping Use    Vaping Use: Never used   Substance and Sexual Activity    Alcohol use: Yes     Comment: 6 drinks per weekly- weekends only now (1/27/22)    Drug use: No    Sexual activity: Not on file   Other Topics Concern    Not on file   Social History Narrative    Not on file     Social Determinants of Health     Financial Resource Strain: Low Risk     Difficulty of Paying Living Expenses: Not hard at all   Food Insecurity: No Food Insecurity    Worried About Running Out of Food in the Last Year: Never true    Ran Out of Food in the Last Year: Never true   Transportation Needs: Not on file   Physical Activity: Not on file   Stress: Not on file   Social Connections: Not on file   Intimate Partner Violence: Not on file   Housing Stability: Not on file       Family History   Problem Relation Age of Onset    Heart Failure Mother     Cancer Mother     Heart Disease Mother         CAD-OPEN HEART    Mental Retardation Sister     Seizures Sister     Hypertension Other         maternal history        Allergies:  Patient has no known allergies. Home Medications:  Prior to Admission medications    Medication Sig Start Date End Date Taking? Authorizing Provider   ibuprofen (ADVIL;MOTRIN) 800 MG tablet Take 1 tablet by mouth 2 times daily as needed for Pain 7/27/22  Yes Arlen Brewer MD   DULoxetine (CYMBALTA) 60 MG extended release capsule Take 1 capsule by mouth in the morning.  7/26/22   Fabio Denny MD   diazePAM (VALIUM) 5 MG tablet TAKE ONE TABLET BY MOUTH EVERY 12 HOURS AS NEEDED FOR ANXIETY OR SLEEP 7/22/22 8/19/22  Lynn Jarqiun MD   cloNIDine (CATAPRES) 0.1 MG tablet TAKE ONE TABLET BY MOUTH DAILY 7/14/22   Lynn Jarquin MD   Minoxidil (ROGAINE MENS) 5 % FOAM Apply topically nightly 7/6/22   Lynn Jarquin MD   Na Sulfate-K Sulfate-Mg Sulf (SUPREP BOWEL PREP KIT) 17.5-3.13-1.6 GM/177ML SOLN solution Please follow instructions given to you by your provider 6/30/22   Yulisa Manrique MD   meloxicam (MOBIC) 15 MG tablet Take 1 tablet by mouth daily 6/29/22 7/29/22  Lynn Jarquin MD   sildenafil (VIAGRA) 100 MG tablet Take 1 tablet by mouth daily as needed for Erectile Dysfunction 6/27/22   Lynn Jarquin MD   tamsulosin [facility]) 0.4 MG capsule Take 1 capsule by mouth daily 6/27/22   Lynn Jarquin MD   metFORMIN (GLUCOPHAGE) 500 MG tablet Take 1 tablet by mouth daily (with breakfast) 6/21/22   Lynn Jarquin MD   cyclobenzaprine (FLEXERIL) 10 mg tablet Take 1 tablet by mouth 2 times daily as needed for Muscle spasms 6/1/22   Lynn Jarquin MD   atenolol (TENORMIN) 25 MG tablet TAKE ONE TABLET BY MOUTH DAILY 5/4/22   FRANCES Valenzuela   cholestyramine (QUESTRAN) 4 g packet TAKE ONE PACKET BY MOUTH FOUR TIMES A DAY 4/11/22   Lynn Jarquin MD   atorvastatin (LIPITOR) 20 MG tablet TAKE ONE TABLET BY MOUTH DAILY 1/24/22   Lynn Jarquin MD   Blood Glucose Monitoring Suppl (BLOOD GLUCOSE MONITOR SYSTEM) w/Device KIT USE TO MONITOR BLOOD GLUCOSE LEVEL. (TEST ONCE PER DAY) 10/22/21   Lynn Jarquin MD   blood glucose monitor strips Test blood glucose level 1 time per day.  10/22/21   Lynn Jarquin MD   Lancets 30G MISC Inject 1 each into the skin daily 10/22/21   Lynn Jarquin MD   hydrOXYzine (VISTARIL) 25 MG capsule  9/20/20   Historical Provider, MD   QUEtiapine (SEROQUEL) 50 MG tablet  9/20/20   Historical Provider, MD   loperamide (IMODIUM) 2 MG capsule TAKE ONE CAPSULE daily for DIARRHEA 8/4/20   Rita Luque MD   lamoTRIgine (LAMICTAL) 200 MG tablet Take 200 mg by mouth daily 9/25/17   Historical Provider, MD   acetaminophen (TYLENOL) 325 MG tablet Take 2 tablets by mouth every 4 hours as needed for Pain 12/15/16   Rita Luque MD       REVIEW OFSYSTEMS    (2-9 systems for level 4, 10 or more for level 5)      Review of Systems   Constitutional:  Negative for appetite change, fatigue and fever. HENT:  Negative for congestion. Eyes:  Negative for pain and itching. Respiratory:  Negative for cough and shortness of breath. Cardiovascular:  Negative for chest pain and leg swelling. Gastrointestinal:  Negative for abdominal pain and vomiting. Genitourinary:  Negative for flank pain and frequency. Musculoskeletal:  Positive for arthralgias and joint swelling. Skin:  Negative for pallor and wound. Neurological:  Negative for light-headedness and headaches. Psychiatric/Behavioral:  Negative for behavioral problems and confusion. PHYSICAL EXAM   (up to 7 for level 4, 8 or more forlevel 5)      INITIAL VITALS:   ED Triage Vitals [07/27/22 1641]   BP Temp Temp Source Heart Rate Resp SpO2 Height Weight   (!) 142/93 98.7 °F (37.1 °C) Oral 78 12 95 % -- --       Physical Exam  Constitutional:       Appearance: Normal appearance. HENT:      Head: Normocephalic. Nose: Nose normal.      Mouth/Throat:      Mouth: Mucous membranes are moist.   Eyes:      Extraocular Movements: Extraocular movements intact. Pupils: Pupils are equal, round, and reactive to light. Cardiovascular:      Rate and Rhythm: Normal rate. Pulmonary:      Effort: Pulmonary effort is normal. No respiratory distress. Abdominal:      General: There is no distension. Palpations: Abdomen is soft. Musculoskeletal:         General: Swelling and tenderness present. Cervical back: Normal range of motion. No rigidity. Right lower leg: No edema. Left lower leg: No edema. Skin:     Capillary Refill: Capillary refill takes less than 2 seconds. Findings: Erythema present. No bruising. Neurological:      General: No focal deficit present. Mental Status: He is alert. Psychiatric:         Mood and Affect: Mood normal.         Behavior: Behavior normal.       DIFFERENTIAL  DIAGNOSIS     PLAN (LABS / IMAGING / EKG):  Orders Placed This Encounter   Procedures    XR ELBOW LEFT (MIN 3 VIEWS)       MEDICATIONS ORDERED:  Orders Placed This Encounter   Medications    ketorolac (TORADOL) injection 30 mg    ibuprofen (ADVIL;MOTRIN) 800 MG tablet     Sig: Take 1 tablet by mouth 2 times daily as needed for Pain     Dispense:  15 tablet     Refill:  1       Initial MDM/Plan: 65 y.o. male who presents with left elbow swelling. There is minimal erythema, minimal warmth. Impression is olecranon bursitis. Will obtain x-ray to any chip fracture. Given Toradol for pain control. Likely discharge and given strict return precautions. DIAGNOSTIC RESULTS / EMERGENCYDEPARTMENT COURSE / MDM     LABS:  Labs Reviewed - No data to display      RADIOLOGY:  No results found. EKG      All EKG's are interpreted by the Emergency Department Physicianwho either signs or Co-signs this chart in the absence of a cardiologist.    EMERGENCY DEPARTMENT COURSE:          PROCEDURES:  None    CONSULTS:  None    CRITICAL CARE:      FINAL IMPRESSION      1. Olecranon bursitis of left elbow          DISPOSITION / PLAN     DISPOSITION Decision To Discharge 07/27/2022 05:45:26 PM      PATIENT REFERRED TO:  [facility] ED  [address] [place] [address]  [phone]    If symptoms worsen    Lydia Alcocer MD  [address]   [address]  [facility] [address]  [phone]      As needed    [address] [place] [address]  [phone]    Please call General Surgery to F/u for olecranon bursitis    DISCHARGE MEDICATIONS:  Discharge Medication List as of 7/27/2022  5:48 PM        START taking these medications    Details   ibuprofen (ADVIL;MOTRIN) 800 MG tablet Take 1 tablet by mouth 2 times daily as needed for Pain, Disp-15 tablet, R-1Print             Karla Bridges MD  Emergency Medicine Resident    (Please note that portions of this note were completed with a voice recognition program.Efforts were made to edit the dictations but occasionally words are mis-transcribed.)       Karla Bridges MD  Resident  07/29/22 1851

## 2022-07-28 ASSESSMENT — ENCOUNTER SYMPTOMS
EYE ITCHING: 0
COUGH: 0
SHORTNESS OF BREATH: 0
EYE PAIN: 0

## 2022-08-02 ENCOUNTER — HOSPITAL ENCOUNTER (OUTPATIENT)
Dept: PREADMISSION TESTING | Age: 62
Discharge: HOME | End: 2022-08-06

## 2022-08-02 VITALS — BODY MASS INDEX: 25.9 KG/M2 | HEIGHT: 71 IN | WEIGHT: 185 LBS

## 2022-08-02 NOTE — PROGRESS NOTES

## 2022-08-05 DIAGNOSIS — M47.812 OSTEOARTHRITIS OF CERVICAL SPINE, UNSPECIFIED SPINAL OSTEOARTHRITIS COMPLICATION STATUS: ICD-10-CM

## 2022-08-05 DIAGNOSIS — M54.16 LUMBAR RADICULOPATHY: ICD-10-CM

## 2022-08-05 DIAGNOSIS — M54.50 CHRONIC MIDLINE LOW BACK PAIN WITHOUT SCIATICA: ICD-10-CM

## 2022-08-05 DIAGNOSIS — G89.29 CHRONIC MIDLINE LOW BACK PAIN WITHOUT SCIATICA: ICD-10-CM

## 2022-08-05 RX ORDER — OXYCODONE HYDROCHLORIDE AND ACETAMINOPHEN 5; 325 MG/1; MG/1
1 TABLET ORAL EVERY 8 HOURS PRN
Qty: 90 TABLET | Refills: 0 | Status: SHIPPED | OUTPATIENT
Start: 2022-08-05 | End: 2022-09-14 | Stop reason: SDUPTHER

## 2022-08-08 RX ORDER — IBUPROFEN 800 MG/1
TABLET ORAL
Qty: 15 TABLET | Refills: 1 | OUTPATIENT
Start: 2022-08-08

## 2022-08-10 ENCOUNTER — ANESTHESIA EVENT (OUTPATIENT)
Dept: ENDOSCOPY | Age: 62
End: 2022-08-10
Payer: MEDICARE

## 2022-08-11 ENCOUNTER — HOSPITAL ENCOUNTER (OUTPATIENT)
Age: 62
Setting detail: OUTPATIENT SURGERY
Discharge: HOME | End: 2022-08-11
Attending: INTERNAL MEDICINE | Admitting: INTERNAL MEDICINE
Payer: MEDICARE

## 2022-08-11 ENCOUNTER — ANESTHESIA (OUTPATIENT)
Dept: ENDOSCOPY | Age: 62
End: 2022-08-11
Payer: MEDICARE

## 2022-08-11 VITALS
HEIGHT: 71 IN | WEIGHT: 185 LBS | RESPIRATION RATE: 16 BRPM | BODY MASS INDEX: 25.9 KG/M2 | HEART RATE: 59 BPM | TEMPERATURE: 97.1 F | DIASTOLIC BLOOD PRESSURE: 88 MMHG | OXYGEN SATURATION: 93 % | SYSTOLIC BLOOD PRESSURE: 132 MMHG

## 2022-08-11 DIAGNOSIS — Z86.010 HISTORY OF COLON POLYPS: ICD-10-CM

## 2022-08-11 LAB — GLUCOSE BLD-MCNC: 120 MG/DL (ref 75–110)

## 2022-08-11 PROCEDURE — 2709999900 HC NON-CHARGEABLE SUPPLY: Performed by: INTERNAL MEDICINE

## 2022-08-11 PROCEDURE — 7100000011 HC PHASE II RECOVERY - ADDTL 15 MIN: Performed by: INTERNAL MEDICINE

## 2022-08-11 PROCEDURE — 2500000003 HC RX 250 WO HCPCS: Performed by: NURSE ANESTHETIST, CERTIFIED REGISTERED

## 2022-08-11 PROCEDURE — 45380 COLONOSCOPY AND BIOPSY: CPT | Performed by: INTERNAL MEDICINE

## 2022-08-11 PROCEDURE — 3700000000 HC ANESTHESIA ATTENDED CARE: Performed by: INTERNAL MEDICINE

## 2022-08-11 PROCEDURE — 6360000002 HC RX W HCPCS: Performed by: NURSE ANESTHETIST, CERTIFIED REGISTERED

## 2022-08-11 PROCEDURE — 3700000001 HC ADD 15 MINUTES (ANESTHESIA): Performed by: INTERNAL MEDICINE

## 2022-08-11 PROCEDURE — 45385 COLONOSCOPY W/LESION REMOVAL: CPT | Performed by: INTERNAL MEDICINE

## 2022-08-11 PROCEDURE — 82947 ASSAY GLUCOSE BLOOD QUANT: CPT

## 2022-08-11 PROCEDURE — 3609010600 HC COLONOSCOPY POLYPECTOMY SNARE/COLD BIOPSY: Performed by: INTERNAL MEDICINE

## 2022-08-11 PROCEDURE — 88305 TISSUE EXAM BY PATHOLOGIST: CPT

## 2022-08-11 PROCEDURE — 7100000010 HC PHASE II RECOVERY - FIRST 15 MIN: Performed by: INTERNAL MEDICINE

## 2022-08-11 PROCEDURE — 2580000003 HC RX 258: Performed by: ANESTHESIOLOGY

## 2022-08-11 RX ORDER — SODIUM CHLORIDE 0.9 % (FLUSH) 0.9 %
5-40 SYRINGE (ML) INJECTION EVERY 12 HOURS SCHEDULED
Status: DISCONTINUED | OUTPATIENT
Start: 2022-08-11 | End: 2022-08-11 | Stop reason: HOSPADM

## 2022-08-11 RX ORDER — SODIUM CHLORIDE 9 MG/ML
INJECTION, SOLUTION INTRAVENOUS PRN
Status: DISCONTINUED | OUTPATIENT
Start: 2022-08-11 | End: 2022-08-11 | Stop reason: HOSPADM

## 2022-08-11 RX ORDER — FENTANYL CITRATE 50 UG/ML
25 INJECTION, SOLUTION INTRAMUSCULAR; INTRAVENOUS EVERY 5 MIN PRN
Status: CANCELLED | OUTPATIENT
Start: 2022-08-11

## 2022-08-11 RX ORDER — SODIUM CHLORIDE 9 MG/ML
INJECTION, SOLUTION INTRAVENOUS PRN
Status: CANCELLED | OUTPATIENT
Start: 2022-08-11

## 2022-08-11 RX ORDER — SODIUM CHLORIDE 0.9 % (FLUSH) 0.9 %
5-40 SYRINGE (ML) INJECTION EVERY 12 HOURS SCHEDULED
Status: CANCELLED | OUTPATIENT
Start: 2022-08-11

## 2022-08-11 RX ORDER — LIDOCAINE HYDROCHLORIDE 10 MG/ML
INJECTION, SOLUTION EPIDURAL; INFILTRATION; INTRACAUDAL; PERINEURAL PRN
Status: DISCONTINUED | OUTPATIENT
Start: 2022-08-11 | End: 2022-08-11 | Stop reason: SDUPTHER

## 2022-08-11 RX ORDER — METOCLOPRAMIDE HYDROCHLORIDE 5 MG/ML
10 INJECTION INTRAMUSCULAR; INTRAVENOUS
Status: CANCELLED | OUTPATIENT
Start: 2022-08-11 | End: 2022-08-11

## 2022-08-11 RX ORDER — PROPOFOL 10 MG/ML
INJECTION, EMULSION INTRAVENOUS PRN
Status: DISCONTINUED | OUTPATIENT
Start: 2022-08-11 | End: 2022-08-11 | Stop reason: SDUPTHER

## 2022-08-11 RX ORDER — SODIUM CHLORIDE 9 MG/ML
INJECTION, SOLUTION INTRAVENOUS CONTINUOUS
Status: DISCONTINUED | OUTPATIENT
Start: 2022-08-11 | End: 2022-08-11 | Stop reason: HOSPADM

## 2022-08-11 RX ORDER — ONDANSETRON 2 MG/ML
4 INJECTION INTRAMUSCULAR; INTRAVENOUS
Status: CANCELLED | OUTPATIENT
Start: 2022-08-11 | End: 2022-08-11

## 2022-08-11 RX ORDER — DIPHENHYDRAMINE HYDROCHLORIDE 50 MG/ML
12.5 INJECTION INTRAMUSCULAR; INTRAVENOUS
Status: CANCELLED | OUTPATIENT
Start: 2022-08-11 | End: 2022-08-11

## 2022-08-11 RX ORDER — SODIUM CHLORIDE 0.9 % (FLUSH) 0.9 %
5-40 SYRINGE (ML) INJECTION PRN
Status: CANCELLED | OUTPATIENT
Start: 2022-08-11

## 2022-08-11 RX ORDER — SODIUM CHLORIDE 0.9 % (FLUSH) 0.9 %
5-40 SYRINGE (ML) INJECTION PRN
Status: DISCONTINUED | OUTPATIENT
Start: 2022-08-11 | End: 2022-08-11 | Stop reason: HOSPADM

## 2022-08-11 RX ORDER — LIDOCAINE HYDROCHLORIDE 10 MG/ML
1 INJECTION, SOLUTION EPIDURAL; INFILTRATION; INTRACAUDAL; PERINEURAL
Status: DISCONTINUED | OUTPATIENT
Start: 2022-08-11 | End: 2022-08-11 | Stop reason: HOSPADM

## 2022-08-11 RX ADMIN — PROPOFOL 350 MG: 10 INJECTION, EMULSION INTRAVENOUS at 11:45

## 2022-08-11 RX ADMIN — LIDOCAINE HYDROCHLORIDE 40 MG: 10 INJECTION, SOLUTION EPIDURAL; INFILTRATION; INTRACAUDAL; PERINEURAL at 11:43

## 2022-08-11 RX ADMIN — SODIUM CHLORIDE: 9 INJECTION, SOLUTION INTRAVENOUS at 10:49

## 2022-08-11 ASSESSMENT — ENCOUNTER SYMPTOMS
SORE THROAT: 0
WHEEZING: 0
SINUS PAIN: 0
CHEST TIGHTNESS: 0
COUGH: 0
TROUBLE SWALLOWING: 0
BACK PAIN: 0
APNEA: 0
SHORTNESS OF BREATH: 0
SINUS PRESSURE: 0
RHINORRHEA: 0

## 2022-08-11 ASSESSMENT — PAIN - FUNCTIONAL ASSESSMENT: PAIN_FUNCTIONAL_ASSESSMENT: 0-10

## 2022-08-11 NOTE — ANESTHESIA POSTPROCEDURE EVALUATION
Department of Anesthesiology  Postprocedure Note    Patient: Bárbara Mayen  MRN: [id number]  YOB: 1960  Date of evaluation: 8/11/2022      Procedure Summary     Date: 08/11/22 Room / Location: [facility] ENDO 04 / [facility] ENDO    Anesthesia Start: 1138 Anesthesia Stop: 1207    Procedure: COLONOSCOPY POLYPECTOMY SNARE/COLD BIOPSY Diagnosis:       History of colon polyps      (HISTORY OF COLON POLYPS)    Surgeons: Everton Franz MD Responsible Provider: Nikki Avitia MD    Anesthesia Type: MAC ASA Status: 3          Anesthesia Type: MAC    Swetha Phase I:      Swetha Phase II: Swetha Score: 10      Anesthesia Post Evaluation    Comments: POST- ANESTHESIA EVALUATION       Pt Name: Bárbara Mayen  MRN: [id number]  YOB: 1960  Date of evaluation: 8/11/2022  Time:  1:00 PM      /88   Pulse 59   Temp 97.1 °F (36.2 °C)   Resp 16   Ht 5' 11\" (1.803 m)   Wt 185 lb (83.9 kg)   SpO2 93%   BMI 25.80 kg/m²      Consciousness Level  Awake  Cardiopulmonary Status  Stable  Pain Adequately Treated YES  Nausea / Vomiting  NO  Adequate Hydration  YES  Anesthesia Related Complications NONE      Electronically signed by Nikki Avitia MD on 8/11/2022 at 1:00 PM

## 2022-08-11 NOTE — DISCHARGE INSTRUCTIONS
Sedation or General Anesthesia, Adult  Care After  Refer to this sheet in the next 24 hours. These instructions provide you with information on caring for yourself after your procedure. Your caregiver may also give you more specific instructions. Your treatment has been planned according to current medical practices, but problems sometimes occur. Call your caregiver if you have any problems or questions after your procedure. HOME CARE INSTRUCTIONS   Do not participate in any activities that require you to be alert or coordinated. Do not:  Drive. Swim. Ride a bicycle. Operate heavy machinery. [employer]. Use power tools. Climb ladders. Work at [employer]. Take a bath. Do not drink alcohol. Do not make any important decisions or sign legal documents. Stay with an adult. The first meal following your procedure should be light and small. Avoid solid foods if you feel sick to your stomach (nauseous) or if you throw up (vomit). Drink enough fluids to keep your urine clear or pale yellow. Only take your usual medicines or new medicines if your caregiver approves them. Only take over-the-counter or prescription medicines for pain, discomfort, or fever as directed by your caregiver. Keep all follow-up appointments as directed by your caregiver. SEEK IMMEDIATE MEDICAL CARE IF:   You are not feeling normal or behaving normally after 24 hours. You have persistent nausea and vomiting. You are unable to drink fluids or eat food. You have difficulty urinating. You have difficulty breathing or speaking. You have blue or gray skin. There is difficulty waking or you cannot be woken up. You have heavy bleeding, redness, or a lot of swelling where the sedative or anesthesia entered your skin (intravenous site). You have a rash. MAKE SURE YOU:  Understand these instructions. Will watch your condition. Will get help right away if you are not doing well or get worse.   Document Released: 12/18/2006 Document Revised: anesthesia medicine can make it hard for you to fully understand what you are agreeing to. Follow-up care is a key part of your treatment and safety. Be sure to make and go to all appointments, and call your doctor if you are having problems. It's also a good idea to know your test results and keep a list of the medicines you take. Call your Doctor if you have any of the following:             Passing blood rectally or vomiting blood (it may be red or black). Persistent nausea or vomiting. Severe abdominal or chest pain, not relieved by passing gas. Fever of 100 or more, chills or excessive sweating. Redness or swelling at the IV site. If you experience shortness of breath or severe chest pain, call 911. Where can you learn more? Go to https://[employer]Sand. org and sign in to your [employer] account. Enter E264 in the [employer] box to learn more about Colonoscopy: What to Expect at Home.     If you do not have an account, please click on the Sign Up Now link. © 2939-4941 Healthwise, Incorporated. Care instructions adapted under license by [facility]. This care instruction is for use with your licensed healthcare professional. If you have questions about a medical condition or this instruction, always ask your healthcare professional. [place] [address] any warranty or liability for your use of this information. Content Version: 0.4.859444; Last Revised: February 20, 2013   Colon Polypectomy   (Colon Polyp Removal)       Definition   A colon polypectomy is the removal of polyps from the inside lining of the colon (large intestine). A polyp is a mass of tissue. Some types of polyps have the potential to develop into cancer. Most polyps can be removed during a colonoscopy or sigmoidoscopy .      A Colon Polyp        2011 [address] [place] [address].   Reasons for Procedure   The purpose of the surgery is to remove a polyp. It is done for cancer prevention. In rare cases, larger polyps can cause troublesome symptoms, such as rectal bleeding, abdominal pain, and bowel irregularities. A polyp removal will relieve these symptoms. Possible Complications   Complications are rare, but no procedure is completely free of risk. If you are planning to have a polypectomy, your doctor will review a list of possible complications, which may include:   Damage to the colon wall   Bleeding   Infection   Adverse reaction to the sedative   Factors that may increase the risk of complications include:   Type, size, and location of the polyp   Patient factors, such as blood-clotting disorders, substance abuse, or other diseases (eg, obesity , diabetes )   What to Expect   Prior to Procedure    Your doctor will likely do the following:   Physical exam and health history   Review of medicines   Test your stool for hidden blood (called \"occult blood\")   X-rays an exam that uses small amounts of radiation to make a picture of the inside of the body   Barium enema x-ray exam that uses contrast to help better see the colon   Diagnostic colonoscopy or sigmoidoscopyexamination of the inside of the intestine with an endoscope   Your colon must be completely cleaned before the procedure. Any stool left in the intestine will block the view. This preparation may start several days before the procedure. Follow your doctor's instructions, which may include any of the following cleansing methods:   Enemas fluid introduced into the rectum to stimulate a bowel movement   Laxativesmedicines that cause you to have soft bowel movements   A clear-liquid diet   Oral cathartic medicinesa large container of fluid to drink, which stimulates a bowel movement   Leading up to your procedure:   Talk to your doctor about your medicines.  You may be asked to stop taking some medicines up to one week before the procedure, like:   Anti-inflammatory drugs (eg, aspirin)   Blood thinners, like clopidogrel (Plavix) or warfarin (Coumadin)   Iron supplements or vitamins containing iron. The night before, eat a light meal. Do not eat or drink anything after midnight. Wear comfortable clothing. If you have diabetes, ask your doctor if you need to adjust your insulin dose. Arrange for a ride home after the procedure. Anesthesia    You will receive a sedative. This will help you relax. You will be drowsy but awake. Description of the Procedure    You will be asked to lie on your side or on your back. A scope, a long flexible tube with a camera on the end, will be inserted through the anus. It will be slowly pushed through the rectum to the colon. The scope will also add air to open the colon. Using the scope, the doctor will locate the polyp. The polyp will be snipped off with a wire snare from the scope. In some cases, the polyp may be destroyed with an electric current. The electric current is also used to close the wound and stop bleeding. The polyps will then be removed for lab testing. When the doctor is finished, the scope will be slowly removed. For larger polyps, a laparoscopic surgical procedure may be needed. Special surgical tools will be inserted through small incisions in the abdomen. The tools will be used to locate and remove the polyp. How Long Will It Take? 30-60 minutes   Will It Hurt? The special cleaning solution, laxatives, and/or enemas often cause discomfort. During and following the procedure, there is little or no pain. You may feel pressure, bloating, and/or cramping because of the air passed into the colon. This discomfort will go away with the passing of gas. Your doctor may prescribe pain medicine. If not, you can take non-prescription pain relievers for discomfort. Post-procedure Care   At the [facility]    The polyps will be sent to a lab for testing. At Home    Expect a complete recovery within two weeks.  To ensure a smooth recovery, be sure to follow your doctor's instructions , which may include: The sedative will make you drowsy. Do not drive, operate machinery, or make important decisions the day of the procedure. Return to your normal diet the same or next day. Avoid tea, coffee, cola drinks, alcohol, and spicy foods for at least 2-3 days following surgery. These can irritate the digestive system. To speed healing, resume normal activities as soon as you feel able. Most people feel well enough by the next day. Ask your doctor when you can participate in any rigorous exercise. Ask your doctor about when it is safe to shower, bathe, or soak in water. You will be scheduled for a follow-up colonoscopy in the future. It will be important to check for recurrence of polyps. Your doctor will discuss the results with you either the day of surgery or the following day. Call Your Doctor   After arriving home, contact your doctor if any of the following occurs:   Signs of infection, including fever and chills   Redness, swelling, increasing pain, excessive bleeding, or discharge from the rectum (Up to cup of blood per day can be expected for up to 3-4 days following your polypectomy.)   Black, tarry stools   Severe abdominal pain   Hard, swollen abdomen   Inability to pass gas or stool   Cough , shortness of breath, chest pain, or severe nausea or vomiting   New, unexplained symptoms   In case of emergency,  CALL 911  .      Last Reviewed: December 2010 Pietro Coffey MD   Updated: 4/7/2011

## 2022-08-11 NOTE — OP NOTE
PROCEDURE NOTE    DATE OF PROCEDURE: 8/11/2022    SURGEON: Army Diya MD  Facility : [facility]  ASSISTANT: None  Anesthesia: mac   PREOPERATIVE DIAGNOSIS:   History of colon polyps    POSTOPERATIVE DIAGNOSIS: as described below    OPERATION: Total colonoscopy     ANESTHESIA: Moderate Sedation    ESTIMATED BLOOD LOSS: less than 50     COMPLICATIONS: None. SPECIMENS:  Was Obtained:     3 small polyps less than 1 cm removed with snare and cold forceps        HISTORY: The patient is a 65y.o. year old male with history of above preop diagnosis. I recommended colonoscopy with possible biopsy or polypectomy and I explained the risk, benefits, expected outcome, and alternatives to the procedure. Risks included but are not limited to bleeding, infection, respiratory distress, hypotension, and perforation of the colon and possibility of missing a lesion. The patient understands and is in agreement. The patient was counseled at length about the risks of gus Covid-19 during their perioperative period and any recovery window from their procedure. The patient was made aware that gus Covid-19  may worsen their prognosis for recovering from their procedure  and lend to a higher morbidity and/or mortality risk. All material risks, benefits, and reasonable alternatives including postponing the procedure were discussed. The patient does wish to proceed with the procedure at this time. PROCEDURE: The patient was given IV conscious sedation. The patient's SPO2 remained above 90% throughout the procedure. The colonoscope was inserted per rectum and advanced under direct vision to the surgical anastomosis    Post sedation note : The patient's SPO2 remained above 90% throughout the procedure. the vital signs remained stable , and no immediate complication form the procedure noted, patient will be ready for d/c when criteria is met . The prep was poor. Findings:      Descending/Sigmoid colon/rectum/anus: abnormal: Surgical anastomosis at 30 cm from the anal verge    Unfortunately the segment is very poorly prepped and has a very sticky green stool which I try to clean with profound irrigation without complete success I could recognize the 3 polyps, the larger of which was 1 cm went ahead and snared the 2 big ones and took the smaller one with the forceps, we put them in 1 jar    Surgical anastomosis looked clean  30 cm of the small bowel was examined  And we came back      Withdrawal Time was (minutes): 10    The colon was decompressed and the scope was removed. The patient tolerated the procedure well.      Recommendations/Plan:   Lifestyle and dietary modifications as discussed  F/U Biopsies  F/U In OfficeYes  Discussed with the family  Repeat colonoscopy [id number] as of the poor prep    Electronically signed by Raina Oliver MD  on 8/11/2022 at 12:03 PM

## 2022-08-11 NOTE — ANESTHESIA PRE PROCEDURE
Department of Anesthesiology  Preprocedure Note       Name:  Jorge Donald   Age:  65 y.o.  :  1960                                          MRN:  [id number]         Date:  2022      Surgeon: Helga Lopez):  Gwendolyn Henderson MD    Procedure: Procedure(s):  COLONOSCOPY DIAGNOSTIC    Medications prior to admission:   Prior to Admission medications    Medication Sig Start Date End Date Taking? Authorizing Provider   oxyCODONE-acetaminophen (PERCOCET) 5-325 MG per tablet Take 1 tablet by mouth every 8 hours as needed for Pain for up to 30 days. 22  Yinka Jaiems MD   DULoxetine (CYMBALTA) 60 MG extended release capsule Take 1 capsule by mouth in the morning.  22   Val Smith MD   ibuprofen (ADVIL;MOTRIN) 800 MG tablet Take 1 tablet by mouth 2 times daily as needed for Pain 22   Arlen Duron MD   diazePAM (VALIUM) 5 MG tablet TAKE ONE TABLET BY MOUTH EVERY 12 HOURS AS NEEDED FOR ANXIETY OR SLEEP 22  Val Smith MD   cloNIDine (CATAPRES) 0.1 MG tablet TAKE ONE TABLET BY MOUTH DAILY 22   Val Smith MD   Minoxidil (ROGAINE MENS) 5 % FOAM Apply topically nightly 22   Val Smith MD   Na Sulfate-K Sulfate-Mg Sulf (SUPREP BOWEL PREP KIT) 17.5-3.13-1.6 GM/177ML SOLN solution Please follow instructions given to you by your provider 22   Gwendolyn Henderson MD   meloxicam (MOBIC) 15 MG tablet Take 1 tablet by mouth daily 22  Val Smith MD   sildenafil (VIAGRA) 100 MG tablet Take 1 tablet by mouth daily as needed for Erectile Dysfunction 22   Val Smith MD   tamsulosin [facility]) 0.4 MG capsule Take 1 capsule by mouth daily 22   Val Smith MD   metFORMIN (GLUCOPHAGE) 500 MG tablet Take 1 tablet by mouth daily (with breakfast) 22   Val Smith MD   cyclobenzaprine (FLEXERIL) 10 mg tablet Take 1 tablet by mouth 2 times daily as needed for Muscle spasms 22   Val Smith, MD   atenolol (TENORMIN) 25 MG tablet TAKE ONE TABLET BY MOUTH DAILY 5/4/22   FRANCES Siddiqui   cholestyramine Dory Savory) 4 g packet TAKE ONE PACKET BY MOUTH FOUR TIMES A DAY 4/11/22   Jesika Nunn MD   atorvastatin (LIPITOR) 20 MG tablet TAKE ONE TABLET BY MOUTH DAILY 1/24/22   Jesika Nunn MD   Blood Glucose Monitoring Suppl (BLOOD GLUCOSE MONITOR SYSTEM) w/Device KIT USE TO MONITOR BLOOD GLUCOSE LEVEL. (TEST ONCE PER DAY) 10/22/21   Jesika Nunn MD   blood glucose monitor strips Test blood glucose level 1 time per day. 10/22/21   Jesika Nunn MD   Lancets 30G MISC Inject 1 each into the skin daily 10/22/21   Jesika Nunn MD   hydrOXYzine (VISTARIL) 25 MG capsule  9/20/20   Historical Provider, MD   QUEtiapine (SEROQUEL) 50 MG tablet  9/20/20   Historical Provider, MD   loperamide (IMODIUM) 2 MG capsule TAKE ONE CAPSULE daily for DIARRHEA 8/4/20   Jesika Nunn MD   lamoTRIgine (LAMICTAL) 200 MG tablet Take 200 mg by mouth daily 9/25/17   Historical Provider, MD   acetaminophen (TYLENOL) 325 MG tablet Take 2 tablets by mouth every 4 hours as needed for Pain 12/15/16   Jesika Nunn MD       Current medications:    Current Facility-Administered Medications   Medication Dose Route Frequency Provider Last Rate Last Admin    lidocaine PF 1 % injection 1 mL  1 mL IntraDERmal Once PRN Kaushal Johnson MD        0.9 % sodium chloride infusion   IntraVENous Continuous Kaushal Johnson MD        sodium chloride flush 0.9 % injection 5-40 mL  5-40 mL IntraVENous 2 times per day Kaushal Johnson MD        sodium chloride flush 0.9 % injection 5-40 mL  5-40 mL IntraVENous PRN Kaushal Johnson MD        0.9 % sodium chloride infusion   IntraVENous PRN Kaushal Johnson MD           Allergies: Allergies   Allergen Reactions    Poison Ivy Extract Itching     Hives & itching       Problem List:    Patient Active Problem List   Diagnosis Code    Depression F32. A    Bipolar 1 disorder ([facility]) F31.9    Hypertension I10    Anxiety F41.9    Nicotine dependence F17.200    Osteoarthritis M19.90    Obstructive sleep apnea on CPAP G47.33, Z99.89    Lumbar radiculopathy M54.16    DDD (degenerative disc disease), lumbar M51.36    Chronic, continuous use of opioids F11.90    724.8 M47.817    Sacroiliitis, not elsewhere classified ([facility] M46.1    Chronic back pain M54.9, G89.29    Medication monitoring encounter Z51.81    Lumbar facet arthropathy M47.816    Cervical spondylosis M47.812    Facet arthropathy, cervical M47.812    Retinal detachment with multiple breaks, left eye H33.022    Degenerative disc disease, cervical M50.30    Tubular adenoma D36.9    Hyperplastic polyp of intestine K63.5    Macular puckering of retina, left eye H35.372    Encounter for genetic counseling [id number]    Colon polyposis K63.5    Erectile dysfunction N52.9    Colon polyp K63.5    History of colon surgery Z98.890    Mild obesity E66.9    Gastroesophageal reflux disease without esophagitis K21.9    Retinal detachment with multiple breaks, right H33.021    Gastritis K29.70    Diabetes mellitus type 2 in obese ([facility]) E11.69, E66.9    Closed Kang's fracture of right radius with routine healing S52.541D       Past Medical History:        Diagnosis Date    Abnormal EKG 10/27/2016    indicates inferior infarct.     Anxiety     Bipolar disorder ([facility] [facility] 2006    on rx    Chronic back pain 11/21/2014    Depression 2006    on rx    Diabetes mellitus ([facility] [address]     borderline    Fracture, clavicle 1996    LEFT    Hyperlipidemia     Hypertension 2006    on rx    Legally blind in left eye, as defined in [place]      very blurry    Lumbar radiculopathy 04/23/2014    Nicotine dependence     Osteoarthritis     Pain     LEFT EYE    Retinal detachment     history miguel    Sleep apnea     doesnt use cpap     Tubular adenoma     Visual floaters     Wears glasses     USES MAGNIFYING GLASS Past Surgical History:        Procedure Laterality Date    CATARACT REMOVAL Left     CATARACT REMOVAL WITH IMPLANT Right 8-25-15    CHOLECYSTECTOMY  2/13    COLONOSCOPY  09/22/2016    the ICV was edematous and erythematous but most likely normal variant , bxs taken Large polyp 3 cm, at 50 cm from the anal verge with a very  wide stalk, not removed tattooed needs to be removed with subtotal colectomy    COLONOSCOPY N/A 5/2/2019    COLONOSCOPY POLYPECTOMY COLD BIOPSY, HOT SNARE performed by Phoenix Sosa MD at [address] [place] [address], base of skull-left side    EYE SURGERY  12/31/15    laser right eye, left vit    EYE SURGERY Left     torn retina lazer/freezer/hole    EYE SURGERY Left 0/48/98    SILICONE REMOVAL AIR FLUID EXCHANGE    EYE SURGERY      cataracts removed miguel.  EYE SURGERY      total 6 eye surg to left, 2 eye surg. to rt.     HERNIA REPAIR      umbilical    LYMPH NODE BIOPSY Left 1971    BASE OF SKULL    NASAL SEPTUM SURGERY  11-24-15    AR OFFICE/OUTPT VISIT,PROCEDURE ONLY Right 7/12/2018    VITRECTOMY 25 GAUGE, AIR FLUID EXCHANGE, AIR GAS EXCHANGE WITH 18% SF6, ENDOLASER, 200 MSECONDS, 200 MWATTS, 377 PULSES performed by Jonathan Jones MD at [address]  12/09/2016    w/ ileorectal anastomosis    TONSILLECTOMY  0805    UMBILICAL HERNIA REPAIR  1996    UPPER GASTROINTESTINAL ENDOSCOPY N/A 5/2/2019    EGD BIOPSY performed by Phoenix Sosa MD at [address] VITRECTOMY Left 12/10/2015    VITRECTOMY Left 10/27/2016    membrane peeling    VITRECTOMY Right 07/12/2018    laser, gas    WRIST FRACTURE SURGERY Right 1/31/2022    DISTAL RADIUS OPEN REDUCTION INTERNAL FIXATION performed by Richard Thomas MD at [facility] [address] History:    Social History     Tobacco Use    Smoking status: Every Day     Packs/day: 1.00     Years: 30.00     Pack years: 30.00     Types: Cigarettes    Smokeless tobacco: Never   Substance Use Topics    Alcohol use: Yes     Comment: 6 drinks per weekly- weekends only now (1/27/22)                                Ready to quit: Not Answered  Counseling given: Not Answered      Vital Signs (Current): There were no vitals filed for this visit. BP Readings from Last 3 Encounters:   07/27/22 (!) 142/93   06/27/22 120/76   05/02/22 124/72       NPO Status:                                                                                 BMI:   Wt Readings from Last 3 Encounters:   08/02/22 185 lb (83.9 kg)   06/27/22 182 lb 3.2 oz (82.6 kg)   05/02/22 185 lb (83.9 kg)     There is no height or weight on file to calculate BMI.    CBC:   Lab Results   Component Value Date/Time    WBC 9.6 01/23/2022 12:12 AM    RBC 4.40 01/23/2022 12:12 AM    RBC 5.02 05/22/2012 07:45 AM    HGB 13.4 01/23/2022 12:12 AM    HCT 38.9 01/23/2022 12:12 AM    MCV 88.4 01/23/2022 12:12 AM    RDW 14.0 01/23/2022 12:12 AM     01/23/2022 12:12 AM     05/22/2012 07:45 AM       CMP:   Lab Results   Component Value Date/Time     01/23/2022 12:12 AM    K 3.9 01/23/2022 12:12 AM     01/23/2022 12:12 AM    CO2 25 01/23/2022 12:12 AM    BUN 14 01/23/2022 12:12 AM    CREATININE 0.73 01/23/2022 12:12 AM    GFRAA >60 01/23/2022 12:12 AM    LABGLOM >60 01/23/2022 12:12 AM    GLUCOSE 92 01/23/2022 12:12 AM    GLUCOSE 154 05/22/2012 07:45 AM    PROT 7.4 03/17/2021 01:25 PM    CALCIUM 9.0 01/23/2022 12:12 AM    BILITOT 0.39 03/17/2021 01:25 PM    ALKPHOS 104 03/17/2021 01:25 PM    AST 33 03/17/2021 01:25 PM    ALT 21 03/17/2021 01:25 PM       POC Tests: No results for input(s): POCGLU, POCNA, POCK, POCCL, POCBUN, POCHEMO, POCHCT in the last 72 hours.     Coags:   Lab Results   Component Value Date/Time    PROTIME 13.4 05/23/2012 10:12 AM    INR 1.3 05/23/2012 10:12 AM    APTT 37.8 05/23/2012 10:12 AM       HCG (If Applicable): No results found for: PREGTESTUR, PREGSERUM, HCG, HCGQUANT     ABGs:   Lab Results   Component Value Date/Time    PO2ART 56.9 05/22/2012 03:48 PM    TNL5OUH 22.8 05/22/2012 03:48 PM    U2KWLCSZ 87.1 05/22/2012 03:48 PM        Type & Screen (If Applicable):  No results found for: LABABO, LABRH    Drug/Infectious Status (If Applicable):  No results found for: HIV, HEPCAB    COVID-19 Screening (If Applicable):   Lab Results   Component Value Date/Time    COVID19 Not Detected 01/27/2022 11:07 PM           Anesthesia Evaluation  Patient summary reviewed and Nursing notes reviewed no history of anesthetic complications:   Airway: Mallampati: II  TM distance: >3 FB   Neck ROM: full  Mouth opening: > = 3 FB   Dental: normal exam         Pulmonary: breath sounds clear to auscultation  (+) sleep apnea: on noncompliant,                             Cardiovascular:    (+) hypertension:,       ECG reviewed  Rhythm: regular                      Neuro/Psych:   (+) neuromuscular disease:, psychiatric history: stable with treatmentdepression/anxiety             GI/Hepatic/Renal:   (+) GERD:,           Endo/Other:    (+) DiabetesType II DM, , : arthritis:., .                 Abdominal:             Vascular: negative vascular ROS. Other Findings:           Anesthesia Plan      general     ASA 3       Induction: intravenous. Anesthetic plan and risks discussed with patient. Plan discussed with CRNA.                     Rae Thrasher MD   8/11/2022

## 2022-08-11 NOTE — H&P
HISTORY and [facility] [id number]       NAME:  Neo Fitzpatrick  MRN: [id number]   YOB: 1960   Date: 8/11/2022   Age: 65 y.o. Gender: male       COMPLAINT AND PRESENT HISTORY:   Neo Fitzpatrick  is a 65 y.o. male presenting today for COLONOSCOPY DIAGNOSTIC as r/t HISTORY OF COLON POLYPS. Pt denies any gi symptoms including n/v/d/c, no abdominal pain, no bloody or tarry stools. Pt has had colonoscopies in the past with polyp removal. Denies any known family hx of colon cacer. Pt reports completing bowel prep without complications, last BM reported this morning. He states last BM was clear with no stool particles. Pt has a PMHX significant for Abnormal ECG, HLD, HTN, DM2, ignacio no machine         Patient states they have been NPO since before midnight. Sip of water with meds   Pt does not wear dentures. Pt denies any hx of MRSA infection  Pt not currently taking any blood thinners or anticoagulants  Pt denies any personal or FHx of complications with anesthesia. Pt denies any acute symptoms of illness at this time including no SOB, CP, fever, URI or UTI symptoms. RECENT IMAGING    XR ELBOW LEFT (MIN 3 VIEWS)    Result Date: 7/27/2022  No fracture or dislocation. Large amount of soft tissue swelling over the olecranon consistent with either bursitis or sequela of soft tissue trauma        PAST MEDICAL HISTORY     Past Medical History:   Diagnosis Date    Abnormal EKG 10/27/2016    indicates inferior infarct.     Anxiety     Bipolar disorder ([address] 2006    on rx    Chronic back pain 11/21/2014    Depression 2006    on rx    Diabetes mellitus ([address]     borderline    Fracture, clavicle 1996    LEFT    Hyperlipidemia     Hypertension 2006    on rx    Legally blind in left eye, as defined in [place]      very blurry    Lumbar radiculopathy 04/23/2014    Nicotine dependence     Osteoarthritis     Pain     LEFT EYE    Retinal detachment     history miguel    Sleep apnea     doesnt use cpap     Tubular adenoma     Visual floaters     Wears glasses     USES MAGNIFYING GLASS       SURGICAL HISTORY       Past Surgical History:   Procedure Laterality Date    CATARACT REMOVAL Left     CATARACT REMOVAL WITH IMPLANT Right 8-25-15    CHOLECYSTECTOMY  2/13    COLONOSCOPY  09/22/2016    the ICV was edematous and erythematous but most likely normal variant , bxs taken Large polyp 3 cm, at 50 cm from the anal verge with a very  wide stalk, not removed tattooed needs to be removed with subtotal colectomy    COLONOSCOPY N/A 5/2/2019    COLONOSCOPY POLYPECTOMY COLD BIOPSY, HOT SNARE performed by Mary Lou Lemus MD at [facility]    foot, base of skull-left side    EYE SURGERY  12/31/15    laser right eye, left vit    EYE SURGERY Left     torn retina lazer/freezer/hole    EYE SURGERY Left 3/80/93    SILICONE REMOVAL AIR FLUID EXCHANGE    EYE SURGERY      cataracts removed miguel. EYE SURGERY      total 6 eye surg to left, 2 eye surg. to rt.     HERNIA REPAIR      umbilical    LYMPH NODE BIOPSY Left 1971    BASE OF SKULL    NASAL SEPTUM SURGERY  11-24-15    MS OFFICE/OUTPT VISIT,PROCEDURE ONLY Right 7/12/2018    VITRECTOMY 25 GAUGE, AIR FLUID EXCHANGE, AIR GAS EXCHANGE WITH 18% SF6, ENDOLASER, 200 MSECONDS, 200 MWATTS, 377 PULSES performed by Kana Sr MD at [facility]  12/09/2016    w/ ileorectal anastomosis    [address] [facility]    UPPER GASTROINTESTINAL ENDOSCOPY N/A 5/2/2019    EGD BIOPSY performed by Mary Lou Lemus MD at [address] Left 12/10/2015    VITRECTOMY Left 10/27/2016    membrane peeling    VITRECTOMY Right 07/12/2018    laser, gas    WRIST FRACTURE SURGERY Right 1/31/2022    DISTAL RADIUS OPEN REDUCTION INTERNAL FIXATION performed by Alesha Talavera MD at [address] [facility]       Family History   Problem Relation Age of Onset    Heart Failure Mother     Cancer Mother     Heart Disease Mother         CAD-OPEN HEART    Mental Retardation Sister     Seizures Sister     Hypertension Other         maternal history       SOCIAL HISTORY       Social History     Socioeconomic History    Marital status: Single   Occupational History    Occupation: disabled   Tobacco Use    Smoking status: Every Day     Packs/day: 1.00     Years: 30.00     Pack years: 30.00     Types: Cigarettes    Smokeless tobacco: Never   Vaping Use    Vaping Use: Never used   Substance and Sexual Activity    Alcohol use: Yes     Comment: 6 drinks per weekly- weekends only now (1/27/22)    Drug use: No     Social Determinants of Health     Financial Resource Strain: Low Risk     Difficulty of Paying Living Expenses: Not hard at all   Food Insecurity: No Food Insecurity    Worried About Running Out of Food in the Last Year: Never true    Ran Out of Food in the Last Year: Never true           REVIEW OF SYSTEMS      Allergies   Allergen Reactions    Poison Ivy Extract Itching     Hives & itching       No current facility-administered medications on file prior to encounter.      Current Outpatient Medications on File Prior to Encounter   Medication Sig Dispense Refill    Na Sulfate-K Sulfate-Mg Sulf (SUPREP BOWEL PREP KIT) 17.5-3.13-1.6 GM/177ML SOLN solution Please follow instructions given to you by your provider 1 each 0    meloxicam (MOBIC) 15 MG tablet Take 1 tablet by mouth daily 30 tablet 5    sildenafil (VIAGRA) 100 MG tablet Take 1 tablet by mouth daily as needed for Erectile Dysfunction 90 tablet 2    tamsulosin (FLOMAX) 0.4 MG capsule Take 1 capsule by mouth daily 90 capsule 3    metFORMIN (GLUCOPHAGE) 500 MG tablet Take 1 tablet by mouth daily (with breakfast) 90 tablet 3    cyclobenzaprine (FLEXERIL) 10 mg tablet Take 1 tablet by mouth 2 times daily as needed for Muscle spasms 60 tablet 5    atenolol (TENORMIN) 25 MG tablet TAKE ONE TABLET BY MOUTH DAILY 90 tablet 0    cholestyramine (QUESTRAN) 4 g packet TAKE ONE PACKET BY MOUTH FOUR TIMES A  packet 5    atorvastatin (LIPITOR) 20 MG tablet TAKE ONE TABLET BY MOUTH DAILY 90 tablet 3    Blood Glucose Monitoring Suppl (BLOOD GLUCOSE MONITOR SYSTEM) w/Device KIT USE TO MONITOR BLOOD GLUCOSE LEVEL. (TEST ONCE PER DAY) 1 kit 0    blood glucose monitor strips Test blood glucose level 1 time per day. 100 strip 3    Lancets 30G MISC Inject 1 each into the skin daily 100 each 3    hydrOXYzine (VISTARIL) 25 MG capsule       QUEtiapine (SEROQUEL) 50 MG tablet       loperamide (IMODIUM) 2 MG capsule TAKE ONE CAPSULE daily for DIARRHEA 90 capsule 5    lamoTRIgine (LAMICTAL) 200 MG tablet Take 200 mg by mouth daily      acetaminophen (TYLENOL) 325 MG tablet Take 2 tablets by mouth every 4 hours as needed for Pain 120 tablet 3        Review of Systems   Constitutional:  Negative for chills, diaphoresis, fatigue and fever. HENT:  Negative for congestion, dental problem, ear pain, postnasal drip, rhinorrhea, sinus pressure, sinus pain, sore throat and trouble swallowing. Eyes:  Positive for visual disturbance. Respiratory:  Negative for apnea, cough, chest tightness, shortness of breath and wheezing. Cardiovascular:  Negative for chest pain, palpitations and leg swelling. Gastrointestinal:         See hpi    Genitourinary:  Negative for dysuria, flank pain, frequency and hematuria. Musculoskeletal:  Negative for back pain, joint swelling and myalgias. Skin:  Negative for rash and wound. Neurological:  Negative for dizziness, weakness, numbness and headaches. Hematological:  Does not bruise/bleed easily. Psychiatric/Behavioral:  Negative for agitation and confusion. The patient is not nervous/anxious. See HPI    GENERAL PHYSICAL EXAM:     Vitals: See nurse flow sheet     Physical Exam  Constitutional:       General: He is not in acute distress. Appearance: Normal appearance. He is well-developed and normal weight.  He is not ill-appearing or toxic-appearing. HENT:      Head: Normocephalic and atraumatic. Mouth/Throat:      Mouth: Mucous membranes are dry. Pharynx: Oropharynx is clear. No oropharyngeal exudate or posterior oropharyngeal erythema. Eyes:      Extraocular Movements: Extraocular movements intact. Conjunctiva/sclera: Conjunctivae normal.      Pupils: Pupils are equal, round, and reactive to light. Comments: Glasses    Cardiovascular:      Rate and Rhythm: Normal rate and regular rhythm. Pulses: Normal pulses. Heart sounds: Normal heart sounds. No murmur heard. No friction rub. No gallop. Pulmonary:      Effort: Pulmonary effort is normal.      Breath sounds: Normal breath sounds. No wheezing. Abdominal:      General: Bowel sounds are normal. There is no distension. Palpations: Abdomen is soft. Tenderness: There is no abdominal tenderness. There is no guarding or rebound. Musculoskeletal:         General: No swelling. Normal range of motion. Cervical back: Normal range of motion and neck supple. No rigidity or tenderness. Right lower leg: No edema. Left lower leg: No edema. Skin:     General: Skin is warm and dry. Findings: No erythema. Neurological:      General: No focal deficit present. Mental Status: He is alert and oriented to person, place, and time. Mental status is at baseline. Sensory: No sensory deficit. Psychiatric:         Mood and Affect: Mood normal.         Behavior: Behavior normal.         Thought Content:  Thought content normal.         Judgment: Judgment normal.                                                                                       PROVISIONAL DIAGNOSES / SURGERY:      COLONOSCOPY DIAGNOSTIC     HISTORY OF COLON POLYPS    Patient Active Problem List    Diagnosis Date Noted    Closed Kang's fracture of right radius with routine healing 01/31/2022    Diabetes mellitus type 2 in obese ([facility] 02/27/2020    Gastritis 10/29/2019 Retinal detachment with multiple breaks, right 07/12/2018    Gastroesophageal reflux disease without esophagitis 12/14/2017    Colon polyp     History of colon surgery     Mild obesity     Colon polyposis 11/15/2016    Erectile dysfunction 11/15/2016    Encounter for genetic counseling 10/29/2016    Macular puckering of retina, left eye 10/27/2016    Tubular adenoma     Hyperplastic polyp of intestine     Degenerative disc disease, cervical     Retinal detachment with multiple breaks, left eye 12/10/2015    Facet arthropathy, cervical     Cervical spondylosis     Lumbar facet arthropathy     Medication monitoring encounter 11/23/2014    Chronic back pain 11/21/2014    724.8 07/31/2014    Sacroiliitis, not elsewhere classified ([facility] 07/31/2014    Chronic, continuous use of opioids 05/23/2014    Lumbar radiculopathy 04/23/2014    DDD (degenerative disc disease), lumbar 04/23/2014    Hypertension     Anxiety     Nicotine dependence     Osteoarthritis     Obstructive sleep apnea on CPAP     Depression 06/07/2012    Bipolar 1 disorder ([facility] 06/07/2012               LUKASZ Jackson - CNP on 8/11/2022 at 9:58 AM

## 2022-08-12 LAB — SURGICAL PATHOLOGY REPORT: NORMAL

## 2022-08-14 ENCOUNTER — APPOINTMENT (OUTPATIENT)
Dept: CT IMAGING | Age: 62
DRG: 381 | End: 2022-08-14
Payer: MEDICARE

## 2022-08-14 ENCOUNTER — HOSPITAL ENCOUNTER (INPATIENT)
Age: 62
LOS: 3 days | Discharge: HOME | DRG: 381 | End: 2022-08-17
Attending: EMERGENCY MEDICINE | Admitting: FAMILY MEDICINE
Payer: MEDICARE

## 2022-08-14 DIAGNOSIS — K92.1 MELENA: ICD-10-CM

## 2022-08-14 DIAGNOSIS — K92.2 UPPER GI BLEED: Primary | ICD-10-CM

## 2022-08-14 PROBLEM — E11.9 TYPE 2 DIABETES MELLITUS WITHOUT COMPLICATION, WITHOUT LONG-TERM CURRENT USE OF INSULIN (HCC): Status: ACTIVE | Noted: 2022-08-14

## 2022-08-14 LAB
ABSOLUTE EOS #: 0.15 K/UL (ref 0–0.44)
ABSOLUTE IMMATURE GRANULOCYTE: 0.11 K/UL (ref 0–0.3)
ABSOLUTE LYMPH #: 2.64 K/UL (ref 1.1–3.7)
ABSOLUTE MONO #: 0.87 K/UL (ref 0.1–1.2)
ALBUMIN SERPL-MCNC: 3.8 G/DL (ref 3.5–5.2)
ALBUMIN/GLOBULIN RATIO: 1.7 (ref 1–2.5)
ALP BLD-CCNC: 107 U/L (ref 40–129)
ALT SERPL-CCNC: 42 U/L (ref 5–41)
ANION GAP SERPL CALCULATED.3IONS-SCNC: 14 MMOL/L (ref 9–17)
AST SERPL-CCNC: 19 U/L
BASOPHILS # BLD: 0 % (ref 0–2)
BASOPHILS ABSOLUTE: 0.04 K/UL (ref 0–0.2)
BILIRUB SERPL-MCNC: 0.17 MG/DL (ref 0.3–1.2)
BUN BLDV-MCNC: 72 MG/DL (ref 8–23)
CALCIUM SERPL-MCNC: 8.6 MG/DL (ref 8.6–10.4)
CHLORIDE BLD-SCNC: 105 MMOL/L (ref 98–107)
CO2: 22 MMOL/L (ref 20–31)
CREAT SERPL-MCNC: 0.78 MG/DL (ref 0.7–1.2)
EOSINOPHILS RELATIVE PERCENT: 1 % (ref 1–4)
GFR AFRICAN AMERICAN: >60 ML/MIN
GFR NON-AFRICAN AMERICAN: >60 ML/MIN
GFR SERPL CREATININE-BSD FRML MDRD: ABNORMAL ML/MIN/{1.73_M2}
GLUCOSE BLD-MCNC: 120 MG/DL (ref 75–110)
GLUCOSE BLD-MCNC: 189 MG/DL (ref 70–99)
HCT VFR BLD CALC: 23.5 % (ref 40.7–50.3)
HCT VFR BLD CALC: 28.5 % (ref 40.7–50.3)
HEMOGLOBIN: 7.8 G/DL (ref 13–17)
HEMOGLOBIN: 9.5 G/DL (ref 13–17)
IMMATURE GRANULOCYTES: 1 %
LACTIC ACID, WHOLE BLOOD: 2.4 MMOL/L (ref 0.7–2.1)
LIPASE: 25 U/L (ref 13–60)
LYMPHOCYTES # BLD: 19 % (ref 24–43)
MCH RBC QN AUTO: 30.4 PG (ref 25.2–33.5)
MCHC RBC AUTO-ENTMCNC: 33.3 G/DL (ref 28.4–34.8)
MCV RBC AUTO: 91.1 FL (ref 82.6–102.9)
MONOCYTES # BLD: 6 % (ref 3–12)
NRBC AUTOMATED: 0 PER 100 WBC
PDW BLD-RTO: 14.3 % (ref 11.8–14.4)
PLATELET # BLD: 292 K/UL (ref 138–453)
PMV BLD AUTO: 10.1 FL (ref 8.1–13.5)
POTASSIUM SERPL-SCNC: 4.7 MMOL/L (ref 3.7–5.3)
RBC # BLD: 3.13 M/UL (ref 4.21–5.77)
SEG NEUTROPHILS: 73 % (ref 36–65)
SEGMENTED NEUTROPHILS ABSOLUTE COUNT: 10.08 K/UL (ref 1.5–8.1)
SODIUM BLD-SCNC: 141 MMOL/L (ref 135–144)
TOTAL PROTEIN: 6 G/DL (ref 6.4–8.3)
WBC # BLD: 13.9 K/UL (ref 3.5–11.3)

## 2022-08-14 PROCEDURE — 99222 1ST HOSP IP/OBS MODERATE 55: CPT | Performed by: NURSE PRACTITIONER

## 2022-08-14 PROCEDURE — 96376 TX/PRO/DX INJ SAME DRUG ADON: CPT

## 2022-08-14 PROCEDURE — 2060000000 HC ICU INTERMEDIATE R&B

## 2022-08-14 PROCEDURE — 2580000003 HC RX 258: Performed by: STUDENT IN AN ORGANIZED HEALTH CARE EDUCATION/TRAINING PROGRAM

## 2022-08-14 PROCEDURE — 86850 RBC ANTIBODY SCREEN: CPT

## 2022-08-14 PROCEDURE — 85018 HEMOGLOBIN: CPT

## 2022-08-14 PROCEDURE — 6360000002 HC RX W HCPCS: Performed by: STUDENT IN AN ORGANIZED HEALTH CARE EDUCATION/TRAINING PROGRAM

## 2022-08-14 PROCEDURE — 86900 BLOOD TYPING SEROLOGIC ABO: CPT

## 2022-08-14 PROCEDURE — 85014 HEMATOCRIT: CPT

## 2022-08-14 PROCEDURE — 96365 THER/PROPH/DIAG IV INF INIT: CPT

## 2022-08-14 PROCEDURE — 82947 ASSAY GLUCOSE BLOOD QUANT: CPT

## 2022-08-14 PROCEDURE — 96375 TX/PRO/DX INJ NEW DRUG ADDON: CPT

## 2022-08-14 PROCEDURE — 99285 EMERGENCY DEPT VISIT HI MDM: CPT

## 2022-08-14 PROCEDURE — 83690 ASSAY OF LIPASE: CPT

## 2022-08-14 PROCEDURE — 6360000004 HC RX CONTRAST MEDICATION: Performed by: STUDENT IN AN ORGANIZED HEALTH CARE EDUCATION/TRAINING PROGRAM

## 2022-08-14 PROCEDURE — 86901 BLOOD TYPING SEROLOGIC RH(D): CPT

## 2022-08-14 PROCEDURE — 74177 CT ABD & PELVIS W/CONTRAST: CPT

## 2022-08-14 PROCEDURE — 85025 COMPLETE CBC W/AUTO DIFF WBC: CPT

## 2022-08-14 PROCEDURE — C9113 INJ PANTOPRAZOLE SODIUM, VIA: HCPCS | Performed by: STUDENT IN AN ORGANIZED HEALTH CARE EDUCATION/TRAINING PROGRAM

## 2022-08-14 PROCEDURE — 80053 COMPREHEN METABOLIC PANEL: CPT

## 2022-08-14 PROCEDURE — 83605 ASSAY OF LACTIC ACID: CPT

## 2022-08-14 PROCEDURE — 86920 COMPATIBILITY TEST SPIN: CPT

## 2022-08-14 RX ORDER — SODIUM CHLORIDE, SODIUM LACTATE, POTASSIUM CHLORIDE, CALCIUM CHLORIDE 600; 310; 30; 20 MG/100ML; MG/100ML; MG/100ML; MG/100ML
INJECTION, SOLUTION INTRAVENOUS CONTINUOUS
Status: DISCONTINUED | OUTPATIENT
Start: 2022-08-14 | End: 2022-08-16

## 2022-08-14 RX ORDER — ONDANSETRON 2 MG/ML
4 INJECTION INTRAMUSCULAR; INTRAVENOUS ONCE
Status: COMPLETED | OUTPATIENT
Start: 2022-08-14 | End: 2022-08-14

## 2022-08-14 RX ORDER — INSULIN LISPRO 100 [IU]/ML
0-4 INJECTION, SOLUTION INTRAVENOUS; SUBCUTANEOUS
Status: DISCONTINUED | OUTPATIENT
Start: 2022-08-15 | End: 2022-08-17 | Stop reason: HOSPADM

## 2022-08-14 RX ORDER — INSULIN LISPRO 100 [IU]/ML
0-4 INJECTION, SOLUTION INTRAVENOUS; SUBCUTANEOUS NIGHTLY
Status: DISCONTINUED | OUTPATIENT
Start: 2022-08-14 | End: 2022-08-17 | Stop reason: HOSPADM

## 2022-08-14 RX ORDER — DEXTROSE MONOHYDRATE 100 MG/ML
INJECTION, SOLUTION INTRAVENOUS CONTINUOUS PRN
Status: DISCONTINUED | OUTPATIENT
Start: 2022-08-14 | End: 2022-08-17 | Stop reason: HOSPADM

## 2022-08-14 RX ORDER — 0.9 % SODIUM CHLORIDE 0.9 %
1000 INTRAVENOUS SOLUTION INTRAVENOUS ONCE
Status: COMPLETED | OUTPATIENT
Start: 2022-08-14 | End: 2022-08-14

## 2022-08-14 RX ADMIN — SODIUM CHLORIDE 1000 ML: 9 INJECTION, SOLUTION INTRAVENOUS at 14:42

## 2022-08-14 RX ADMIN — SODIUM CHLORIDE 80 MG: 9 INJECTION, SOLUTION INTRAVENOUS at 16:34

## 2022-08-14 RX ADMIN — ONDANSETRON 4 MG: 2 INJECTION INTRAMUSCULAR; INTRAVENOUS at 14:42

## 2022-08-14 RX ADMIN — SODIUM CHLORIDE, POTASSIUM CHLORIDE, SODIUM LACTATE AND CALCIUM CHLORIDE: 600; 310; 30; 20 INJECTION, SOLUTION INTRAVENOUS at 16:57

## 2022-08-14 RX ADMIN — IOPAMIDOL 75 ML: 755 INJECTION, SOLUTION INTRAVENOUS at 16:22

## 2022-08-14 RX ADMIN — SODIUM CHLORIDE 8 MG/HR: 9 INJECTION, SOLUTION INTRAVENOUS at 17:08

## 2022-08-14 ASSESSMENT — ENCOUNTER SYMPTOMS
DIARRHEA: 0
BLOOD IN STOOL: 1
SHORTNESS OF BREATH: 0
CHEST TIGHTNESS: 0
NAUSEA: 0
COLOR CHANGE: 0
ABDOMINAL PAIN: 0
EYE REDNESS: 0
CONSTIPATION: 0
SHORTNESS OF BREATH: 1
TROUBLE SWALLOWING: 0
COUGH: 0
PHOTOPHOBIA: 0
VOMITING: 1
BACK PAIN: 0
RHINORRHEA: 0

## 2022-08-14 ASSESSMENT — PAIN - FUNCTIONAL ASSESSMENT: PAIN_FUNCTIONAL_ASSESSMENT: NONE - DENIES PAIN

## 2022-08-14 NOTE — FLOWSHEET NOTE
[address] [facility] [address]  PROGRESS NOTE    Shift date: 08/14/22  Shift day: Sunday   Shift # 2    Room # 17/17   Name: Popeye Shaw                Spiritism:    Place of Yazidi:     Referral: Routine Visit    Admit Date & Time: 8/14/2022  2:27 PM    Assessment:  Popeye Shaw is a 65 y.o. male     Intervention:  Writer introduced self and title as . Patient engaged in conversation with  and appeared to welcome visit. Writer offered space for patient  to express feelings, needs, and concerns and provided a ministry presence. Patient asked for bedding and pillow which  provided. Outcome:  Patient expressed gratitude for visit. Plan:  Chaplains will remain available to offer spiritual and emotional support as needed.       Electronically signed by Sharon Medrano, on 8/14/2022 at 6:10 PM.  [facility]  [phone]

## 2022-08-14 NOTE — ED NOTES
Report given to 4B RN, all questions answered  No change in patient status  Continues to rest quietly       Deborah Murcia RN  08/14/22 [id number]

## 2022-08-14 NOTE — H&P
[facility]  Office: [address], DO, Linn Barth, DO, Manuela Mail, DO, Moraima Has Blood, DO, Harlan Walsh MD, Gisela Gatica MD, Smith Grover MD, Andrew eBnítez MD,  Adwoa Johnson MD, Rebeca Best MD, Richard Chen, DO, Kvng Molina MD,  Marcello Flores MD, Sunny Ugarte MD, Enmanuel Toney, DO, Gabriel Weaver MD, Chandrakant Rodriguez MD, Gareth Calderón MD, Odilon Orozco, DO, Sky Garcia MD, Craig eLung MD, Soledad Mom, CNP,  Virginia Raw, CNP, Jihan Tucker, CNP, Keli Booty, CNP, Elsie Ellington PAKamiC, Bridget Angeles, DNP, Beto Valderrama, CNP, Michelle Beat, CNP, Naga Class, CNP, Shemar Herzog, CNP, Lázaro Grande, CNP, Kd Petandrés, CNS, Tony Parmar, DNP, Jasmin Bond, CNP, Mireya Amador, CNP, Niranjan Ashford, CNP           [address]    HISTORY AND PHYSICAL EXAMINATION            Date:   8/14/2022  Patient name:  Jorge Donald  Date of admission:  8/14/2022  2:27 PM  MRN:   [id number]  Account:  [de-identified]  YOB: 1960  PCP:    Val Smith MD  Room:   9718/8329-04  Code Status:    Prior    Chief Complaint:     Chief Complaint   Patient presents with    Diarrhea     Had 2 polyps rmeoved from colon 2 days ago    Rectal Bleeding       History Obtained From:     patient, electronic medical record    History of Present Illness:     Jorge Donald is a 65 y.o. male who presented for evaluation of fever and tarry stools that occurred over the last 2 days. None today. He states that he had a colonoscopy done on 8/11/22 for routine screening. He did have polyps removed. He states after he went home he began having persistent sweating. He is diabetic but did not know if he had low blood sugar. He states he did eat after discharge. He did take Ibuprofen and sweating eventually subsided. Yesterday he states he had several dark, tarry diarrhea stools.  He had no dizziness or SOB. Denies chest pain. He has had no stools today. He does not take any blood thinners. He does take Meloxicam 15mg daily which he states he has been on for 15 years. He did not take any since his colonoscopy because he was told not to take with Ibuprofen. He has been taking Ibuprofen 800 mg BID since his colonoscopy. Additional medical history includes HLD, T2DM, HTN and bipolar disorder. Past Medical History:     Past Medical History:   Diagnosis Date    Abnormal EKG 10/27/2016    indicates inferior infarct.     Anxiety     Bipolar disorder ([address] 2006    on rx    Chronic back pain 11/21/2014    Depression 2006    on rx    Diabetes mellitus ([facility] [address]     borderline    Fracture, clavicle 1996    LEFT    Hyperlipidemia     Hypertension 2006    on rx    Legally blind in left eye, as defined in [place]      very blurry    Lumbar radiculopathy 04/23/2014    Nicotine dependence     Osteoarthritis     Pain     LEFT EYE    Retinal detachment     history miguel    Sleep apnea     doesnt use cpap     Tubular adenoma     Visual floaters     Wears glasses     USES MAGNIFYING GLASS        Past Surgical History:     Past Surgical History:   Procedure Laterality Date    CATARACT REMOVAL Left     CATARACT REMOVAL WITH IMPLANT Right 8-25-15    CHOLECYSTECTOMY  2/13    COLONOSCOPY  09/22/2016    the ICV was edematous and erythematous but most likely normal variant , bxs taken Large polyp 3 cm, at 50 cm from the anal verge with a very  wide stalk, not removed tattooed needs to be removed with subtotal colectomy    COLONOSCOPY N/A 5/2/2019    COLONOSCOPY POLYPECTOMY COLD BIOPSY, HOT SNARE performed by Syl Russo MD at [facility] N/A 8/11/2022    COLONOSCOPY POLYPECTOMY SNARE/COLD BIOPSY performed by Syl Russo MD at [facility]-left side    EYE SURGERY  12/31/15    laser right eye, left vit    EYE SURGERY Left     torn retina lazer/freezer/hole    EYE SURGERY Left 7/30/12    SILICONE REMOVAL AIR FLUID EXCHANGE    EYE SURGERY      cataracts removed miguel.  EYE SURGERY      total 6 eye surg to left, 2 eye surg. to rt.  HERNIA REPAIR      umbilical    LYMPH NODE BIOPSY Left 1971    BASE OF SKULL    NASAL SEPTUM SURGERY  11-24-15    [place] OFFICE/OUTPT VISIT,PROCEDURE ONLY Right 7/12/2018    VITRECTOMY 25 GAUGE, AIR FLUID EXCHANGE, AIR GAS EXCHANGE WITH 18% SF6, ENDOLASER, 200 MSECONDS, 200 MWATTS, 377 PULSES performed by Kaley Diaz MD at [address]  12/09/2016    w/ ileorectal anastomosis    TONSILLECTOMY  4396    UMBILICAL HERNIA REPAIR  1996    UPPER GASTROINTESTINAL ENDOSCOPY N/A 5/2/2019    EGD BIOPSY performed by Gwendolyn Henderson MD at [address] VITRECTOMY Left 12/10/2015    VITRECTOMY Left 10/27/2016    membrane peeling    VITRECTOMY Right 07/12/2018    laser, gas    WRIST FRACTURE SURGERY Right 1/31/2022    DISTAL RADIUS OPEN REDUCTION INTERNAL FIXATION performed by Georgi Dance, MD at [address]        Medications Prior to Admission:     Prior to Admission medications    Medication Sig Start Date End Date Taking? Authorizing Provider   DULoxetine (CYMBALTA) 60 MG extended release capsule Take 1 capsule by mouth in the morning. 7/26/22   Val Smith MD   oxyCODONE-acetaminophen (PERCOCET) 5-325 MG per tablet Take 1 tablet by mouth every 8 hours as needed for Pain for up to 30 days.  8/5/22 9/4/22  Yinka Jaimes MD   ibuprofen (ADVIL;MOTRIN) 800 MG tablet Take 1 tablet by mouth 2 times daily as needed for Pain 7/27/22   Arlen Duron MD   diazePAM (VALIUM) 5 MG tablet TAKE ONE TABLET BY MOUTH EVERY 12 HOURS AS NEEDED FOR ANXIETY OR SLEEP 7/22/22 8/19/22  Val Smith MD   cloNIDine (CATAPRES) 0.1 MG tablet TAKE ONE TABLET BY MOUTH DAILY 7/14/22   Val Smith MD   Minoxidil (ROGAINE MENS) 5 % FOAM Apply topically nightly 7/6/22   Torito Ricardo MD loy [facility]) 15 MG tablet Take 1 tablet by mouth daily 6/29/22 8/11/22  Nadir Wellington MD   sildenafil (VIAGRA) 100 MG tablet Take 1 tablet by mouth daily as needed for Erectile Dysfunction 6/27/22   Nadir Wellington MD   tamsulosin [facility]) 0.4 MG capsule Take 1 capsule by mouth daily 6/27/22   Nadir Wellington MD   metFORMIN (GLUCOPHAGE) 500 MG tablet Take 1 tablet by mouth daily (with breakfast) 6/21/22   Nadir Wellington MD   cyclobenzaprine (FLEXERIL) 10 mg tablet Take 1 tablet by mouth 2 times daily as needed for Muscle spasms 6/1/22   Nadir Wellington MD   atenolol (TENORMIN) 25 MG tablet TAKE ONE TABLET BY MOUTH DAILY 5/4/22   Tamyeta FRANCES Crouch   cholestyramine [facility] [place]) 4 g packet TAKE ONE PACKET BY MOUTH FOUR TIMES A DAY 4/11/22   Nadir Wellington MD   atorvastatin (LIPITOR) 20 MG tablet TAKE ONE TABLET BY MOUTH DAILY 1/24/22   Nadir Wellington MD   Blood Glucose Monitoring Suppl (BLOOD GLUCOSE MONITOR SYSTEM) w/Device KIT USE TO MONITOR BLOOD GLUCOSE LEVEL. (TEST ONCE PER DAY) 10/22/21   Nadir Wellington MD   blood glucose monitor strips Test blood glucose level 1 time per day. 10/22/21   Nadir Wellington MD   Lancets 30G MISC Inject 1 each into the skin daily 10/22/21   Nadir Wellington MD   hydrOXYzine (VISTARIL) 25 MG capsule  9/20/20   Historical Provider, MD   QUEtiapine (SEROQUEL) 50 MG tablet  9/20/20   Historical Provider, MD   loperamide (IMODIUM) 2 MG capsule TAKE ONE CAPSULE daily for DIARRHEA 8/4/20   Nadir Wellington MD   lamoTRIgine (LAMICTAL) 200 MG tablet Take 200 mg by mouth daily 9/25/17   Historical Provider, MD   acetaminophen (TYLENOL) 325 MG tablet Take 2 tablets by mouth every 4 hours as needed for Pain 12/15/16   Nadir Wellington MD        Allergies:     Poison ivy extract    Social History:     Tobacco:    reports that he has been smoking cigarettes. He has a 30.00 pack-year smoking history.  He has never used smokeless tobacco.  Alcohol:      reports current alcohol use. Drug Use:  reports no history of drug use. Family History:     Family History   Problem Relation Age of Onset    Heart Failure Mother     Cancer Mother     Heart Disease Mother         CAD-OPEN HEART    Mental Retardation Sister     Seizures Sister     Hypertension Other         maternal history       Review of Systems:     Positive and Negative as described in HPI. Review of Systems   Constitutional:  Positive for chills and fever. Negative for appetite change and fatigue. HENT:  Negative for congestion and rhinorrhea. Eyes:  Negative for photophobia, redness and visual disturbance. Respiratory:  Positive for shortness of breath. Negative for cough. Cardiovascular:  Negative for chest pain, palpitations and leg swelling. Gastrointestinal:  Positive for blood in stool (Tarry diarrhea stools. ). Endocrine: Negative for polydipsia, polyphagia and polyuria. Genitourinary:  Negative for dysuria and hematuria. Musculoskeletal:  Negative for back pain and myalgias. Skin:  Negative for rash and wound. Allergic/Immunologic: Negative for immunocompromised state. Neurological:  Negative for dizziness, syncope, weakness and headaches. Hematological:  Negative for adenopathy. Does not bruise/bleed easily. Psychiatric/Behavioral:  Negative for confusion. The patient is not nervous/anxious. Physical Exam:   BP (!) 149/82   Pulse (!) 101   Temp 98.4 °F (36.9 °C) (Oral)   Resp 20   Ht 5' 11\" (1.803 m)   Wt 180 lb (81.6 kg)   SpO2 98%   BMI 25.10 kg/m²   Temp (24hrs), Av °F (36.7 °C), Min:97.5 °F (36.4 °C), Max:98.4 °F (36.9 °C)    No results for input(s): POCGLU in the last 72 hours. Intake/Output Summary (Last 24 hours) at 2022  Last data filed at 2022 1704  Gross per 24 hour   Intake 50 ml   Output --   Net 50 ml       Physical Exam  Vitals reviewed.    Constitutional:       General: He is not in acute distress. Appearance: Normal appearance. HENT:      Nose: Nose normal. No congestion or rhinorrhea. Mouth/Throat:      Mouth: Mucous membranes are dry. Pharynx: Oropharynx is clear. Eyes:      Pupils: Pupils are equal, round, and reactive to light. Cardiovascular:      Rate and Rhythm: Normal rate and regular rhythm. Pulmonary:      Effort: Pulmonary effort is normal.      Breath sounds: Normal breath sounds. Abdominal:      General: Bowel sounds are normal. There is no distension. Palpations: Abdomen is soft. Tenderness: There is no abdominal tenderness. There is no guarding. Musculoskeletal:         General: No swelling or tenderness. Normal range of motion. Cervical back: Normal range of motion. Right lower leg: No edema. Left lower leg: No edema. Skin:     General: Skin is warm and dry. Capillary Refill: Capillary refill takes less than 2 seconds. Neurological:      General: No focal deficit present. Mental Status: He is easily aroused. Psychiatric:         Mood and Affect: Mood normal.         Behavior: Behavior normal. Behavior is cooperative.        Investigations:      Laboratory Testing:  Recent Results (from the past 24 hour(s))   CBC with Auto Differential    Collection Time: 08/14/22  2:43 PM   Result Value Ref Range    WBC 13.9 (H) 3.5 - 11.3 k/uL    RBC 3.13 (L) 4.21 - 5.77 m/uL    Hemoglobin 9.5 (L) 13.0 - 17.0 g/dL    Hematocrit 28.5 (L) 40.7 - 50.3 %    MCV 91.1 82.6 - 102.9 fL    MCH 30.4 25.2 - 33.5 pg    MCHC 33.3 28.4 - 34.8 g/dL    RDW 14.3 11.8 - 14.4 %    Platelets 455 240 - 158 k/uL    MPV 10.1 8.1 - 13.5 fL    NRBC Automated 0.0 0.0 per 100 WBC    Seg Neutrophils 73 (H) 36 - 65 %    Lymphocytes 19 (L) 24 - 43 %    Monocytes 6 3 - 12 %    Eosinophils % 1 1 - 4 %    Basophils 0 0 - 2 %    Immature Granulocytes 1 (H) 0 %    Segs Absolute 10.08 (H) 1.50 - 8.10 k/uL    Absolute Lymph # 2.64 1.10 - 3.70 k/uL    Absolute Mono # 0.87 0.10 - 1.20 k/uL    Absolute Eos # 0.15 0.00 - 0.44 k/uL    Basophils Absolute 0.04 0.00 - 0.20 k/uL    Absolute Immature Granulocyte 0.11 0.00 - 0.30 k/uL   CMP    Collection Time: 08/14/22  2:43 PM   Result Value Ref Range    Glucose 189 (H) 70 - 99 mg/dL    BUN 72 (H) 8 - 23 mg/dL    Creatinine 0.78 0.70 - 1.20 mg/dL    Calcium 8.6 8.6 - 10.4 mg/dL    Sodium 141 135 - 144 mmol/L    Potassium 4.7 3.7 - 5.3 mmol/L    Chloride 105 98 - 107 mmol/L    CO2 22 20 - 31 mmol/L    Anion Gap 14 9 - 17 mmol/L    Alkaline Phosphatase 107 40 - 129 U/L    ALT 42 (H) 5 - 41 U/L    AST 19 <40 U/L    Total Bilirubin 0.17 (L) 0.3 - 1.2 mg/dL    Total Protein 6.0 (L) 6.4 - 8.3 g/dL    Albumin 3.8 3.5 - 5.2 g/dL    Albumin/Globulin Ratio 1.7 1.0 - 2.5    GFR Non-African American >60 >60 mL/min    GFR African American >60 >60 mL/min    GFR Comment         Lipase    Collection Time: 08/14/22  2:43 PM   Result Value Ref Range    Lipase 25 13 - 60 U/L   Lactic Acid    Collection Time: 08/14/22  2:55 PM   Result Value Ref Range    Lactic Acid, Whole Blood 2.4 (H) 0.7 - 2.1 mmol/L   TYPE AND SCREEN    Collection Time: 08/14/22  3:12 PM   Result Value Ref Range    Expiration Date 08/17/2022,2359     Arm Band Number BE [id number]     ABO/Rh O POSITIVE     Antibody Screen NEGATIVE    Hemoglobin and Hematocrit    Collection Time: 08/14/22  5:02 PM   Result Value Ref Range    Hemoglobin 7.8 (L) 13.0 - 17.0 g/dL    Hematocrit 23.5 (L) 40.7 - 50.3 %       Imaging/Diagnostics:  No results found.     Assessment :      Hospital Problems             Last Modified POA    * (Principal) GIB (gastrointestinal bleeding) 8/14/2022 Yes    Type 2 diabetes mellitus without complication, without long-term current use of insulin ([facility] [address] 8/14/2022 Yes    Complaint of melena 8/14/2022 Yes    Hypertension 8/14/2022 Yes    Nicotine dependence 8/14/2022 Yes       Plan:     Patient status inpatient in the Progressive Unit/Step down    Inpatient consult to GI team. Appreciate recommendations. Will continue Protonix drip. Trend H/H. Document any stools for color and consistency. Remain NPO. IV hydration. Hold home antidiabetic agents. Accu checks ac/hs with SSI. Hypoglycemia treatment orders prn. Carb control diet when okay with GI team.   Hold home antihypertensives while NPO. Prn IV antihypertensives until po meds can be restarted. Hold any NSAIDS or anticoagulation. May use SCD's while in bed for DVT prophylaxis. Tobacco cessation education. Case management for discharge planning. Consultations:   IP CONSULT TO GI  IP CONSULT TO HOSPITALIST     Patient is admitted as inpatient status because of co-morbidities listed above, severity of signs and symptoms as outlined, requirement for current medical therapies and most importantly because of direct risk to patient if care not provided in a hospital setting. Expected length of stay > 48 hours.     LUKASZ Craig NP  8/14/2022  8:10 PM    Copy sent to Dr. Loy Hall MD

## 2022-08-14 NOTE — ED PROVIDER NOTES
[facility] ED  Emergency Department  Emergency Medicine Resident [address] Cuca Hameed was assumed from Dr. Reina Perez and is being seen for Diarrhea (Had 2 polyps rmeoved from colon 2 days ago) and Rectal Bleeding  . The patient's initial evaluation and plan have been discussed with the prior provider who initially evaluated the patient.      EMERGENCY DEPARTMENT COURSE / MEDICAL DECISION MAKING:       MEDICATIONS GIVEN:  Orders Placed This Encounter   Medications    0.9 % sodium chloride bolus    ondansetron (ZOFRAN) injection 4 mg    pantoprazole (PROTONIX) 80 mg in sodium chloride 0.9 % 50 mL bolus    pantoprazole (PROTONIX) 80 mg in sodium chloride 0.9 % 100 mL infusion    pantoprazole (PROTONIX) 40 mg in sodium chloride (PF) 10 mL injection    iopamidol (ISOVUE-370) 76 % injection 75 mL    lactated ringers infusion       LABS / RADIOLOGY:     Labs Reviewed   CBC WITH AUTO DIFFERENTIAL - Abnormal; Notable for the following components:       Result Value    WBC 13.9 (*)     RBC 3.13 (*)     Hemoglobin 9.5 (*)     Hematocrit 28.5 (*)     Seg Neutrophils 73 (*)     Lymphocytes 19 (*)     Immature Granulocytes 1 (*)     Segs Absolute 10.08 (*)     All other components within normal limits   COMPREHENSIVE METABOLIC PANEL - Abnormal; Notable for the following components:    Glucose 189 (*)     BUN 72 (*)     ALT 42 (*)     Total Bilirubin 0.17 (*)     Total Protein 6.0 (*)     All other components within normal limits   LACTIC ACID - Abnormal; Notable for the following components:    Lactic Acid, Whole Blood 2.4 (*)     All other components within normal limits   HEMOGLOBIN AND HEMATOCRIT - Abnormal; Notable for the following components:    Hemoglobin 7.8 (*)     Hematocrit 23.5 (*)     All other components within normal limits   LIPASE   TYPE AND SCREEN       XR ELBOW LEFT (MIN 3 VIEWS)    Result Date: 7/27/2022  EXAMINATION: THREE XRAY VIEWS OF THE LEFT ELBOW 7/27/2022 5:11 pm COMPARISON: None. HISTORY: ORDERING SYSTEM PROVIDED HISTORY: bursitis TECHNOLOGIST PROVIDED HISTORY: bursitis Reason for Exam: fall x few weeks ago, posterior elbow swelling FINDINGS: No fracture or dislocation. No joint effusion. Large amount of soft tissue swelling over the olecranon. No fracture or dislocation. Large amount of soft tissue swelling over the olecranon consistent with either bursitis or sequela of soft tissue trauma       RECENT VITALS:     Temp: 98.4 °F (36.9 °C),  Heart Rate: (!) 101, Resp: 20, BP: (!) 149/82, SpO2: 98 %    This patient is a 65 y.o. Male presents with melena, lightheadedness, nausea and decreased oral intake for 3 days. Patient had colonoscopy 3 days ago with removal of polyps. Patient has history of polyps with partial colectomy secondary to polyps. No history of upper GI bleed, alcohol use or NSAID use. Patient noted to have hemoglobin of 9.5 which is a drop from 14 about 3 days ago. Also noted to have BUN of 73. Labs consistent with upper GI bleed. Patient noted to have linda melanotic stool and guaiac positive. No abdominal tenderness. GI consulted they will evaluate patient started on Protonix drip. Patient initially systolic blood pressure 094 with heart rate of 120. Has increased systolic blood pressure to 120 with heart rate in the 90s after fluid bolus. F/u H/H down to hb of 7.8. Patient admitted to [facility]  service    ED Course as of 08/14/22 1804   Sun Aug 14, 2022   1445 Divided bedside. Here for lightheadedness decreased oral intake and nausea for the last 3 days after colonoscopy. Patient also states he has had dark tarry stools. [ZE]   1515 Elevated lactic acid hemoglobin low at 9.5. Given melanic stools in the setting of recent colonoscopy we will start Protonix and will give GI a call. Waiting for CMP and lipase. Patient to get CT abdomen pelvis. Anticipate admission [ZE]   1518 BUN very elevated further points towards active GI bleed.

## 2022-08-14 NOTE — ED NOTES
Rectal exam completed and hemacult positive for rectal bleeding. Pt tolerated well.  Pt given warm blankets     Fabien Donahue RN  08/14/22 4516

## 2022-08-14 NOTE — ED NOTES
Pt presented to ED via triage for the complaint of dark black diarrhea x 2 days since having polyps removed from colon. Pt pt denies abd pain. Pt denies emesis. Pt alert and oriented x 4. RR even and non labored.      Tammy Hayward RN  08/14/22 [id number]

## 2022-08-14 NOTE — ED NOTES
The following labs obtained, labeled with pt sticker and tubed to lab: by me    [] Blue     [x] Lavender   [] on ice  [] Green/yellow  [] Green/black [] on ice  [] Yellow  [] Red  [] Pink      [] COVID-19 swab    [] Rapid  [] PCR  [] Flu swab  [] Peds Viral Panel     [] Urine Sample  [] Pelvic Cultures  [] Blood Cultures            Mine Xiong RN  08/14/22 2144

## 2022-08-14 NOTE — ED NOTES
Report received from LVL6. Met pt, repositioned, updated on plan of care, applied continuous monitoring, provided bloodband.       Kimberlyn Charles  08/14/22 7139

## 2022-08-14 NOTE — CONSULTS
[facility] ([facility]) Gastroenterology  Consultation Note     . Chief Complaint:    Dark Diarrhea x 2 days    Reason for consult:    Melanic stools  Colonoscopy 3 days ago    History of present illness: This is a 65 y.o. male with PMH including HLD , HTN, DM type II, ROB colonic polyps for which she had colonoscopy 8/11/2022 per  who presents with complaints of melena x2 days, fatigue and weakness. Patient states there was no complications with colonoscopy. Patient reports he was taking Motrin 800 for generalized joint pain and had been taking Mobic for the last 15 years. No bright red blood per rectum, no hematochezia. Patient denies any chest pain, lightheadedness, or dizziness  He does endorse fatigue and increased weakness. Patient is noted to be slightly hypotensive and slightly tachycardic on arrival    No fevers or chills, nausea or vomiting, diarrhea or constipation, recent travels, new foods, sick exposures or COVID exposure. Summary of imaging completed at this time:  CT Abdomen and Pelvis is pending      Summary of current abnormal labs completed at this time:    Metabolic: BUN 72, lactic acid 2.4  Hematology: WBCs 13.9, hemoglobin 9.5-7.8 g/dL,   Coags: NA  Liver profile: WNL  Cardiac profile: N/A    Previous GI history:   8/11/2022 Colonoscopy per Dr. Jalen Kilgore  Findings:  Descending/Sigmoid colon/rectum/anus: abnormal: Surgical anastomosis at 30 cm from the anal verge     Unfortunately the segment is very poorly prepped and has a very sticky green stool which I try to clean with profound irrigation without complete success I could recognize the 3 polyps, the larger of which was 1 cm went ahead and snared the 2 big ones and took the smaller one with the forceps, we put them in 1 jar     Surgical anastomosis looked clean  30 cm of the small bowel was examined  And we came back     Withdrawal Time was (minutes): 10     The colon was decompressed and the scope was removed.   The patient tolerated the procedure well. Recommendations/Plan:  Lifestyle and dietary modifications as discussed  F/U Biopsies  F/U In OfficeYes  Discussed with the family  Repeat colonoscopy [id number] as of the poor prep     Electronically signed by Veronica Swanson MD  on 8/11/2022 at 12:03 PM     5/2019 EGD per Dr. Charissa Collet:    Findings:     Retropharyngeal area was grossly normal appearing     Esophagus: abnormal: Barrette's esophagus , short segment 2 cm , bxs taken        Stomach:    Fundus: abnormal:  severe gastritis    Body: abnormal: severe gastritis    Antrum:  severe gastritis with edema nd deformity of pyloric orifice     Duodenum:    Descending: normal    Bulb: abnormal:  duodenitis     The scope was removed and the patient tolerated the procedure well. Recommendations/Plan:  F/U Biopsies  F/U In Office in 3-4 weeks  Discussed with the family     Electronically signed by Veronica Swanson MD  on 5/2/2019 at 8:37 AM   Past Medical/Social/Family History:  Past Medical History:   Diagnosis Date    Abnormal EKG 10/27/2016    indicates inferior infarct.     Anxiety     Bipolar disorder ([facility] [address] 2006    on rx    Chronic back pain 11/21/2014    Depression 2006    on rx    Diabetes mellitus ([address]     borderline    Fracture, clavicle 1996    LEFT    Hyperlipidemia     Hypertension 2006    on rx    Legally blind in left eye, as defined in [place]      very blurry    Lumbar radiculopathy 04/23/2014    Nicotine dependence     Osteoarthritis     Pain     LEFT EYE    Retinal detachment     history miguel    Sleep apnea     doesnt use cpap     Tubular adenoma     Visual floaters     Wears glasses     USES MAGNIFYING GLASS     Past Surgical History:   Procedure Laterality Date    CATARACT REMOVAL Left     CATARACT REMOVAL WITH IMPLANT Right 8-25-15    CHOLECYSTECTOMY  2/13    COLONOSCOPY  09/22/2016    the ICV was edematous and erythematous but most likely normal variant , bxs taken Large polyp 3 cm, at 50 cm from the anal verge with a very wide stalk, not removed tattooed needs to be removed with subtotal colectomy    COLONOSCOPY N/A 5/2/2019    COLONOSCOPY POLYPECTOMY COLD BIOPSY, HOT SNARE performed by Elvira Galindo MD at [place] [address] N/A 8/11/2022    COLONOSCOPY POLYPECTOMY SNARE/COLD BIOPSY performed by Elvira Galindo MD at [address] [place] [address]    foot, base of skull-left side    EYE SURGERY  12/31/15    laser right eye, left vit    EYE SURGERY Left     torn retina lazer/freezer/hole    EYE SURGERY Left 9/03/54    SILICONE REMOVAL AIR FLUID EXCHANGE    EYE SURGERY      cataracts removed miguel. EYE SURGERY      total 6 eye surg to left, 2 eye surg. to rt. HERNIA REPAIR      umbilical    LYMPH NODE BIOPSY Left 1971    BASE OF SKULL    NASAL SEPTUM SURGERY  11-24-15    [facility] OFFICE/OUTPT VISIT,PROCEDURE ONLY Right 7/12/2018    VITRECTOMY 25 GAUGE, AIR FLUID EXCHANGE, AIR GAS EXCHANGE WITH 18% SF6, ENDOLASER, 200 MSECONDS, 200 MWATTS, 377 PULSES performed by Sheldon Mackey MD at [address] [place] [address]  12/09/2016    w/ ileorectal anastomosis    [address] [facility]    UPPER GASTROINTESTINAL ENDOSCOPY N/A 5/2/2019    EGD BIOPSY performed by Elvira Galindo MD at [place] [address] Left 12/10/2015    VITRECTOMY Left 10/27/2016    membrane peeling    VITRECTOMY Right 07/12/2018    laser, gas    WRIST FRACTURE SURGERY Right 1/31/2022    DISTAL RADIUS OPEN REDUCTION INTERNAL FIXATION performed by Zaire Hernandez MD at [facility] [place] History   Problem Relation Age of Onset    Heart Failure Mother     Cancer Mother     Heart Disease Mother         CAD-OPEN HEART    Mental Retardation Sister     Seizures Sister     Hypertension Other         maternal history     Previous records/history/ and notes were reviewed    Allergies:   Allergies   Allergen Reactions    Poison Ivy Extract Itching     Hives & itching       Home medications:  Prior to Admission medications    Medication Sig per day. 10/22/21   Alexus Woodward MD   Lancets 30G MISC Inject 1 each into the skin daily 10/22/21   Alexus Woodward MD   hydrOXYzine (VISTARIL) 25 MG capsule  9/20/20   Historical Provider, MD   QUEtiapine (SEROQUEL) 50 MG tablet  9/20/20   Historical Provider, MD   loperamide (IMODIUM) 2 MG capsule TAKE ONE CAPSULE daily for DIARRHEA 8/4/20   Alexus Woodward MD   lamoTRIgine (LAMICTAL) 200 MG tablet Take 200 mg by mouth daily 9/25/17   Historical Provider, MD   acetaminophen (TYLENOL) 325 MG tablet Take 2 tablets by mouth every 4 hours as needed for Pain 12/15/16   Alexus Woodward MD     .  Current Medications:  Scheduled Meds:   pantoprazole (PROTONIX) bolus  80 mg IntraVENous Once    [START ON 8/17/2022] pantoprazole (PROTONIX) 40 mg injection  40 mg IntraVENous Daily     . Continuous Infusions:   pantoprazole       . PRN Meds:iopamidol    REVIEW OF SYSTEMS:     Constitutional: Increased fatigue and weakness  HEENT:  No headache, otalgia, itchy eyes, nasal discharge or sore throat. Cardiac:  No chest pain, dyspnea, orthopnea or PND. Chest:   No cough, phlegm or wheezing. Abdomen:  Melena x2 days multiple occasions  Neuro:  No focal weakness, abnormal movements or seizure like activity. Skin:   No rashes, no itching. :   No hematuria, no pyuria, no dysuria, no flank pain. Extremities:  No swelling or joint pains. ROS was otherwise negative except as mentioned in the [address] PHYSICAL EXAM:    /89   Pulse (!) 107   Temp 98.4 °F (36.9 °C) (Oral)   Resp 18   Ht 5' 11\" (1.803 m)   Wt 180 lb (81.6 kg)   SpO2 100%   BMI 25.10 kg/m²   . TMAX[24]    General: Frail  Head:  Normocephalic, Atraumatic  EENT: EOMI, Sclera not icteric, Oropharynx moist  Neck:  Supple, Trachea midline  Lungs:CTA Bilaterally  Heart: RRR, No murmur, No rub, No gallop, PMI nondisplaced. Abdomen:Soft, Non tender, Not distended, BS WNL,  No masses. No hepatomegalia   Ext:No clubbing. No cyanosis.  No edema.  Skin: No rashes. No jaundice. No stigmata of liver disease. Neuro:  A&O x Three, No focal neurological deficits    Imaging:       Hemotological labs: Anemia studies:  No results for input(s): LABIRON, TIBC, FERRITIN, [id number], FOLATE, OCCULTBLD in the last 72 hours. CBC:  Recent Labs     08/14/22  1443   WBC 13.9*   HGB 9.5*   MCV 91.1   RDW 14.3          PT/INR:  No results for input(s): PROTIME, INR in the last 72 hours. BMP:  Recent Labs     08/14/22  1443      K 4.7      CO2 22   BUN 72*   CREATININE 0.78   GLUCOSE 189*   CALCIUM 8.6       Liver work up:  Hepatitis Functional Panel:  Recent Labs     08/14/22  1443   ALKPHOS 107   ALT 42*   AST 19   PROT 6.0*   BILITOT 0.17*   LABALBU 3.8       Amylase/Lipase/Ammonia:  Recent Labs     08/14/22  1443   LIPASE 25       Acute Hepatitis Panel:  No results found for: HEPBSAG, HEPCAB, HEPBIGM, HEPAIGM         Active Problems:    * No active hospital problems. *  Resolved Problems:    * No resolved hospital problems. *       GI Impression:  81-year-old male presents for 2-day complaint of melena, fatigue, weakness found to have elevated BUN, acute drop in hemoglobin from baseline in the setting of chronic NSAID use-concern is for NSAID induced peptic ulcer disease, gastritis, duodenitis. Perforation needs to be a consideration given the longevity of the NSAID use-CT scan is pending. Recommendations and plan: We we will plan for endoscopy in a.m. unless patient becomes hemodynamically unstable overnight  Continue Protonix drip  Patient n.p.o..  Okay for meds with sips  Monitor closely for active melena, hematochezia  Recommend transfusion 1 unit of PRBCs given acute drop in hemoglobin since admission  Avoid all NSAIDs  Recommend daily CBC BMP.   Hemoglobin every 6 hours transfuse as clinically indicated  We will follow closely    This plan was formulated in collaboration with Dr. Yobani Rios MD  Please feel free to contact us with any questions or concerns      [facility]enter[facility]  Umer Arreola, [address]   [phone]  8/14/2022    4:10 PM     This note was created with the assistance of a speech-recognition program.  Although the intention is to generate a document that actually reflects the content of the visit, no guarantees can be provided that every mistake has been identified and corrected by editing.

## 2022-08-14 NOTE — ED PROVIDER NOTES
[facility] ED  Emergency Department Encounter  EmergencyMedicine Resident     Pt Marek Caputo  MRN: [id number]  [place] 1960  Date of evaluation: 8/14/22  PCP:  Odilon Hanna MD    This patient was evaluated in the Emergency Department for symptoms described in the history of present illness. The patient was evaluated in the context of the global COVID-19 pandemic, which necessitated consideration that the patient might be at risk for infection with the SARS-CoV-2 virus that causes COVID-19. Institutional protocols and algorithms that pertain to the evaluation of patients at risk for COVID-19 are in a state of rapid change based on information released by regulatory bodies including the CDC and federal and state organizations. These policies and algorithms were followed during the patient's care in the ED. CHIEF COMPLAINT       Chief Complaint   Patient presents with    Diarrhea     Had 2 polyps rmeoved from colon 2 days ago    Rectal Bleeding       HISTORY OF PRESENT ILLNESS  (Location/Symptom, Timing/Onset, Context/Setting, Quality, Duration, Modifying Factors, Severity.)      El Kemp is a 65 y.o. male who presents with lightheadedness, rectal bleeding nausea and decreased oral intake for 3 days. Patient states he had a colonoscopy done 3 days ago for removal of polyps. States symptoms started then. Denies any history of rectal bleeding in the past.  He denies any abdominal pain or dysuria.   Denies any chest pain or shortness of breath    PAST MEDICAL / SURGICAL / SOCIAL / FAMILY HISTORY      has a past medical history of Abnormal EKG, Anxiety, Bipolar disorder ([address] Chronic back pain, Depression, Diabetes mellitus ([address] Fracture, clavicle, Hyperlipidemia, Hypertension, Legally blind in left eye, as defined in [place], Lumbar radiculopathy, Nicotine dependence, Osteoarthritis, Pain, Retinal detachment, Sleep apnea, Tubular adenoma, Visual floaters, and Wears glasses. has a past surgical history that includes Cholecystectomy (2/13); cyst removal (1970); Tonsillectomy (1969); Umbilical hernia repair (1996); lymph node biopsy (Left, 1971); Cataract removal with implant (Right, 8-25-15); Nasal septum surgery (11-24-15); Cataract removal (Left); vitrectomy (Left, 12/10/2015); retinal laser; Eye surgery (12/31/15); Colonoscopy (09/22/2016); vitrectomy (Left, 10/27/2016); eye surgery (Left); eye surgery (Left, 3/31/16); eye surgery; eye surgery; hernia repair; subtotal colectomy (12/09/2016); vitrectomy (Right, 07/12/2018); pr office/outpt visit,procedure only (Right, 7/12/2018); Upper gastrointestinal endoscopy (N/A, 5/2/2019); Colonoscopy (N/A, 5/2/2019); Wrist fracture surgery (Right, 1/31/2022); and Colonoscopy (N/A, 8/11/2022).       Social History     Socioeconomic History    Marital status: Single     Spouse name: Not on file    Number of children: Not on file    Years of education: Not on file    Highest education level: Not on file   Occupational History    Occupation: disabled   Tobacco Use    Smoking status: Every Day     Packs/day: 1.00     Years: 30.00     Pack years: 30.00     Types: Cigarettes    Smokeless tobacco: Never   Vaping Use    Vaping Use: Never used   Substance and Sexual Activity    Alcohol use: Yes     Comment: 6 drinks per weekly- weekends only now (1/27/22)    Drug use: No    Sexual activity: Not on file   Other Topics Concern    Not on file   Social History Narrative    Not on file     Social Determinants of Health     Financial Resource Strain: Low Risk     Difficulty of Paying Living Expenses: Not hard at all   Food Insecurity: No Food Insecurity    Worried About Running Out of Food in the Last Year: Never true    Ran Out of Food in the Last Year: Never true   Transportation Needs: Not on file   Physical Activity: Not on file   Stress: Not on file   Social Connections: Not on file   Intimate Partner Violence: Not on file   Housing Stability: Not on file       Family History   Problem Relation Age of Onset    Heart Failure Mother     Cancer Mother     Heart Disease Mother         CAD-OPEN HEART    Mental Retardation Sister     Seizures Sister     Hypertension Other         maternal history       Allergies:  Poison ivy extract    Home Medications:  Prior to Admission medications    Medication Sig Start Date End Date Taking? Authorizing Provider   oxyCODONE-acetaminophen (PERCOCET) 5-325 MG per tablet Take 1 tablet by mouth every 8 hours as needed for Pain for up to 30 days. 8/5/22 9/4/22  Kai Ling MD   DULoxetine (CYMBALTA) 60 MG extended release capsule Take 1 capsule by mouth in the morning.  7/26/22   Aysha Larsen MD   ibuprofen (ADVIL;MOTRIN) 800 MG tablet Take 1 tablet by mouth 2 times daily as needed for Pain 7/27/22   Arlen Daly MD   diazePAM (VALIUM) 5 MG tablet TAKE ONE TABLET BY MOUTH EVERY 12 HOURS AS NEEDED FOR ANXIETY OR SLEEP 7/22/22 8/19/22  Aysha Larsen MD   cloNIDine (CATAPRES) 0.1 MG tablet TAKE ONE TABLET BY MOUTH DAILY 7/14/22   Aysha Larsen MD   Minoxidil (ROGAINE MENS) 5 % FOAM Apply topically nightly 7/6/22   Aysha Larsen MD   meloxicam LATASHA CANALES [facility]) 15 MG tablet Take 1 tablet by mouth daily 6/29/22 8/11/22  Aysha Larsen MD   sildenafil (VIAGRA) 100 MG tablet Take 1 tablet by mouth daily as needed for Erectile Dysfunction 6/27/22   Aysha Larsen MD   tamsulosin [facility]) 0.4 MG capsule Take 1 capsule by mouth daily 6/27/22   Aysha Larsen MD   metFORMIN (GLUCOPHAGE) 500 MG tablet Take 1 tablet by mouth daily (with breakfast) 6/21/22   Aysha Larsen MD   cyclobenzaprine (FLEXERIL) 10 mg tablet Take 1 tablet by mouth 2 times daily as needed for Muscle spasms 6/1/22   Aysha Larsen MD   atenolol (TENORMIN) 25 MG tablet TAKE ONE TABLET BY MOUTH DAILY 5/4/22   FRANCES Rowley   cholestyramine (QUESTRAN) 4 g packet TAKE ONE PACKET BY MOUTH FOUR TIMES A DAY 4/11/22   Odilon Hanna MD   atorvastatin (LIPITOR) 20 MG tablet TAKE ONE TABLET BY MOUTH DAILY 1/24/22   Odilon Hanna MD   Blood Glucose Monitoring Suppl (BLOOD GLUCOSE MONITOR SYSTEM) w/Device KIT USE TO MONITOR BLOOD GLUCOSE LEVEL. (TEST ONCE PER DAY) 10/22/21   Odilon Hanna MD   blood glucose monitor strips Test blood glucose level 1 time per day. 10/22/21   Odilon Hanna MD   Lancets 30G MISC Inject 1 each into the skin daily 10/22/21   Odilon Hanna MD   hydrOXYzine (VISTARIL) 25 MG capsule  9/20/20   Historical Provider, MD   QUEtiapine (SEROQUEL) 50 MG tablet  9/20/20   Historical Provider, MD   loperamide (IMODIUM) 2 MG capsule TAKE ONE CAPSULE daily for DIARRHEA 8/4/20   Odilon Hanna MD   lamoTRIgine (LAMICTAL) 200 MG tablet Take 200 mg by mouth daily 9/25/17   Historical Provider, MD   acetaminophen (TYLENOL) 325 MG tablet Take 2 tablets by mouth every 4 hours as needed for Pain 12/15/16   Odilon Hanna MD       REVIEW OF SYSTEMS    (2-9 systems for level 4, 10 or more for level 5)      Review of Systems   Constitutional:  Negative for appetite change, fatigue and fever. HENT:  Negative for congestion and trouble swallowing. Respiratory:  Negative for cough, chest tightness and shortness of breath. Cardiovascular:  Negative for chest pain and leg swelling. Gastrointestinal:  Positive for blood in stool and vomiting. Negative for abdominal pain, constipation, diarrhea and nausea. Genitourinary:  Negative for dysuria. Musculoskeletal:  Negative for back pain. Skin:  Negative for color change and rash. Neurological:  Positive for light-headedness. Negative for dizziness, weakness and headaches.      PHYSICAL EXAM   (up to 7 for level 4, 8 or more for level 5)      INITIAL VITALS:   /89   Pulse (!) 107   Temp 98.4 °F (36.9 °C) (Oral)   Resp 18   Ht 5' 11\" (1.803 m)   Wt 180 lb (81.6 kg)   SpO2 100%   BMI 25.10 kg/m²     Physical Exam  Constitutional:       General: He is not in acute distress. HENT:      Head: Normocephalic and atraumatic. Nose: No congestion. Eyes:      General: No scleral icterus. Pupils: Pupils are equal, round, and reactive to light. Cardiovascular:      Rate and Rhythm: Normal rate. Heart sounds: No murmur heard. No friction rub. No gallop. Pulmonary:      Breath sounds: No wheezing, rhonchi or rales. Abdominal:      General: Abdomen is flat. There is no distension. Palpations: Abdomen is soft. Tenderness: There is no abdominal tenderness. There is no guarding or rebound. Genitourinary:     Rectum: Guaiac result positive. Musculoskeletal:         General: No swelling. Cervical back: Normal range of motion. Skin:     Findings: No rash. Neurological:      General: No focal deficit present.        DIFFERENTIAL  DIAGNOSIS     PLAN (LABS / IMAGING / EKG):  Orders Placed This Encounter   Procedures    CT ABDOMEN PELVIS W IV CONTRAST Additional Contrast? None    CBC with Auto Differential    CMP    Lipase    Lactic Acid    Inpatient consult to GI    Inpatient consult to Hospitalist    TYPE AND SCREEN       MEDICATIONS ORDERED:  Orders Placed This Encounter   Medications    0.9 % sodium chloride bolus    ondansetron (ZOFRAN) injection 4 mg    pantoprazole (PROTONIX) 80 mg in sodium chloride 0.9 % 50 mL bolus    pantoprazole (PROTONIX) 80 mg in sodium chloride 0.9 % 100 mL infusion    pantoprazole (PROTONIX) 40 mg in sodium chloride (PF) 10 mL injection    iopamidol (ISOVUE-370) 76 % injection 75 mL         DIAGNOSTIC RESULTS / EMERGENCY DEPARTMENT COURSE / MDM   LAB RESULTS:  Results for orders placed or performed during the hospital encounter of 08/14/22   CBC with Auto Differential   Result Value Ref Range    WBC 13.9 (H) 3.5 - 11.3 k/uL    RBC 3.13 (L) 4.21 - 5.77 m/uL    Hemoglobin 9.5 (L) 13.0 - 17.0 g/dL    Hematocrit 28.5 (L) 40.7 - 50.3 %    MCV 91.1 82.6 - 102.9 fL MCH 30.4 25.2 - 33.5 pg    MCHC 33.3 28.4 - 34.8 g/dL    RDW 14.3 11.8 - 14.4 %    Platelets 648 611 - 553 k/uL    MPV 10.1 8.1 - 13.5 fL    NRBC Automated 0.0 0.0 per 100 WBC    Seg Neutrophils 73 (H) 36 - 65 %    Lymphocytes 19 (L) 24 - 43 %    Monocytes 6 3 - 12 %    Eosinophils % 1 1 - 4 %    Basophils 0 0 - 2 %    Immature Granulocytes 1 (H) 0 %    Segs Absolute 10.08 (H) 1.50 - 8.10 k/uL    Absolute Lymph # 2.64 1.10 - 3.70 k/uL    Absolute Mono # 0.87 0.10 - 1.20 k/uL    Absolute Eos # 0.15 0.00 - 0.44 k/uL    Basophils Absolute 0.04 0.00 - 0.20 k/uL    Absolute Immature Granulocyte 0.11 0.00 - 0.30 k/uL   CMP   Result Value Ref Range    Glucose 189 (H) 70 - 99 mg/dL    BUN 72 (H) 8 - 23 mg/dL    Creatinine 0.78 0.70 - 1.20 mg/dL    Calcium 8.6 8.6 - 10.4 mg/dL    Sodium 141 135 - 144 mmol/L    Potassium 4.7 3.7 - 5.3 mmol/L    Chloride 105 98 - 107 mmol/L    CO2 22 20 - 31 mmol/L    Anion Gap 14 9 - 17 mmol/L    Alkaline Phosphatase 107 40 - 129 U/L    ALT 42 (H) 5 - 41 U/L    AST 19 <40 U/L    Total Bilirubin 0.17 (L) 0.3 - 1.2 mg/dL    Total Protein 6.0 (L) 6.4 - 8.3 g/dL    Albumin 3.8 3.5 - 5.2 g/dL    Albumin/Globulin Ratio 1.7 1.0 - 2.5    GFR Non-African American >60 >60 mL/min    GFR African American >60 >60 mL/min    GFR Comment         Lipase   Result Value Ref Range    Lipase 25 13 - 60 U/L   Lactic Acid   Result Value Ref Range    Lactic Acid, Whole Blood 2.4 (H) 0.7 - 2.1 mmol/L   TYPE AND SCREEN   Result Value Ref Range    Expiration Date 08/17/2022,2359     Arm Band Number [id number]     ABO/Rh O POSITIVE     Antibody Screen NEGATIVE        RADIOLOGY:  No results found. EKG Interpretation  none    EMERGENCY DEPARTMENT COURSE:  ED Course as of 08/14/22 1612   Sun Aug 14, 2022   1445 Divided bedside. Here for lightheadedness decreased oral intake and nausea for the last 3 days after colonoscopy. Patient also states he has had dark tarry stools.  [ZE]   1515 Elevated lactic acid hemoglobin low at 9.5. Given melanic stools in the setting of recent colonoscopy we will start Protonix and will give GI a call. Waiting for CMP and lipase. Patient to get CT abdomen pelvis. Anticipate admission [ZE]   1518 BUN very elevated further points towards active GI bleed. We will go ahead and consult [facility] for admission [ZE]   1530 Elevated white blood cell count however no concern for any infectious process at this time. Leukocytosis likely reactive vitals secondary to volume depletion and [ZE]   1538 Spoke with GI. They agree with assessment this is likely an active upper GI bleed. They will evaluate patient at bedside [ZE]   [address] [facility] signed out to Dr. Zaire Amador [ZE]      ED Course User Index  [ZE] Vicente Bridges DO       PROCEDURES:  Procedures     CONSULTS:  IP CONSULT TO GI  IP CONSULT TO HOSPITALIST    CRITICAL CARE:  none    MDM  Patient noted to have likely upper GI bleed given melanoic stool, low hgb and elevated BUN. No history of etoh abuse, cirrhosis or varices. Patient started on PPX gtt and GI consulted who agreed this is likely UGIB. Patient did have slightly low BP and elevated HR however both improved with fluid bolus. Patient to be admitted. Care signed out to Dr. Mary Jane Weston      1. Upper GI bleed          DISPOSITION / PLAN     DISPOSITION Decision To Admit 08/14/2022 03:22:55 PM      PATIENT REFERRED TO:  No follow-up provider specified.     DISCHARGE MEDICATIONS:  New Prescriptions    No medications on file       Martha Torre DO  Emergency Medicine Resident    (Please note that portions of thisnote were completed with a voice recognition program.  Efforts were made to edit the dictations but occasionally words are mis-transcribed.)        Vicente Bridges DO  Resident  08/14/22 0655

## 2022-08-15 ENCOUNTER — ANESTHESIA (OUTPATIENT)
Dept: ENDOSCOPY | Age: 62
DRG: 381 | End: 2022-08-15
Payer: MEDICARE

## 2022-08-15 ENCOUNTER — ANESTHESIA EVENT (OUTPATIENT)
Dept: ENDOSCOPY | Age: 62
DRG: 381 | End: 2022-08-15
Payer: MEDICARE

## 2022-08-15 PROBLEM — K25.0 ACUTE GASTRIC ULCER WITH HEMORRHAGE: Status: ACTIVE | Noted: 2022-08-15

## 2022-08-15 PROBLEM — D62 ACUTE BLOOD LOSS ANEMIA: Status: ACTIVE | Noted: 2022-08-15

## 2022-08-15 LAB
ANION GAP SERPL CALCULATED.3IONS-SCNC: 9 MMOL/L (ref 9–17)
BUN BLDV-MCNC: 34 MG/DL (ref 8–23)
CALCIUM SERPL-MCNC: 8 MG/DL (ref 8.6–10.4)
CHLORIDE BLD-SCNC: 108 MMOL/L (ref 98–107)
CO2: 24 MMOL/L (ref 20–31)
CREAT SERPL-MCNC: 0.62 MG/DL (ref 0.7–1.2)
GFR AFRICAN AMERICAN: >60 ML/MIN
GFR NON-AFRICAN AMERICAN: >60 ML/MIN
GFR SERPL CREATININE-BSD FRML MDRD: ABNORMAL ML/MIN/{1.73_M2}
GLUCOSE BLD-MCNC: 121 MG/DL (ref 75–110)
GLUCOSE BLD-MCNC: 123 MG/DL (ref 70–99)
GLUCOSE BLD-MCNC: 127 MG/DL (ref 75–110)
GLUCOSE BLD-MCNC: 142 MG/DL (ref 75–110)
HCT VFR BLD CALC: 20.6 % (ref 40.7–50.3)
HCT VFR BLD CALC: 21.4 % (ref 40.7–50.3)
HCT VFR BLD CALC: 23.1 % (ref 40.7–50.3)
HEMOGLOBIN: 6.9 G/DL (ref 13–17)
HEMOGLOBIN: 7 G/DL (ref 13–17)
HEMOGLOBIN: 8 G/DL (ref 13–17)
INR BLD: 1
IRON SATURATION: 37 % (ref 20–55)
IRON: 92 UG/DL (ref 59–158)
PARTIAL THROMBOPLASTIN TIME: 22.3 SEC (ref 20.5–30.5)
POTASSIUM SERPL-SCNC: 3.7 MMOL/L (ref 3.7–5.3)
PROTHROMBIN TIME: 11.1 SEC (ref 9.1–12.3)
SODIUM BLD-SCNC: 141 MMOL/L (ref 135–144)
TOTAL IRON BINDING CAPACITY: 247 UG/DL (ref 250–450)
UNSATURATED IRON BINDING CAPACITY: 155 UG/DL (ref 112–347)

## 2022-08-15 PROCEDURE — 99232 SBSQ HOSP IP/OBS MODERATE 35: CPT | Performed by: STUDENT IN AN ORGANIZED HEALTH CARE EDUCATION/TRAINING PROGRAM

## 2022-08-15 PROCEDURE — 83550 IRON BINDING TEST: CPT

## 2022-08-15 PROCEDURE — 2580000003 HC RX 258: Performed by: STUDENT IN AN ORGANIZED HEALTH CARE EDUCATION/TRAINING PROGRAM

## 2022-08-15 PROCEDURE — 36415 COLL VENOUS BLD VENIPUNCTURE: CPT

## 2022-08-15 PROCEDURE — 80048 BASIC METABOLIC PNL TOTAL CA: CPT

## 2022-08-15 PROCEDURE — 2060000000 HC ICU INTERMEDIATE R&B

## 2022-08-15 PROCEDURE — 2580000003 HC RX 258: Performed by: NURSE ANESTHETIST, CERTIFIED REGISTERED

## 2022-08-15 PROCEDURE — 85610 PROTHROMBIN TIME: CPT

## 2022-08-15 PROCEDURE — 6360000002 HC RX W HCPCS: Performed by: STUDENT IN AN ORGANIZED HEALTH CARE EDUCATION/TRAINING PROGRAM

## 2022-08-15 PROCEDURE — C9113 INJ PANTOPRAZOLE SODIUM, VIA: HCPCS | Performed by: STUDENT IN AN ORGANIZED HEALTH CARE EDUCATION/TRAINING PROGRAM

## 2022-08-15 PROCEDURE — 6370000000 HC RX 637 (ALT 250 FOR IP): Performed by: NURSE PRACTITIONER

## 2022-08-15 PROCEDURE — 86900 BLOOD TYPING SEROLOGIC ABO: CPT

## 2022-08-15 PROCEDURE — 88305 TISSUE EXAM BY PATHOLOGIST: CPT

## 2022-08-15 PROCEDURE — 7100000010 HC PHASE II RECOVERY - FIRST 15 MIN: Performed by: INTERNAL MEDICINE

## 2022-08-15 PROCEDURE — 2580000003 HC RX 258: Performed by: INTERNAL MEDICINE

## 2022-08-15 PROCEDURE — 43255 EGD CONTROL BLEEDING ANY: CPT | Performed by: INTERNAL MEDICINE

## 2022-08-15 PROCEDURE — 3609012400 HC EGD TRANSORAL BIOPSY SINGLE/MULTIPLE: Performed by: INTERNAL MEDICINE

## 2022-08-15 PROCEDURE — 3700000000 HC ANESTHESIA ATTENDED CARE: Performed by: INTERNAL MEDICINE

## 2022-08-15 PROCEDURE — 43239 EGD BIOPSY SINGLE/MULTIPLE: CPT | Performed by: INTERNAL MEDICINE

## 2022-08-15 PROCEDURE — 83540 ASSAY OF IRON: CPT

## 2022-08-15 PROCEDURE — 0DB78ZX EXCISION OF STOMACH, PYLORUS, VIA NATURAL OR ARTIFICIAL OPENING ENDOSCOPIC, DIAGNOSTIC: ICD-10-PCS | Performed by: INTERNAL MEDICINE

## 2022-08-15 PROCEDURE — 2500000003 HC RX 250 WO HCPCS: Performed by: NURSE ANESTHETIST, CERTIFIED REGISTERED

## 2022-08-15 PROCEDURE — 2720000010 HC SURG SUPPLY STERILE: Performed by: INTERNAL MEDICINE

## 2022-08-15 PROCEDURE — P9016 RBC LEUKOCYTES REDUCED: HCPCS

## 2022-08-15 PROCEDURE — 2500000003 HC RX 250 WO HCPCS: Performed by: STUDENT IN AN ORGANIZED HEALTH CARE EDUCATION/TRAINING PROGRAM

## 2022-08-15 PROCEDURE — 7100000011 HC PHASE II RECOVERY - ADDTL 15 MIN: Performed by: INTERNAL MEDICINE

## 2022-08-15 PROCEDURE — 85014 HEMATOCRIT: CPT

## 2022-08-15 PROCEDURE — 36430 TRANSFUSION BLD/BLD COMPNT: CPT

## 2022-08-15 PROCEDURE — 2709999900 HC NON-CHARGEABLE SUPPLY: Performed by: INTERNAL MEDICINE

## 2022-08-15 PROCEDURE — 6370000000 HC RX 637 (ALT 250 FOR IP): Performed by: INTERNAL MEDICINE

## 2022-08-15 PROCEDURE — 85730 THROMBOPLASTIN TIME PARTIAL: CPT

## 2022-08-15 PROCEDURE — 3700000001 HC ADD 15 MINUTES (ANESTHESIA): Performed by: INTERNAL MEDICINE

## 2022-08-15 PROCEDURE — 85018 HEMOGLOBIN: CPT

## 2022-08-15 PROCEDURE — 82947 ASSAY GLUCOSE BLOOD QUANT: CPT

## 2022-08-15 PROCEDURE — 6360000002 HC RX W HCPCS: Performed by: NURSE ANESTHETIST, CERTIFIED REGISTERED

## 2022-08-15 PROCEDURE — 3609013000 HC EGD TRANSORAL CONTROL BLEEDING ANY METHOD: Performed by: INTERNAL MEDICINE

## 2022-08-15 PROCEDURE — 0W3P8ZZ CONTROL BLEEDING IN GASTROINTESTINAL TRACT, VIA NATURAL OR ARTIFICIAL OPENING ENDOSCOPIC: ICD-10-PCS | Performed by: INTERNAL MEDICINE

## 2022-08-15 RX ORDER — LIDOCAINE HYDROCHLORIDE 10 MG/ML
INJECTION, SOLUTION EPIDURAL; INFILTRATION; INTRACAUDAL; PERINEURAL PRN
Status: DISCONTINUED | OUTPATIENT
Start: 2022-08-15 | End: 2022-08-15 | Stop reason: SDUPTHER

## 2022-08-15 RX ORDER — SODIUM CHLORIDE 9 MG/ML
INJECTION, SOLUTION INTRAVENOUS PRN
Status: DISCONTINUED | OUTPATIENT
Start: 2022-08-15 | End: 2022-08-17 | Stop reason: HOSPADM

## 2022-08-15 RX ORDER — ONDANSETRON 4 MG/1
4 TABLET, ORALLY DISINTEGRATING ORAL EVERY 8 HOURS PRN
Status: DISCONTINUED | OUTPATIENT
Start: 2022-08-15 | End: 2022-08-17 | Stop reason: HOSPADM

## 2022-08-15 RX ORDER — SODIUM CHLORIDE 0.9 % (FLUSH) 0.9 %
5-40 SYRINGE (ML) INJECTION PRN
Status: DISCONTINUED | OUTPATIENT
Start: 2022-08-15 | End: 2022-08-17 | Stop reason: HOSPADM

## 2022-08-15 RX ORDER — PROPOFOL 10 MG/ML
INJECTION, EMULSION INTRAVENOUS PRN
Status: DISCONTINUED | OUTPATIENT
Start: 2022-08-15 | End: 2022-08-15 | Stop reason: SDUPTHER

## 2022-08-15 RX ORDER — SODIUM CHLORIDE 9 MG/ML
INJECTION, SOLUTION INTRAVENOUS CONTINUOUS PRN
Status: DISCONTINUED | OUTPATIENT
Start: 2022-08-15 | End: 2022-08-15 | Stop reason: SDUPTHER

## 2022-08-15 RX ORDER — ONDANSETRON 2 MG/ML
4 INJECTION INTRAMUSCULAR; INTRAVENOUS EVERY 6 HOURS PRN
Status: DISCONTINUED | OUTPATIENT
Start: 2022-08-15 | End: 2022-08-17 | Stop reason: HOSPADM

## 2022-08-15 RX ORDER — CLONIDINE HYDROCHLORIDE 0.1 MG/1
0.1 TABLET ORAL DAILY
Status: DISCONTINUED | OUTPATIENT
Start: 2022-08-15 | End: 2022-08-17 | Stop reason: HOSPADM

## 2022-08-15 RX ORDER — ACETAMINOPHEN 650 MG/1
650 SUPPOSITORY RECTAL EVERY 6 HOURS PRN
Status: DISCONTINUED | OUTPATIENT
Start: 2022-08-15 | End: 2022-08-17 | Stop reason: HOSPADM

## 2022-08-15 RX ORDER — METOPROLOL TARTRATE 5 MG/5ML
5 INJECTION INTRAVENOUS EVERY 4 HOURS PRN
Status: DISCONTINUED | OUTPATIENT
Start: 2022-08-15 | End: 2022-08-17 | Stop reason: HOSPADM

## 2022-08-15 RX ORDER — ATENOLOL 25 MG/1
25 TABLET ORAL DAILY
Status: DISCONTINUED | OUTPATIENT
Start: 2022-08-15 | End: 2022-08-17 | Stop reason: HOSPADM

## 2022-08-15 RX ORDER — ACETAMINOPHEN 325 MG/1
650 TABLET ORAL EVERY 6 HOURS PRN
Status: DISCONTINUED | OUTPATIENT
Start: 2022-08-15 | End: 2022-08-17 | Stop reason: HOSPADM

## 2022-08-15 RX ORDER — SODIUM CHLORIDE 0.9 % (FLUSH) 0.9 %
5-40 SYRINGE (ML) INJECTION EVERY 12 HOURS SCHEDULED
Status: DISCONTINUED | OUTPATIENT
Start: 2022-08-15 | End: 2022-08-17 | Stop reason: HOSPADM

## 2022-08-15 RX ORDER — LAMOTRIGINE 100 MG/1
200 TABLET ORAL DAILY
Status: DISCONTINUED | OUTPATIENT
Start: 2022-08-15 | End: 2022-08-17 | Stop reason: HOSPADM

## 2022-08-15 RX ORDER — DIAZEPAM 5 MG/1
5 TABLET ORAL EVERY 8 HOURS PRN
Status: DISCONTINUED | OUTPATIENT
Start: 2022-08-15 | End: 2022-08-17 | Stop reason: HOSPADM

## 2022-08-15 RX ORDER — DIGOXIN 0.25 MG/ML
250 INJECTION INTRAMUSCULAR; INTRAVENOUS ONCE
Status: COMPLETED | OUTPATIENT
Start: 2022-08-15 | End: 2022-08-15

## 2022-08-15 RX ADMIN — SODIUM CHLORIDE: 9 INJECTION, SOLUTION INTRAVENOUS at 13:45

## 2022-08-15 RX ADMIN — ACETAMINOPHEN 650 MG: 325 TABLET ORAL at 21:43

## 2022-08-15 RX ADMIN — DIGOXIN 250 MCG: 250 INJECTION, SOLUTION INTRAMUSCULAR; INTRAVENOUS at 18:16

## 2022-08-15 RX ADMIN — SODIUM CHLORIDE 8 MG/HR: 9 INJECTION, SOLUTION INTRAVENOUS at 01:41

## 2022-08-15 RX ADMIN — SODIUM CHLORIDE, PRESERVATIVE FREE 10 ML: 5 INJECTION INTRAVENOUS at 21:44

## 2022-08-15 RX ADMIN — DIAZEPAM 5 MG: 5 TABLET ORAL at 21:43

## 2022-08-15 RX ADMIN — ATENOLOL 25 MG: 25 TABLET ORAL at 19:24

## 2022-08-15 RX ADMIN — CLONIDINE HYDROCHLORIDE 0.1 MG: 0.1 TABLET ORAL at 19:24

## 2022-08-15 RX ADMIN — LIDOCAINE HYDROCHLORIDE 50 MG: 10 INJECTION, SOLUTION EPIDURAL; INFILTRATION; INTRACAUDAL; PERINEURAL at 14:03

## 2022-08-15 RX ADMIN — PROPOFOL 300 MG: 10 INJECTION, EMULSION INTRAVENOUS at 14:03

## 2022-08-15 RX ADMIN — METOPROLOL TARTRATE 5 MG: 1 INJECTION, SOLUTION INTRAVENOUS at 17:16

## 2022-08-15 ASSESSMENT — PAIN - FUNCTIONAL ASSESSMENT: PAIN_FUNCTIONAL_ASSESSMENT: 0-10

## 2022-08-15 ASSESSMENT — PAIN SCALES - GENERAL
PAINLEVEL_OUTOF10: 8
PAINLEVEL_OUTOF10: 9
PAINLEVEL_OUTOF10: 3

## 2022-08-15 ASSESSMENT — PAIN DESCRIPTION - LOCATION
LOCATION: BACK
LOCATION: BACK

## 2022-08-15 ASSESSMENT — LIFESTYLE VARIABLES: SMOKING_STATUS: 1

## 2022-08-15 ASSESSMENT — ENCOUNTER SYMPTOMS: TACHYPNEA: 1

## 2022-08-15 ASSESSMENT — PAIN DESCRIPTION - DESCRIPTORS
DESCRIPTORS: STABBING

## 2022-08-15 ASSESSMENT — PAIN DESCRIPTION - PAIN TYPE: TYPE: CHRONIC PAIN

## 2022-08-15 NOTE — PROGRESS NOTES
Physician Progress Note      Artemio Pizarro  CSN #:                  [id number]  :                       1960  ADMIT DATE:       2022 2:27 PM  [address] [place] DATE:  RESPONDING  PROVIDER #:        Beka Salazar          QUERY TEXT:    Pt admitted with GIB. If possible, please clarify if you are evaluating and/or   treating any of the following: The medical record reflects the following:  Risk Factors: NSAID use, DM  Clinical Indicators: Pt to ED with c/o fever/tarry stools x 2days s/p  recent   Colonoscopy on  with polyp removal.. Hgb 9.5-7.8-6.9 since admission. Per   GI consult-'Suspect peptic ulcer disease due to chronic NSAID use'-with plan   for EGD. Treatment: GI consult, PRBC infusion,Protonix,H&H monitoring with plan for EGD  Options provided:  -- Acute blood loss anemia  -- Chronic blood loss anemia  -- Acute on chronic blood loss anemia  -- Other - I will add my own diagnosis  -- Disagree - Not applicable / Not valid  -- Disagree - Clinically unable to determine / Unknown  -- Refer to Clinical Documentation Reviewer    PROVIDER RESPONSE TEXT:    This patient has acute blood loss anemia.     Query created by: Justin Stoll on 8/15/2022 9:21 AM      Electronically signed by:  Beka Salazar 8/15/2022 10:22 AM

## 2022-08-15 NOTE — ANESTHESIA POSTPROCEDURE EVALUATION
Department of Anesthesiology  Postprocedure Note    Patient: Keshav Fitzgerald  MRN: [id number]  YOB: 1960  Date of evaluation: 8/15/2022      Procedure Summary     Date: 08/15/22 Room / Location: [address]    Anesthesia Start: 1400 Anesthesia Stop: 0508    Procedures:       EGD CONTROL HEMORRHAGE      EGD BIOPSY Diagnosis:       Melena      (Melena)    Surgeons: Janelle Toney MD Responsible Provider: Moisés Trimble MD    Anesthesia Type: MAC ASA Status: 3          Anesthesia Type: No value filed.     Swetha Phase I: Swetha Score: 10    Swetha Phase II: Swetha Score: 9      Anesthesia Post Evaluation    Patient location during evaluation: bedside  Patient participation: complete - patient participated  Level of consciousness: awake  Pain score: 0  Airway patency: patent  Nausea & Vomiting: no nausea and no vomiting  Complications: no  Cardiovascular status: blood pressure returned to baseline  Respiratory status: acceptable  Hydration status: euvolemic  Comments: /75   Pulse 100   Temp 97.9 °F (36.6 °C) (Temporal)   Resp 17   Ht 5' 11\" (1.803 m)   Wt 180 lb (81.6 kg)   SpO2 94%   BMI 25.10 kg/m²

## 2022-08-15 NOTE — PROGRESS NOTES
[facility]  Office: [address], DO, Ryan Krishnamurthy, DO, Edouard Forrest, DO, Ministerio Sheehan Blood, DO, Wale Shaffer MD, Alba Lundberg MD, Roberto Wilkerson MD, Judy Wood MD,  Samanta Strong MD, Gabrielle Toure MD, Emilie Dobson, DO, Heladio Danielson MD,  Pako Hernandez MD, Henry Neri MD, Cheyanne Dominguez, DO, Jesús Zamudio MD, Lavon Julian MD, Jase Avery MD, Vivek Manning, DO, Josseline Gupta MD, Ben Lanier MD, Jamil Lindsey, CNP,  Nigel Neely CNP, Danni Lawton CNP, Marifer Lira CNP, Valeria Yanez PA-C, Charmel Osgood, [facility], Rosaura Prince, [facility], Eleonora Tyler, CNP, Richard Blanco, CNP, Kamran Mejia, CNP, Rich Hernandez, CNP, Leigh Castaneda, CNS, Kalyn Sanon, [facility], Ludwin Jose, CNP, Jeremias Nelson, CNP, Xander Ceron, CNP           [address]    Progress Note    8/15/2022    8:51 AM    Name:   Shea Cardona  MRN:     [id number]     [place]:      [de-identified]   Room:   [facility] Day:  1  Admit Date:  8/14/2022  2:27 PM    PCP:   Ada Alvarez MD  Code Status:  Full Code    Subjective:     C/C:   Chief Complaint   Patient presents with    Diarrhea     Had 2 polyps rmeoved from colon 2 days ago    Rectal Bleeding     Interval History Status: worsened. Brief History:     Shea Cardona is a 65 y.o. male who presented for evaluation of fever and tarry stools that occurred over the last 2 days. None today. He states that he had a colonoscopy done on 8/11/22 for routine screening. He did have polyps removed. He states after he went home he began having persistent sweating. He is diabetic but did not know if he had low blood sugar. He states he did eat after discharge. He did take Ibuprofen and sweating eventually subsided. Yesterday he states he had several dark, tarry diarrhea stools. He had no dizziness or SOB. Denies chest pain. He has had no stools today.  He does not take any blood thinners. He does take Meloxicam 15mg daily which he states he has been on for 15 years. He did not take any since his colonoscopy because he was told not to take with Ibuprofen. He has been taking Ibuprofen 800 mg BID since his colonoscopy. Additional medical history includes HLD, T2DM, HTN and bipolar disorder. Review of Systems:     Constitutional:  (+) for chills, (-) fevers, (+) sweats  Respiratory:  negative for cough, dyspnea on exertion, shortness of breath, wheezing  Cardiovascular:  negative for chest pain, chest pressure/discomfort, lower extremity edema, palpitations  Gastrointestinal:  negative for abdominal pain, constipation, diarrhea, nausea, vomiting  Neurological:  negative for dizziness, headache    Medications: Allergies:     Allergies   Allergen Reactions    Poison Ivy Extract Itching     Hives & itching       Current Meds:   Scheduled Meds:    sodium chloride flush  5-40 mL IntraVENous 2 times per day    [START ON 8/17/2022] pantoprazole (PROTONIX) 40 mg injection  40 mg IntraVENous Daily    insulin lispro  0-4 Units SubCUTAneous TID WC    insulin lispro  0-4 Units SubCUTAneous Nightly     Continuous Infusions:    sodium chloride      sodium chloride      pantoprazole 8 mg/hr (08/15/22 0141)    lactated ringers 125 mL/hr at 08/14/22 1657    dextrose       PRN Meds: sodium chloride flush, sodium chloride, ondansetron **OR** ondansetron, acetaminophen **OR** acetaminophen, sodium chloride, glucose, dextrose bolus **OR** dextrose bolus, glucagon (rDNA), dextrose    Data:     Past Medical History:   has a past medical history of Abnormal EKG, Anxiety, Bipolar disorder ([facility] [address] Chronic back pain, Depression, Diabetes mellitus ([facility] [address] Fracture, clavicle, Hyperlipidemia, Hypertension, Legally blind in left eye, as defined in [place], Lumbar radiculopathy, Nicotine dependence, Osteoarthritis, Pain, Retinal detachment, Sleep apnea, Tubular adenoma, Visual floaters, and Wears glasses. Social History:   reports that he has been smoking cigarettes. He has a 30.00 pack-year smoking history. He has never used smokeless tobacco. He reports current alcohol use. He reports that he does not use drugs. Family History:   Family History   Problem Relation Age of Onset    Heart Failure Mother     Cancer Mother     Heart Disease Mother         CAD-OPEN HEART    Mental Retardation Sister     Seizures Sister     Hypertension Other         maternal history       Vitals:  /76   Pulse (!) 111   Temp 98 °F (36.7 °C) (Temporal)   Resp 24   Ht 5' 11\" (1.803 m)   Wt 180 lb (81.6 kg)   SpO2 94%   BMI 25.10 kg/m²   Temp (24hrs), Av.2 °F (36.8 °C), Min:97.5 °F (36.4 °C), Max:98.8 °F (37.1 °C)    Recent Labs     08/14/22  2032 08/15/22  0648   POCGLU 120* 142*       I/O (24Hr):     Intake/Output Summary (Last 24 hours) at 8/15/2022 0851  Last data filed at 2022 1704  Gross per 24 hour   Intake 50 ml   Output --   Net 50 ml       Labs:  Hematology:  Recent Labs     22  1443 22  1702 08/15/22  0632   WBC 13.9*  --   --    RBC 3.13*  --   --    HGB 9.5* 7.8* 6.9*   HCT 28.5* 23.5* 20.6*   MCV 91.1  --   --    MCH 30.4  --   --    MCHC 33.3  --   --    RDW 14.3  --   --      --   --    MPV 10.1  --   --    INR  --   --  1.0     Chemistry:  Recent Labs     22  1443 22  1455 08/15/22  0632     --  141   K 4.7  --  3.7     --  108*   CO2 22  --  24   GLUCOSE 189*  --  123*   BUN 72*  --  34*   CREATININE 0.78  --  0.62*   ANIONGAP 14  --  9   LABGLOM >60  --  >60   GFRAA >60  --  >60   CALCIUM 8.6  --  8.0*   LACTACIDWB  --  2.4*  --      Recent Labs     22  1443 08/14/22  2032 08/15/22  0648   PROT 6.0*  --   --    LABALBU 3.8  --   --    AST 19  --   --    ALT 42*  --   --    ALKPHOS 107  --   --    BILITOT 0.17*  --   --    LIPASE 25  --   --    POCGLU  --  120* 142*     ABG:  Lab Results   Component Value Date/Time    PH 7.458 05/22/2012 03:48 PM    PCO2 32.7 05/22/2012 03:48 PM    PO2ART 56.9 05/22/2012 03:48 PM    EVG7FJX 22.8 05/22/2012 03:48 PM    NBEA 0.6 05/22/2012 03:48 PM    PBEA NOT REPORTED 05/22/2012 03:48 PM    G2RHNHYN 87.1 05/22/2012 03:48 PM    FIO2 NOT REPORTED 05/22/2012 03:48 PM     No results found for: SPECIAL  Lab Results   Component Value Date/Time    CULTURE (A) 05/02/2022 08:10 PM     METHICILLIN RESISTANT STAPHYLOCOCCUS AUREUS LIGHT GROWTH       Radiology:  CT ABDOMEN PELVIS W IV CONTRAST Additional Contrast? None    Result Date: 8/14/2022  No acute abnormality of the gastrointestinal tract. Left nephrolithiasis. Hepatomegaly with probable mild steatosis. Enlarged prostate.        Physical Examination:        General appearance:  alert, cooperative and no distress  Mental Status:  oriented to person, place and time and normal affect  Lungs:  clear to auscultation bilaterally, normal effort  Heart:  regular rate and rhythm, no murmur  Abdomen:  soft, nontender, nondistended, normal bowel sounds, no masses, hepatomegaly, splenomegaly  Extremities:  no edema, redness, tenderness in the calves  Skin:  rather pale skin complexion    Assessment:        Hospital Problems             Last Modified POA    * (Principal) Upper GI bleed 8/14/2022 Yes    Type 2 diabetes mellitus without complication, without long-term current use of insulin ([facility] [address] 8/14/2022 Yes    Complaint of melena 8/14/2022 Yes    Hypertension 8/14/2022 Yes    Nicotine dependence 8/14/2022 Yes       Plan:        UGIB - pending scope this am  Acute blood loss anemia 2/2 recent colonoscopy, polyp removal, nsaid use v. Diverticular bleed v hemorrhoid v AVM  DMII well controlled - sliding scale ordered but doubt will need  Home meds will be ordered post-proceduarlly - please PS me to restart  BP stable  Does have bipolar hx will need to resume home meds soon  May need ICU eval if continues bleeding, or BP drop    Sam Durbin MD  8/15/2022  8:51 AM

## 2022-08-15 NOTE — ANESTHESIA PRE PROCEDURE
Department of Anesthesiology  Preprocedure Note       Name:  Demetria Blankenship   Age:  65 y.o.  :  1960                                          MRN:  [id number]         Date:  8/15/2022      Surgeon: Tati Flores):  Gisele Esteban MD    Procedure: Procedure(s):  EGD ESOPHAGOGASTRODUODENOSCOPY    Medications prior to admission:   Prior to Admission medications    Medication Sig Start Date End Date Taking? Authorizing Provider   DULoxetine (CYMBALTA) 60 MG extended release capsule Take 1 capsule by mouth in the morning. 22   Marquita Arevalo MD   oxyCODONE-acetaminophen (PERCOCET) 5-325 MG per tablet Take 1 tablet by mouth every 8 hours as needed for Pain for up to 30 days.  22  Kenny Newton MD   ibuprofen (ADVIL;MOTRIN) 800 MG tablet Take 1 tablet by mouth 2 times daily as needed for Pain 22   Arlen Mora MD   diazePAM (VALIUM) 5 MG tablet TAKE ONE TABLET BY MOUTH EVERY 12 HOURS AS NEEDED FOR ANXIETY OR SLEEP 22  Marquita Arevalo MD   cloNIDine (CATAPRES) 0.1 MG tablet TAKE ONE TABLET BY MOUTH DAILY 22   Marquita Arevalo MD   Minoxidil (ROGAINE MENS) 5 % FOAM Apply topically nightly 22   Marquita Arevalo MD   meloxicam LATASHA CANALES [facility]) 15 MG tablet Take 1 tablet by mouth daily 22  Marquita Arevalo MD   sildenafil (VIAGRA) 100 MG tablet Take 1 tablet by mouth daily as needed for Erectile Dysfunction 22   Marquita Arevalo MD   tamsulosin [facility]) 0.4 MG capsule Take 1 capsule by mouth daily 22   Marquita Arevalo MD   metFORMIN (GLUCOPHAGE) 500 MG tablet Take 1 tablet by mouth daily (with breakfast) 22   Marquita Arevalo MD   cyclobenzaprine (FLEXERIL) 10 mg tablet Take 1 tablet by mouth 2 times daily as needed for Muscle spasms 22   Marquita Arevalo MD   atenolol (TENORMIN) 25 MG tablet TAKE ONE TABLET BY MOUTH DAILY 22   FRANCES Mckeon   cholestyramine (QUESTRAN) 4 g packet TAKE ONE PACKET BY MOUTH FOUR TIMES A DAY 4/11/22   Shawn Kerr MD   atorvastatin (LIPITOR) 20 MG tablet TAKE ONE TABLET BY MOUTH DAILY 1/24/22   Shawn Kerr MD   Blood Glucose Monitoring Suppl (BLOOD GLUCOSE MONITOR SYSTEM) w/Device KIT USE TO MONITOR BLOOD GLUCOSE LEVEL. (TEST ONCE PER DAY) 10/22/21   Shawn Kerr MD   blood glucose monitor strips Test blood glucose level 1 time per day. 10/22/21   Shawn Kerr MD   Lancets 30G [address] [facility] Inject 1 each into the skin daily 10/22/21   Shawn Kerr MD   hydrOXYzine (VISTARIL) 25 MG capsule  9/20/20   Historical Provider, MD   QUEtiapine (SEROQUEL) 50 MG tablet  9/20/20   Historical Provider, MD   loperamide (IMODIUM) 2 MG capsule TAKE ONE CAPSULE daily for DIARRHEA 8/4/20   Shawn Kerr MD   lamoTRIgine (LAMICTAL) 200 MG tablet Take 200 mg by mouth daily 9/25/17   Historical Provider, MD   acetaminophen (TYLENOL) 325 MG tablet Take 2 tablets by mouth every 4 hours as needed for Pain 12/15/16   Shawn Kerr MD       Current medications:    No current facility-administered medications for this visit. No current outpatient medications on file.      Facility-Administered Medications Ordered in Other Visits   Medication Dose Route Frequency Provider Last Rate Last Admin    sodium chloride flush 0.9 % injection 5-40 mL  5-40 mL IntraVENous 2 times per day Brand Baize, APRN - NP        sodium chloride flush 0.9 % injection 5-40 mL  5-40 mL IntraVENous PRN Brand Baize, APRN - NP        0.9 % sodium chloride infusion   IntraVENous PRN Brand Baize, APRN - NP        ondansetron (ZOFRAN-ODT) disintegrating tablet 4 mg  4 mg Oral Q8H PRN Brand Baize, APRN - NP        Or    ondansetron [facility]) injection 4 mg  4 mg IntraVENous Q6H PRN Brand Baize, APRN - NP        acetaminophen (TYLENOL) tablet 650 mg  650 mg Oral Q6H PRN Brand Baize, APRN - NP        Or   Ema Bustos acetaminophen (TYLENOL) suppository 650 mg  650 mg Rectal Q6H PRN Antoni Bradley Indore LUKASZ Eaton NP        0.9 % sodium chloride infusion   IntraVENous PRN Zoila Anderson MD        pantoprazole (PROTONIX) 80 mg in sodium chloride 0.9 % 100 mL infusion  8 mg/hr IntraVENous Continuous LUKASZ Faulkner NP 10 mL/hr at 08/15/22 0141 8 mg/hr at 08/15/22 0141    [START ON 8/17/2022] pantoprazole (PROTONIX) 40 mg in sodium chloride (PF) 10 mL injection  40 mg IntraVENous Daily LUKASZ Faulkner NP        lactated ringers infusion   IntraVENous Continuous LUKASZ Faulkner  mL/hr at 08/14/22 1657 New Bag at 08/14/22 1657    glucose chewable tablet 16 g  4 tablet Oral PRN LUKASZ Faulkner NP        dextrose bolus 10% 125 mL  125 mL IntraVENous PRN LUKASZ Faulkner NP        Or    dextrose bolus 10% 250 mL  250 mL IntraVENous PRN LUKASZ Faulkner NP        glucagon (rDNA) injection 1 mg  1 mg IntraMUSCular PRN LUKASZ Faulkner NP        dextrose 10 % infusion   IntraVENous Continuous PRN LUKASZ Faulkner NP        insulin lispro (HUMALOG) injection vial 0-4 Units  0-4 Units SubCUTAneous TID WC LUKASZ Faulkner NP        insulin lispro (HUMALOG) injection vial 0-4 Units  0-4 Units SubCUTAneous Nightly LUKASZ Faulkner NP           Allergies: Allergies   Allergen Reactions    Poison Ivy Extract Itching     Hives & itching       Problem List:    Patient Active Problem List   Diagnosis Code    Depression F32. A    Bipolar 1 disorder (HCC) F31.9    Hypertension I10    Anxiety F41.9    Nicotine dependence F17.200    Osteoarthritis M19.90    Obstructive sleep apnea on CPAP G47.33, Z99.89    Lumbar radiculopathy M54.16    DDD (degenerative disc disease), lumbar M51.36    Chronic, continuous use of opioids F11.90    724.8 M47.817    Sacroiliitis, not elsewhere classified ([facility] [address] M46.1    Chronic back pain M54.9, G89.29    Medication monitoring encounter Z51.81    Lumbar facet arthropathy M47.816    Cervical spondylosis M47.812    Facet arthropathy, cervical M47.812    Retinal detachment with multiple breaks, left eye H33.022    Degenerative disc disease, cervical M50.30    Tubular adenoma D36.9    Hyperplastic polyp of intestine K63.5    Macular puckering of retina, left eye H35.372    Encounter for genetic counseling [id number]    Colon polyposis K63.5    Erectile dysfunction N52.9    Colon polyp K63.5    History of colon surgery Z98.890    Mild obesity E66.9    Gastroesophageal reflux disease without esophagitis K21.9    Retinal detachment with multiple breaks, right H33.021    Gastritis K29.70    Diabetes mellitus type 2 in obese ([facility]) E11.69, E66.9    Closed Kang's fracture of right radius with routine healing S52.541D    Upper GI bleed K92.2    Type 2 diabetes mellitus without complication, without long-term current use of insulin ([facility]) E11.9    Complaint of melena K92.1       Past Medical History:        Diagnosis Date    Abnormal EKG 10/27/2016    indicates inferior infarct.     Anxiety     Bipolar disorder ([address] 2006    on rx    Chronic back pain 11/21/2014    Depression 2006    on rx    Diabetes mellitus ([address]     borderline    Fracture, clavicle 1996    LEFT    Hyperlipidemia     Hypertension 2006    on rx    Legally blind in left eye, as defined in [place]      very blurry    Lumbar radiculopathy 04/23/2014    Nicotine dependence     Osteoarthritis     Pain     LEFT EYE    Retinal detachment     history miguel    Sleep apnea     doesnt use cpap     Tubular adenoma     Visual floaters     Wears glasses     USES MAGNIFYING GLASS       Past Surgical History:        Procedure Laterality Date    CATARACT REMOVAL Left     CATARACT REMOVAL WITH IMPLANT Right 8-25-15    CHOLECYSTECTOMY  2/13    COLONOSCOPY  09/22/2016    the ICV was edematous and erythematous but most likely normal variant , bxs taken Large polyp 3 cm, at 50 cm from the anal verge with a very  wide stalk, not removed tattooed needs to be removed with subtotal colectomy    COLONOSCOPY N/A 5/2/2019    COLONOSCOPY POLYPECTOMY COLD BIOPSY, HOT SNARE performed by Norm Gloria MD at [address] [facility] N/A 8/11/2022    COLONOSCOPY POLYPECTOMY SNARE/COLD BIOPSY performed by Norm Gloria MD at [address] [facility], base of skull-left side    EYE SURGERY  12/31/15    laser right eye, left vit    EYE SURGERY Left     torn retina lazer/freezer/hole    EYE SURGERY Left 7/24/80    SILICONE REMOVAL AIR FLUID EXCHANGE    EYE SURGERY      cataracts removed miguel.  EYE SURGERY      total 6 eye surg to left, 2 eye surg. to rt.  HERNIA REPAIR      umbilical    LYMPH NODE BIOPSY Left 1971    BASE OF SKULL    NASAL SEPTUM SURGERY  11-24-15    [place] OFFICE/OUTPT VISIT,PROCEDURE ONLY Right 7/12/2018    VITRECTOMY 25 GAUGE, AIR FLUID EXCHANGE, AIR GAS EXCHANGE WITH 18% SF6, ENDOLASER, 200 MSECONDS, 200 MWATTS, 377 PULSES performed by Lennox Byars, MD at [address]  12/09/2016    w/ ileorectal anastomosis    TONSILLECTOMY  3518    UMBILICAL HERNIA REPAIR  1996    UPPER GASTROINTESTINAL ENDOSCOPY N/A 5/2/2019    EGD BIOPSY performed by Norm Gloria MD at [facility] VITRECTOMY Left 12/10/2015    VITRECTOMY Left 10/27/2016    membrane peeling    VITRECTOMY Right 07/12/2018    laser, gas    WRIST FRACTURE SURGERY Right 1/31/2022    DISTAL RADIUS OPEN REDUCTION INTERNAL FIXATION performed by Matti Gleason MD at [facility] History:    Social History     Tobacco Use    Smoking status: Every Day     Packs/day: 1.00     Years: 30.00     Pack years: 30.00     Types: Cigarettes    Smokeless tobacco: Never   Substance Use Topics    Alcohol use: Yes     Comment: 6 drinks per weekly- weekends only now (1/27/22)                                Ready to quit: Not Answered  Counseling given: Not Answered      Vital Signs (Current): There were no vitals filed for this visit. BP Readings from Last 3 Encounters:   08/15/22 139/75   08/11/22 132/88   07/27/22 (!) 142/93       NPO Status:                                                                                 BMI:   Wt Readings from Last 3 Encounters:   08/14/22 180 lb (81.6 kg)   08/11/22 185 lb (83.9 kg)   08/02/22 185 lb (83.9 kg)     There is no height or weight on file to calculate BMI.    CBC:   Lab Results   Component Value Date/Time    WBC 13.9 08/14/2022 02:43 PM    RBC 3.13 08/14/2022 02:43 PM    RBC 5.02 05/22/2012 07:45 AM    HGB 6.9 08/15/2022 06:32 AM    HCT 20.6 08/15/2022 06:32 AM    MCV 91.1 08/14/2022 02:43 PM    RDW 14.3 08/14/2022 02:43 PM     08/14/2022 02:43 PM     05/22/2012 07:45 AM       CMP:   Lab Results   Component Value Date/Time     08/15/2022 06:32 AM    K 3.7 08/15/2022 06:32 AM     08/15/2022 06:32 AM    CO2 24 08/15/2022 06:32 AM    BUN 34 08/15/2022 06:32 AM    CREATININE 0.62 08/15/2022 06:32 AM    GFRAA >60 08/15/2022 06:32 AM    LABGLOM >60 08/15/2022 06:32 AM    GLUCOSE 123 08/15/2022 06:32 AM    GLUCOSE 154 05/22/2012 07:45 AM    PROT 6.0 08/14/2022 02:43 PM    CALCIUM 8.0 08/15/2022 06:32 AM    BILITOT 0.17 08/14/2022 02:43 PM    ALKPHOS 107 08/14/2022 02:43 PM    AST 19 08/14/2022 02:43 PM    ALT 42 08/14/2022 02:43 PM       POC Tests:   Recent Labs     08/15/22  0648   POCGLU 142*       Coags:   Lab Results   Component Value Date/Time    PROTIME 11.1 08/15/2022 06:32 AM    PROTIME 13.4 05/23/2012 10:12 AM    INR 1.0 08/15/2022 06:32 AM    APTT 22.3 08/15/2022 06:32 AM       HCG (If Applicable): No results found for: PREGTESTUR, PREGSERUM, HCG, HCGQUANT     ABGs:   Lab Results   Component Value Date/Time    PO2ART 56.9 05/22/2012 03:48 PM    LXR8VMD 22.8 05/22/2012 03:48 PM    F7BCLAAM 87.1 05/22/2012 03:48 PM        Type & Screen (If Applicable):  No results found for: LABABO, LABRH    Drug/Infectious Status (If Applicable):   No

## 2022-08-15 NOTE — PROGRESS NOTES
Patient returned from [address] [place] [address] O via stretcher. Asleep but arouses and oriented. Protonix gtt off on return to floor. Pivoted to bed.

## 2022-08-15 NOTE — PROGRESS NOTES
Evaluated patient at request of [facility]. Review of records shows admit for suspected GI bleed. Patient had EGD performed earlier today that demonstrated 2 cm ulcer in the pyloric channel. Hemoclips were placed with hemostasis able to be achieved GI recommended Protonix. Patient also noted to be anemic with almost 3 g hemoglobin drop since admission. Patient has received 1 unit of PRBCs and hemoglobin improved to 7, second unit PRBCs was ordered by [facility]. Patient is currently in atrial fibrillation with RVR, no anticoagulation secondary to GI bleeding. However currently maintaining his blood pressures. Patient has second unit of PVCs ordered as well as digoxin ordered for atrial fibrillation with RVR on examination he is alert to person, place, time no acute distress. Had discussion with [facility] physician as well as attending critical care physician. Critical care will follow peripherally given that the patient is hemodynamically stable. Will follow labs as well as vital sign abnormalities.   If the patient decompensates please consult critical care for potential transfer to the ICU    Electronically signed by Roshan Lara DO on 8/15/2022 at 8:19 PM

## 2022-08-15 NOTE — OP NOTE
Operative Note      Patient: Blanca Perez  YOB: 1960  MRN: [id number]    Date of Procedure: 8/15/2022    Pre-Op Diagnosis: Melena    Post-Op Diagnosis: Same       Procedure(s):  EGD CONTROL HEMORRHAGE  EGD BIOPSY    Surgeon(s):  Sonido Triplett MD    Assistant:   First Assistant: Mirela Reddy RN    Anesthesia: Monitor Anesthesia Care    Estimated Blood Loss (mL): Minimal    Complications: None    Specimens:   ID Type Source Tests Collected by Time Destination   A : GASTRIC BX, CONCERN FOR H. Ram MD [place] 8/15/2022 1419        Implants:  * No implants in log *      Drains: * No LDAs found *    Findings:   LA grade C erosive esophagitis in the distal third esophagus. 5 cm hiatal hernia. A 2 cm cratered ulcer with a nonbleeding visible vessel was found in the pyloric channel. 2 hemoclips were placed with complete approximation of the ulcer margins. Mild gastritis in the antrum and pylorus. Biopsies were performed for histology and H. pylori testing. The retroflexed view of the fundus cardia and gastric body was normal.  Mild duodenitis with small clean-based ulcers were found in the duodenal bulb and in the second portion of the duodenum. Recommendations:  Continue IV Protonix 40 mg twice daily for another 48 hours then changed to oral Protonix 40 mg twice daily for 8 weeks. Follow biopsy results. Start soft diet today and advance to regular diet over the next 24 hours. No NSAID use. Patient will need a repeat EGD in 2 to 3 months to check for healing of esophagitis and healing of gastric ulcer. Will need biopsy of the distal esophagus to rule out Arreola's. Patient to follow an antireflux lifestyle and GERD diet.       Detailed Description of Procedure:   Informed consent was obtained from the patient after explanation of the procedure including indications, description of the procedure,  benefits and possible risks and complications of the procedure, and alternatives. Questions were answered. The patient's history was reviewed and a directed physical examination was performed prior to the procedure. Patient was monitored throughout the procedure with pulse oximetry and periodic assessment of vital signs. Patient was sedated as noted above. With the patient in the left lateral decubitus position, the Olympus videoendoscope was placed in the patient's mouth and under direct visualization passed into the esophagus. Visualization of the esophagus, stomach, and duodenum was performed during both introduction and withdrawal of the endoscope and retroflexed view of the proximal stomach was obtained. The scope was passed to the 2nd portion of the duodenum. The patient tolerated the procedure well and was taken to the recovery area in good condition. The patient  was taken to the recovery area in good condition.      Electronically signed by Tawana Lilly MD on 8/15/2022 at 2:44 PM

## 2022-08-16 PROBLEM — K25.3 ACUTE PYLORUS ULCER: Status: ACTIVE | Noted: 2022-08-16

## 2022-08-16 PROBLEM — K29.60 EROSIVE GASTRITIS: Status: ACTIVE | Noted: 2019-10-29

## 2022-08-16 LAB
ABO/RH: NORMAL
ANTIBODY SCREEN: NEGATIVE
ARM BAND NUMBER: NORMAL
BLD PROD TYP BPU: NORMAL
BLD PROD TYP BPU: NORMAL
BLOOD BANK BLOOD PRODUCT EXPIRATION DATE: NORMAL
BLOOD BANK BLOOD PRODUCT EXPIRATION DATE: NORMAL
BLOOD BANK ISBT PRODUCT BLOOD TYPE: 5100
BLOOD BANK ISBT PRODUCT BLOOD TYPE: 5100
BLOOD BANK PRODUCT CODE: NORMAL
BLOOD BANK PRODUCT CODE: NORMAL
BLOOD BANK UNIT TYPE AND RH: NORMAL
BLOOD BANK UNIT TYPE AND RH: NORMAL
BPU ID: NORMAL
BPU ID: NORMAL
CROSSMATCH RESULT: NORMAL
CROSSMATCH RESULT: NORMAL
DISPENSE STATUS BLOOD BANK: NORMAL
DISPENSE STATUS BLOOD BANK: NORMAL
EXPIRATION DATE: NORMAL
GLUCOSE BLD-MCNC: 109 MG/DL (ref 75–110)
GLUCOSE BLD-MCNC: 133 MG/DL (ref 75–110)
GLUCOSE BLD-MCNC: 142 MG/DL (ref 75–110)
GLUCOSE BLD-MCNC: 170 MG/DL (ref 75–110)
HCT VFR BLD CALC: 23 % (ref 40.7–50.3)
HCT VFR BLD CALC: 24.7 % (ref 40.7–50.3)
HCT VFR BLD CALC: 24.9 % (ref 40.7–50.3)
HEMOGLOBIN: 8.2 G/DL (ref 13–17)
HEMOGLOBIN: 8.3 G/DL (ref 13–17)
HEMOGLOBIN: 8.5 G/DL (ref 13–17)
MCH RBC QN AUTO: 31.4 PG (ref 25.2–33.5)
MCHC RBC AUTO-ENTMCNC: 35.7 G/DL (ref 28.4–34.8)
MCV RBC AUTO: 88.1 FL (ref 82.6–102.9)
NRBC AUTOMATED: 0 PER 100 WBC
PDW BLD-RTO: 14.8 % (ref 11.8–14.4)
PLATELET # BLD: 200 K/UL (ref 138–453)
PMV BLD AUTO: 9.9 FL (ref 8.1–13.5)
RBC # BLD: 2.61 M/UL (ref 4.21–5.77)
TRANSFUSION STATUS: NORMAL
TRANSFUSION STATUS: NORMAL
UNIT DIVISION: 0
UNIT DIVISION: 0
UNIT ISSUE DATE/TIME: NORMAL
UNIT ISSUE DATE/TIME: NORMAL
WBC # BLD: 5.9 K/UL (ref 3.5–11.3)

## 2022-08-16 PROCEDURE — 1200000000 HC SEMI PRIVATE

## 2022-08-16 PROCEDURE — 85018 HEMOGLOBIN: CPT

## 2022-08-16 PROCEDURE — 85027 COMPLETE CBC AUTOMATED: CPT

## 2022-08-16 PROCEDURE — 6370000000 HC RX 637 (ALT 250 FOR IP): Performed by: FAMILY MEDICINE

## 2022-08-16 PROCEDURE — 2580000003 HC RX 258: Performed by: INTERNAL MEDICINE

## 2022-08-16 PROCEDURE — 99232 SBSQ HOSP IP/OBS MODERATE 35: CPT | Performed by: INTERNAL MEDICINE

## 2022-08-16 PROCEDURE — 6360000002 HC RX W HCPCS: Performed by: INTERNAL MEDICINE

## 2022-08-16 PROCEDURE — 36415 COLL VENOUS BLD VENIPUNCTURE: CPT

## 2022-08-16 PROCEDURE — 6360000002 HC RX W HCPCS: Performed by: NURSE PRACTITIONER

## 2022-08-16 PROCEDURE — 2580000003 HC RX 258: Performed by: NURSE PRACTITIONER

## 2022-08-16 PROCEDURE — 2060000000 HC ICU INTERMEDIATE R&B

## 2022-08-16 PROCEDURE — C9113 INJ PANTOPRAZOLE SODIUM, VIA: HCPCS | Performed by: NURSE PRACTITIONER

## 2022-08-16 PROCEDURE — 6370000000 HC RX 637 (ALT 250 FOR IP): Performed by: NURSE PRACTITIONER

## 2022-08-16 PROCEDURE — 82947 ASSAY GLUCOSE BLOOD QUANT: CPT

## 2022-08-16 PROCEDURE — C9113 INJ PANTOPRAZOLE SODIUM, VIA: HCPCS | Performed by: INTERNAL MEDICINE

## 2022-08-16 PROCEDURE — 99232 SBSQ HOSP IP/OBS MODERATE 35: CPT | Performed by: FAMILY MEDICINE

## 2022-08-16 PROCEDURE — 85014 HEMATOCRIT: CPT

## 2022-08-16 RX ORDER — CYCLOBENZAPRINE HCL 10 MG
10 TABLET ORAL 2 TIMES DAILY PRN
Status: DISCONTINUED | OUTPATIENT
Start: 2022-08-16 | End: 2022-08-17 | Stop reason: HOSPADM

## 2022-08-16 RX ORDER — TAMSULOSIN HYDROCHLORIDE 0.4 MG/1
0.4 CAPSULE ORAL DAILY
Status: DISCONTINUED | OUTPATIENT
Start: 2022-08-16 | End: 2022-08-17 | Stop reason: HOSPADM

## 2022-08-16 RX ORDER — DULOXETIN HYDROCHLORIDE 30 MG/1
60 CAPSULE, DELAYED RELEASE ORAL DAILY
Status: DISCONTINUED | OUTPATIENT
Start: 2022-08-16 | End: 2022-08-17 | Stop reason: HOSPADM

## 2022-08-16 RX ORDER — OXYCODONE HYDROCHLORIDE AND ACETAMINOPHEN 5; 325 MG/1; MG/1
1 TABLET ORAL EVERY 8 HOURS PRN
Status: DISCONTINUED | OUTPATIENT
Start: 2022-08-16 | End: 2022-08-17 | Stop reason: HOSPADM

## 2022-08-16 RX ORDER — ATORVASTATIN CALCIUM 20 MG/1
20 TABLET, FILM COATED ORAL DAILY
Status: DISCONTINUED | OUTPATIENT
Start: 2022-08-16 | End: 2022-08-17 | Stop reason: HOSPADM

## 2022-08-16 RX ORDER — QUETIAPINE FUMARATE 25 MG/1
50 TABLET, FILM COATED ORAL NIGHTLY
Status: DISCONTINUED | OUTPATIENT
Start: 2022-08-16 | End: 2022-08-17 | Stop reason: HOSPADM

## 2022-08-16 RX ORDER — CHOLESTYRAMINE LIGHT 4 G/5.7G
1 POWDER, FOR SUSPENSION ORAL DAILY
Status: DISCONTINUED | OUTPATIENT
Start: 2022-08-16 | End: 2022-08-17 | Stop reason: HOSPADM

## 2022-08-16 RX ORDER — HYDROXYZINE HYDROCHLORIDE 25 MG/1
25 TABLET, FILM COATED ORAL NIGHTLY PRN
Status: DISCONTINUED | OUTPATIENT
Start: 2022-08-16 | End: 2022-08-17 | Stop reason: HOSPADM

## 2022-08-16 RX ADMIN — OXYCODONE HYDROCHLORIDE AND ACETAMINOPHEN 1 TABLET: 5; 325 TABLET ORAL at 13:54

## 2022-08-16 RX ADMIN — CHOLESTYRAMINE 4 G: 4 POWDER, FOR SUSPENSION ORAL at 11:12

## 2022-08-16 RX ADMIN — LAMOTRIGINE 200 MG: 100 TABLET ORAL at 00:31

## 2022-08-16 RX ADMIN — ATORVASTATIN CALCIUM 20 MG: 20 TABLET, FILM COATED ORAL at 11:11

## 2022-08-16 RX ADMIN — SODIUM CHLORIDE, POTASSIUM CHLORIDE, SODIUM LACTATE AND CALCIUM CHLORIDE: 600; 310; 30; 20 INJECTION, SOLUTION INTRAVENOUS at 07:12

## 2022-08-16 RX ADMIN — SODIUM CHLORIDE 8 MG/HR: 9 INJECTION, SOLUTION INTRAVENOUS at 10:52

## 2022-08-16 RX ADMIN — QUETIAPINE FUMARATE 50 MG: 25 TABLET ORAL at 20:34

## 2022-08-16 RX ADMIN — TAMSULOSIN HYDROCHLORIDE 0.4 MG: 0.4 CAPSULE ORAL at 20:34

## 2022-08-16 RX ADMIN — SODIUM CHLORIDE 8 MG/HR: 9 INJECTION, SOLUTION INTRAVENOUS at 07:16

## 2022-08-16 RX ADMIN — SODIUM CHLORIDE, PRESERVATIVE FREE 10 ML: 5 INJECTION INTRAVENOUS at 20:39

## 2022-08-16 RX ADMIN — SODIUM CHLORIDE 8 MG/HR: 9 INJECTION, SOLUTION INTRAVENOUS at 20:37

## 2022-08-16 RX ADMIN — OXYCODONE HYDROCHLORIDE AND ACETAMINOPHEN 1 TABLET: 5; 325 TABLET ORAL at 22:27

## 2022-08-16 RX ADMIN — DULOXETINE HYDROCHLORIDE 60 MG: 30 CAPSULE, DELAYED RELEASE ORAL at 11:11

## 2022-08-16 RX ADMIN — LAMOTRIGINE 200 MG: 100 TABLET ORAL at 09:17

## 2022-08-16 RX ADMIN — ATENOLOL 25 MG: 25 TABLET ORAL at 09:17

## 2022-08-16 ASSESSMENT — ENCOUNTER SYMPTOMS
ABDOMINAL PAIN: 0
CONSTIPATION: 0
BLOOD IN STOOL: 0
NAUSEA: 0
CHEST TIGHTNESS: 0
VOMITING: 0
DIARRHEA: 0
SHORTNESS OF BREATH: 0
COUGH: 0
WHEEZING: 0

## 2022-08-16 ASSESSMENT — PAIN DESCRIPTION - LOCATION
LOCATION: SHOULDER;BACK
LOCATION: BACK

## 2022-08-16 ASSESSMENT — PAIN SCALES - GENERAL
PAINLEVEL_OUTOF10: 8
PAINLEVEL_OUTOF10: 8

## 2022-08-16 NOTE — PROGRESS NOTES
Pt had incontinence of large black tarry stool   Pt cleansed gown and bed linens changed  Denies pain or discomfort   Call light in reach

## 2022-08-16 NOTE — PROGRESS NOTES
(Temporal)   Resp 14   Ht 5' 11\" (1.803 m)   Wt 180 lb (81.6 kg)   SpO2 96%   BMI 25.10 kg/m²   TEMPERATURE:  Current - Temp: 98 °F (36.7 °C); Max - Temp  Av.1 °F (36.7 °C)  Min: 97.2 °F (36.2 °C)  Max: 98.6 °F (37 °C)    Physical Assessment:  General appearance:  alert, cooperative and no distress  Mental Status:  oriented to person, place and time and normal affect  Lungs:  clear to auscultation bilaterally, normal effort  Heart:  regular rate and rhythm, no murmur  Abdomen:  soft, nontender, nondistended, normal bowel sounds, no masses, hepatomegaly, splenomegaly  Extremities:  no edema, redness, tenderness in the calves  Skin:  no gross lesions, rashes, induration    Data Review:    Labs and Imaging:     CBC:  Recent Labs     22  1443 22  1702 08/15/22  0632 08/15/22  1636 08/15/22  2250 22  0543 22  0920   WBC 13.9*  --   --   --   --   --  5.9   HGB 9.5*   < > 6.9* 7.0* 8.0* 8.5* 8.2*   MCV 91.1  --   --   --   --   --  88.1   RDW 14.3  --   --   --   --   --  14.8*     --   --   --   --   --  200    < > = values in this interval not displayed.        ANEMIA STUDIES:  Recent Labs     08/15/22  0632   LABIRON 37   TIBC 247*       BMP:  Recent Labs     22  1443 08/15/22  0632    141   K 4.7 3.7    108*   CO2 22 24   BUN 72* 34*   CREATININE 0.78 0.62*   GLUCOSE 189* 123*   CALCIUM 8.6 8.0*       LFTS:  Recent Labs     22  1443   ALKPHOS 107   ALT 42*   AST 19   BILITOT 0.17*   LABALBU 3.8       Amylase/Lipase and Ammonia:  Recent Labs     22  1443   LIPASE 25       Acute Hepatitis Panel:  No results found for: HEPBSAG, HEPCAB, HEPBIGM, HEPAIGM    HCV Genotype:  No results found for: HEPATITISCGENOTYPE    HCV Quantitative:  No results found for: HCVQNT    LIVER WORK UP:    AFP  No results found for: AFP    Alpha 1 antitrypsin   No results found for: A1A    HERNANDEZ  No results found for: HERNANDEZ    AMA  No results found for: MITOAB    ASMA  No results found for: SMOOTHMUSCAB    PT/INR  Recent Labs     08/15/22  0632   PROTIME 11.1   INR 1.0       Cancer Markers:  CEA:  No results for input(s): CEA in the last 72 hours. Ca 125:  No results for input(s):  in the last 72 hours. Ca 19-9:   Invalid input(s):   AFP: No results for input(s): AFP in the last 72 hours. Lactic acid:Invalid input(s): LACTIC ACID    Radiology Review:    No results found. Principal Problem:    Upper GI bleed  Active Problems:    Type 2 diabetes mellitus without complication, without long-term current use of insulin (HCC)    Melena    Acute blood loss anemia    Acute gastric ulcer with hemorrhage    Hypertension    Nicotine dependence  Resolved Problems:    * No resolved hospital problems. *       GI Impression:    Melena-greatly improved. Patient had 1 small episode this a.m.  -s/p EGD that revealed LA grade C erosive esophagitis in the distal third esophagus, 2 cm cratered gastric ulcer in the pylorus with nonbleeding visible vessel-treated with hemoclips. Gastritis-biopsies taken and pending. Mild duodenitis with small clean-based ulcers noted  -Pt had been taking Motrin along with Mobic  Symptomatic anemia-stable    Plan and Recommendations:    Cont PPI drip x 24 more hours. Then if Hgb remains stable, and no further episodes of bleeding, will change to PO BID tomorrow  Diet as tolerated  Avoid Nsaids  Daily labs  Avoid smoking, drinking alcohol-pt counseled at length and verbalized understanding of how these things decrease his potential to heal properly  Will follow up on biopsies in am  Will follow-anticipate dc in am if stable      This plan was formulated in collaboration with Dr. Kim Sebastian MD    Thank you for allowing me to participate in the care of your patient. Please feel free to contact me with any questions or concerns.      [facility] [place]   Marczoë Mason, [address]   [phone]  8/16/2022  10:21 AM    This note was created with the assistance of a speech-recognition program.  Although the intention is to generate a document that actually reflects the content of the visit, no guarantees can be provided that every mistake has been identified and corrected by editing.

## 2022-08-16 NOTE — PROGRESS NOTES
[facility]  Office: [address], DO, Jose Ch, DO, Enmanuel Garner, DO, Anthony Sanchez Blood, DO, Jo Ann Rangel MD, Yaa Fajardo MD, Shwetha Burgos MD, Joshua Hurst MD,  Judy Otto MD, Iris Uriostegui MD, Erica Valencia, DO, Amairani Davey MD,  Micheal Clayton MD, Edilberto Olsen MD, Kaitlyn Siegel DO, Sheila Diez MD, Monica Fleming MD, Juliette Canas MD, Kalpana Lazar DO, Naida Monroe MD, Livia Jacob MD, Judith Thomas, CNP,  Alison Amador, CNP, Anthony Mckoy, CNP, Elizabeth Adam, CNP, DEMOND ShultzC, Natasha Verma, [facility], Dean Lanza, CNP, Jerry Salinas, CNP, Bhumika Salgado, CNP, Suad Hammond, CNP, Gabriel Martinez, CNP, Cherise Nurse, CNS, Laure Flores, [facility], Lis Ramon, CNP, Shireen Malik, CNP, [facility], [address] [place] [address]    Progress Note    8/16/2022    6:33 PM    Name:   Patti Guevara  MRN:     [id number]     [place]:      [de-identified]   Room:   [facility] Day:  2  Admit Date:  8/14/2022  2:27 PM    PCP:   Jesika Nunn MD  Code Status:  Full Code    Subjective:     C/C:   Chief Complaint   Patient presents with    Diarrhea     Had 2 polyps rmeoved from colon 2 days ago    Rectal Bleeding     Interval History Status: improved. Patient seen and examined at bedside, had his EGD yesterday, also found, on PPI drip, after coming to the floor developed episode of A. fib with RVR that responded to digoxin. Blood pressure and heart rate since then are stable   Denies any issues this morning, requesting his home medication resumption, I did review with them and confirmed   patient denies any chest pain, shortness of breath, chills, fevers, nausea or vomiting.   Patient vitals, labs and all providers notes were reviewed,from overnight shift and morning updates were noted and discussed with the nurse    Brief History:     Patti Guevara is a 65 y.o. male who presented for evaluation of fever and tarry stools that occurred over the last 2 days. None today. He states that he had a colonoscopy done on 8/11/22 for routine screening. He did have polyps removed. He states after he went home he began having persistent sweating. He is diabetic but did not know if he had low blood sugar. He states he did eat after discharge. He did take Ibuprofen and sweating eventually subsided. Yesterday he states he had several dark, tarry diarrhea stools. He had no dizziness or SOB. Denies chest pain. He has had no stools today. He does not take any blood thinners. He does take Meloxicam 15mg daily which he states he has been on for 15 years. He did not take any since his colonoscopy because he was told not to take with Ibuprofen. He has been taking Ibuprofen 800 mg BID since his colonoscopy. Additional medical history includes HLD, T2DM, HTN and bipolar disorder. Review of Systems:     Review of Systems   Constitutional:  Positive for activity change and appetite change. Negative for chills, diaphoresis and fever. HENT:  Negative for congestion. Eyes:  Negative for visual disturbance. Respiratory:  Negative for cough, chest tightness, shortness of breath and wheezing. Cardiovascular:  Negative for chest pain, palpitations and leg swelling. Gastrointestinal:  Negative for abdominal pain, blood in stool, constipation, diarrhea, nausea and vomiting. Genitourinary:  Negative for difficulty urinating. Neurological:  Negative for dizziness, weakness, light-headedness, numbness and headaches. Psychiatric/Behavioral:  The patient is nervous/anxious. All other systems reviewed and are negative. Medications: Allergies:     Allergies   Allergen Reactions    Poison Ivy Extract Itching     Hives & itching       Current Meds:   Scheduled Meds:    atorvastatin  20 mg Oral Daily    cholestyramine light  1 packet Oral Daily    DULoxetine  60 mg Oral Daily    QUEtiapine  50 mg Oral Nightly    tamsulosin  0.4 mg Oral Daily    sodium chloride flush  5-40 mL IntraVENous 2 times per day    atenolol  25 mg Oral Daily    cloNIDine  0.1 mg Oral Daily    lamoTRIgine  200 mg Oral Daily    insulin lispro  0-4 Units SubCUTAneous TID WC    insulin lispro  0-4 Units SubCUTAneous Nightly     Continuous Infusions:    pantoprazole 8 mg/hr (22 1052)    sodium chloride      sodium chloride      sodium chloride      dextrose       PRN Meds: cyclobenzaprine, hydrOXYzine HCl, oxyCODONE-acetaminophen, sodium chloride flush, sodium chloride, ondansetron **OR** ondansetron, acetaminophen **OR** acetaminophen, sodium chloride, metoprolol, sodium chloride, diazePAM, glucose, dextrose bolus **OR** dextrose bolus, glucagon (rDNA), dextrose    Data:     Past Medical History:   has a past medical history of Abnormal EKG, Anxiety, Bipolar disorder ([address] Chronic back pain, Depression, Diabetes mellitus ([facility] [address] Fracture, clavicle, Hyperlipidemia, Hypertension, Legally blind in left eye, as defined in [place], Lumbar radiculopathy, Nicotine dependence, Osteoarthritis, Pain, Retinal detachment, Sleep apnea, Tubular adenoma, Visual floaters, and Wears glasses. Social History:   reports that he has been smoking cigarettes. He has a 30.00 pack-year smoking history. He has never used smokeless tobacco. He reports current alcohol use. He reports that he does not use drugs.      Family History:   Family History   Problem Relation Age of Onset    Heart Failure Mother     Cancer Mother     Heart Disease Mother         CAD-OPEN HEART    Mental Retardation Sister     Seizures Sister     Hypertension Other         maternal history       Vitals:  BP 88/63   Pulse 73   Temp 98.1 °F (36.7 °C) (Oral)   Resp 23   Ht 5' 11\" (1.803 m)   Wt 180 lb (81.6 kg)   SpO2 94%   BMI 25.10 kg/m²   Temp (24hrs), Av.1 °F (36.7 °C), Min:97.6 °F (36.4 °C), Max:98.6 °F (37 °C)    Recent Labs 08/15/22  2027 08/16/22  0726 08/16/22  1240 08/16/22  1648   POCGLU 127* 109 133* 170*       I/O (24Hr): Intake/Output Summary (Last 24 hours) at 8/16/2022 1833  Last data filed at 8/16/2022 0556  Gross per 24 hour   Intake 343.33 ml   Output 1400 ml   Net -1056.67 ml       Labs:  Hematology:  Recent Labs     08/14/22  1443 08/14/22  1702 08/15/22  0632 08/15/22  1636 08/16/22  0543 08/16/22  0920 08/16/22  1612   WBC 13.9*  --   --   --   --  5.9  --    RBC 3.13*  --   --   --   --  2.61*  --    HGB 9.5*   < > 6.9*   < > 8.5* 8.2* 8.3*   HCT 28.5*   < > 20.6*   < > 24.9* 23.0* 24.7*   MCV 91.1  --   --   --   --  88.1  --    MCH 30.4  --   --   --   --  31.4  --    MCHC 33.3  --   --   --   --  35.7*  --    RDW 14.3  --   --   --   --  14.8*  --      --   --   --   --  200  --    MPV 10.1  --   --   --   --  9.9  --    INR  --   --  1.0  --   --   --   --     < > = values in this interval not displayed. Chemistry:  Recent Labs     08/14/22  1443 08/14/22  1455 08/15/22  0632     --  141   K 4.7  --  3.7     --  108*   CO2 22  --  24   GLUCOSE 189*  --  123*   BUN 72*  --  34*   CREATININE 0.78  --  0.62*   ANIONGAP 14  --  9   LABGLOM >60  --  >60   GFRAA >60  --  >60   CALCIUM 8.6  --  8.0*   LACTACIDWB  --  2.4*  --      Recent Labs     08/14/22  1443 08/14/22  2032 08/15/22  0648 08/15/22  1550 08/15/22  2027 08/16/22  0726 08/16/22  1240 08/16/22  1648   PROT 6.0*  --   --   --   --   --   --   --    LABALBU 3.8  --   --   --   --   --   --   --    AST 19  --   --   --   --   --   --   --    ALT 42*  --   --   --   --   --   --   --    ALKPHOS 107  --   --   --   --   --   --   --    BILITOT 0.17*  --   --   --   --   --   --   --    LIPASE 25  --   --   --   --   --   --   --    POCGLU  --    < > 142* 121* 127* 109 133* 170*    < > = values in this interval not displayed.      ABG:  Lab Results   Component Value Date/Time    PH 7.458 05/22/2012 03:48 PM    PCO2 32.7 05/22/2012 03:48 PM    PO2ART 56.9 05/22/2012 03:48 PM    PUG4HBB 22.8 05/22/2012 03:48 PM    NBEA 0.6 05/22/2012 03:48 PM    PBEA NOT REPORTED 05/22/2012 03:48 PM    R6EXNYBS 87.1 05/22/2012 03:48 PM    FIO2 NOT REPORTED 05/22/2012 03:48 PM     No results found for: SPECIAL  Lab Results   Component Value Date/Time    CULTURE (A) 05/02/2022 08:10 PM     METHICILLIN RESISTANT STAPHYLOCOCCUS AUREUS LIGHT GROWTH       Radiology:  CT ABDOMEN PELVIS W IV CONTRAST Additional Contrast? None    Result Date: 8/14/2022  No acute abnormality of the gastrointestinal tract. Left nephrolithiasis. Hepatomegaly with probable mild steatosis. Enlarged prostate. EGD 8/15  Findings:   LA grade C erosive esophagitis in the distal third esophagus. 5 cm hiatal hernia. A 2 cm cratered ulcer with a nonbleeding visible vessel was found in the pyloric channel. 2 hemoclips were placed with complete approximation of the ulcer margins. Mild gastritis in the antrum and pylorus. Biopsies were performed for histology and H. pylori testing. The retroflexed view of the fundus cardia and gastric body was normal.  Mild duodenitis with small clean-based ulcers were found in the duodenal bulb and in the second portion of the duodenum. Recommendations:  Continue IV Protonix 40 mg twice daily for another 48 hours then changed to oral Protonix 40 mg twice daily for 8 weeks. Follow biopsy results. Start soft diet today and advance to regular diet over the next 24 hours. No NSAID use. Patient will need a repeat EGD in 2 to 3 months to check for healing of esophagitis and healing of gastric ulcer. Will need biopsy of the distal esophagus to rule out Arreola's. Patient to follow an antireflux lifestyle and GERD diet    Physical Examination:        Physical Exam  Vitals and nursing note reviewed. Constitutional:       General: He is not in acute distress. HENT:      Head: Normocephalic and atraumatic.    Eyes:      Conjunctiva/sclera: Conjunctivae normal. problems. *    Status post 1 unit packed RBCs  Hydrated  Currently on Protonix drip  Hold clonidine for soft blood pressure this morning  Patient home medications were all resumed  Educated regarding diagnosis medications and food to avoid. He is eager for discharge.   Per GI needs to stay 1 more day on PPI IV was stable hemoglobin before discharging  Continue glycemic and blood pressure control  Discussed with the patient and the nurse      Kris Lindsay MD  8/16/2022  6:33 PM

## 2022-08-17 ENCOUNTER — APPOINTMENT (OUTPATIENT)
Dept: GENERAL RADIOLOGY | Age: 62
DRG: 381 | End: 2022-08-17
Payer: MEDICARE

## 2022-08-17 VITALS
DIASTOLIC BLOOD PRESSURE: 60 MMHG | HEIGHT: 71 IN | HEART RATE: 64 BPM | BODY MASS INDEX: 25.2 KG/M2 | RESPIRATION RATE: 16 BRPM | WEIGHT: 180 LBS | TEMPERATURE: 97.9 F | SYSTOLIC BLOOD PRESSURE: 103 MMHG | OXYGEN SATURATION: 96 %

## 2022-08-17 LAB
GLUCOSE BLD-MCNC: 112 MG/DL (ref 75–110)
GLUCOSE BLD-MCNC: 118 MG/DL (ref 75–110)
HCT VFR BLD CALC: 21.4 % (ref 40.7–50.3)
HCT VFR BLD CALC: 23 % (ref 40.7–50.3)
HCT VFR BLD CALC: 23 % (ref 40.7–50.3)
HEMOGLOBIN: 7.5 G/DL (ref 13–17)
HEMOGLOBIN: 8.1 G/DL (ref 13–17)
HEMOGLOBIN: 8.1 G/DL (ref 13–17)
SURGICAL PATHOLOGY REPORT: NORMAL

## 2022-08-17 PROCEDURE — 85014 HEMATOCRIT: CPT

## 2022-08-17 PROCEDURE — 99222 1ST HOSP IP/OBS MODERATE 55: CPT | Performed by: INTERNAL MEDICINE

## 2022-08-17 PROCEDURE — 85018 HEMOGLOBIN: CPT

## 2022-08-17 PROCEDURE — 6370000000 HC RX 637 (ALT 250 FOR IP): Performed by: FAMILY MEDICINE

## 2022-08-17 PROCEDURE — 82947 ASSAY GLUCOSE BLOOD QUANT: CPT

## 2022-08-17 PROCEDURE — 36415 COLL VENOUS BLD VENIPUNCTURE: CPT

## 2022-08-17 PROCEDURE — 99232 SBSQ HOSP IP/OBS MODERATE 35: CPT | Performed by: FAMILY MEDICINE

## 2022-08-17 PROCEDURE — 71046 X-RAY EXAM CHEST 2 VIEWS: CPT

## 2022-08-17 PROCEDURE — 99232 SBSQ HOSP IP/OBS MODERATE 35: CPT | Performed by: INTERNAL MEDICINE

## 2022-08-17 PROCEDURE — 6370000000 HC RX 637 (ALT 250 FOR IP): Performed by: NURSE PRACTITIONER

## 2022-08-17 RX ORDER — PANTOPRAZOLE SODIUM 40 MG/1
40 TABLET, DELAYED RELEASE ORAL
Qty: 180 TABLET | Refills: 1 | Status: SHIPPED | OUTPATIENT
Start: 2022-08-17 | End: 2022-11-04 | Stop reason: SDUPTHER

## 2022-08-17 RX ADMIN — TAMSULOSIN HYDROCHLORIDE 0.4 MG: 0.4 CAPSULE ORAL at 08:33

## 2022-08-17 RX ADMIN — DULOXETINE HYDROCHLORIDE 60 MG: 30 CAPSULE, DELAYED RELEASE ORAL at 08:33

## 2022-08-17 RX ADMIN — CHOLESTYRAMINE 4 G: 4 POWDER, FOR SUSPENSION ORAL at 08:33

## 2022-08-17 RX ADMIN — ATENOLOL 25 MG: 25 TABLET ORAL at 08:33

## 2022-08-17 RX ADMIN — OXYCODONE HYDROCHLORIDE AND ACETAMINOPHEN 1 TABLET: 5; 325 TABLET ORAL at 11:40

## 2022-08-17 RX ADMIN — CLONIDINE HYDROCHLORIDE 0.1 MG: 0.1 TABLET ORAL at 08:33

## 2022-08-17 RX ADMIN — ATORVASTATIN CALCIUM 20 MG: 20 TABLET, FILM COATED ORAL at 08:33

## 2022-08-17 RX ADMIN — LAMOTRIGINE 200 MG: 100 TABLET ORAL at 08:33

## 2022-08-17 ASSESSMENT — PAIN SCALES - GENERAL: PAINLEVEL_OUTOF10: 7

## 2022-08-17 NOTE — CONSULTS
REASON FOR THE CONSULTATION:  Colonic polyposis  GI bleed  Peptic ulcer disease  History of chronic smoking  Possible COPD  Hypertension  Diabetes  Malnutrition  HISTORY OF PRESENT ILLNESS:    Breanna Doll is a 65y.o. year old male here for evaluation of GI bleed. He had upper endoscopy done which revealed gastritis also revealed a antral ulcer. This has been clipped. There is no upper GI bleed anymore. Patient did present initially with melanotic stools. He is still having increased number of stools and which are black pleural colored. His blood hematocrit is falling. History suggestive that he might still be bleeding in the last not. He is hemodynamically stable however no tachycardia blood pressure is stable. Denies any abdominal pain. No nausea or vomiting. Patient has been diagnosed to have colonic polyposis and about 10 years back underwent partial colectomy. He has been getting colonic examination since then and no malignancy has been detected. He had a colon exam only recently and few colon polyps were removed which were apparently was not malignant. I have been asked to see the patient because he has been a chronic smoker he been smoking for more than 35 pack years he is still smoking. He is does not consume excessive amount of alcohol or any other addictive drugs. He denies any dyspnea and denies any cough denies any sputum production no wheezing no hemoptysis. He have had CT screening for lung cancer which have been negative. He is not taking any bronchodilators. He has not seen a pulmonologist.  He does not have to go to the hospital or urgent care center or ER because of upper respiratory infection frequently. Denies any chest pain. The patient is known to have systemic hypertension which is under good control. He also has diabetes which is under good control. No history of hypoglycemia or ketoacidosis. No history of thromboembolic process.   He already had COVID-vaccine. No history of any COVID infection. He has evidence of malnutrition. He have had he has evidence of weight loss. His appetite is fairly good. He is not known to have any thyroid dysfunction. No history of coronary artery disease. No PND angina. No symptoms of sleep apnea syndrome. LUNG CANCER SCREENING     CRITERIA MET    [x]     CT ORDERED  []      CRITERIA NOT MET   []      REFUSED                    []        REASON CRITERIA NOT MET     SMOKING LESS THAN 30 PY  []      AGE LESS THAN 55 or GREATER 77 YEARS  []      QUIT SMOKING 15 YEARS OR GREATER   []      RECENT CT WITH IN 11 MONTHS    []      LIFE EXPECTANCY < 5 YEARS   []      SIGNS  AND SYMPTOMS OF LUNG CANCER   []         Immunization   Immunization History   Administered Date(s) Administered    Influenza, Elsy Sermons (age 1 y+), MDV 10/29/2020    Influenza, FLUARIX, FLULAVAL, (age 10 mo+) AND AFLURIA, FLUZONE (age 1 y+), PF 11/15/2016, 09/15/2017, 09/27/2018, 11/27/2019    Influenza, FLUCELVAX, (age 10 mo+), MDCK, PF 11/09/2021    Pneumococcal Conjugate 13-valent ([id number]) 11/15/2016    Pneumococcal Polysaccharide ([id number]) 09/15/2017    Tdap (Boostrix, Adacel) 06/21/2018    Zoster Recombinant (Shingrix) 01/16/2019, 04/17/2019        Pneumococcal Vaccine     [x] Up to date    [] Indicated   [] Refused  [] Contraindicated       Influenza Vaccine   [x] Up to date    [] Indicated   [] Refused  [] Contraindicated          PAST MEDICAL HISTORY:       Diagnosis Date    Abnormal EKG 10/27/2016    indicates inferior infarct.     Anxiety     Bipolar disorder ([address] 2006    on rx    Chronic back pain 11/21/2014    Depression 2006    on rx    Diabetes mellitus ([address]     borderline    Fracture, clavicle 1996    LEFT    Hyperlipidemia     Hypertension 2006    on rx    Legally blind in left eye, as defined in [place]      very blurry    Lumbar radiculopathy 04/23/2014    Nicotine dependence     Osteoarthritis     Pain     LEFT EYE Retinal detachment     history miguel    Sleep apnea     doesnt use cpap     Tubular adenoma     Visual floaters     Wears glasses     USES MAGNIFYING GLASS         Family History:       Problem Relation Age of Onset    Heart Failure Mother     Cancer Mother     Heart Disease Mother         CAD-OPEN HEART    Mental Retardation Sister     Seizures Sister     Hypertension Other         maternal history       SURGICAL HISTORY:   Past Surgical History:   Procedure Laterality Date    CATARACT REMOVAL Left     CATARACT REMOVAL WITH IMPLANT Right 8-25-15    CHOLECYSTECTOMY  2/13    COLONOSCOPY  09/22/2016    the ICV was edematous and erythematous but most likely normal variant , bxs taken Large polyp 3 cm, at 50 cm from the anal verge with a very  wide stalk, not removed tattooed needs to be removed with subtotal colectomy    COLONOSCOPY N/A 5/2/2019    COLONOSCOPY POLYPECTOMY COLD BIOPSY, HOT SNARE [facility] [address] [facility] [address] [place] N/A 8/11/2022    COLONOSCOPY POLYPECTOMY SNARE/COLD BIOPSY performed by Grady Orona MD at [address] [facility], base of skull-left side    EYE SURGERY  12/31/15    laser right eye, left vit    EYE SURGERY Left     torn retina lazer/freezer/hole    EYE SURGERY Left 2/04/40    SILICONE REMOVAL AIR FLUID EXCHANGE    EYE SURGERY      cataracts removed miguel. EYE SURGERY      total 6 eye surg to left, 2 eye surg. to rt.     HERNIA REPAIR      umbilical    LYMPH NODE BIOPSY Left 1971    BASE OF SKULL    NASAL SEPTUM SURGERY  11-24-15    NM OFFICE/OUTPT VISIT,PROCEDURE ONLY Right 7/12/2018    VITRECTOMY 25 GAUGE, AIR FLUID EXCHANGE, AIR GAS EXCHANGE WITH 18% SF6, ENDOLASER, 200 MSECONDS, 200 MWATTS, 377 PULSES performed by Pee Sanchez MD at [address] [facility]  12/09/2016    w/ ileorectal anastomosis    [address] [facility]    UPPER GASTROINTESTINAL ENDOSCOPY N/A 5/2/2019    EGD BIOPSY performed by Rachele Ennis MD at [id number] [place] [address] 8/15/2022    EGD CONTROL HEMORRHAGE performed by Adolfo Ortiz MD at [facility] [address]  8/15/2022    EGD BIOPSY performed by Adolfo Ortiz MD at [facility]    VITRECTOMY Left 12/10/2015    VITRECTOMY Left 10/27/2016    membrane peeling    VITRECTOMY Right 07/12/2018    laser, gas    WRIST FRACTURE SURGERY Right 1/31/2022    DISTAL RADIUS OPEN REDUCTION INTERNAL FIXATION performed by Wilmar Luke MD at [address] [place] [address] Rd:      There is no history of TB or TB exposure. There is no asbestos or silica dust exposure. The patient reports is no coal, foundry, quarry or [employer] exposure. Travel history reveals absent. There is no history of recreational or IV drug use. There is no hot tube exposure. Pets dog    Occupational history no significant occupational history. TOBACCO:   reports that he has been smoking cigarettes. He has a 30.00 pack-year smoking history. He has never used smokeless tobacco.  ETOH:   reports current alcohol use. ALLERGIES:      Allergies   Allergen Reactions    Poison Ivy Extract Itching     Hives & itching         Home Meds:   Prior to Admission medications    Medication Sig Start Date End Date Taking? Authorizing Provider   pantoprazole (PROTONIX) 40 MG tablet Take 1 tablet by mouth 2 times daily (before meals) 8/17/22  Yes Paula Driscoll MD   DULoxetine (CYMBALTA) 60 MG extended release capsule Take 1 capsule by mouth in the morning. 7/26/22   Deirdre Jara MD   oxyCODONE-acetaminophen (PERCOCET) 5-325 MG per tablet Take 1 tablet by mouth every 8 hours as needed for Pain for up to 30 days.  8/5/22 9/4/22  Satya Lindo MD   ibuprofen (ADVIL;MOTRIN) 800 MG tablet Take 1 tablet by mouth 2 times daily as needed for Pain 7/27/22 8/17/22  Arlen Delgado MD   diazePAM (VALIUM) 5 MG tablet TAKE ONE TABLET BY MOUTH EVERY 12 HOURS AS NEEDED FOR ANXIETY OR SLEEP 7/22/22 8/19/22  Megan Rouse MD   cloNIDine (CATAPRES) 0.1 MG tablet TAKE ONE TABLET BY MOUTH DAILY 7/14/22   Megan Rouse MD   Minoxidil (ROGAINE MENS) 5 % FOAM Apply topically nightly 7/6/22   Megan Rouse MD   meloxicam LATASHA CANALES [facility]) 15 MG tablet Take 1 tablet by mouth daily 6/29/22 8/17/22  Megan Rouse MD   sildenafil (VIAGRA) 100 MG tablet Take 1 tablet by mouth daily as needed for Erectile Dysfunction 6/27/22   Megan Rouse MD   tamsulosin [facility]) 0.4 MG capsule Take 1 capsule by mouth daily 6/27/22   Megan Rouse MD   metFORMIN (GLUCOPHAGE) 500 MG tablet Take 1 tablet by mouth daily (with breakfast) 6/21/22   Megan Rouse MD   cyclobenzaprine (FLEXERIL) 10 mg tablet Take 1 tablet by mouth 2 times daily as needed for Muscle spasms 6/1/22   Megan Rouse MD   atenolol (TENORMIN) 25 MG tablet TAKE ONE TABLET BY MOUTH DAILY 5/4/22   FRANCES Martinez   cholestyramine Terryann Factor) 4 g packet TAKE ONE PACKET BY MOUTH FOUR TIMES A DAY 4/11/22   Megna Rouse MD   atorvastatin (LIPITOR) 20 MG tablet TAKE ONE TABLET BY MOUTH DAILY 1/24/22   Megan Rouse MD   Blood Glucose Monitoring Suppl (BLOOD GLUCOSE MONITOR SYSTEM) w/Device KIT USE TO MONITOR BLOOD GLUCOSE LEVEL. (TEST ONCE PER DAY) 10/22/21   Megan Rouse MD   blood glucose monitor strips Test blood glucose level 1 time per day.  10/22/21   Megan Rouse MD   Lancets 30G MISC Inject 1 each into the skin daily 10/22/21   Megan Rouse MD   hydrOXYzine (VISTARIL) 25 MG capsule  9/20/20   Historical Provider, MD   QUEtiapine (SEROQUEL) 50 MG tablet  9/20/20   Historical Provider, MD   loperamide (IMODIUM) 2 MG capsule TAKE ONE CAPSULE daily for DIARRHEA 8/4/20   Megan Rouse MD   lamoTRIgine (LAMICTAL) 200 MG tablet Take 200 mg by mouth daily 9/25/17   Historical Provider, MD   acetaminophen (TYLENOL) 325 MG tablet Take 2 tablets by mouth every 4 hours as needed for Pain 12/15/16   Aysha Larsen MD              REVIEW OF SYSTEMS: Review the system was conducted for all other 10 system no additional information was obtained and is noted above. There is no history of any arthritis. No history of any stroke no history of thyroid dysfunction no history of myocardial infarction. No thromboembolic process.     CONSTITUTIONAL:  negative for  fevers, chills, sweats, fatigue, malaise, anorexia and weight loss  EYES:  negative for  double vision, blurred vision, dry eyes, eye discharge and redness  HEENT:  negative for  hearing loss, tinnitus, ear drainage, earaches and nasal congestion  RESPIRATORY:  See hpi  CARDIOVASCULAR:  negative for  chest pain,, palpitations, orthopnea, PND  GASTROINTESTINAL:  negative for nausea, vomiting, change in bowel habits, diarrhea, constipation, abdominal pain, pruritus, abdominal mass and abdominal distention  GENITOURINARY:  negative for frequency, dysuria, nocturia, urinary incontinence and hesitancy  INTEGUMENT  negative for rash, skin lesion(s), dryness, skin color change, changes in lesion, pruritus and changes in hair  HEMATOLOGIC/LYMPHATIC:  negative for easy bruising, bleeding, lymphadenopathy, petechiae and swelling/edema  ALLERGIC/IMMUNOLOGIC:  negative for recurrent infections, urticaria and drug reactions  ENDOCRINE:  negative for heat intolerance, cold intolerance, tremor, weight changes and change in bowel habits  MUSCULOSKELETAL:  negative for  myalgias, arthralgias, pain, joint swelling, stiff joints and decreased range of motion  NEUROLOGICAL:  negative for headaches, dizziness, seizures, memory problems, speech problems, visual disturbance and coordination problems  BEHAVIOR/PSYCH:  negative for poor appetite, increased appetite, decreased sleep, increased sleep, decreased energy level, increased energy level and poor concentration  Skin no rash no dermatitis  Vitals:  /60   Pulse 64 08/17/22  0412   WBC 5.9  --   --    HGB 8.2* 8.3* 7.5*     --   --      BMP:    Recent Labs     08/15/22  0632      K 3.7   *   CO2 24   BUN 34*   CREATININE 0.62*   GLUCOSE 123*     Hepatic: No results for input(s): AST, ALT, ALB, BILITOT, ALKPHOS in the last 72 hours. Amylase:   Lab Results   Component Value Date/Time    AMYLASE 37 05/22/2012 07:45 AM     Lipase:   Lab Results   Component Value Date/Time    LIPASE 25 08/14/2022 02:43 PM     Troponin: No results for input(s): TROPONINI in the last 72 hours. BNP: No results for input(s): BNP in the last 72 hours. Lipids: No results for input(s): CHOL, HDL in the last 72 hours. Invalid input(s): LDLCALCU  ABGs:   Lab Results   Component Value Date/Time    PO2ART 56.9 05/22/2012 03:48 PM     INR:   Recent Labs     08/15/22  3743   INR 1.0     Thyroid:   Lab Results   Component Value Date/Time    TSH 5.65 06/27/2013 09:35 AM     Urinalysis: No results for input(s): BACTERIA, BLOODU, CLARITYU, COLORU, PHUR, PROTEINU, RBCUA, SPECGRAV, BILIRUBINUR, NITRU, WBCUA, LEUKOCYTESUR, GLUCOSEU in the last 72 hours. Cultures:-  No available cultures    CXR  Chest x-ray consistent with COPD      CT Scans  No recent CT scan    Echo    No echocardiogram            IMPRESSION:    Visit Diagnoses         Codes    Melena     K92.1        COPD  : Chronic smoking            Hypertension  Diabetes type 2  Anemia iron deficiency  Malnutrition  Peptic ulcer disease  Colonic polyposis    PLAN:      Patient has been a chronic smoker. He will probably has some degree of obstructive lung disease. However he denies any symptoms related to COPD. Denies any dyspnea cough or sputum production. I offered him that we can give him bronchodilator he did not feel any need for that. If he does continue agreed to use the bronchodilators and I will start him on Spiriva daily once a day and restart albuterol on as-needed basis. I advised the patient to give up smoking. He was not sure about that. He already had taken the COVID-vaccine. He should also be given COVID in the fall and also flu vaccine in the fall. I reviewed recent labs. There is evidence of a blood loss probably due to GI bleed causing increasing the anemia. He does not have any evidence of CO2 retention on chronic basis. Renal function relatively stable. I thank you very much for asking us to participate in his care. Dictated by Dr. Tashi Guerrero MD dictation over thank you      Medications Discontinued During This Encounter   Medication Reason    lactated ringers infusion     pantoprazole (PROTONIX) 80 mg in sodium chloride 0.9 % 100 mL infusion     pantoprazole (PROTONIX) 40 mg in sodium chloride (PF) 10 mL injection     pantoprazole (PROTONIX) 40 mg in sodium chloride (PF) 10 mL injection     meloxicam (MOBIC) 15 MG tablet Stop Taking at Discharge    ibuprofen (ADVIL;MOTRIN) 800 MG tablet Stop Taking at Discharge       Hawa  received counseling on the following healthy behaviors: nutrition, exercise and medication adherence    Patient given educational materials : see patient instruction       Discussed use, benefit, and side effects of prescribed medications. Barriers to medication compliance addressed. All patient questions answered. Pt voiced understanding. I hope this updates you on my evaluation and clinical thinking. Thank you for allowing me to participate in his care. Sincerely,      Electronically signed by Ryan Mckeon MD on   8/17/22 at 3:45 PM EDT       Please note that this chart was generated using voice recognition Dragon dictation software. Although every effort was made to ensure the accuracy of this automated transcription, some errors in transcription may have occurred.

## 2022-08-17 NOTE — PROGRESS NOTES
[facility]  Office: [address], DO, Linn Barth, DO, Manuela Mail, DO, Moraima Has Blood, DO, Harlan Walsh MD, Gisela Gatica MD, Smith Grover MD, Andrew Benítez MD,  Adwoa Johnson MD, Rebeca Best MD, Richard Chen, DO, Kvng Molina MD,  Marcello Flores MD, Sunny Ugarte MD, Enmanuel Toney, DO, Gabriel Weaver MD, Chandrakant Rodriguez MD, Gareth Calderón MD, Odilon Orozco, DO, Sky Garcia MD, Craig Leung MD, Soledad Dudley, CNP,  Virginia Raw, CNP, Jihan Tucker, CNP, Keli Booty, CNP, Elsie Ellington PA-C, Bridget Angeles, DNP, Beto Valderrama, CNP, Michelle Beat, CNP, Naga Class, CNP, Shemar Herzog, CNP, Lázaro Grande, CNP, Kd Roberts, CNS, Tony Parmar, [facility], Jasmin Bond, CNP, Mireya Amador, [facility], Niranjan Ashford, [address] [place] [address]    Progress Note    8/17/2022    1:22 PM    Name:   Jorge Donald  MRN:     [id number]     [place]:      [de-identified]   Room:   [facility] [place] [address] Day:  3  Admit Date:  8/14/2022  2:27 PM    PCP:   Val Smith MD  Code Status:  Full Code    Subjective:     C/C:   Chief Complaint   Patient presents with    Diarrhea     Had 2 polyps rmeoved from colon 2 days ago    Rectal Bleeding     Interval History Status: improved. Patient seen and examined at bedside, had his EGD yesterday, also found, on PPI drip, after coming to the floor developed episode of A. fib with RVR that responded to digoxin. Blood pressure and heart rate since then are stable   Denies any issues this morning, requesting his home medication resumption, I did review with them and confirmed   patient denies any chest pain, shortness of breath, chills, fevers, nausea or vomiting.   Patient vitals, labs and all providers notes were reviewed,from overnight shift and morning updates were noted and discussed with the nurse    Brief History:     Jorge Donald is a 65 y.o. male who presented for evaluation of fever and tarry stools that occurred over the last 2 days. None today. He states that he had a colonoscopy done on 8/11/22 for routine screening. He did have polyps removed. He states after he went home he began having persistent sweating. He is diabetic but did not know if he had low blood sugar. He states he did eat after discharge. He did take Ibuprofen and sweating eventually subsided. Yesterday he states he had several dark, tarry diarrhea stools. He had no dizziness or SOB. Denies chest pain. He has had no stools today. He does not take any blood thinners. He does take Meloxicam 15mg daily which he states he has been on for 15 years. He did not take any since his colonoscopy because he was told not to take with Ibuprofen. He has been taking Ibuprofen 800 mg BID since his colonoscopy. Additional medical history includes HLD, T2DM, HTN and bipolar disorder. Review of Systems:     Review of Systems   Constitutional:  Positive for activity change and appetite change. Negative for chills, diaphoresis and fever. HENT:  Negative for congestion. Eyes:  Negative for visual disturbance. Respiratory:  Negative for cough, chest tightness, shortness of breath and wheezing. Cardiovascular:  Negative for chest pain, palpitations and leg swelling. Gastrointestinal:  Negative for abdominal pain, blood in stool, constipation, diarrhea, nausea and vomiting. Genitourinary:  Negative for difficulty urinating. Neurological:  Negative for dizziness, weakness, light-headedness, numbness and headaches. Psychiatric/Behavioral:  The patient is nervous/anxious. All other systems reviewed and are negative. Medications: Allergies:     Allergies   Allergen Reactions    Poison Ivy Extract Itching     Hives & itching       Current Meds:   Scheduled Meds:    atorvastatin  20 mg Oral Daily    cholestyramine light  1 packet Oral Daily    DULoxetine  60 mg Oral Daily    QUEtiapine  50 mg Oral Nightly    tamsulosin  0.4 mg Oral Daily    sodium chloride flush  5-40 mL IntraVENous 2 times per day    atenolol  25 mg Oral Daily    cloNIDine  0.1 mg Oral Daily    lamoTRIgine  200 mg Oral Daily    insulin lispro  0-4 Units SubCUTAneous TID WC    insulin lispro  0-4 Units SubCUTAneous Nightly     Continuous Infusions:    pantoprazole 8 mg/hr (22)    sodium chloride      sodium chloride      sodium chloride      dextrose       PRN Meds: cyclobenzaprine, hydrOXYzine HCl, oxyCODONE-acetaminophen, sodium chloride flush, sodium chloride, ondansetron **OR** ondansetron, acetaminophen **OR** acetaminophen, sodium chloride, metoprolol, sodium chloride, diazePAM, glucose, dextrose bolus **OR** dextrose bolus, glucagon (rDNA), dextrose    Data:     Past Medical History:   has a past medical history of Abnormal EKG, Anxiety, Bipolar disorder ([address] Chronic back pain, Depression, Diabetes mellitus ([facility] [address] [facility] Fracture, clavicle, Hyperlipidemia, Hypertension, Legally blind in left eye, as defined in [place], Lumbar radiculopathy, Nicotine dependence, Osteoarthritis, Pain, Retinal detachment, Sleep apnea, Tubular adenoma, Visual floaters, and Wears glasses. Social History:   reports that he has been smoking cigarettes. He has a 30.00 pack-year smoking history. He has never used smokeless tobacco. He reports current alcohol use. He reports that he does not use drugs.      Family History:   Family History   Problem Relation Age of Onset    Heart Failure Mother     Cancer Mother     Heart Disease Mother         CAD-OPEN HEART    Mental Retardation Sister     Seizures Sister     Hypertension Other         maternal history       Vitals:  /81   Pulse 86   Temp 97.9 °F (36.6 °C) (Temporal)   Resp 13   Ht 5' 11\" (1.803 m)   Wt 180 lb (81.6 kg)   SpO2 96%   BMI 25.10 kg/m²   Temp (24hrs), Av °F (36.7 °C), Min:97.6 °F (36.4 °C), Max:98.4 °F (36.9 °C)    Recent Labs 08/16/22  1240 08/16/22  1648 08/16/22 2020 08/17/22  0718   POCGLU 133* 170* 142* 112*         I/O (24Hr): Intake/Output Summary (Last 24 hours) at 8/17/2022 1322  Last data filed at 8/17/2022 0815  Gross per 24 hour   Intake 400 ml   Output --   Net 400 ml         Labs:  Hematology:  Recent Labs     08/14/22  1443 08/14/22  1702 08/15/22  0632 08/15/22  1636 08/16/22  0920 08/16/22  1612 08/17/22  0412   WBC 13.9*  --   --   --  5.9  --   --    RBC 3.13*  --   --   --  2.61*  --   --    HGB 9.5*   < > 6.9*   < > 8.2* 8.3* 7.5*   HCT 28.5*   < > 20.6*   < > 23.0* 24.7* 21.4*   MCV 91.1  --   --   --  88.1  --   --    MCH 30.4  --   --   --  31.4  --   --    MCHC 33.3  --   --   --  35.7*  --   --    RDW 14.3  --   --   --  14.8*  --   --      --   --   --  200  --   --    MPV 10.1  --   --   --  9.9  --   --    INR  --   --  1.0  --   --   --   --     < > = values in this interval not displayed. Chemistry:  Recent Labs     08/14/22  1443 08/14/22  1455 08/15/22  0632     --  141   K 4.7  --  3.7     --  108*   CO2 22  --  24   GLUCOSE 189*  --  123*   BUN 72*  --  34*   CREATININE 0.78  --  0.62*   ANIONGAP 14  --  9   LABGLOM >60  --  >60   GFRAA >60  --  >60   CALCIUM 8.6  --  8.0*   LACTACIDWB  --  2.4*  --        Recent Labs     08/14/22  1443 08/14/22  2032 08/15/22  2027 08/16/22  0726 08/16/22  1240 08/16/22  1648 08/16/22 2020 08/17/22  0718   PROT 6.0*  --   --   --   --   --   --   --    LABALBU 3.8  --   --   --   --   --   --   --    AST 19  --   --   --   --   --   --   --    ALT 42*  --   --   --   --   --   --   --    ALKPHOS 107  --   --   --   --   --   --   --    BILITOT 0.17*  --   --   --   --   --   --   --    LIPASE 25  --   --   --   --   --   --   --    POCGLU  --    < > 127* 109 133* 170* 142* 112*    < > = values in this interval not displayed.        ABG:  Lab Results   Component Value Date/Time    PH 7.458 05/22/2012 03:48 PM    PCO2 32.7 05/22/2012 03:48 PM PO2ART 56.9 05/22/2012 03:48 PM    IVL4KYX 22.8 05/22/2012 03:48 PM    NBEA 0.6 05/22/2012 03:48 PM    PBEA NOT REPORTED 05/22/2012 03:48 PM    J8CPETYZ 87.1 05/22/2012 03:48 PM    FIO2 NOT REPORTED 05/22/2012 03:48 PM     No results found for: SPECIAL  Lab Results   Component Value Date/Time    CULTURE (A) 05/02/2022 08:10 PM     METHICILLIN RESISTANT STAPHYLOCOCCUS AUREUS LIGHT GROWTH       Radiology:  CT ABDOMEN PELVIS W IV CONTRAST Additional Contrast? None    Result Date: 8/14/2022  No acute abnormality of the gastrointestinal tract. Left nephrolithiasis. Hepatomegaly with probable mild steatosis. Enlarged prostate. EGD 8/15  Findings:   LA grade C erosive esophagitis in the distal third esophagus. 5 cm hiatal hernia. A 2 cm cratered ulcer with a nonbleeding visible vessel was found in the pyloric channel. 2 hemoclips were placed with complete approximation of the ulcer margins. Mild gastritis in the antrum and pylorus. Biopsies were performed for histology and H. pylori testing. The retroflexed view of the fundus cardia and gastric body was normal.  Mild duodenitis with small clean-based ulcers were found in the duodenal bulb and in the second portion of the duodenum. Recommendations:  Continue IV Protonix 40 mg twice daily for another 48 hours then changed to oral Protonix 40 mg twice daily for 8 weeks. Follow biopsy results. Start soft diet today and advance to regular diet over the next 24 hours. No NSAID use. Patient will need a repeat EGD in 2 to 3 months to check for healing of esophagitis and healing of gastric ulcer. Will need biopsy of the distal esophagus to rule out Arreola's. Patient to follow an antireflux lifestyle and GERD diet    Physical Examination:        Physical Exam  Vitals and nursing note reviewed. Constitutional:       General: He is not in acute distress. HENT:      Head: Normocephalic and atraumatic.    Eyes:      Conjunctiva/sclera: Conjunctivae normal. *    Status post 1 unit packed RBCs  Hydrated  Currently on Protonix drip  Per GI needed  at least 48 hours of protonix drip  Hb slightly going down  If repeat Hb stable can go per GI  Patient home medications were all resumed  Educated regarding diagnosis medications and food to avoid. He is eager for discharge.   Continue glycemic and blood pressure control  Discussed with the patient and the nurse      Joe Evans MD  8/17/2022  1:22 PM

## 2022-08-17 NOTE — PROGRESS NOTES
[facility]. [facility]   Gastroenterology Progress Note    Momo Lopez is a 65 y.o. male patient. Hospitalization Day:3      Chief consult reason:     Melanic stools  Colonoscopy 3 days ago    Subjective:  Patient seen and examined. No acute events overnight. No further episodes of GI bleeding  Tolerating diet and requesting discharge    VITALS:  /81   Pulse 86   Temp 97.9 °F (36.6 °C) (Temporal)   Resp 13   Ht 5' 11\" (1.803 m)   Wt 180 lb (81.6 kg)   SpO2 96%   BMI 25.10 kg/m²   TEMPERATURE:  Current - Temp: 97.9 °F (36.6 °C); Max - Temp  Av.1 °F (36.7 °C)  Min: 97.6 °F (36.4 °C)  Max: 98.4 °F (36.9 °C)    Physical Assessment:  General appearance:  alert, cooperative and no distress  Mental Status:  oriented to person, place and time and normal affect  Lungs:  clear to auscultation bilaterally, normal effort  Heart:  regular rate and rhythm, no murmur  Abdomen:  soft, nontender, nondistended, normal bowel sounds, no masses, hepatomegaly, splenomegaly  Extremities:  no edema, redness, tenderness in the calves  Skin:  no gross lesions, rashes, induration    Data Review:    Labs and Imaging:     CBC:  Recent Labs     22  1443 22  1702 08/15/22  2250 22  0543 22  0920 22  1612 22  0412   WBC 13.9*  --   --   --  5.9  --   --    HGB 9.5*   < > 8.0* 8.5* 8.2* 8.3* 7.5*   MCV 91.1  --   --   --  88.1  --   --    RDW 14.3  --   --   --  14.8*  --   --      --   --   --  200  --   --     < > = values in this interval not displayed.          ANEMIA STUDIES:  Recent Labs     08/15/22  0632   LABIRON 37   TIBC 247*         BMP:  Recent Labs     22  1443 08/15/22  0632    141   K 4.7 3.7    108*   CO2 22 24   BUN 72* 34*   CREATININE 0.78 0.62*   GLUCOSE 189* 123*   CALCIUM 8.6 8.0*         LFTS:  Recent Labs     22  1443   ALKPHOS 107   ALT 42*   AST 19   BILITOT 0.17*   LABALBU 3.8         Amylase/Lipase and Ammonia:  Recent Labs     08/14/22  1443   LIPASE 25         Acute Hepatitis Panel:  No results found for: HEPBSAG, HEPCAB, HEPBIGM, HEPAIGM    HCV Genotype:  No results found for: HEPATITISCGENOTYPE    HCV Quantitative:  No results found for: HCVQNT    LIVER WORK UP:    AFP  No results found for: AFP    Alpha 1 antitrypsin   No results found for: A1A    HERNANDEZ  No results found for: HERNANDEZ    AMA  No results found for: MITOAB    ASMA  No results found for: SMOOTHMUSCAB    PT/INR  Recent Labs     08/15/22  0632   PROTIME 11.1   INR 1.0         Cancer Markers:  CEA:  No results for input(s): CEA in the last 72 hours. Ca 125:  No results for input(s):  in the last 72 hours. Ca 19-9:   Invalid input(s):   AFP: No results for input(s): AFP in the last 72 hours. Lactic acid:Invalid input(s): LACTIC ACID    Radiology Review:    No results found. Principal Problem:    Acute pylorus ulcer  Active Problems:    Upper GI bleed    Type 2 diabetes mellitus without complication, without long-term current use of insulin (HCC)    Melena    Acute blood loss anemia    Acute gastric ulcer with hemorrhage    Hypertension    Nicotine dependence    Erosive gastritis  Resolved Problems:    * No resolved hospital problems. *       GI Impression:    Melena-greatly improved. Pt had brown stool this am. Biopsies negative  -s/p EGD that revealed LA grade C erosive esophagitis in the distal third esophagus, 2 cm cratered gastric ulcer in the pylorus with nonbleeding visible vessel-treated with hemoclips. Gastritis-biopsies taken and pending. Mild duodenitis with small clean-based ulcers noted  -Pt had been taking Motrin along with Mobic  Symptomatic anemia-stable    Plan and Recommendations:      Stat hemoglobin.  If stable pt may be discharged this afternoon from GI perspective  Diet as tolerated  Avoid Nsaids  Daily labs  Avoid smoking, drinking alcohol-pt counseled at length and verbalized understanding of how these things decrease his potential to heal properly  Pt will need to follow up in the GI clinic with Dr. Yoli Cavazos in 2-3 weeks      This plan was formulated in collaboration with Dr. Mariza De Los Santos MD    Thank you for allowing me to participate in the care of your patient. Please feel free to contact me with any questions or concerns. [address] [facility]   [facility], [address] [place] [address]   [phone]  8/17/2022  11:47 AM    This note was created with the assistance of a speech-recognition program.  Although the intention is to generate a document that actually reflects the content of the visit, no guarantees can be provided that every mistake has been identified and corrected by editing.

## 2022-08-17 NOTE — CARE COORDINATION
Discharge 1 [facility] Case Management Department  Written by: Sunny Davey RN    Patient Name: Micki Laws  Attending Provider: Scout Lopez MD  Admit Date: 2022  2:27 PM  MRN: [id number]  Account: [de-identified]                     : 1960  Discharge Date: 22      Disposition: home, no needs    Sunny Davey RN

## 2022-08-17 NOTE — PROGRESS NOTES
CLINICAL PHARMACY NOTE: MEDS TO BEDS    Total # of Prescriptions Filled: 1   The following medications were delivered to the patient:  pantoprazole    Additional Documentation:  Delivered to patient at 10:49am. No copay.  HR

## 2022-08-17 NOTE — PROGRESS NOTES
Pt meds given, head to toe assessment done, questions answered. pt off unit to go outside.  Will continue to monitor

## 2022-08-17 NOTE — DISCHARGE SUMMARY
[facility]  Office: [address], DO, Kevin Waiteet, DO, Tavia Ignaciomisha, DO, Sharon Ndiaye Blood, DO, Roxanne Morales MD, Thais Doe MD, Addison Burkett MD, Porfirio Johnson MD,  Reena Stein MD, Judith Best MD, Addison Best, DO, Mis De Leon MD,  My Retana MD, Benita Isaac MD, Lorenzo العلي, DO, Leila Aleman MD, Jori Cardenas MD, Andrey Berman MD, Norma Zarate DO, García Fox MD, Reena Tompkins MD, Maxwell Ramsey, CNP,  Justa Ruiz, CNP, Izabella Villar, CNP, Maile Machuca, CNP, Frances Dolan PA-C, Glendy Hobson, [facility], Nancy Leach, CNP, Lizy Taylor, CNP, William Sotelo, CNP, Ewa Chirinos, CNP, Sammy Sanchez, CNP, Lissette Banks, CNS, Prince Goldman, [facility], Boaz Perez, CNP, Angelica Downing, CNP, Agnes Salgado, [address] [place] [address]    Discharge Summary     Patient ID: Rhonda Gauthier  :  1960   MRN: [id number]     ACCOUNT:  [de-identified]   Patient's PCP: Catalina Simms MD  Admit Date: 2022   Discharge Date: 2022   Length of Stay: 3  Code Status:  Full Code  Admitting Physician: Porfirio Johnson MD  Discharge Physician: Porfirio Johnson MD     Active Discharge Diagnoses:     Hospital Problem Lists:  Principal Problem:    Acute pylorus ulcer  Active Problems:    Erosive gastritis    Upper GI bleed    Type 2 diabetes mellitus without complication, without long-term current use of insulin (HCC)    Melena    Acute blood loss anemia    Acute gastric ulcer with hemorrhage    Hypertension    Nicotine dependence  Resolved Problems:    * No resolved hospital problems. *      Admission Condition:  poor     Discharged Condition: good    Hospital Stay:     HPI:    Rhonda Gauthier is a 65 y.o. male who presented for evaluation of fever and tarry stools that occurred over the last 2 days. None today.  He states that he had a colonoscopy done on 22 for routine screening. He did have polyps removed. He states after he went home he began having persistent sweating. He is diabetic but did not know if he had low blood sugar. He states he did eat after discharge. He did take Ibuprofen and sweating eventually subsided. Yesterday he states he had several dark, tarry diarrhea stools. He had no dizziness or SOB. Denies chest pain. He has had no stools today. He does not take any blood thinners. He does take Meloxicam 15mg daily which he states he has been on for 15 years. He did not take any since his colonoscopy because he was told not to take with Ibuprofen. He has been taking Ibuprofen 800 mg BID since his colonoscopy. Additional medical history includes HLD, T2DM, HTN and bipolar disorder. During the admission patient was managed as follow    Principal Problem:    Acute pylorus ulcer  Active Problems:    Erosive gastritis    Upper GI bleed    Type 2 diabetes mellitus without complication, without long-term current use of insulin (HCC)    Melena    Acute blood loss anemia    Acute gastric ulcer with hemorrhage    Hypertension    Nicotine dependence  Resolved Problems:    * No resolved hospital problems. *     Status post 1 unit packed RBCs  Hydrated  Currently on Protonix drip  Per GI needed  at least 48 hours of protonix drip  Hb slightly going down  If repeat Hb stable can go per GI  Patient home medications were all resumed  Educated regarding diagnosis medications and food to avoid. He is eager for discharge.   Continue glycemic and blood pressure control  Discussed with the patient and the nurse     Addendum : Hb stable, good for discharge     Significant therapeutic interventions:       Significant Diagnostic Studies:   Labs / Micro:  CBC:   Lab Results   Component Value Date/Time    WBC 5.9 08/16/2022 09:20 AM    RBC 2.61 08/16/2022 09:20 AM    RBC 5.02 05/22/2012 07:45 AM    HGB 8.1 08/17/2022 04:01 PM    HCT 23.0 08/17/2022 04:01 PM    MCV 88.1 08/16/2022 09:20 AM MCH 31.4 08/16/2022 09:20 AM    MCHC 35.7 08/16/2022 09:20 AM    RDW 14.8 08/16/2022 09:20 AM     08/16/2022 09:20 AM     05/22/2012 07:45 AM     CMP:    Lab Results   Component Value Date/Time    GLUCOSE 123 08/15/2022 06:32 AM    GLUCOSE 154 05/22/2012 07:45 AM     08/15/2022 06:32 AM    K 3.7 08/15/2022 06:32 AM     08/15/2022 06:32 AM    CO2 24 08/15/2022 06:32 AM    BUN 34 08/15/2022 06:32 AM    CREATININE 0.62 08/15/2022 06:32 AM    ANIONGAP 9 08/15/2022 06:32 AM    ALKPHOS 107 08/14/2022 02:43 PM    ALT 42 08/14/2022 02:43 PM    AST 19 08/14/2022 02:43 PM    BILITOT 0.17 08/14/2022 02:43 PM    LABALBU 3.8 08/14/2022 02:43 PM    LABALBU 4.4 05/22/2012 07:45 AM    ALBUMIN 1.7 08/14/2022 02:43 PM    LABGLOM >60 08/15/2022 06:32 AM    GFRAA >60 08/15/2022 06:32 AM    GFR      08/15/2022 06:32 AM    PROT 6.0 08/14/2022 02:43 PM    CALCIUM 8.0 08/15/2022 06:32 AM     PT/INR:    Lab Results   Component Value Date/Time    PROTIME 11.1 08/15/2022 06:32 AM    PROTIME 13.4 05/23/2012 10:12 AM    INR 1.0 08/15/2022 06:32 AM     PTT:   Lab Results   Component Value Date/Time    APTT 22.3 08/15/2022 06:32 AM     FLP:    Lab Results   Component Value Date/Time    CHOL 223 09/19/2017 12:00 AM    TRIG 402 09/19/2017 12:00 AM    HDL 41 03/17/2021 01:25 PM     U/A:    Lab Results   Component Value Date/Time    COLORU yellow 09/19/2017 02:17 PM    COLORU YELLOW 12/09/2016 07:57 AM    TURBIDITY CLEAR 12/09/2016 07:57 AM    SPECGRAV 1.007 12/09/2016 07:57 AM    HGBUR NEGATIVE 12/09/2016 07:57 AM    PHUR 5.0 12/09/2016 07:57 AM    PROTEINU neg 09/19/2017 02:17 PM    PROTEINU NEGATIVE 12/09/2016 07:57 AM    GLUCOSEU neg 09/19/2017 02:17 PM    GLUCOSEU NEGATIVE 12/09/2016 07:57 AM    GLUCOSEU NEGATIVE 05/22/2012 11:20 AM    KETUA NEGATIVE 12/09/2016 07:57 AM    BILIRUBINUR NEGATIVE 12/09/2016 07:57 AM    BILIRUBINUR NEGATIVE 05/22/2012 11:20 AM    UROBILINOGEN Normal 12/09/2016 07:57 AM    NITRU NEGATIVE 12/09/2016 07:57 AM    LEUKOCYTESUR neg 09/19/2017 02:17 PM    LEUKOCYTESUR NEGATIVE 12/09/2016 07:57 AM     TSH:    Lab Results   Component Value Date/Time    TSH 5.65 06/27/2013 09:35 AM        Radiology:  CT ABDOMEN PELVIS W IV CONTRAST Additional Contrast? None    Result Date: 8/14/2022  No acute abnormality of the gastrointestinal tract. Left nephrolithiasis. Hepatomegaly with probable mild steatosis. Enlarged prostate. Consultations:    Consults:     Final Specialist Recommendations/Findings:   IP CONSULT TO GI  IP CONSULT TO HOSPITALIST  IP CONSULT TO CRITICAL CARE      The patient was seen and examined on day of discharge and this discharge summary is in conjunction with any daily progress note from day of discharge. Discharge plan:     Disposition: Home    Physician Follow Up:     Sloane Angel MD  [address]  [facility] [address] [phone]    Follow up  Call for follow up appt     Requiring Further Evaluation/Follow Up POST HOSPITALIZATION/Incidental Findings:     Diet: regular diet    Activity: As tolerated    Instructions to Patient:     Discharge Medications:      Medication List        START taking these medications      pantoprazole 40 MG tablet  Commonly known as: PROTONIX  Take 1 tablet by mouth 2 times daily (before meals)            CONTINUE taking these medications      acetaminophen 325 MG tablet  Commonly known as: TYLENOL  Take 2 tablets by mouth every 4 hours as needed for Pain     atenolol 25 MG tablet  Commonly known as: TENORMIN  TAKE ONE TABLET BY MOUTH DAILY     atorvastatin 20 MG tablet  Commonly known as: LIPITOR  TAKE ONE TABLET BY MOUTH DAILY     Blood Glucose Monitor System w/Device Kit  USE TO MONITOR BLOOD GLUCOSE LEVEL. (TEST ONCE PER DAY)     blood glucose test strips  Test blood glucose level 1 time per day.      cholestyramine 4 g packet  Commonly known as: QUESTRAN  TAKE ONE PACKET BY MOUTH FOUR TIMES A DAY     cloNIDine 0.1 MG tablet  Commonly known as: CATAPRES  TAKE ONE TABLET BY MOUTH DAILY     cyclobenzaprine 10 MG tablet  Commonly known as: FLEXERIL  Take 1 tablet by mouth 2 times daily as needed for Muscle spasms     diazePAM 5 MG tablet  Commonly known as: VALIUM  TAKE ONE TABLET BY MOUTH EVERY 12 HOURS AS NEEDED FOR ANXIETY OR SLEEP     DULoxetine 60 MG extended release capsule  Commonly known as: CYMBALTA  Take 1 capsule by mouth in the morning.     hydrOXYzine pamoate 25 MG capsule  Commonly known as: VISTARIL     lamoTRIgine 200 MG tablet  Commonly known as: LAMICTAL     Lancets 30G Misc  Inject 1 each into the skin daily     loperamide 2 MG capsule  Commonly known as: IMODIUM  TAKE ONE CAPSULE daily for DIARRHEA     metFORMIN 500 MG tablet  Commonly known as: GLUCOPHAGE  Take 1 tablet by mouth daily (with breakfast)     oxyCODONE-acetaminophen 5-325 MG per tablet  Commonly known as: PERCOCET  Take 1 tablet by mouth every 8 hours as needed for Pain for up to 30 days. QUEtiapine 50 MG tablet  Commonly known as: SEROQUEL     Rogaine Mens 5 % Foam  Generic drug: Minoxidil  Apply topically nightly     sildenafil 100 MG tablet  Commonly known as: Viagra  Take 1 tablet by mouth daily as needed for Erectile Dysfunction     tamsulosin 0.4 MG capsule  Commonly known as: FLOMAX  Take 1 capsule by mouth daily            STOP taking these medications      ibuprofen 800 MG tablet  Commonly known as: ADVIL;MOTRIN     meloxicam 15 MG tablet  Commonly known as: MOBIC               Where to Get Your Medications        These medications were sent to [address] [address]      Phone: [phone]   pantoprazole 40 MG tablet         No discharge procedures on file.     Time Spent on discharge is  34 mins in patient examination, evaluation, counseling as well as medication reconciliation, prescriptions for required medications, discharge plan and follow up.    Electronically signed by   Stacy Mahmood MD  8/17/2022  4:41 PM      Thank you Dr. Peter Rebolledo MD for the opportunity to be involved in this patient's care.

## 2022-08-18 ENCOUNTER — TELEPHONE (OUTPATIENT)
Dept: PHARMACY | Facility: CLINIC | Age: 62
End: 2022-08-18

## 2022-08-18 ENCOUNTER — CARE COORDINATION (OUTPATIENT)
Dept: CARE COORDINATION | Age: 62
End: 2022-08-18

## 2022-08-18 DIAGNOSIS — K25.3 ACUTE PYLORUS ULCER: Primary | ICD-10-CM

## 2022-08-18 PROCEDURE — 1111F DSCHRG MED/CURRENT MED MERGE: CPT | Performed by: FAMILY MEDICINE

## 2022-08-18 NOTE — TELEPHONE ENCOUNTER
Received a referral:  from Care Coordinator to review patients medications. Called patient to schedule a time to speak with a pharmacist over the telephone. Spoke to patient and advised them of the above message. Patient verified understanding and scheduled their appointment: tomorrow at [facility].    [address] [place] [address] free: [address] in [address]:  No  Recommendation Provided To: Patient/Caregiver: 1 via Telephone  Intervention Detail: Scheduled Appointment  Gap Closed?: Yes   Intervention Accepted By: Patient/Caregiver: 1  Time Spent (min): 15

## 2022-08-18 NOTE — CARE COORDINATION
[facility] Transitions Initial Follow Up Call    Call within 2 business days of discharge: Yes    Patient: Zoe Ratliff Patient : 1960   MRN: [id number]  Reason for Admission: Acute Pylorus Ulcer  Discharge Date: 22 RARS: Readmission Risk Score: 15.3      Last Discharge [facility]       Date Complaint Diagnosis Description Type Department Provider    22 Diarrhea; Rectal Bleeding Upper GI bleed . .. ED to Hosp-Admission (Discharged) (ADMITTED) [facility] Theo Uriarte MD; Monroe Harrison. .. Spoke with: Patient    Facility: [employer] [employer] with patient, states he is feeling back to normal.  He states his last BM was yesterday in the hospital and did not show any signs of blood. He denies any fever, chills, fatigue, dizziness, chest pain, SOB, unusual pain or discomfort. He received his new rx for Protonix through [facility]. Med rec completed with patient and pharmacy referral placed due to polypharmacy. Patient is diabetic and FS this am was 139. He does not monitor his BP. He has not scheduled his follow up appts and declines assistance doing so. He states he will do it today. He declines any needs at this time. Transitions of Care Initial Call    Was this an external facility discharge? No Discharge Facility: [facility]    Challenges to be reviewed by the provider   Additional needs identified to be addressed with provider: Yes      Please contact patient to schedule 7 day hospital follow up. Method of communication with provider : chart routing      Advance Care Planning:   Does patient have an Advance Directive: reviewed and current, reviewed and needs to be updated, not on file; education provided, not on file, patient declined education, decision maker updated, and referral to internal ACP facilitator. Was this a readmission?  Yes  Patient stated reason for admission: Blood in stool  Patients top risk factors for readmission: lack of knowledge about disease  medical condition-Pylorus ulcer, DM, HTN    Care Transition Nurse (CTN) contacted the patient by telephone to perform post hospital discharge assessment. Verified name and  with patient as identifiers. Provided introduction to self, and explanation of the CTN role. CTN reviewed discharge instructions, medical action plan and red flags with patient who verbalized understanding. Patient given an opportunity to ask questions and does not have any further questions or concerns at this time. Were discharge instructions available to patient? Yes. Reviewed appropriate site of care based on symptoms and resources available to patient including: PCP  Specialist  When to call 911. The patient agrees to contact the PCP office for questions related to their healthcare. Medication reconciliation was performed with patient, who verbalizes understanding of administration of home medications. Advised obtaining a 90-day supply of all daily and as-needed medications. CTN provided contact information. Plan for follow-up call in 3-5 days based on severity of symptoms and risk factors.   Plan for next call:  - sxs black stools             - follow ups scheduled    Blanca Celestin LPN  [facility] Transition Nurse  [phone]      Care Transitions 24 Hour Call    Do you have a copy of your discharge instructions?: Yes  Do you have all of your prescriptions and are they filled?: Yes  Have you been contacted by a [address]?: No  Have you scheduled your follow up appointment?: No  Do you feel like you have everything you need to keep you well at home?: Yes  Care Transitions Interventions   Meds to Bed: Completed              Follow Up  Future Appointments   Date Time Provider Jessica Ordaz   2022  2:20 PM MD TULIO Chavez LPN

## 2022-08-19 ENCOUNTER — TELEPHONE (OUTPATIENT)
Dept: PHARMACY | Facility: CLINIC | Age: 62
End: 2022-08-19

## 2022-08-19 NOTE — TELEPHONE ENCOUNTER
CLINICAL PHARMACY NOTE - Medication Review    Breanna Doll is a 65 y.o. male referred to a clinical pharmacy specialist by care coordination    2 attempts made to reach patient by telephone for scheduled medication review. Left voice message for patient to return clinician's phone call to [phone] opt 1. Thank you,  SYEDA Lal, PharmD, [address]  Department, toll free: [phone]     For Pharmacy Admin Tracking Only  Gap Closed?: No   Time Spent (min): 15

## 2022-08-22 ENCOUNTER — TELEPHONE (OUTPATIENT)
Dept: PHARMACY | Facility: CLINIC | Age: 62
End: 2022-08-22

## 2022-08-22 DIAGNOSIS — F41.9 ANXIETY: ICD-10-CM

## 2022-08-22 RX ORDER — DIAZEPAM 5 MG/1
TABLET ORAL
Qty: 60 TABLET | Refills: 0 | Status: SHIPPED | OUTPATIENT
Start: 2022-08-22 | End: 2022-09-15 | Stop reason: SDUPTHER

## 2022-08-23 ENCOUNTER — CARE COORDINATION (OUTPATIENT)
Dept: CARE COORDINATION | Age: 62
End: 2022-08-23

## 2022-08-23 NOTE — TELEPHONE ENCOUNTER
CLINICAL PHARMACY NOTE - Medication Review  Patient outreach to review medications - Spoke with patient. SUBJECTIVE/OBJECTIVE:   Gretchen Villa is a 65 y.o. male referred to a clinical pharmacy specialist by care coordination. Medications:  Current Outpatient Medications   Medication Sig Dispense Refill    diazePAM (VALIUM) 5 MG tablet TAKE ONE TABLET BY MOUTH EVERY 12 HOURS AS NEEDED FOR ANXIETY OR SLEEP 60 tablet 0    pantoprazole (PROTONIX) 40 MG tablet Take 1 tablet by mouth 2 times daily (before meals) 180 tablet 1    oxyCODONE-acetaminophen (PERCOCET) 5-325 MG per tablet Take 1 tablet by mouth every 8 hours as needed for Pain for up to 30 days. 90 tablet 0    DULoxetine (CYMBALTA) 60 MG extended release capsule Take 1 capsule by mouth in the morning. 30 capsule 0    cloNIDine (CATAPRES) 0.1 MG tablet TAKE ONE TABLET BY MOUTH DAILY 90 tablet 3    tamsulosin (FLOMAX) 0.4 MG capsule Take 1 capsule by mouth daily 90 capsule 3    metFORMIN (GLUCOPHAGE) 500 MG tablet Take 1 tablet by mouth daily (with breakfast) 90 tablet 3    cyclobenzaprine (FLEXERIL) 10 mg tablet Take 1 tablet by mouth 2 times daily as needed for Muscle spasms 60 tablet 5    atenolol (TENORMIN) 25 MG tablet TAKE ONE TABLET BY MOUTH DAILY 90 tablet 0    cholestyramine (QUESTRAN) 4 g packet TAKE ONE PACKET BY MOUTH FOUR TIMES A  packet 5    atorvastatin (LIPITOR) 20 MG tablet TAKE ONE TABLET BY MOUTH DAILY 90 tablet 3    Blood Glucose Monitoring Suppl (BLOOD GLUCOSE MONITOR SYSTEM) w/Device KIT USE TO MONITOR BLOOD GLUCOSE LEVEL. (TEST ONCE PER DAY) 1 kit 0    blood glucose monitor strips Test blood glucose level 1 time per day.  100 strip 3    Lancets 30G MISC Inject 1 each into the skin daily 100 each 3    hydrOXYzine (VISTARIL) 25 MG capsule Take 25 mg by mouth 3 times daily as needed      QUEtiapine (SEROQUEL) 50 MG tablet Take 50 mg by mouth nightly      lamoTRIgine (LAMICTAL) 200 MG tablet Take 200 mg by mouth daily sildenafil (VIAGRA) 100 MG tablet Take 1 tablet by mouth daily as needed for Erectile Dysfunction (Patient not taking: Reported on 8/23/2022) 90 tablet 2    acetaminophen (TYLENOL) 325 MG tablet Take 2 tablets by mouth every 4 hours as needed for Pain (Patient not taking: No sig reported) 120 tablet 3     No current facility-administered medications for this visit. Allergies: Allergies   Allergen Reactions    Poison Ivy Extract Itching     Hives & itching       Pertinent Labs/Vitals:  BP Readings from Last 3 Encounters:   08/17/22 103/60   08/11/22 132/88   07/27/22 (!) 142/93     No results found for: Briana Mojica [id number]  Lab Results   Component Value Date    LABA1C 5.9 06/27/2022    LABA1C 6.1 03/10/2022    LABA1C 6.0 08/11/2021     Lab Results   Component Value Date    CHOL 223 09/19/2017    TRIG 402 09/19/2017    HDL 41 03/17/2021    LDLCHOLESTEROL 87 03/17/2021    LDLDIRECT 105 (H) 06/27/2013     ALT   Date Value Ref Range Status   08/14/2022 42 (H) 5 - 41 U/L Final     AST   Date Value Ref Range Status   08/14/2022 19 <40 U/L Final     The 10-year ASCVD risk score (Gregg Isbell et al., 2013) is: 20.8%    Values used to calculate the score:      Age: 65 years      Sex: Male      Is Non- : No      Diabetic: Yes      Tobacco smoker: Yes      Systolic Blood Pressure: 135 mmHg      Is BP treated: Yes      HDL Cholesterol: 41 mg/dL      Total Cholesterol: 167 mg/dL     Lab Results   Component Value Date    CREATININE 0.62 (L) 08/15/2022       Estimated Creatinine Clearance: 133 mL/min (A) (based on SCr of 0.62 mg/dL (L)).     Social History:   Social History     Tobacco Use    Smoking status: Every Day     Packs/day: 1.00     Years: 30.00     Pack years: 30.00     Types: Cigarettes    Smokeless tobacco: Never   Substance Use Topics    Alcohol use: Yes     Comment: 6 drinks per weekly- weekends only now (1/27/22)       Immunizations:   Most Recent Immunizations   Administered Date(s) Administered    Influenza, ISELA Presley Ma (age 1 y+), MDV 10/29/2020    Influenza, FLUARIX, FLULAVAL, (age 10 mo+) AND AFLURIA, FLUZONE (age 1 y+), PF 11/27/2019    Influenza, FLUCELVAX, (age 10 mo+), MDCK, PF 11/09/2021    Pneumococcal Conjugate 13-valent ([id number]) 11/15/2016    Pneumococcal Polysaccharide ([id number]) 09/15/2017    Tdap (Boostrix, Adacel) 06/21/2018    Zoster Recombinant (Shingrix) 04/17/2019       ASSESSMENT/PLAN:   - General Assessment:   Adherence: Per patient report and reconciled dispensed report, he looks to be adherent to all medication. Cost: Not a concern. Pt has dual Medicare and Medicaid and does not have copays on covered medications  Current pharmacy/pharmacies: [facility]  Drug interactions: No clinically significant interactions identified via [employer] Interaction Analysis as category D or higher. Renal dosing: No renal adjustments necessary. Other:  Patient reported that he was still taking Aspirin 81mg daily. Recommended that he stop this immediately and he agreed. He was using for heart health and heart attack/stroke prevention given his other health conditions. Explained that his risk with having an ulcer is now much higher than the possible benefit. Patient reported that he did not feel he was getting much benefit from the cholestyramine. He said he was using to help prevent diarrhea and did not feel it was making a difference. Encouraged him to discuss with his provider at his next visit  Patient looks to have developed a pyloric ulcer secondary to NSAID use. Discussed how this could happen, and also recommended avoiding all NSAIDs in the future. This includes OTC options such as naproxen and IBU. He verbalized understanding. Discussed ETOH use. Patient reports drinking 5-6 beers on a weekend night, and avoiding all throughout the week. HE is aware of the risks of \"binge\" drinking.   I did also remind him that alcohol and thin blood and make It harder for the ulcer to heal. He verbalized understanding and said he would try to cut back and/or avoid all together in the short term. Not currently taking Rogaine. He may try using it over the counter which is fine. It is not covered by insurance  Discussed Viagra coverage with insurance. Aware that medicare and medicaid will not cover. He will discuss with [facility]. They may be able to offer certain prescription discounts on the generic. Not interested in smoking cessation  Sees outside psych provider. Doses updated. - Diabetes:   Glycemic goal: <7.0%. Stable and at blood glucose goal.  Currently only taking metformin 500mg once daily. Reports that he also checks his BG on occasion. Readings are within normal limits. No concerns. - Enocuraged patient to reach out and make his appointments. He has f/u with PCP in a month. He also mentioned that he missed a GI appt while inpatient. Still needs to make that one up. - Upcoming appointments:   Future Appointments   Date Time Provider Jessica Ordaz   9/29/2022  2:20 PM MD [facility] Taryn Whitehead.  Kaitlin Amador, PharmD, [address] [place] [address]  Department, toll free: [phone]          For Pharmacy [address] [place] [address] in place:  No  Recommendation Provided To: Patient/Caregiver: 1 via Telephone  Intervention Detail: Discontinued Rx: 1, reason: Unsafe Therapy  Gap Closed?: Yes   Intervention Accepted By: Patient/Caregiver: 1  Time Spent (min): 60

## 2022-08-23 NOTE — CARE COORDINATION
[facility] Transitions Follow Up Call    2022    Patient: Melodie Machado  Patient : 1960   MRN: [id number]  Reason for Admission: Acute Pylorus Ulcer  Discharge Date: 22 RARS: Readmission Risk Score: 15.3         Spoke with: Patient    Spoke with patient, states he continues to feel well. He states he is still taking it easy and watching his diet to avoid irritating his ulcer. He still has not scheduled any hospital follow up appts. States he is unorganized and just piles all his papers up. I provided contact information for him and he wrote it down. I offered to schedule appt for him, but he wishes to call himself and assures me he will today. I also reminded patient of the phone call scheduled with the pharmacist today. He was agreeable. Care Transitions Follow Up Call    Needs to be reviewed by the provider   Additional needs identified to be addressed with provider: Yes    Please contact patient to schedule a 7 day hospital follow up. Method of communication with provider : chart routing      Care Transition Nurse (CTN) contacted the patient by telephone to follow up after admission on 22. Verified name and  with patient as identifiers. Addressed changes since last contact:  No follow ups scheduled and Pharmacy referral  Discussed follow-up appointments. If no appointment was previously scheduled, appointment scheduling offered: Yes. Is follow up appointment scheduled within 7 days of discharge? No.    Advance Care Planning:   Does patient have an Advance Directive: reviewed and current, reviewed and needs to be updated, not on file; education provided, not on file, patient declined education, decision maker updated, and referral to internal ACP facilitator. CTN reviewed discharge instructions, medical action plan and red flags with patient and discussed any barriers to care and/or understanding of plan of care after discharge.  Discussed appropriate

## 2022-08-25 NOTE — TELEPHONE ENCOUNTER
Patient was contacted after the 72 hours for TCM. This would not be a [facility] [place] follow up, I called patient to schedule and he said he already has an appt. On 9/29/22. I asked him if he would like to be scheduled sooner he said he will leave that up to Dr. Jackquelyn Holstein, but he said he is feeling fine right now. Would you like patient to be seen sooner?

## 2022-08-26 ENCOUNTER — TELEPHONE (OUTPATIENT)
Dept: PRIMARY CARE CLINIC | Age: 62
End: 2022-08-26

## 2022-08-26 NOTE — TELEPHONE ENCOUNTER
Pt doing well. Will keep his appt on Sept 29th. Would not let me attach this to the other encounter.

## 2022-08-30 ENCOUNTER — CARE COORDINATION (OUTPATIENT)
Dept: CASE MANAGEMENT | Age: 62
End: 2022-08-30

## 2022-08-30 NOTE — CARE COORDINATION
[facility]    2022    Patient: Romy Yang  Patient : 1960   MRN: <[id number]>  Reason for Admission: Acute Pylorus Ulcer  Discharge Date: 22 RARS: Readmission Risk Score: 15.3         Spoke with: Subsequent transitional call. No answer. Left HIPPA compliant message to return call to this writer. Call back from patient. He has no nausea, vomiting or abdominal pain. No fever, chills or dyspnea. His appetite is good. . Bowel and bladder WNL. No dark tarry stools. Taking all medications as directed. He has an appt with is PCP on 22. He has an appt with Dr Evelyn Jeffries on 22. No new issues or concerns. Final call. Care Transitions Subsequent and Final Call    Subsequent and Final Calls  Care Transitions Interventions    Pharmacist: Completed     Meds to Bed: Completed     Other Interventions:              Follow Up  Future Appointments   Date Time Provider Jessica Ordaz   2022  9:20 AM SCHEDULE, [facility]   2022  2:20 PM MD TULIO Dukes TOLP   2022  1:45 PM Mariana Ennis MD [address] Care Transitions Nurse   [phone]

## 2022-09-07 ENCOUNTER — OFFICE VISIT (OUTPATIENT)
Dept: ORTHOPEDIC SURGERY | Age: 62
End: 2022-09-07
Payer: MEDICARE

## 2022-09-07 VITALS — BODY MASS INDEX: 25.9 KG/M2 | HEIGHT: 71 IN | WEIGHT: 185 LBS

## 2022-09-07 DIAGNOSIS — M70.22 OLECRANON BURSITIS OF LEFT ELBOW: Primary | ICD-10-CM

## 2022-09-07 PROCEDURE — 99213 OFFICE O/P EST LOW 20 MIN: CPT | Performed by: STUDENT IN AN ORGANIZED HEALTH CARE EDUCATION/TRAINING PROGRAM

## 2022-09-07 PROCEDURE — G8427 DOCREV CUR MEDS BY ELIG CLIN: HCPCS | Performed by: STUDENT IN AN ORGANIZED HEALTH CARE EDUCATION/TRAINING PROGRAM

## 2022-09-07 PROCEDURE — 3017F COLORECTAL CA SCREEN DOC REV: CPT | Performed by: STUDENT IN AN ORGANIZED HEALTH CARE EDUCATION/TRAINING PROGRAM

## 2022-09-07 PROCEDURE — 4004F PT TOBACCO SCREEN RCVD TLK: CPT | Performed by: STUDENT IN AN ORGANIZED HEALTH CARE EDUCATION/TRAINING PROGRAM

## 2022-09-07 PROCEDURE — 1111F DSCHRG MED/CURRENT MED MERGE: CPT | Performed by: STUDENT IN AN ORGANIZED HEALTH CARE EDUCATION/TRAINING PROGRAM

## 2022-09-07 PROCEDURE — G8417 CALC BMI ABV UP PARAM F/U: HCPCS | Performed by: STUDENT IN AN ORGANIZED HEALTH CARE EDUCATION/TRAINING PROGRAM

## 2022-09-07 NOTE — PROGRESS NOTES
[address] [facility]  [facility] [address]  Dept: [phone]    Ambulatory Orthopedic Consult    CHIEF COMPLAINT:    Chief Complaint   Patient presents with    New Patient     new patient, Olecranon bursitis of left elbow       HISTORY OF PRESENT ILLNESS:      The patient is a 65 y.o. RHD male who is being seen for consultation and evaluation of left elbow swelling. The patient notes several months of left elbow swelling that started atraumatically. He reports minimal pain associated with this. The swelling has been stable for the past following months. He notes that it is occasionally sore when he will rest his elbow on a hard object. He denies any drainage or redness associated with the swelling. He denies any numbness or tingling. Past Medical History:    Past Medical History:   Diagnosis Date    Abnormal EKG 10/27/2016    indicates inferior infarct.     Anxiety     Bipolar disorder ([facility] 2006    on rx    Chronic back pain 11/21/2014    Depression 2006    on rx    Diabetes mellitus ([facility]     borderline    Fracture, clavicle 1996    LEFT    Hyperlipidemia     Hypertension 2006    on rx    Legally blind in left eye, as defined in [place]      very blurry    Lumbar radiculopathy 04/23/2014    Nicotine dependence     Osteoarthritis     Pain     LEFT EYE    Retinal detachment     history miguel    Sleep apnea     doesnt use cpap     Tubular adenoma     Visual floaters     Wears glasses     USES MAGNIFYING GLASS       Past Surgical History:    Past Surgical History:   Procedure Laterality Date    CATARACT REMOVAL Left     CATARACT REMOVAL WITH IMPLANT Right 8-25-15    CHOLECYSTECTOMY  2/13    COLONOSCOPY  09/22/2016    the ICV was edematous and erythematous but most likely normal variant , bxs taken Large polyp 3 cm, at 50 cm from the anal verge with a very  wide stalk, not removed tattooed needs to be removed with subtotal colectomy disabled   Tobacco Use    Smoking status: Every Day     Packs/day: 1.00     Years: 30.00     Pack years: 30.00     Types: Cigarettes    Smokeless tobacco: Never   Vaping Use    Vaping Use: Never used   Substance and Sexual Activity    Alcohol use: Yes     Comment: 6 drinks per weekly- weekends only now (1/27/22)    Drug use: No    Sexual activity: Not on file   Other Topics Concern    Not on file   Social History Narrative    Not on file     Social Determinants of Health     Financial Resource Strain: Low Risk     Difficulty of Paying Living Expenses: Not hard at all   Food Insecurity: No Food Insecurity    Worried About Running Out of Food in the Last Year: Never true    Ran Out of Food in the Last Year: Never true   Transportation Needs: Not on file   Physical Activity: Not on file   Stress: Not on file   Social Connections: Not on file   Intimate Partner Violence: Not on file   Housing Stability: Not on file       Family History:  Family History   Problem Relation Age of Onset    Heart Failure Mother     Cancer Mother     Heart Disease Mother         CAD-OPEN HEART    Mental Retardation Sister     Seizures Sister     Hypertension Other         maternal history       REVIEW OF SYSTEMS:  Constitutional: Negative for fever and chills. HENT: Negative for congestion, nose bleeds, and sore throat. Eyes: Negative for blurred vision and double vision. Respiratory: Negative for cough, shortness of breath and wheezing. Cardiovascular: Negative for chest pain and palpitations. Gastrointestinal: Negative for nausea, vomiting, diarrhea/constipation. Musculoskeletal: Positive for left elbow swelling  Skin: Negative for itching and rash. Neurological: Negative for dizziness, sensory change and headaches. Psychiatric/Behavioral: Negative for depression and suicidal ideas. I have reviewed the CC, HPI, ROS, PMH, FHX, Social History.  I agree with the documentation provided by other staff, residents, and/or medical students and have reviewed their documentation prior to providing my signature indicating agreement. PHYSICAL EXAM:  Ht 5' 11\" (1.803 m)   Wt 185 lb (83.9 kg)   BMI 25.80 kg/m²   Gen: AAOx3, NAD, appears stated age  CV: no dependent edema, distal pulses 2+  Chest: unlabored respirations, equal chest rise bilaterally, no audible wheezes    Ortho Exam  Left upper extremity: Skin is intact. There is swelling over the olecranon bursa that is nontender to palpation. There is no erythema. There is no drainage. Full elbow range of motion without pain. Elbow stable to varus and valgus stress through range of motion. Compartments soft. 2+ rad pulse. Median/Radial/Ulnar/AIN/PIN motor intact. Median/Radial/Ulnar nerve SILT. Radiology: No new x-rays at today's visit. Reviewed prior x-rays of left elbow demonstrate no acute fracture. There is a small enthesophyte over the olecranon and some soft tissue swelling consistent with olecranon bursitis. ASSESSMENT:     1. Olecranon bursitis of left elbow       PLAN:  Rolly Jordan is a pleasant 61-year-old male with aseptic olecranon bursitis. He does not have any significant pain or limitations at this time. Therefore we do not recommend any further intervention. We advised the patient with a compressive sleeve to hopefully improve the swelling of his olecranon bursa. Otherwise we discussed that if this does start to hurt him or get larger we could perform a bursectomy however at this time has is not limiting him and causing him no pain would not recommend that. We will see him back as needed    Return if symptoms worsen or fail to improve. No orders of the defined types were placed in this encounter. No orders of the defined types were placed in this encounter.        Electronically signed by Akhil Cornejo DO on 9/7/2022 at 10:30 AM

## 2022-09-07 NOTE — PROGRESS NOTES
I reviewed with the resident the medical history and the resident's findings on the physical examination. I discussed with the resident the patient's diagnosis and concur with the plan. I independently performed a history and physical exam on the patient and agree with documentation as above.      Honorio Roblesughter, DO

## 2022-09-14 DIAGNOSIS — M54.16 LUMBAR RADICULOPATHY: ICD-10-CM

## 2022-09-14 DIAGNOSIS — M47.812 OSTEOARTHRITIS OF CERVICAL SPINE, UNSPECIFIED SPINAL OSTEOARTHRITIS COMPLICATION STATUS: ICD-10-CM

## 2022-09-14 DIAGNOSIS — M54.50 CHRONIC MIDLINE LOW BACK PAIN WITHOUT SCIATICA: ICD-10-CM

## 2022-09-14 DIAGNOSIS — G89.29 CHRONIC MIDLINE LOW BACK PAIN WITHOUT SCIATICA: ICD-10-CM

## 2022-09-14 RX ORDER — OXYCODONE HYDROCHLORIDE AND ACETAMINOPHEN 5; 325 MG/1; MG/1
1 TABLET ORAL EVERY 8 HOURS PRN
Qty: 90 TABLET | Refills: 0 | Status: SHIPPED | OUTPATIENT
Start: 2022-09-14 | End: 2022-10-24 | Stop reason: SDUPTHER

## 2022-09-15 DIAGNOSIS — F41.9 ANXIETY: ICD-10-CM

## 2022-09-15 RX ORDER — DIAZEPAM 5 MG/1
TABLET ORAL
Qty: 60 TABLET | Refills: 0 | Status: SHIPPED | OUTPATIENT
Start: 2022-09-15 | End: 2022-10-17 | Stop reason: SDUPTHER

## 2022-09-26 DIAGNOSIS — E66.9 DIABETES MELLITUS TYPE 2 IN OBESE (HCC): ICD-10-CM

## 2022-09-26 DIAGNOSIS — E11.69 DIABETES MELLITUS TYPE 2 IN OBESE (HCC): ICD-10-CM

## 2022-09-29 ENCOUNTER — OFFICE VISIT (OUTPATIENT)
Dept: PRIMARY CARE CLINIC | Age: 62
End: 2022-09-29
Payer: MEDICARE

## 2022-09-29 VITALS
SYSTOLIC BLOOD PRESSURE: 124 MMHG | WEIGHT: 180.4 LBS | HEART RATE: 82 BPM | DIASTOLIC BLOOD PRESSURE: 62 MMHG | OXYGEN SATURATION: 97 % | HEIGHT: 71 IN | BODY MASS INDEX: 25.26 KG/M2

## 2022-09-29 DIAGNOSIS — D50.0 IRON DEFICIENCY ANEMIA DUE TO CHRONIC BLOOD LOSS: ICD-10-CM

## 2022-09-29 DIAGNOSIS — Z12.5 SCREENING PSA (PROSTATE SPECIFIC ANTIGEN): ICD-10-CM

## 2022-09-29 DIAGNOSIS — E11.9 TYPE 2 DIABETES MELLITUS WITHOUT COMPLICATION, WITHOUT LONG-TERM CURRENT USE OF INSULIN (HCC): ICD-10-CM

## 2022-09-29 DIAGNOSIS — Z00.00 MEDICARE ANNUAL WELLNESS VISIT, SUBSEQUENT: ICD-10-CM

## 2022-09-29 DIAGNOSIS — M47.816 OSTEOARTHRITIS OF LUMBAR SPINE, UNSPECIFIED SPINAL OSTEOARTHRITIS COMPLICATION STATUS: ICD-10-CM

## 2022-09-29 DIAGNOSIS — Z23 NEED FOR VACCINATION: Primary | ICD-10-CM

## 2022-09-29 LAB — HBA1C MFR BLD: 5.6 %

## 2022-09-29 PROCEDURE — 90674 CCIIV4 VAC NO PRSV 0.5 ML IM: CPT | Performed by: FAMILY MEDICINE

## 2022-09-29 PROCEDURE — 3017F COLORECTAL CA SCREEN DOC REV: CPT | Performed by: FAMILY MEDICINE

## 2022-09-29 PROCEDURE — G0439 PPPS, SUBSEQ VISIT: HCPCS | Performed by: FAMILY MEDICINE

## 2022-09-29 PROCEDURE — 83036 HEMOGLOBIN GLYCOSYLATED A1C: CPT | Performed by: FAMILY MEDICINE

## 2022-09-29 PROCEDURE — G0008 ADMIN INFLUENZA VIRUS VAC: HCPCS | Performed by: FAMILY MEDICINE

## 2022-09-29 PROCEDURE — 3044F HG A1C LEVEL LT 7.0%: CPT | Performed by: FAMILY MEDICINE

## 2022-09-29 RX ORDER — CYCLOBENZAPRINE HCL 10 MG
10 TABLET ORAL 2 TIMES DAILY PRN
Qty: 60 TABLET | Refills: 5 | Status: SHIPPED | OUTPATIENT
Start: 2022-09-29

## 2022-09-29 ASSESSMENT — LIFESTYLE VARIABLES
HOW OFTEN DO YOU HAVE A DRINK CONTAINING ALCOHOL: 2-4 TIMES A MONTH
HOW MANY STANDARD DRINKS CONTAINING ALCOHOL DO YOU HAVE ON A TYPICAL DAY: 1 OR 2

## 2022-09-29 ASSESSMENT — PATIENT HEALTH QUESTIONNAIRE - PHQ9
SUM OF ALL RESPONSES TO PHQ QUESTIONS 1-9: 3
1. LITTLE INTEREST OR PLEASURE IN DOING THINGS: 1
3. TROUBLE FALLING OR STAYING ASLEEP: 1
8. MOVING OR SPEAKING SO SLOWLY THAT OTHER PEOPLE COULD HAVE NOTICED. OR THE OPPOSITE, BEING SO FIGETY OR RESTLESS THAT YOU HAVE BEEN MOVING AROUND A LOT MORE THAN USUAL: 0
SUM OF ALL RESPONSES TO PHQ QUESTIONS 1-9: 3
5. POOR APPETITE OR OVEREATING: 0
9. THOUGHTS THAT YOU WOULD BE BETTER OFF DEAD, OR OF HURTING YOURSELF: 0
SUM OF ALL RESPONSES TO PHQ QUESTIONS 1-9: 3
SUM OF ALL RESPONSES TO PHQ9 QUESTIONS 1 & 2: 2
SUM OF ALL RESPONSES TO PHQ QUESTIONS 1-9: 3
6. FEELING BAD ABOUT YOURSELF - OR THAT YOU ARE A FAILURE OR HAVE LET YOURSELF OR YOUR FAMILY DOWN: 0
4. FEELING TIRED OR HAVING LITTLE ENERGY: 0
7. TROUBLE CONCENTRATING ON THINGS, SUCH AS READING THE NEWSPAPER OR WATCHING TELEVISION: 0
10. IF YOU CHECKED OFF ANY PROBLEMS, HOW DIFFICULT HAVE THESE PROBLEMS MADE IT FOR YOU TO DO YOUR WORK, TAKE CARE OF THINGS AT HOME, OR GET ALONG WITH OTHER PEOPLE: 0
2. FEELING DOWN, DEPRESSED OR HOPELESS: 1

## 2022-09-29 NOTE — PATIENT INSTRUCTIONS
Personalized Preventive Plan for Rhonda Gauthier - 9/29/2022  Medicare offers a range of preventive health benefits. Some of the tests and screenings are paid in full while other may be subject to a deductible, co-insurance, and/or copay. Some of these benefits include a comprehensive review of your medical history including lifestyle, illnesses that may run in your family, and various assessments and screenings as appropriate. After reviewing your medical record and screening and assessments performed today your provider may have ordered immunizations, labs, imaging, and/or referrals for you. A list of these orders (if applicable) as well as your Preventive Care list are included within your After Visit Summary for your review. Other Preventive Recommendations:    A preventive eye exam performed by an eye specialist is recommended every 1-2 years to screen for glaucoma; cataracts, macular degeneration, and other eye disorders. A preventive dental visit is recommended every 6 months. Try to get at least 150 minutes of exercise per week or 10,000 steps per day on a pedometer . Order or download the FREE \"Exercise & Physical Activity: Your Everyday Guide\" from [employer]. Call 1-825.930.7262 or search [employer] online. You need 7883-9197 mg of calcium and 6082-5388 IU of vitamin D per day. It is possible to meet your calcium requirement with diet alone, but a vitamin D supplement is usually necessary to meet this goal.  When exposed to the sun, use a sunscreen that protects against both UVA and UVB radiation with an SPF of 30 or greater. Reapply every 2 to 3 hours or after sweating, drying off with a towel, or swimming. Always wear a seat belt when traveling in a car. Always wear a helmet when riding a bicycle or motorcycle.

## 2022-09-29 NOTE — PROGRESS NOTES
Medicare Annual Wellness Visit    Neo Fitzpatrick is here for Medicare AWV    Assessment & Plan   Need for vaccination  -     Influenza, FLUCELVAX, (age 10 mo+), IM, Preservative Free, 0.5 mL  Medicare annual wellness visit, subsequent  Iron deficiency anemia due to chronic blood loss  -     CBC with Auto Differential; Future  -     Ferritin; Future  -     Iron and TIBC; Future  Type 2 diabetes mellitus without complication, without long-term current use of insulin (HCC)  -     POCT glycosylated hemoglobin (Hb A1C)  Screening PSA (prostate specific antigen)  -     PSA Screening; Future  Osteoarthritis of lumbar spine, unspecified spinal osteoarthritis complication status  -     cyclobenzaprine (FLEXERIL) 10 MG tablet; Take 1 tablet by mouth 2 times daily as needed for Muscle spasms, Disp-60 tablet, R-5Normal    Recommendations for Preventive Services Due: see orders and patient instructions/AVS.  Recommended screening schedule for the next 5-10 years is provided to the patient in written form: see Patient Instructions/AVS.     Return in 3 months (on 12/29/2022) for Medicare Annual Wellness Visit in 1 year. Subjective   The following acute and/or chronic problems were also addressed today:  GI bleed   Antral ulcer  NIDDM    Here for AWV. Patient was in hospital in mid-August. He reports he was experiencing GI blood loss following a colonoscopy on 8/11/22 with polypectomy. Upper GI ulcer identified. Received pRBC transfusion. He feels back to normal now. He admits he does not fully understand what occurred during his hospital course and is seeking clarification on what was found and how he was treated. He was instructed to stop aspirin and meloxicam after hospital stay. He stopped the aspirin, but continued taking the meloxicam. Stopped Tylenol. No other medication changes. Taking percocet 2-3 times daily. Patient reports he has pain in low back to neck. Muscles spasms in right hip.     Highest blood glucose in past 30 days were in 130s. Last A1C was 5.9 on 6/27/22. Today's A1C was . Patient requests flu vaccine today. Patient's complete Health Risk Assessment and screening values have been reviewed and are found in Flowsheets. The following problems were reviewed today and where indicated follow up appointments were made and/or referrals ordered. Positive Risk Factor Screenings with Interventions:    Fall Risk:  Do you feel unsteady or are you worried about falling? : (!) yes  2 or more falls in past year?: (!) yes  Fall with injury in past year?: (!) yes   Fall Risk Interventions:    Patient declines any further evaluation/treatment for this issue      Tobacco Use:  Tobacco Use: High Risk    Smoking Tobacco Use: Every Day    Smokeless Tobacco Use: Never     E-cigarette/Vaping       Questions Responses    E-cigarette/Vaping Use Never User    Start Date     Passive Exposure     Quit Date     Counseling Given     Comments           Substance Use - Tobacco Interventions:  patient is not ready to work toward tobacco cessation at this time         Opioid Risk: (High risk score ?55) Opioid risk score: 87    Last PDMP [place] as Reviewed:  Review User Review Instant Review Result   Ayad  6/27/2022  1:34 PM Reviewed PDMP [1]     Last Controlled Substance Monitoring Documentation      [address] Office Visit from 3/10/2022 in [facility]   Periodic Controlled Substance Monitoring Possible medication side effects, risk of tolerance/dependence & alternative treatments discussed., No signs of potential drug abuse or diversion identified. , Random urine drug screen sent today. filed at 03/10/2022 1059   Chronic Pain > 50 MEDD Obtained or confirmed \"Consent for Opioid Use\" on file. filed at 03/10/2022 1059   Chronic Pain > 120 MEDD Obtained or confirmed \"Medication Contract\" on file.  filed at 03/10/2022 [place] and ACP:  General  In general, how would you say your health is?: Good  In the past 7 days, have you experienced any of the following: New or Increased Pain, New or Increased Fatigue, Loneliness, Social Isolation, Stress or Anger?: (!) Yes  Select all that apply: (!) New or Increased Fatigue  Do you get the social and emotional support that you need?: Yes  Do you have a Living Will?: (!) No    Advance Directives       Power of  Living Will ACP-Advance Directive ACP-Power of     Not on File Not on File Not on File Not on File        General Health Risk Interventions:  No Living Will: Advance Care Planning addressed with patient today    Health Habits/Nutrition:  Physical Activity: Sufficiently Active    Days of Exercise per Week: 7 days    Minutes of Exercise per Session: 60 min     Have you lost any weight without trying in the past 3 months?: No  Body mass index: (!) 25.13  Have you seen the dentist within the past year?: (!) No  Health Habits/Nutrition Interventions:  Dental exam overdue:  patient encouraged to make appointment with his/her dentist    Hearing/Vision:  Do you or your family notice any trouble with your hearing that hasn't been managed with hearing aids?: No  Do you have difficulty driving, watching TV, or doing any of your daily activities because of your eyesight?: No  Have you had an eye exam within the past year?: (!) No  No results found. Hearing/Vision Interventions:  No concerns             Objective   Vitals:    09/29/22 1417   BP: 124/62   Pulse: 82   SpO2: 97%   Weight: 180 lb 6.4 oz (81.8 kg)   Height: 5' 11.04\" (1.804 m)      Body mass index is 25.13 kg/m².       General Appearance: alert and oriented to person, place and time, well developed and well- nourished, in no acute distress  Skin: warm and dry, no rash or erythema  Head: normocephalic and atraumatic  Eyes: pupils equal, round, and reactive to light, extraocular eye movements intact, conjunctivae normal  ENT: tympanic membrane, external ear and ear canal normal bilaterally, nose without deformity, nasal mucosa and turbinates normal without polyps  Neck: supple and non-tender without mass, no thyromegaly or thyroid nodules, no cervical lymphadenopathy  Pulmonary/Chest: clear to auscultation bilaterally- no wheezes, rales or rhonchi, normal air movement, no respiratory distress  Cardiovascular: normal rate, regular rhythm, normal S1 and S2, no murmurs, rubs, clicks, or gallops, distal pulses intact, no carotid bruits  Abdomen: soft, non-tender, non-distended, normal bowel sounds, no masses or organomegaly  Extremities: no cyanosis, clubbing or edema  Musculoskeletal: normal range of motion, no joint swelling, deformity or tenderness  Neurologic: reflexes normal and symmetric, no cranial nerve deficit, gait, coordination and speech normal       Allergies   Allergen Reactions    Poison Ivy Extract Itching     Hives & itching     Prior to Visit Medications    Medication Sig Taking? Authorizing Provider   cyclobenzaprine (FLEXERIL) 10 MG tablet Take 1 tablet by mouth 2 times daily as needed for Muscle spasms Yes Pretty Parr MD   metFORMIN (GLUCOPHAGE) 500 MG tablet Take 1 tablet by mouth daily (with breakfast) Yes Pretty Parr MD   diazePAM (VALIUM) 5 MG tablet TAKE ONE TABLET BY MOUTH EVERY 12 HOURS AS NEEDED FOR ANXIETY OR SLEEP Yes Pretty Parr MD   oxyCODONE-acetaminophen (PERCOCET) 5-325 MG per tablet Take 1 tablet by mouth every 8 hours as needed for Pain for up to 30 days. Yes Pretty Parr MD   pantoprazole (PROTONIX) 40 MG tablet Take 1 tablet by mouth 2 times daily (before meals) Yes Joe Evans MD   DULoxetine (CYMBALTA) 60 MG extended release capsule Take 1 capsule by mouth in the morning.  Yes Pretty Parr MD   cloNIDine (CATAPRES) 0.1 MG tablet TAKE ONE TABLET BY MOUTH DAILY Yes Pretty Parr MD   sildenafil (VIAGRA) 100 MG tablet Take 1 tablet by mouth daily as needed for Erectile Dysfunction Yes Pretty Parr MD tamsulosin (FLOMAX) 0.4 MG capsule Take 1 capsule by mouth daily Yes Rae Fajardo MD   atenolol (TENORMIN) 25 MG tablet TAKE ONE TABLET BY MOUTH DAILY Yes FRANCES Portillo   cholestyramine (QUESTRAN) 4 g packet TAKE ONE PACKET BY MOUTH FOUR TIMES A DAY Yes Rae Fajardo MD   atorvastatin (LIPITOR) 20 MG tablet TAKE ONE TABLET BY MOUTH DAILY Yes Rae Fajardo MD   Blood Glucose Monitoring Suppl (BLOOD GLUCOSE MONITOR SYSTEM) w/Device KIT USE TO MONITOR BLOOD GLUCOSE LEVEL. (TEST ONCE PER DAY) Yes Rae Fajardo MD   blood glucose monitor strips Test blood glucose level 1 time per day.  Yes Rae Fajardo MD   Lancets 30G MISC Inject 1 each into the skin daily Yes Rae Fajardo MD   hydrOXYzine (VISTARIL) 25 MG capsule Take 25 mg by mouth 3 times daily as needed Yes Historical Provider, MD   QUEtiapine (SEROQUEL) 50 MG tablet Take 50 mg by mouth nightly Yes Historical Provider, MD   lamoTRIgine (LAMICTAL) 200 MG tablet Take 200 mg by mouth daily Yes Historical Provider, MD   acetaminophen (TYLENOL) 325 MG tablet Take 2 tablets by mouth every 4 hours as needed for Pain Yes Rae Fajardo MD   ibuprofen (ADVIL;MOTRIN) 800 MG tablet Take 1 tablet by mouth 2 times daily as needed for Pain  Assumpta Daniela Ibrahim MD   meloxicam (MOBIC) 15 MG tablet Take 1 tablet by mouth daily  Rae Fajardo MD       CareTeam (Including outside providers/suppliers regularly involved in providing care):   Patient Care Team:  Rae Fajardo MD as PCP - General (Family Medicine)  Rae Fajardo MD as PCP - [facility]  Nellie Coker MD as Consulting Physician (Gastroenterology)  Brenden Kinney MD as Consulting Physician (Hematology and Oncology)  Yamila Donald LPN as Care Transitions Nurse     Reviewed and updated this visit:  Allergies  Meds  Med Hx  Surg Hx  Soc Hx  Fam Hx        Reprint order for low dose ct chest  Labs ordered  Continue meds as is   If Still anemic, will start iron. Patient continues Percocet for both neck and back pain.

## 2022-10-03 ENCOUNTER — TELEPHONE (OUTPATIENT)
Dept: PRIMARY CARE CLINIC | Age: 62
End: 2022-10-03

## 2022-10-03 NOTE — TELEPHONE ENCOUNTER
----- Message from Ole Daniel sent at 9/30/2022 12:49 PM EDT -----  Subject: Medication Problem    Medication: cholestyramine (QUESTRAN) 4 g packet  Dosage: three times daily  Ordering Provider: sue    Question/Problem: Pt would like to know if he should still take   cholestyramine or not since he is now taking additional medication.   Additional Information for Provider:     Pharmacy: [facility], [address] [facility] [phone]    ---------------------------------------------------------------------------  --------------  Kallie ARIZMENDI  [phone]; OK to leave message on voicemail, OK to respond with   electronic message via [employer] portal (only for patients who have   registered [employer] account)  ---------------------------------------------------------------------------  --------------    SCRIPT ANSWERS  Relationship to Patient: Self

## 2022-10-05 DIAGNOSIS — I10 ESSENTIAL HYPERTENSION: ICD-10-CM

## 2022-10-05 RX ORDER — ATENOLOL 25 MG/1
TABLET ORAL
Qty: 90 TABLET | Refills: 0 | Status: SHIPPED | OUTPATIENT
Start: 2022-10-05

## 2022-10-17 DIAGNOSIS — F41.9 ANXIETY: ICD-10-CM

## 2022-10-17 RX ORDER — DIAZEPAM 5 MG/1
TABLET ORAL
Qty: 60 TABLET | Refills: 0 | Status: SHIPPED | OUTPATIENT
Start: 2022-10-17 | End: 2022-11-14

## 2022-10-21 DIAGNOSIS — E11.69 DIABETES MELLITUS TYPE 2 IN OBESE (HCC): ICD-10-CM

## 2022-10-21 DIAGNOSIS — E66.9 DIABETES MELLITUS TYPE 2 IN OBESE (HCC): ICD-10-CM

## 2022-10-21 RX ORDER — LANCETS 33 GAUGE
EACH MISCELLANEOUS
Qty: 100 EACH | Refills: 5 | Status: SHIPPED | OUTPATIENT
Start: 2022-10-21

## 2022-10-24 DIAGNOSIS — M54.16 LUMBAR RADICULOPATHY: ICD-10-CM

## 2022-10-24 DIAGNOSIS — G89.29 CHRONIC MIDLINE LOW BACK PAIN WITHOUT SCIATICA: ICD-10-CM

## 2022-10-24 DIAGNOSIS — M54.50 CHRONIC MIDLINE LOW BACK PAIN WITHOUT SCIATICA: ICD-10-CM

## 2022-10-24 DIAGNOSIS — M47.812 OSTEOARTHRITIS OF CERVICAL SPINE, UNSPECIFIED SPINAL OSTEOARTHRITIS COMPLICATION STATUS: ICD-10-CM

## 2022-10-24 RX ORDER — OXYCODONE HYDROCHLORIDE AND ACETAMINOPHEN 5; 325 MG/1; MG/1
1 TABLET ORAL EVERY 8 HOURS PRN
Qty: 90 TABLET | Refills: 0 | Status: SHIPPED | OUTPATIENT
Start: 2022-10-24 | End: 2022-11-23

## 2022-11-02 ENCOUNTER — HOSPITAL ENCOUNTER (OUTPATIENT)
Dept: CT IMAGING | Age: 62
Discharge: HOME | End: 2022-11-04
Payer: MEDICARE

## 2022-11-02 ENCOUNTER — HOSPITAL ENCOUNTER (OUTPATIENT)
Age: 62
Discharge: HOME | End: 2022-11-02
Payer: MEDICARE

## 2022-11-02 DIAGNOSIS — Z12.5 SCREENING PSA (PROSTATE SPECIFIC ANTIGEN): ICD-10-CM

## 2022-11-02 DIAGNOSIS — Z87.891 PERSONAL HISTORY OF NICOTINE DEPENDENCE: ICD-10-CM

## 2022-11-02 DIAGNOSIS — D50.0 IRON DEFICIENCY ANEMIA DUE TO CHRONIC BLOOD LOSS: ICD-10-CM

## 2022-11-02 LAB
ABSOLUTE EOS #: 0.5 K/UL (ref 0–0.44)
ABSOLUTE IMMATURE GRANULOCYTE: 0.03 K/UL (ref 0–0.3)
ABSOLUTE LYMPH #: 2.52 K/UL (ref 1.1–3.7)
ABSOLUTE MONO #: 0.65 K/UL (ref 0.1–1.2)
BASOPHILS # BLD: 1 % (ref 0–2)
BASOPHILS ABSOLUTE: 0.07 K/UL (ref 0–0.2)
EOSINOPHILS RELATIVE PERCENT: 6 % (ref 1–4)
FERRITIN: 15 NG/ML (ref 30–400)
HCT VFR BLD CALC: 41.4 % (ref 40.7–50.3)
HEMOGLOBIN: 12.6 G/DL (ref 13–17)
IMMATURE GRANULOCYTES: 0 %
IRON SATURATION: 9 % (ref 20–55)
IRON: 34 UG/DL (ref 59–158)
LYMPHOCYTES # BLD: 28 % (ref 24–43)
MCH RBC QN AUTO: 25.9 PG (ref 25.2–33.5)
MCHC RBC AUTO-ENTMCNC: 30.4 G/DL (ref 28.4–34.8)
MCV RBC AUTO: 85.2 FL (ref 82.6–102.9)
MONOCYTES # BLD: 7 % (ref 3–12)
NRBC AUTOMATED: 0 PER 100 WBC
PDW BLD-RTO: 17.9 % (ref 11.8–14.4)
PLATELET # BLD: 343 K/UL (ref 138–453)
PMV BLD AUTO: 9 FL (ref 8.1–13.5)
PROSTATE SPECIFIC ANTIGEN: 3.4 NG/ML
RBC # BLD: 4.86 M/UL (ref 4.21–5.77)
RBC # BLD: ABNORMAL 10*6/UL
SEG NEUTROPHILS: 58 % (ref 36–65)
SEGMENTED NEUTROPHILS ABSOLUTE COUNT: 5.38 K/UL (ref 1.5–8.1)
TOTAL IRON BINDING CAPACITY: 394 UG/DL (ref 250–450)
UNSATURATED IRON BINDING CAPACITY: 360 UG/DL (ref 112–347)
WBC # BLD: 9.2 K/UL (ref 3.5–11.3)

## 2022-11-02 PROCEDURE — 82728 ASSAY OF FERRITIN: CPT

## 2022-11-02 PROCEDURE — 83550 IRON BINDING TEST: CPT

## 2022-11-02 PROCEDURE — G0103 PSA SCREENING: HCPCS

## 2022-11-02 PROCEDURE — 36415 COLL VENOUS BLD VENIPUNCTURE: CPT

## 2022-11-02 PROCEDURE — 83540 ASSAY OF IRON: CPT

## 2022-11-02 PROCEDURE — 85025 COMPLETE CBC W/AUTO DIFF WBC: CPT

## 2022-11-02 PROCEDURE — 71271 CT THORAX LUNG CANCER SCR C-: CPT

## 2022-11-04 RX ORDER — PANTOPRAZOLE SODIUM 40 MG/1
40 TABLET, DELAYED RELEASE ORAL
Qty: 180 TABLET | Refills: 1 | Status: SHIPPED | OUTPATIENT
Start: 2022-11-04

## 2022-11-08 DIAGNOSIS — E66.9 DIABETES MELLITUS TYPE 2 IN OBESE (HCC): ICD-10-CM

## 2022-11-08 DIAGNOSIS — E11.69 DIABETES MELLITUS TYPE 2 IN OBESE (HCC): ICD-10-CM

## 2022-11-08 RX ORDER — GLUCOSAMINE HCL/CHONDROITIN SU 500-400 MG
CAPSULE ORAL
Qty: 100 STRIP | Refills: 3 | Status: SHIPPED | OUTPATIENT
Start: 2022-11-08

## 2022-11-10 ENCOUNTER — TELEPHONE (OUTPATIENT)
Dept: GASTROENTEROLOGY | Age: 62
End: 2022-11-10

## 2022-11-14 DIAGNOSIS — F41.9 ANXIETY: ICD-10-CM

## 2022-11-14 RX ORDER — DIAZEPAM 5 MG/1
TABLET ORAL
Qty: 60 TABLET | Refills: 0 | Status: SHIPPED | OUTPATIENT
Start: 2022-11-14 | End: 2022-12-12

## 2022-11-21 ENCOUNTER — OFFICE VISIT (OUTPATIENT)
Dept: PRIMARY CARE CLINIC | Age: 62
End: 2022-11-21
Payer: MEDICARE

## 2022-11-21 VITALS
BODY MASS INDEX: 26.35 KG/M2 | SYSTOLIC BLOOD PRESSURE: 138 MMHG | HEIGHT: 71 IN | WEIGHT: 188.2 LBS | DIASTOLIC BLOOD PRESSURE: 82 MMHG | HEART RATE: 76 BPM | OXYGEN SATURATION: 97 %

## 2022-11-21 DIAGNOSIS — G89.29 CHRONIC MIDLINE LOW BACK PAIN WITHOUT SCIATICA: ICD-10-CM

## 2022-11-21 DIAGNOSIS — E11.9 TYPE 2 DIABETES MELLITUS WITHOUT COMPLICATION, WITHOUT LONG-TERM CURRENT USE OF INSULIN (HCC): Primary | ICD-10-CM

## 2022-11-21 DIAGNOSIS — M54.50 CHRONIC MIDLINE LOW BACK PAIN WITHOUT SCIATICA: ICD-10-CM

## 2022-11-21 DIAGNOSIS — K25.0 ACUTE GASTRIC ULCER WITH HEMORRHAGE: ICD-10-CM

## 2022-11-21 DIAGNOSIS — F31.9 BIPOLAR 1 DISORDER (HCC): ICD-10-CM

## 2022-11-21 PROCEDURE — 3078F DIAST BP <80 MM HG: CPT | Performed by: FAMILY MEDICINE

## 2022-11-21 PROCEDURE — 3074F SYST BP LT 130 MM HG: CPT | Performed by: FAMILY MEDICINE

## 2022-11-21 PROCEDURE — 99213 OFFICE O/P EST LOW 20 MIN: CPT | Performed by: FAMILY MEDICINE

## 2022-11-21 PROCEDURE — G8417 CALC BMI ABV UP PARAM F/U: HCPCS | Performed by: FAMILY MEDICINE

## 2022-11-21 PROCEDURE — G8427 DOCREV CUR MEDS BY ELIG CLIN: HCPCS | Performed by: FAMILY MEDICINE

## 2022-11-21 PROCEDURE — 4004F PT TOBACCO SCREEN RCVD TLK: CPT | Performed by: FAMILY MEDICINE

## 2022-11-21 PROCEDURE — 2022F DILAT RTA XM EVC RTNOPTHY: CPT | Performed by: FAMILY MEDICINE

## 2022-11-21 PROCEDURE — 3044F HG A1C LEVEL LT 7.0%: CPT | Performed by: FAMILY MEDICINE

## 2022-11-21 PROCEDURE — G8482 FLU IMMUNIZE ORDER/ADMIN: HCPCS | Performed by: FAMILY MEDICINE

## 2022-11-21 PROCEDURE — 3017F COLORECTAL CA SCREEN DOC REV: CPT | Performed by: FAMILY MEDICINE

## 2022-11-21 RX ORDER — SILDENAFIL 100 MG/1
100 TABLET, FILM COATED ORAL DAILY PRN
Qty: 30 TABLET | Refills: 3 | Status: SHIPPED | OUTPATIENT
Start: 2022-11-21

## 2022-11-21 ASSESSMENT — ENCOUNTER SYMPTOMS
SHORTNESS OF BREATH: 0
BACK PAIN: 1
VOMITING: 0
SORE THROAT: 0
WHEEZING: 0
DIARRHEA: 0
COUGH: 0
RHINORRHEA: 0
ABDOMINAL PAIN: 0
EYE REDNESS: 0
EYE DISCHARGE: 0
NAUSEA: 0

## 2022-11-21 NOTE — PROGRESS NOTES
[facility] PRIMARY CARE  [address] [facility] [place] [address] [address]  Dept: [phone]    Nicki Perez is a 65 y.o. male Established patient, who presents today for his medical conditions/complaints as noted below. Chief Complaint   Patient presents with    Anemia     Discuss labs       HPI:     HPI  Pt states was told in August he was anemic. Had colonoscopy done, had six polyps removed. Pt has been taking one iron pill per day. No chest pain or sob. Pt with some pelvic pain riding a motorcycle. . EGD done, showed gastric ulcer. Pt has been using his percocet prn for pelvic pain. Patient states normally takes his Percocet twice a day but for a few days did need to do 3 times a day. States his blood sugars have been under good control. Denies any low blood sugar reactions. Reviewed prior notes None  Reviewed previous Labs    LDL Cholesterol (mg/dL)   Date Value   03/17/2021 87   02/19/2019 84   06/27/2013 Unable to calc. as TRIG>400       (goal LDL is <100)   AST (U/L)   Date Value   08/14/2022 19     ALT (U/L)   Date Value   08/14/2022 42 (H)     BUN (mg/dL)   Date Value   08/15/2022 34 (H)     Hemoglobin A1C (%)   Date Value   09/29/2022 5.6     TSH (mIU/L)   Date Value   06/27/2013 5.65 (H)     BP Readings from Last 3 Encounters:   11/21/22 138/82   09/29/22 124/62   08/17/22 103/60          (goal 120/80)    Past Medical History:   Diagnosis Date    Abnormal EKG 10/27/2016    indicates inferior infarct.     Anxiety     Bipolar disorder ([address] 2006    on rx    Chronic back pain 11/21/2014    Depression 2006    on rx    Diabetes mellitus ([address]     borderline    Fracture, clavicle 1996    LEFT    Hyperlipidemia     Hypertension 2006    on rx    Legally blind in left eye, as defined in [place]      very blurry    Lumbar radiculopathy 04/23/2014    Nicotine dependence     Osteoarthritis     Pain     LEFT EYE    Retinal detachment     history miguel    Sleep apnea doesnt use cpap     Tubular adenoma     Visual floaters     Wears glasses     USES MAGNIFYING GLASS      Past Surgical History:   Procedure Laterality Date    CATARACT REMOVAL Left     CATARACT REMOVAL WITH IMPLANT Right 8-25-15    CHOLECYSTECTOMY  2/13    COLONOSCOPY  09/22/2016    the ICV was edematous and erythematous but most likely normal variant , bxs taken Large polyp 3 cm, at 50 cm from the anal verge with a very  wide stalk, not removed tattooed needs to be removed with subtotal colectomy    COLONOSCOPY N/A 5/2/2019    COLONOSCOPY POLYPECTOMY COLD BIOPSY, HOT SNARE performed by Price Rangel MD at [facility] [address] N/A 8/11/2022    COLONOSCOPY POLYPECTOMY SNARE/COLD BIOPSY performed by Price Rangel MD at [facility], base of skull-left side    EYE SURGERY  12/31/15    laser right eye, left vit    EYE SURGERY Left     torn retina lazer/freezer/hole    EYE SURGERY Left 6/20/51    SILICONE REMOVAL AIR FLUID EXCHANGE    EYE SURGERY      cataracts removed miguel. EYE SURGERY      total 6 eye surg to left, 2 eye surg. to rt.     HERNIA REPAIR      umbilical    LYMPH NODE BIOPSY Left 1971    BASE OF SKULL    NASAL SEPTUM SURGERY  11-24-15    [place] OFFICE/OUTPT VISIT,PROCEDURE ONLY Right 7/12/2018    VITRECTOMY 25 GAUGE, AIR FLUID EXCHANGE, AIR GAS EXCHANGE WITH 18% SF6, ENDOLASER, 200 MSECONDS, 200 MWATTS, 377 PULSES performed by Arlin Crowell MD at [address] [place] [address]  12/09/2016    w/ ileorectal anastomosis    [facility]    UPPER GASTROINTESTINAL ENDOSCOPY N/A 5/2/2019    EGD BIOPSY performed by Price Rangel MD at [address] [place] [address] 8/15/2022    EGD CONTROL HEMORRHAGE performed by Aliza Barba MD at [place] [address]  8/15/2022    EGD BIOPSY performed by Aliza Barba MD at [facility]    VITRECTOMY Left 12/10/2015    VITRECTOMY Left 10/27/2016    membrane peeling    VITRECTOMY Right 07/12/2018    laser, gas    WRIST FRACTURE SURGERY Right 1/31/2022    DISTAL RADIUS OPEN REDUCTION INTERNAL FIXATION performed by Pawan Rajput MD at [address] History   Problem Relation Age of Onset    Heart Failure Mother     Cancer Mother     Heart Disease Mother         CAD-OPEN HEART    Mental Retardation Sister     Seizures Sister     Hypertension Other         maternal history       Social History     Tobacco Use    Smoking status: Every Day     Packs/day: 1.00     Years: 30.00     Pack years: 30.00     Types: Cigarettes    Smokeless tobacco: Never   Substance Use Topics    Alcohol use: Yes     Comment: 6 drinks per weekly- weekends only now (1/27/22)      Current Outpatient Medications   Medication Sig Dispense Refill    sildenafil (VIAGRA) 100 MG tablet Take 1 tablet by mouth daily as needed for Erectile Dysfunction 30 tablet 3    diazePAM (VALIUM) 5 MG tablet TAKE ONE TABLET BY MOUTH EVERY 12 HOURS AS NEEDED FOR ANXIETY OR SLEEP 60 tablet 0    blood glucose monitor strips Test blood glucose level 1 time per day. 100 strip 3    pantoprazole (PROTONIX) 40 MG tablet Take 1 tablet by mouth 2 times daily (before meals) 180 tablet 1    oxyCODONE-acetaminophen (PERCOCET) 5-325 MG per tablet Take 1 tablet by mouth every 8 hours as needed for Pain for up to 30 days. 90 tablet 0    Lancets (ONETOUCH DELICA PLUS AIRLBW88K) MISC USE ONCE DAILY 100 each 5    atenolol (TENORMIN) 25 MG tablet TAKE ONE TABLET BY MOUTH DAILY 90 tablet 0    cyclobenzaprine (FLEXERIL) 10 MG tablet Take 1 tablet by mouth 2 times daily as needed for Muscle spasms 60 tablet 5    metFORMIN (GLUCOPHAGE) 500 MG tablet Take 1 tablet by mouth daily (with breakfast) 90 tablet 3    DULoxetine (CYMBALTA) 60 MG extended release capsule Take 1 capsule by mouth in the morning.  30 capsule 0    cloNIDine (CATAPRES) 0.1 MG tablet TAKE ONE TABLET BY MOUTH DAILY 90 tablet 3    sildenafil (VIAGRA) 100 MG tablet Take 1 tablet by mouth daily as needed for Erectile Dysfunction 90 tablet 2    tamsulosin (FLOMAX) 0.4 MG capsule Take 1 capsule by mouth daily 90 capsule 3    atorvastatin (LIPITOR) 20 MG tablet TAKE ONE TABLET BY MOUTH DAILY 90 tablet 3    Blood Glucose Monitoring Suppl (BLOOD GLUCOSE MONITOR SYSTEM) w/Device KIT USE TO MONITOR BLOOD GLUCOSE LEVEL. (TEST ONCE PER DAY) 1 kit 0    hydrOXYzine (VISTARIL) 25 MG capsule Take 25 mg by mouth 3 times daily as needed      QUEtiapine (SEROQUEL) 50 MG tablet Take 50 mg by mouth nightly      lamoTRIgine (LAMICTAL) 200 MG tablet Take 200 mg by mouth daily      acetaminophen (TYLENOL) 325 MG tablet Take 2 tablets by mouth every 4 hours as needed for Pain 120 tablet 3     No current facility-administered medications for this visit. Allergies   Allergen Reactions    Poison Ivy Extract Itching     Hives & itching       Health Maintenance   Topic Date Due    Lipids  03/17/2022    Diabetic retinal exam  06/30/2022    Diabetic foot exam  11/09/2022    Diabetic microalbuminuria test  11/09/2022    COVID-19 Vaccine (1) 06/27/2023 (Originally 1960)    A1C test (Diabetic or Prediabetic)  09/29/2023    Depression Monitoring  09/29/2023    Annual Wellness Visit (AWV)  09/30/2023    Low dose CT lung screening  11/02/2023    Colorectal Cancer Screen  08/11/2025    Pneumococcal 0-64 years Vaccine (3 - PPSV23 if available, else PCV20) 02/10/2026    DTaP/Tdap/Td vaccine (2 - Td or Tdap) 06/21/2028    Flu vaccine  Completed    Shingles vaccine  Completed    Hepatitis C screen  Completed    HIV screen  Completed    Hepatitis A vaccine  Aged Out    Hib vaccine  Aged Out    Meningococcal (ACWY) vaccine  Aged Out       Subjective:      Review of Systems   Constitutional:  Negative for chills and fever. HENT:  Negative for rhinorrhea and sore throat. Eyes:  Negative for discharge and redness. Respiratory:  Negative for cough, shortness of breath and wheezing. Cardiovascular:  Negative for chest pain and palpitations. Gastrointestinal:  Negative for abdominal pain, diarrhea, nausea and vomiting. Genitourinary:  Negative for dysuria and frequency. Musculoskeletal:  Positive for back pain. Negative for arthralgias and myalgias. Neurological:  Negative for dizziness, light-headedness and headaches. Psychiatric/Behavioral:  Negative for sleep disturbance. Objective:     /82   Pulse 76   Ht 5' 11.04\" (1.804 m)   Wt 188 lb 3.2 oz (85.4 kg)   SpO2 97%   BMI 26.22 kg/m²   Physical Exam  Vitals and nursing note reviewed. Constitutional:       General: He is not in acute distress. Appearance: He is well-developed. He is not ill-appearing. HENT:      Head: Normocephalic and atraumatic. Right Ear: External ear normal.      Left Ear: External ear normal.   Eyes:      General: No scleral icterus. Right eye: No discharge. Left eye: No discharge. Conjunctiva/sclera: Conjunctivae normal.   Neck:      Thyroid: No thyromegaly. Trachea: No tracheal deviation. Cardiovascular:      Rate and Rhythm: Normal rate and regular rhythm. Heart sounds: Normal heart sounds. Pulmonary:      Effort: Pulmonary effort is normal. No respiratory distress. Breath sounds: Normal breath sounds. No wheezing. Lymphadenopathy:      Cervical: No cervical adenopathy. Skin:     General: Skin is warm. Findings: No rash. Neurological:      Mental Status: He is alert and oriented to person, place, and time. Psychiatric:         Mood and Affect: Mood normal.         Behavior: Behavior normal.         Thought Content: Thought content normal.       Assessment:       Diagnosis Orders   1. Type 2 diabetes mellitus without complication, without long-term current use of insulin ([address]        2. Chronic midline low back pain without sciatica        3. Bipolar 1 disorder ([address]        4.  Acute gastric ulcer with hemorrhage             Plan: Continue iron pill, will retest in future  Use iron pill daily  Continue other meds as is   Patient to try to continue to use his Percocet no more than twice a day      Return in about 3 months (around 2/21/2023) for DIABETES. No orders of the defined types were placed in this encounter. Orders Placed This Encounter   Medications    sildenafil (VIAGRA) 100 MG tablet     Sig: Take 1 tablet by mouth daily as needed for Erectile Dysfunction     Dispense:  30 tablet     Refill:  3         Patient given educational materials - see patient instructions. Discussed use, benefit, and side effects of prescribed medications. All patient questions answered. Pt voiced understanding. Reviewed health maintenance. Instructed to continue current medications, diet andexercise. Patient agreed with treatment plan. Follow up as directed.      Electronicallysigned by Citlali Garcia MD on 11/21/2022 at 2:57 PM

## 2022-11-30 NOTE — TELEPHONE ENCOUNTER
ok What Type Of Note Output Would You Prefer (Optional)?: Standard Output How Severe Is Your Rash?: severe Is This A New Presentation, Or A Follow-Up?: Follow Up Rash Additional History: Patient here to FU does not want to continue Abdry states is not working and is continuing to flare and is experiencing joint pains.

## 2022-12-05 DIAGNOSIS — G89.29 CHRONIC MIDLINE LOW BACK PAIN WITHOUT SCIATICA: ICD-10-CM

## 2022-12-05 DIAGNOSIS — M54.50 CHRONIC MIDLINE LOW BACK PAIN WITHOUT SCIATICA: ICD-10-CM

## 2022-12-05 DIAGNOSIS — M54.16 LUMBAR RADICULOPATHY: ICD-10-CM

## 2022-12-05 DIAGNOSIS — M47.812 OSTEOARTHRITIS OF CERVICAL SPINE, UNSPECIFIED SPINAL OSTEOARTHRITIS COMPLICATION STATUS: ICD-10-CM

## 2022-12-05 RX ORDER — OXYCODONE HYDROCHLORIDE AND ACETAMINOPHEN 5; 325 MG/1; MG/1
1 TABLET ORAL EVERY 8 HOURS PRN
Qty: 90 TABLET | Refills: 0 | Status: SHIPPED | OUTPATIENT
Start: 2022-12-05 | End: 2023-01-04

## 2022-12-14 DIAGNOSIS — F41.9 ANXIETY: ICD-10-CM

## 2022-12-14 RX ORDER — DIAZEPAM 5 MG/1
TABLET ORAL
Qty: 60 TABLET | Refills: 0 | Status: SHIPPED | OUTPATIENT
Start: 2022-12-14 | End: 2023-01-11

## 2023-01-05 DIAGNOSIS — E78.2 MIXED HYPERLIPIDEMIA: ICD-10-CM

## 2023-01-05 DIAGNOSIS — F41.9 ANXIETY: ICD-10-CM

## 2023-01-05 DIAGNOSIS — I10 ESSENTIAL HYPERTENSION: ICD-10-CM

## 2023-01-06 RX ORDER — ATENOLOL 25 MG/1
TABLET ORAL
Qty: 90 TABLET | Refills: 0 | Status: SHIPPED | OUTPATIENT
Start: 2023-01-06

## 2023-01-06 RX ORDER — ATORVASTATIN CALCIUM 20 MG/1
TABLET, FILM COATED ORAL
Qty: 90 TABLET | Refills: 0 | Status: SHIPPED | OUTPATIENT
Start: 2023-01-06

## 2023-01-06 RX ORDER — DIAZEPAM 5 MG/1
TABLET ORAL
Qty: 60 TABLET | Refills: 0 | Status: SHIPPED | OUTPATIENT
Start: 2023-01-06 | End: 2023-02-06

## 2023-01-12 DIAGNOSIS — M47.812 OSTEOARTHRITIS OF CERVICAL SPINE, UNSPECIFIED SPINAL OSTEOARTHRITIS COMPLICATION STATUS: ICD-10-CM

## 2023-01-12 DIAGNOSIS — M54.50 CHRONIC MIDLINE LOW BACK PAIN WITHOUT SCIATICA: ICD-10-CM

## 2023-01-12 DIAGNOSIS — G89.29 CHRONIC MIDLINE LOW BACK PAIN WITHOUT SCIATICA: ICD-10-CM

## 2023-01-12 DIAGNOSIS — M54.16 LUMBAR RADICULOPATHY: ICD-10-CM

## 2023-01-13 RX ORDER — OXYCODONE HYDROCHLORIDE AND ACETAMINOPHEN 5; 325 MG/1; MG/1
1 TABLET ORAL EVERY 8 HOURS PRN
Qty: 90 TABLET | Refills: 0 | Status: SHIPPED | OUTPATIENT
Start: 2023-01-13 | End: 2023-02-12

## 2023-01-16 DIAGNOSIS — M47.812 OSTEOARTHRITIS OF CERVICAL SPINE, UNSPECIFIED SPINAL OSTEOARTHRITIS COMPLICATION STATUS: ICD-10-CM

## 2023-01-16 DIAGNOSIS — M54.16 LUMBAR RADICULOPATHY: ICD-10-CM

## 2023-01-16 DIAGNOSIS — G89.29 CHRONIC MIDLINE LOW BACK PAIN WITHOUT SCIATICA: ICD-10-CM

## 2023-01-16 DIAGNOSIS — M54.50 CHRONIC MIDLINE LOW BACK PAIN WITHOUT SCIATICA: ICD-10-CM

## 2023-01-16 RX ORDER — OXYCODONE HYDROCHLORIDE AND ACETAMINOPHEN 5; 325 MG/1; MG/1
1 TABLET ORAL EVERY 8 HOURS PRN
Qty: 90 TABLET | Refills: 0 | Status: SHIPPED | OUTPATIENT
Start: 2023-01-16 | End: 2023-02-15

## 2023-01-16 NOTE — TELEPHONE ENCOUNTER
----- Message from Aury Pavon sent at 1/16/2023 10:09 AM EST -----  Subject: Medication Problem    Medication: oxyCODONE-acetaminophen (PERCOCET) 5-325 MG per tablet  Dosage: 1 every 8 hours as needed for pain  Ordering Provider: sue    Question/Problem: Ame Russell from [facility] called. They will not be able   to get patients Percocet in until Thursday 1/19/23. they are voiding   patients refill for the Percocet and patient now wants the prescription   sent to [facility] on [address]. Patient would like a phone call advising when   this has been done. Pharmacy: [facility] [id number]   [phone] Claudia Jain [phone]    ---------------------------------------------------------------------------  --------------  Brittany ARIZMENDI  [phone]; OK to leave message on voicemail  ---------------------------------------------------------------------------  --------------    SCRIPT ANSWERS  Relationship to Patient: Third Party  Third Party Type: Pharmacy?    Representative Name: Ame Russell Z Plasty Text: The lesion was extirpated to the level of the fat with a #15 scalpel blade.  Given the location of the defect, shape of the defect and the proximity to free margins a Z-plasty was deemed most appropriate for repair.  Using a sterile surgical marker, the appropriate transposition arms of the Z-plasty were drawn incorporating the defect and placing the expected incisions within the relaxed skin tension lines where possible.    The area thus outlined was incised deep to adipose tissue with a #15 scalpel blade.  The skin margins were undermined to an appropriate distance in all directions utilizing iris scissors.  The opposing transposition arms were then transposed into place in opposite direction and anchored with interrupted buried subcutaneous sutures.

## 2023-01-30 DIAGNOSIS — F41.9 ANXIETY: ICD-10-CM

## 2023-01-31 RX ORDER — DIAZEPAM 5 MG/1
TABLET ORAL
Qty: 60 TABLET | Refills: 0 | Status: SHIPPED | OUTPATIENT
Start: 2023-01-31 | End: 2023-03-02

## 2023-02-23 DIAGNOSIS — M54.16 LUMBAR RADICULOPATHY: ICD-10-CM

## 2023-02-23 DIAGNOSIS — M54.50 CHRONIC MIDLINE LOW BACK PAIN WITHOUT SCIATICA: ICD-10-CM

## 2023-02-23 DIAGNOSIS — G89.29 CHRONIC MIDLINE LOW BACK PAIN WITHOUT SCIATICA: ICD-10-CM

## 2023-02-23 DIAGNOSIS — M47.812 OSTEOARTHRITIS OF CERVICAL SPINE, UNSPECIFIED SPINAL OSTEOARTHRITIS COMPLICATION STATUS: ICD-10-CM

## 2023-02-23 RX ORDER — OXYCODONE HYDROCHLORIDE AND ACETAMINOPHEN 5; 325 MG/1; MG/1
1 TABLET ORAL EVERY 8 HOURS PRN
Qty: 90 TABLET | Refills: 0 | Status: SHIPPED | OUTPATIENT
Start: 2023-02-23 | End: 2023-03-25

## 2023-02-27 ENCOUNTER — OFFICE VISIT (OUTPATIENT)
Dept: PRIMARY CARE CLINIC | Age: 63
End: 2023-02-27

## 2023-02-27 VITALS
HEART RATE: 74 BPM | DIASTOLIC BLOOD PRESSURE: 88 MMHG | BODY MASS INDEX: 25.98 KG/M2 | WEIGHT: 185.6 LBS | OXYGEN SATURATION: 94 % | SYSTOLIC BLOOD PRESSURE: 132 MMHG | HEIGHT: 71 IN

## 2023-02-27 DIAGNOSIS — I10 ESSENTIAL HYPERTENSION: ICD-10-CM

## 2023-02-27 DIAGNOSIS — F31.9 BIPOLAR 1 DISORDER (HCC): ICD-10-CM

## 2023-02-27 DIAGNOSIS — D62 ACUTE BLOOD LOSS ANEMIA: ICD-10-CM

## 2023-02-27 DIAGNOSIS — E78.2 MIXED HYPERLIPIDEMIA: ICD-10-CM

## 2023-02-27 DIAGNOSIS — E11.9 TYPE 2 DIABETES MELLITUS WITHOUT COMPLICATION, WITHOUT LONG-TERM CURRENT USE OF INSULIN (HCC): Primary | ICD-10-CM

## 2023-02-27 DIAGNOSIS — Z13.6 ENCOUNTER FOR LIPID SCREENING FOR CARDIOVASCULAR DISEASE: ICD-10-CM

## 2023-02-27 DIAGNOSIS — Z79.899 HIGH RISK MEDICATION USE: ICD-10-CM

## 2023-02-27 DIAGNOSIS — Z13.220 ENCOUNTER FOR LIPID SCREENING FOR CARDIOVASCULAR DISEASE: ICD-10-CM

## 2023-02-27 DIAGNOSIS — E11.69 DIABETES MELLITUS TYPE 2 IN OBESE (HCC): ICD-10-CM

## 2023-02-27 DIAGNOSIS — M46.1 SACROILIITIS, NOT ELSEWHERE CLASSIFIED (HCC): ICD-10-CM

## 2023-02-27 DIAGNOSIS — J44.9 CHRONIC OBSTRUCTIVE PULMONARY DISEASE, UNSPECIFIED COPD TYPE (HCC): ICD-10-CM

## 2023-02-27 DIAGNOSIS — E66.9 DIABETES MELLITUS TYPE 2 IN OBESE (HCC): ICD-10-CM

## 2023-02-27 DIAGNOSIS — Z00.00 ANNUAL PHYSICAL EXAM: ICD-10-CM

## 2023-02-27 LAB
ALCOHOL URINE: NORMAL
AMPHETAMINE SCREEN, URINE: NEGATIVE
BARBITURATE SCREEN, URINE: NEGATIVE
BENZODIAZEPINE SCREEN, URINE: POSITIVE
BUPRENORPHINE URINE: NEGATIVE
COCAINE METABOLITE SCREEN URINE: NEGATIVE
FENTANYL SCREEN, URINE: NORMAL
GABAPENTIN SCREEN, URINE: NORMAL
HBA1C MFR BLD: 6 %
MDMA URINE: NEGATIVE
METHADONE SCREEN, URINE: NEGATIVE
METHAMPHETAMINE, URINE: NEGATIVE
OPIATE SCREEN URINE: NEGATIVE
OXYCODONE SCREEN URINE: POSITIVE
PHENCYCLIDINE SCREEN URINE: NEGATIVE
PROPOXYPHENE SCREEN, URINE: NEGATIVE
SYNTHETIC CANNABINOIDS(K2) SCREEN, URINE: NORMAL
THC SCREEN, URINE: NEGATIVE
TRAMADOL SCREEN URINE: NORMAL
TRICYCLIC ANTIDEPRESSANTS, UR: NEGATIVE

## 2023-02-27 RX ORDER — PANTOPRAZOLE SODIUM 40 MG/1
40 TABLET, DELAYED RELEASE ORAL
Qty: 180 TABLET | Refills: 3 | Status: SHIPPED | OUTPATIENT
Start: 2023-02-27

## 2023-02-27 RX ORDER — ATORVASTATIN CALCIUM 20 MG/1
20 TABLET, FILM COATED ORAL NIGHTLY
Qty: 90 TABLET | Refills: 3 | Status: SHIPPED | OUTPATIENT
Start: 2023-02-27

## 2023-02-27 RX ORDER — CLONIDINE HYDROCHLORIDE 0.1 MG/1
0.1 TABLET ORAL DAILY
Qty: 90 TABLET | Refills: 3 | Status: SHIPPED | OUTPATIENT
Start: 2023-02-27

## 2023-02-27 RX ORDER — ATENOLOL 25 MG/1
25 TABLET ORAL DAILY
Qty: 90 TABLET | Refills: 3 | Status: SHIPPED | OUTPATIENT
Start: 2023-02-27

## 2023-02-27 SDOH — ECONOMIC STABILITY: HOUSING INSECURITY
IN THE LAST 12 MONTHS, WAS THERE A TIME WHEN YOU DID NOT HAVE A STEADY PLACE TO SLEEP OR SLEPT IN A SHELTER (INCLUDING NOW)?: NO

## 2023-02-27 SDOH — ECONOMIC STABILITY: FOOD INSECURITY: WITHIN THE PAST 12 MONTHS, YOU WORRIED THAT YOUR FOOD WOULD RUN OUT BEFORE YOU GOT MONEY TO BUY MORE.: NEVER TRUE

## 2023-02-27 SDOH — ECONOMIC STABILITY: INCOME INSECURITY: HOW HARD IS IT FOR YOU TO PAY FOR THE VERY BASICS LIKE FOOD, HOUSING, MEDICAL CARE, AND HEATING?: NOT HARD AT ALL

## 2023-02-27 SDOH — ECONOMIC STABILITY: FOOD INSECURITY: WITHIN THE PAST 12 MONTHS, THE FOOD YOU BOUGHT JUST DIDN'T LAST AND YOU DIDN'T HAVE MONEY TO GET MORE.: NEVER TRUE

## 2023-02-27 ASSESSMENT — PATIENT HEALTH QUESTIONNAIRE - PHQ9
5. POOR APPETITE OR OVEREATING: 0
SUM OF ALL RESPONSES TO PHQ QUESTIONS 1-9: 0
SUM OF ALL RESPONSES TO PHQ QUESTIONS 1-9: 0
7. TROUBLE CONCENTRATING ON THINGS, SUCH AS READING THE NEWSPAPER OR WATCHING TELEVISION: 0
6. FEELING BAD ABOUT YOURSELF - OR THAT YOU ARE A FAILURE OR HAVE LET YOURSELF OR YOUR FAMILY DOWN: 0
SUM OF ALL RESPONSES TO PHQ QUESTIONS 1-9: 0
SUM OF ALL RESPONSES TO PHQ9 QUESTIONS 1 & 2: 0
1. LITTLE INTEREST OR PLEASURE IN DOING THINGS: 0
SUM OF ALL RESPONSES TO PHQ QUESTIONS 1-9: 0
9. THOUGHTS THAT YOU WOULD BE BETTER OFF DEAD, OR OF HURTING YOURSELF: 0
3. TROUBLE FALLING OR STAYING ASLEEP: 0
4. FEELING TIRED OR HAVING LITTLE ENERGY: 0
2. FEELING DOWN, DEPRESSED OR HOPELESS: 0
8. MOVING OR SPEAKING SO SLOWLY THAT OTHER PEOPLE COULD HAVE NOTICED. OR THE OPPOSITE, BEING SO FIGETY OR RESTLESS THAT YOU HAVE BEEN MOVING AROUND A LOT MORE THAN USUAL: 0

## 2023-02-27 ASSESSMENT — ENCOUNTER SYMPTOMS
WHEEZING: 0
EYE DISCHARGE: 0
ABDOMINAL PAIN: 0
VOMITING: 0
RHINORRHEA: 0
SHORTNESS OF BREATH: 0
COUGH: 0
DIARRHEA: 0
EYE REDNESS: 0
NAUSEA: 0
SORE THROAT: 0

## 2023-02-27 NOTE — PROGRESS NOTES
[facility] PRIMARY CARE  [address] [facility]  [facility] [address]  Dept: [phone]    Sandor Barber is a 59 y.o. male Established patient, who presents today for his medical conditions/complaints as noted below. Chief Complaint   Patient presents with    Diabetes       HPI:     HPI  Presenting for diabetic f/u. Pt uses a glucometer at home, avg morning glucose is 110-120s. Was 136 this morning. No n/t of extremities. Scheduled appt to optometry in April, blind in left eye after cataract surgery. Planning on scheduling with dentist.   Planning on scheduling with GI in August, had to have 2units blood, had hernia, ulcer, and acid reflux. States had dark tarry stool. Pt states has been taking Celestiline orange powder since total colectomy to prevent diarrhea. Pt would like to stop med if possible. Also has new complaint of sciatica down left leg, last month which ceased in 4-5days. Sits on heating pad which helps. Has had sciatica down right leg in past.Negative straight leg raise bilaterally. Percocet for back pain 1-2x/day, occasionally 3pills/day. Diabetic foot exam performed today. Diazepam refill. Reviewed prior notes None  Reviewed previous Labs and Imaging    LDL Cholesterol (mg/dL)   Date Value   03/17/2021 87   02/19/2019 84   06/27/2013 Unable to calc. as TRIG>400       (goal LDL is <100)   AST (U/L)   Date Value   08/14/2022 19     ALT (U/L)   Date Value   08/14/2022 42 (H)     BUN (mg/dL)   Date Value   08/15/2022 34 (H)     Hemoglobin A1C (%)   Date Value   02/27/2023 6.0     TSH (mIU/L)   Date Value   06/27/2013 5.65 (H)     BP Readings from Last 3 Encounters:   02/27/23 132/88   11/21/22 138/82   09/29/22 124/62          (goal 120/80)    Past Medical History:   Diagnosis Date    Abnormal EKG 10/27/2016    indicates inferior infarct.     Anxiety     Bipolar disorder ([facility] [address] 2006    on rx    Chronic back pain 11/21/2014    Depression 2006    on rx    Diabetes mellitus ([facility] [address]     borderline    Fracture, clavicle 1996    LEFT    Hyperlipidemia     Hypertension 2006    on rx    Legally blind in left eye, as defined in [place]      very blurry    Lumbar radiculopathy 04/23/2014    Nicotine dependence     Osteoarthritis     Pain     LEFT EYE    Retinal detachment     history miguel    Sleep apnea     doesnt use cpap     Tubular adenoma     Visual floaters     Wears glasses     USES MAGNIFYING GLASS      Past Surgical History:   Procedure Laterality Date    CATARACT REMOVAL Left     CATARACT REMOVAL WITH IMPLANT Right 8-25-15    CHOLECYSTECTOMY  2/13    COLONOSCOPY  09/22/2016    the ICV was edematous and erythematous but most likely normal variant , bxs taken Large polyp 3 cm, at 50 cm from the anal verge with a very  wide stalk, not removed tattooed needs to be removed with subtotal colectomy    COLONOSCOPY N/A 5/2/2019    COLONOSCOPY POLYPECTOMY COLD BIOPSY, HOT SNARE performed by Mariola Ford MD at [place] [address] N/A 8/11/2022    COLONOSCOPY POLYPECTOMY SNARE/COLD BIOPSY performed by Mariola Ford MD at [address] [place] [address], base of skull-left side    EYE SURGERY  12/31/15    laser right eye, left vit    EYE SURGERY Left     torn retina lazer/freezer/hole    EYE SURGERY Left 2/38/21    SILICONE REMOVAL AIR FLUID EXCHANGE    EYE SURGERY      cataracts removed miguel. EYE SURGERY      total 6 eye surg to left, 2 eye surg. to rt.     HERNIA REPAIR      umbilical    LYMPH NODE BIOPSY Left 1971    BASE OF SKULL    NASAL SEPTUM SURGERY  11-24-15    [place] OFFICE/OUTPT VISIT,PROCEDURE ONLY Right 7/12/2018    VITRECTOMY 25 GAUGE, AIR FLUID EXCHANGE, AIR GAS EXCHANGE WITH 18% SF6, ENDOLASER, 200 MSECONDS, 200 MWATTS, 377 PULSES performed by Wyman Scheuermann, MD at [address] [place] [address]  12/09/2016    w/ ileorectal anastomosis    [facility]    UPPER GASTROINTESTINAL ENDOSCOPY N/A 5/2/2019    EGD BIOPSY performed by Gabbie Levy MD at [address] N/A 8/15/2022    EGD CONTROL HEMORRHAGE performed by Vivian Giraldo MD at [address]  8/15/2022    EGD BIOPSY performed by Vivian Giraldo MD at [facility]    VITRECTOMY Left 12/10/2015    VITRECTOMY Left 10/27/2016    membrane peeling    VITRECTOMY Right 07/12/2018    laser, gas    WRIST FRACTURE SURGERY Right 1/31/2022    DISTAL RADIUS OPEN REDUCTION INTERNAL FIXATION performed by Dragan Ward MD at [address] History   Problem Relation Age of Onset    Heart Failure Mother     Cancer Mother     Heart Disease Mother         CAD-OPEN HEART    Mental Retardation Sister     Seizures Sister     Hypertension Other         maternal history       Social History     Tobacco Use    Smoking status: Every Day     Packs/day: 1.00     Years: 30.00     Pack years: 30.00     Types: Cigarettes    Smokeless tobacco: Never   Substance Use Topics    Alcohol use: Yes     Comment: 6 drinks per weekly- weekends only now (1/27/22)      Current Outpatient Medications   Medication Sig Dispense Refill    atenolol (TENORMIN) 25 MG tablet Take 1 tablet by mouth daily 90 tablet 3    atorvastatin (LIPITOR) 20 MG tablet Take 1 tablet by mouth nightly TAKE 1 TABLET BY MOUTH DAILY 90 tablet 3    pantoprazole (PROTONIX) 40 MG tablet Take 1 tablet by mouth 2 times daily (before meals) 180 tablet 3    cloNIDine (CATAPRES) 0.1 MG tablet Take 1 tablet by mouth daily 90 tablet 3    oxyCODONE-acetaminophen (PERCOCET) 5-325 MG per tablet Take 1 tablet by mouth every 8 hours as needed for Pain for up to 30 days.  90 tablet 0    diazePAM (VALIUM) 5 MG tablet TAKE ONE TABLET BY MOUTH EVERY 12 HOURS AS NEEDED FOR ANXIETY OR SLEEP 60 tablet 0    sildenafil (VIAGRA) 100 MG tablet Take 1 tablet by mouth daily as needed for Erectile Dysfunction 30 tablet 3    blood glucose monitor strips Test blood glucose level 1 time per day. 100 strip 3    Lancets (ONETOUCH DELICA PLUS SAPRXC89A) MISC USE ONCE DAILY 100 each 5    cyclobenzaprine (FLEXERIL) 10 MG tablet Take 1 tablet by mouth 2 times daily as needed for Muscle spasms 60 tablet 5    metFORMIN (GLUCOPHAGE) 500 MG tablet Take 1 tablet by mouth daily (with breakfast) 90 tablet 3    DULoxetine (CYMBALTA) 60 MG extended release capsule Take 1 capsule by mouth in the morning. 30 capsule 0    sildenafil (VIAGRA) 100 MG tablet Take 1 tablet by mouth daily as needed for Erectile Dysfunction 90 tablet 2    tamsulosin (FLOMAX) 0.4 MG capsule Take 1 capsule by mouth daily 90 capsule 3    Blood Glucose Monitoring Suppl (BLOOD GLUCOSE MONITOR SYSTEM) w/Device KIT USE TO MONITOR BLOOD GLUCOSE LEVEL. (TEST ONCE PER DAY) 1 kit 0    hydrOXYzine (VISTARIL) 25 MG capsule Take 25 mg by mouth 3 times daily as needed      QUEtiapine (SEROQUEL) 50 MG tablet Take 50 mg by mouth nightly      lamoTRIgine (LAMICTAL) 200 MG tablet Take 200 mg by mouth daily      acetaminophen (TYLENOL) 325 MG tablet Take 2 tablets by mouth every 4 hours as needed for Pain 120 tablet 3     No current facility-administered medications for this visit.      Allergies   Allergen Reactions    Poison Ivy Extract Itching     Hives & itching       Health Maintenance   Topic Date Due    Lipids  03/17/2022    Diabetic retinal exam  06/30/2022    Diabetic foot exam  11/09/2022    Diabetic Alb to Cr ratio (uACR) test  11/09/2022    COVID-19 Vaccine (1) 06/27/2023 (Originally 1960)    GFR test (Diabetes, CKD 3-4, OR last GFR 15-59)  08/15/2023    Depression Monitoring  09/29/2023    Annual Wellness Visit (AWV)  09/30/2023    Low dose CT lung screening  11/02/2023    A1C test (Diabetic or Prediabetic)  02/27/2024    Colorectal Cancer Screen  08/11/2025    Pneumococcal 0-64 years Vaccine (3 - PPSV23 if available, else PCV20) 02/10/2026    DTaP/Tdap/Td vaccine (2 - Td or Tdap) 06/21/2028    Flu vaccine  Completed Shingles vaccine  Completed    Hepatitis C screen  Completed    HIV screen  Completed    Hepatitis A vaccine  Aged Out    Hib vaccine  Aged Out    Meningococcal (ACWY) vaccine  Aged Out       Subjective:      Review of Systems   Constitutional:  Negative for chills and fever.   HENT:  Negative for rhinorrhea and sore throat.    Eyes:  Negative for discharge and redness.   Respiratory:  Negative for cough, shortness of breath and wheezing.    Cardiovascular:  Negative for chest pain and palpitations.   Gastrointestinal:  Negative for abdominal pain, diarrhea, nausea and vomiting.   Genitourinary:  Negative for dysuria and frequency.   Musculoskeletal:  Negative for arthralgias and myalgias.   Neurological:  Negative for dizziness, light-headedness and headaches.   Psychiatric/Behavioral:  Negative for sleep disturbance.      Objective:     /88   Pulse 74   Ht 5' 11.04\" (1.804 m)   Wt 185 lb 9.6 oz (84.2 kg)   SpO2 94%   BMI 25.86 kg/m²   Physical Exam  Vitals and nursing note reviewed.   Constitutional:       General: He is not in acute distress.     Appearance: He is well-developed. He is not ill-appearing.   HENT:      Head: Normocephalic and atraumatic.      Right Ear: External ear normal.      Left Ear: External ear normal.   Eyes:      General: No scleral icterus.        Right eye: No discharge.         Left eye: No discharge.      Conjunctiva/sclera: Conjunctivae normal.   Neck:      Thyroid: No thyromegaly.      Trachea: No tracheal deviation.   Cardiovascular:      Rate and Rhythm: Normal rate and regular rhythm.      Heart sounds: Normal heart sounds.   Pulmonary:      Effort: Pulmonary effort is normal. No respiratory distress.      Breath sounds: Normal breath sounds. No wheezing.   Lymphadenopathy:      Cervical: No cervical adenopathy.   Skin:     General: Skin is warm.      Findings: No rash.   Neurological:      Mental Status: He is alert and oriented to person, place, and time.  Psychiatric:         Mood and Affect: Mood normal.         Behavior: Behavior normal.         Thought Content: Thought content normal.       Assessment:       Diagnosis Orders   1. Type 2 diabetes mellitus without complication, without long-term current use of insulin ([facility])  POCT glycosylated hemoglobin (Hb A1C)      2. Chronic obstructive pulmonary disease, unspecified COPD type ([facility]        3. Sacroiliitis, not elsewhere classified ([facility]        4. Bipolar 1 disorder ([facility]        5. Diabetes mellitus type 2 in obese ([facility]        6. Acute blood loss anemia  CBC with Auto Differential    Iron and TIBC    Ferritin      7. Essential hypertension  atenolol (TENORMIN) 25 MG tablet      8. Mixed hyperlipidemia  atorvastatin (LIPITOR) 20 MG tablet      9. High risk medication use  POCT Rapid Drug Screen      10. Encounter for lipid screening for cardiovascular disease  Lipid, Fasting      11. Annual physical exam  Hepatic Function Panel           Plan:   Patient had hospital admission for bleeding ulcers. Did require transfusion x2. States is not sure if he is taking Protonix or not. Has been taking his iron pill. Blood work ordered  Risktail medications  Urine drug screen  Encouraged to quit smoking  Avoid all nonsteroidal anti-inflammatories    Return in about 3 months (around 5/27/2023). Orders Placed This Encounter   Procedures    CBC with Auto Differential     Standing Status:   Future     Standing Expiration Date:   2/27/2024    Iron and TIBC     Standing Status:   Future     Standing Expiration Date:   2/27/2024     Order Specific Question:   Is Patient Fasting? Answer:   no     Order Specific Question:   No of Hours?      Answer:   na    Ferritin     Standing Status:   Future     Standing Expiration Date:   2/27/2024    Lipid, Fasting     Standing Status:   Future     Standing Expiration Date:   2/27/2024    Hepatic Function Panel     Standing Status:   Future     Standing Expiration Date:   2/27/2024 POCT glycosylated hemoglobin (Hb A1C)    POCT Rapid Drug Screen     Orders Placed This Encounter   Medications    atenolol (TENORMIN) 25 MG tablet     Sig: Take 1 tablet by mouth daily     Dispense:  90 tablet     Refill:  3    atorvastatin (LIPITOR) 20 MG tablet     Sig: Take 1 tablet by mouth nightly TAKE 1 TABLET BY MOUTH DAILY     Dispense:  90 tablet     Refill:  3    pantoprazole (PROTONIX) 40 MG tablet     Sig: Take 1 tablet by mouth 2 times daily (before meals)     Dispense:  180 tablet     Refill:  3    cloNIDine (CATAPRES) 0.1 MG tablet     Sig: Take 1 tablet by mouth daily     Dispense:  90 tablet     Refill:  3       Patient given educational materials - see patient instructions. Discussed use, benefit, and side effects of prescribed medications. All patient questions answered. Pt voiced understanding. Reviewed health maintenance. Instructed to continue current medications, diet andexercise. Patient agreed with treatment plan. Follow up as directed.      Electronicallysigned by Teddy Johnson MD on 2/27/2023 at 4:03 PM

## 2023-03-02 DIAGNOSIS — F41.9 ANXIETY: ICD-10-CM

## 2023-03-02 RX ORDER — DIAZEPAM 5 MG/1
5 TABLET ORAL EVERY 12 HOURS PRN
Qty: 60 TABLET | Refills: 2 | Status: SHIPPED | OUTPATIENT
Start: 2023-03-02 | End: 2023-04-01

## 2023-03-12 NOTE — PLAN OF CARE
Problem: Discharge Planning  Goal: Discharge to home or other facility with appropriate resources  8/16/2022 2343 by Glo Glez RN  Outcome: Progressing  Flowsheets (Taken 8/16/2022 2000)  Discharge to home or other facility with appropriate resources: Identify barriers to discharge with patient and caregiver  8/16/2022 2343 by Glo Glez RN  Outcome: Progressing  Flowsheets (Taken 8/16/2022 2000)  Discharge to home or other facility with appropriate resources: Identify barriers to discharge with patient and caregiver     Problem: Skin/Tissue Integrity  Goal: Absence of new skin breakdown  Description: 1. Monitor for areas of redness and/or skin breakdown  2. Assess vascular access sites hourly  3. Every 4-6 hours minimum:  Change oxygen saturation probe site  4. Every 4-6 hours:  If on nasal continuous positive airway pressure, respiratory therapy assess nares and determine need for appliance change or resting period.   8/16/2022 2343 by Glo Glez RN  Outcome: Progressing  8/16/2022 2343 by Glo Glez RN  Outcome: Progressing     Problem: Safety - Adult  Goal: Free from fall injury  8/16/2022 2343 by Glo Glez RN  Outcome: Progressing  Flowsheets (Taken 8/16/2022 2339)  Free From Fall Injury: Instruct family/caregiver on patient safety  8/16/2022 2343 by Glo Glez RN  Outcome: Progressing  Flowsheets (Taken 8/16/2022 2339)  Free From Fall Injury: Verle Cowing family/caregiver on patient safety     Problem: ABCDS Injury Assessment  Goal: Absence of physical injury  8/16/2022 2343 by Glo Glez RN  Outcome: Progressing  8/16/2022 2343 by Glo Glez RN  Outcome: Progressing
Problem: Discharge Planning  Goal: Discharge to home or other facility with appropriate resources  Outcome: Progressing
Problem: Discharge Planning  Goal: Discharge to home or other facility with appropriate resources  Outcome: Progressing     Problem: Skin/Tissue Integrity  Goal: Absence of new skin breakdown  Description: 1. Monitor for areas of redness and/or skin breakdown  2. Assess vascular access sites hourly  3. Every 4-6 hours minimum:  Change oxygen saturation probe site  4. Every 4-6 hours:  If on nasal continuous positive airway pressure, respiratory therapy assess nares and determine need for appliance change or resting period.   Outcome: Progressing     Problem: Safety - Adult  Goal: Free from fall injury  Outcome: Progressing     Problem: Chronic Conditions and Co-morbidities  Goal: Patient's chronic conditions and co-morbidity symptoms are monitored and maintained or improved  Outcome: Progressing
Chart(s)/Patient

## 2023-03-31 DIAGNOSIS — F41.9 ANXIETY: ICD-10-CM

## 2023-03-31 RX ORDER — DIAZEPAM 5 MG/1
5 TABLET ORAL EVERY 12 HOURS PRN
Qty: 60 TABLET | Refills: 2 | Status: SHIPPED | OUTPATIENT
Start: 2023-03-31 | End: 2023-04-30

## 2023-03-31 NOTE — TELEPHONE ENCOUNTER
LAST VISIT:   2/27/2023     Future Appointments   Date Time Provider Jessica Ordaz   5/30/2023  4:20 PM Darryle Foreman, MD [facility] [place]

## 2023-04-05 DIAGNOSIS — M54.16 LUMBAR RADICULOPATHY: ICD-10-CM

## 2023-04-05 DIAGNOSIS — M47.812 OSTEOARTHRITIS OF CERVICAL SPINE, UNSPECIFIED SPINAL OSTEOARTHRITIS COMPLICATION STATUS: ICD-10-CM

## 2023-04-05 DIAGNOSIS — M54.50 CHRONIC MIDLINE LOW BACK PAIN WITHOUT SCIATICA: ICD-10-CM

## 2023-04-05 DIAGNOSIS — G89.29 CHRONIC MIDLINE LOW BACK PAIN WITHOUT SCIATICA: ICD-10-CM

## 2023-04-05 RX ORDER — OXYCODONE HYDROCHLORIDE AND ACETAMINOPHEN 5; 325 MG/1; MG/1
1 TABLET ORAL EVERY 8 HOURS PRN
Qty: 90 TABLET | Refills: 0 | Status: SHIPPED | OUTPATIENT
Start: 2023-04-05 | End: 2023-05-05

## 2023-04-18 DIAGNOSIS — M47.816 OSTEOARTHRITIS OF LUMBAR SPINE, UNSPECIFIED SPINAL OSTEOARTHRITIS COMPLICATION STATUS: ICD-10-CM

## 2023-04-18 RX ORDER — CYCLOBENZAPRINE HCL 10 MG
10 TABLET ORAL 2 TIMES DAILY PRN
Qty: 60 TABLET | Refills: 5 | Status: SHIPPED | OUTPATIENT
Start: 2023-04-18

## 2023-04-18 NOTE — TELEPHONE ENCOUNTER
LAST VISIT:   2/27/2023     Future Appointments   Date Time Provider Jessica Ordaz   5/30/2023  4:20 PM MD TULIO Willsno

## 2023-04-27 DIAGNOSIS — F41.9 ANXIETY: ICD-10-CM

## 2023-04-27 RX ORDER — DIAZEPAM 5 MG/1
5 TABLET ORAL EVERY 12 HOURS PRN
Qty: 60 TABLET | Refills: 2 | Status: SHIPPED | OUTPATIENT
Start: 2023-04-27 | End: 2023-05-27

## 2023-05-10 ENCOUNTER — HOSPITAL ENCOUNTER (OUTPATIENT)
Age: 63
Discharge: HOME | End: 2023-05-10
Payer: MEDICARE

## 2023-05-10 DIAGNOSIS — Z13.220 ENCOUNTER FOR LIPID SCREENING FOR CARDIOVASCULAR DISEASE: ICD-10-CM

## 2023-05-10 DIAGNOSIS — D62 ACUTE BLOOD LOSS ANEMIA: ICD-10-CM

## 2023-05-10 DIAGNOSIS — Z13.6 ENCOUNTER FOR LIPID SCREENING FOR CARDIOVASCULAR DISEASE: ICD-10-CM

## 2023-05-10 DIAGNOSIS — Z00.00 ANNUAL PHYSICAL EXAM: ICD-10-CM

## 2023-05-10 LAB
ABSOLUTE EOS #: 0.52 K/UL (ref 0–0.44)
ABSOLUTE IMMATURE GRANULOCYTE: <0.03 K/UL (ref 0–0.3)
ABSOLUTE LYMPH #: 2.08 K/UL (ref 1.1–3.7)
ABSOLUTE MONO #: 0.62 K/UL (ref 0.1–1.2)
ALBUMIN SERPL-MCNC: 4.2 G/DL (ref 3.5–5.2)
ALBUMIN/GLOBULIN RATIO: 1.6 (ref 1–2.5)
ALP SERPL-CCNC: 126 U/L (ref 40–129)
ALT SERPL-CCNC: 15 U/L (ref 5–41)
AST SERPL-CCNC: 15 U/L
BASOPHILS # BLD: 1 % (ref 0–2)
BASOPHILS ABSOLUTE: 0.05 K/UL (ref 0–0.2)
BILIRUB DIRECT SERPL-MCNC: <0.1 MG/DL
BILIRUB INDIRECT SERPL-MCNC: NORMAL MG/DL (ref 0–1)
BILIRUB SERPL-MCNC: 0.3 MG/DL (ref 0.3–1.2)
CHOLEST SERPL-MCNC: 175 MG/DL
CHOLESTEROL/HDL RATIO: 4.7
EOSINOPHILS RELATIVE PERCENT: 6 % (ref 1–4)
FERRITIN SERPL-MCNC: 54 NG/ML (ref 30–400)
HCT VFR BLD AUTO: 44.6 % (ref 40.7–50.3)
HDLC SERPL-MCNC: 37 MG/DL
HGB BLD-MCNC: 15.1 G/DL (ref 13–17)
IMMATURE GRANULOCYTES: 0 %
IRON SATURATION: 26 % (ref 20–55)
IRON SERPL-MCNC: 80 UG/DL (ref 59–158)
LDLC SERPL CALC-MCNC: 62 MG/DL (ref 0–130)
LYMPHOCYTES # BLD: 22 % (ref 24–43)
MCH RBC QN AUTO: 31.2 PG (ref 25.2–33.5)
MCHC RBC AUTO-ENTMCNC: 33.9 G/DL (ref 28.4–34.8)
MCV RBC AUTO: 92.1 FL (ref 82.6–102.9)
MONOCYTES # BLD: 7 % (ref 3–12)
NRBC AUTOMATED: 0 PER 100 WBC
PDW BLD-RTO: 13.5 % (ref 11.8–14.4)
PLATELET # BLD AUTO: 302 K/UL (ref 138–453)
PMV BLD AUTO: 9.5 FL (ref 8.1–13.5)
PROT SERPL-MCNC: 6.9 G/DL (ref 6.4–8.3)
RBC # BLD: 4.84 M/UL (ref 4.21–5.77)
SEG NEUTROPHILS: 64 % (ref 36–65)
SEGMENTED NEUTROPHILS ABSOLUTE COUNT: 5.99 K/UL (ref 1.5–8.1)
TIBC SERPL-MCNC: 313 UG/DL (ref 250–450)
TRIGL SERPL-MCNC: 382 MG/DL
UNSATURATED IRON BINDING CAPACITY: 233 UG/DL (ref 112–347)
WBC # BLD AUTO: 9.3 K/UL (ref 3.5–11.3)

## 2023-05-10 PROCEDURE — 83550 IRON BINDING TEST: CPT

## 2023-05-10 PROCEDURE — 82728 ASSAY OF FERRITIN: CPT

## 2023-05-10 PROCEDURE — 83540 ASSAY OF IRON: CPT

## 2023-05-10 PROCEDURE — 80076 HEPATIC FUNCTION PANEL: CPT

## 2023-05-10 PROCEDURE — 36415 COLL VENOUS BLD VENIPUNCTURE: CPT

## 2023-05-10 PROCEDURE — 85025 COMPLETE CBC W/AUTO DIFF WBC: CPT

## 2023-05-10 PROCEDURE — 80061 LIPID PANEL: CPT

## 2023-05-18 DIAGNOSIS — M47.812 OSTEOARTHRITIS OF CERVICAL SPINE, UNSPECIFIED SPINAL OSTEOARTHRITIS COMPLICATION STATUS: ICD-10-CM

## 2023-05-18 DIAGNOSIS — M54.16 LUMBAR RADICULOPATHY: ICD-10-CM

## 2023-05-18 DIAGNOSIS — M54.50 CHRONIC MIDLINE LOW BACK PAIN WITHOUT SCIATICA: ICD-10-CM

## 2023-05-18 DIAGNOSIS — G89.29 CHRONIC MIDLINE LOW BACK PAIN WITHOUT SCIATICA: ICD-10-CM

## 2023-05-18 RX ORDER — OXYCODONE HYDROCHLORIDE AND ACETAMINOPHEN 5; 325 MG/1; MG/1
1 TABLET ORAL EVERY 8 HOURS PRN
Qty: 90 TABLET | Refills: 0 | Status: SHIPPED | OUTPATIENT
Start: 2023-05-18 | End: 2023-06-17

## 2023-05-18 NOTE — TELEPHONE ENCOUNTER
LAST VISIT:   2/27/2023     Future Appointments   Date Time Provider Jessica Ordaz   5/30/2023  4:20 PM MD [facility] Arden Shaw

## 2023-05-30 ENCOUNTER — OFFICE VISIT (OUTPATIENT)
Dept: PRIMARY CARE CLINIC | Age: 63
End: 2023-05-30
Payer: MEDICARE

## 2023-05-30 VITALS
OXYGEN SATURATION: 95 % | WEIGHT: 184.6 LBS | HEART RATE: 90 BPM | DIASTOLIC BLOOD PRESSURE: 86 MMHG | SYSTOLIC BLOOD PRESSURE: 130 MMHG | HEIGHT: 71 IN | BODY MASS INDEX: 25.84 KG/M2

## 2023-05-30 DIAGNOSIS — E66.9 DIABETES MELLITUS TYPE 2 IN OBESE (HCC): Primary | ICD-10-CM

## 2023-05-30 DIAGNOSIS — I10 ESSENTIAL HYPERTENSION: ICD-10-CM

## 2023-05-30 DIAGNOSIS — F41.9 ANXIETY: ICD-10-CM

## 2023-05-30 DIAGNOSIS — E11.69 DIABETES MELLITUS TYPE 2 IN OBESE (HCC): Primary | ICD-10-CM

## 2023-05-30 DIAGNOSIS — M54.16 LUMBAR RADICULOPATHY: ICD-10-CM

## 2023-05-30 LAB — HBA1C MFR BLD: 5.7 %

## 2023-05-30 PROCEDURE — 3075F SYST BP GE 130 - 139MM HG: CPT | Performed by: FAMILY MEDICINE

## 2023-05-30 PROCEDURE — 4004F PT TOBACCO SCREEN RCVD TLK: CPT | Performed by: FAMILY MEDICINE

## 2023-05-30 PROCEDURE — G8417 CALC BMI ABV UP PARAM F/U: HCPCS | Performed by: FAMILY MEDICINE

## 2023-05-30 PROCEDURE — 83036 HEMOGLOBIN GLYCOSYLATED A1C: CPT | Performed by: FAMILY MEDICINE

## 2023-05-30 PROCEDURE — G8427 DOCREV CUR MEDS BY ELIG CLIN: HCPCS | Performed by: FAMILY MEDICINE

## 2023-05-30 PROCEDURE — 99213 OFFICE O/P EST LOW 20 MIN: CPT | Performed by: FAMILY MEDICINE

## 2023-05-30 PROCEDURE — 3044F HG A1C LEVEL LT 7.0%: CPT | Performed by: FAMILY MEDICINE

## 2023-05-30 PROCEDURE — 2022F DILAT RTA XM EVC RTNOPTHY: CPT | Performed by: FAMILY MEDICINE

## 2023-05-30 PROCEDURE — 3079F DIAST BP 80-89 MM HG: CPT | Performed by: FAMILY MEDICINE

## 2023-05-30 PROCEDURE — 3017F COLORECTAL CA SCREEN DOC REV: CPT | Performed by: FAMILY MEDICINE

## 2023-05-30 RX ORDER — DIAZEPAM 5 MG/1
5 TABLET ORAL EVERY 12 HOURS PRN
Qty: 60 TABLET | Refills: 5 | Status: SHIPPED | OUTPATIENT
Start: 2023-05-30 | End: 2023-06-29

## 2023-05-30 ASSESSMENT — ENCOUNTER SYMPTOMS
EYE REDNESS: 0
COUGH: 0
VOMITING: 0
SHORTNESS OF BREATH: 0
SORE THROAT: 0
NAUSEA: 0
EYE DISCHARGE: 0
DIARRHEA: 0
ABDOMINAL PAIN: 0
WHEEZING: 0
RHINORRHEA: 0

## 2023-05-30 NOTE — PROGRESS NOTES
Medications    diazePAM (VALIUM) 5 MG tablet     Sig: Take 1 tablet by mouth every 12 hours as needed for Anxiety for up to 30 days. Max Daily Amount: 10 mg     Dispense:  60 tablet     Refill:  5       Patient given educational materials - see patient instructions. Discussed use, benefit, and side effects of prescribed medications. All patient questions answered. Pt voiced understanding. Reviewed health maintenance. Instructed to continue current medications, diet andexercise. Patient agreed with treatment plan. Follow up as directed.      Electronicallysigned by Feroz Manriquez MD on 5/30/2023 at 4:34 PM

## 2023-06-27 DIAGNOSIS — F41.9 ANXIETY: ICD-10-CM

## 2023-06-27 RX ORDER — DIAZEPAM 5 MG/1
5 TABLET ORAL EVERY 12 HOURS PRN
Qty: 60 TABLET | Refills: 5 | Status: SHIPPED | OUTPATIENT
Start: 2023-06-27 | End: 2023-12-24

## 2023-06-29 DIAGNOSIS — M47.812 OSTEOARTHRITIS OF CERVICAL SPINE, UNSPECIFIED SPINAL OSTEOARTHRITIS COMPLICATION STATUS: ICD-10-CM

## 2023-06-29 DIAGNOSIS — G89.29 CHRONIC MIDLINE LOW BACK PAIN WITHOUT SCIATICA: ICD-10-CM

## 2023-06-29 DIAGNOSIS — M54.16 LUMBAR RADICULOPATHY: ICD-10-CM

## 2023-06-29 DIAGNOSIS — M54.50 CHRONIC MIDLINE LOW BACK PAIN WITHOUT SCIATICA: ICD-10-CM

## 2023-06-29 RX ORDER — OXYCODONE HYDROCHLORIDE AND ACETAMINOPHEN 5; 325 MG/1; MG/1
1 TABLET ORAL EVERY 8 HOURS PRN
Qty: 90 TABLET | Refills: 0 | Status: SHIPPED | OUTPATIENT
Start: 2023-06-29 | End: 2023-07-29

## 2023-07-24 DIAGNOSIS — F41.9 ANXIETY: ICD-10-CM

## 2023-07-24 RX ORDER — DIAZEPAM 5 MG/1
5 TABLET ORAL EVERY 12 HOURS PRN
Qty: 60 TABLET | Refills: 5 | Status: SHIPPED | OUTPATIENT
Start: 2023-07-24 | End: 2024-01-20

## 2023-07-24 NOTE — TELEPHONE ENCOUNTER
LAST VISIT:   5/30/2023     Future Appointments   Date Time Provider [address]   10/2/2023  3:50 PM MD TULIO Yang

## 2023-08-02 NOTE — TELEPHONE ENCOUNTER
LAST VISIT:   5/30/2023     Future Appointments   Date Time Provider [address]  [facility]   10/2/2023  3:50 PM MD TULIO Villa

## 2023-08-03 RX ORDER — TAMSULOSIN HYDROCHLORIDE 0.4 MG/1
0.4 CAPSULE ORAL DAILY
Qty: 90 CAPSULE | Refills: 2 | Status: SHIPPED | OUTPATIENT
Start: 2023-08-03

## 2023-08-10 DIAGNOSIS — M47.812 OSTEOARTHRITIS OF CERVICAL SPINE, UNSPECIFIED SPINAL OSTEOARTHRITIS COMPLICATION STATUS: ICD-10-CM

## 2023-08-10 DIAGNOSIS — G89.29 CHRONIC MIDLINE LOW BACK PAIN WITHOUT SCIATICA: ICD-10-CM

## 2023-08-10 DIAGNOSIS — M54.16 LUMBAR RADICULOPATHY: ICD-10-CM

## 2023-08-10 DIAGNOSIS — M54.50 CHRONIC MIDLINE LOW BACK PAIN WITHOUT SCIATICA: ICD-10-CM

## 2023-08-11 RX ORDER — OXYCODONE HYDROCHLORIDE AND ACETAMINOPHEN 5; 325 MG/1; MG/1
1 TABLET ORAL EVERY 8 HOURS PRN
Qty: 90 TABLET | Refills: 0 | Status: SHIPPED | OUTPATIENT
Start: 2023-08-11 | End: 2023-09-10

## 2023-09-12 DIAGNOSIS — M47.816 OSTEOARTHRITIS OF LUMBAR SPINE, UNSPECIFIED SPINAL OSTEOARTHRITIS COMPLICATION STATUS: ICD-10-CM

## 2023-09-12 NOTE — TELEPHONE ENCOUNTER
LAST VISIT:   5/30/2023     Future Appointments   Date Time Provider [address]   9/25/2023  2:20 PM MD TULIO Harden

## 2023-09-13 RX ORDER — CYCLOBENZAPRINE HCL 10 MG
10 TABLET ORAL 2 TIMES DAILY PRN
Qty: 60 TABLET | Refills: 5 | Status: SHIPPED | OUTPATIENT
Start: 2023-09-13

## 2023-09-21 DIAGNOSIS — M54.16 LUMBAR RADICULOPATHY: ICD-10-CM

## 2023-09-21 DIAGNOSIS — M54.50 CHRONIC MIDLINE LOW BACK PAIN WITHOUT SCIATICA: ICD-10-CM

## 2023-09-21 DIAGNOSIS — G89.29 CHRONIC MIDLINE LOW BACK PAIN WITHOUT SCIATICA: ICD-10-CM

## 2023-09-21 DIAGNOSIS — M47.812 OSTEOARTHRITIS OF CERVICAL SPINE, UNSPECIFIED SPINAL OSTEOARTHRITIS COMPLICATION STATUS: ICD-10-CM

## 2023-09-22 RX ORDER — OXYCODONE HYDROCHLORIDE AND ACETAMINOPHEN 5; 325 MG/1; MG/1
1 TABLET ORAL EVERY 8 HOURS PRN
Qty: 90 TABLET | Refills: 0 | Status: SHIPPED | OUTPATIENT
Start: 2023-09-22 | End: 2023-10-22

## 2023-09-25 ENCOUNTER — OFFICE VISIT (OUTPATIENT)
Dept: PRIMARY CARE CLINIC | Age: 63
End: 2023-09-25
Payer: MEDICARE

## 2023-09-25 VITALS
SYSTOLIC BLOOD PRESSURE: 138 MMHG | WEIGHT: 181.4 LBS | HEIGHT: 71 IN | OXYGEN SATURATION: 95 % | HEART RATE: 86 BPM | BODY MASS INDEX: 25.4 KG/M2 | DIASTOLIC BLOOD PRESSURE: 80 MMHG

## 2023-09-25 DIAGNOSIS — M47.812 OSTEOARTHRITIS OF CERVICAL SPINE, UNSPECIFIED SPINAL OSTEOARTHRITIS COMPLICATION STATUS: ICD-10-CM

## 2023-09-25 DIAGNOSIS — Z00.00 MEDICARE ANNUAL WELLNESS VISIT, SUBSEQUENT: Primary | ICD-10-CM

## 2023-09-25 DIAGNOSIS — M25.552 BILATERAL HIP PAIN: ICD-10-CM

## 2023-09-25 DIAGNOSIS — M25.551 BILATERAL HIP PAIN: ICD-10-CM

## 2023-09-25 DIAGNOSIS — M47.816 LUMBAR FACET ARTHROPATHY: ICD-10-CM

## 2023-09-25 DIAGNOSIS — Z23 NEED FOR VACCINATION: ICD-10-CM

## 2023-09-25 DIAGNOSIS — E11.9 TYPE 2 DIABETES MELLITUS WITHOUT COMPLICATION, WITHOUT LONG-TERM CURRENT USE OF INSULIN (HCC): ICD-10-CM

## 2023-09-25 LAB
CREATININE URINE POCT: NORMAL
HBA1C MFR BLD: 5.7 %
MICROALBUMIN/CREAT 24H UR: NORMAL MG/G{CREAT}
MICROALBUMIN/CREAT UR-RTO: NORMAL

## 2023-09-25 PROCEDURE — G0439 PPPS, SUBSEQ VISIT: HCPCS | Performed by: FAMILY MEDICINE

## 2023-09-25 PROCEDURE — 3075F SYST BP GE 130 - 139MM HG: CPT | Performed by: FAMILY MEDICINE

## 2023-09-25 PROCEDURE — G0008 ADMIN INFLUENZA VIRUS VAC: HCPCS | Performed by: FAMILY MEDICINE

## 2023-09-25 PROCEDURE — 3017F COLORECTAL CA SCREEN DOC REV: CPT | Performed by: FAMILY MEDICINE

## 2023-09-25 PROCEDURE — 83036 HEMOGLOBIN GLYCOSYLATED A1C: CPT | Performed by: FAMILY MEDICINE

## 2023-09-25 PROCEDURE — 90674 CCIIV4 VAC NO PRSV 0.5 ML IM: CPT | Performed by: FAMILY MEDICINE

## 2023-09-25 PROCEDURE — 82044 UR ALBUMIN SEMIQUANTITATIVE: CPT | Performed by: FAMILY MEDICINE

## 2023-09-25 PROCEDURE — 3079F DIAST BP 80-89 MM HG: CPT | Performed by: FAMILY MEDICINE

## 2023-09-25 PROCEDURE — 3044F HG A1C LEVEL LT 7.0%: CPT | Performed by: FAMILY MEDICINE

## 2023-09-25 ASSESSMENT — PATIENT HEALTH QUESTIONNAIRE - PHQ9
SUM OF ALL RESPONSES TO PHQ QUESTIONS 1-9: 0
5. POOR APPETITE OR OVEREATING: 0
SUM OF ALL RESPONSES TO PHQ9 QUESTIONS 1 & 2: 0
SUM OF ALL RESPONSES TO PHQ QUESTIONS 1-9: 0
SUM OF ALL RESPONSES TO PHQ QUESTIONS 1-9: 0
7. TROUBLE CONCENTRATING ON THINGS, SUCH AS READING THE NEWSPAPER OR WATCHING TELEVISION: 0
6. FEELING BAD ABOUT YOURSELF - OR THAT YOU ARE A FAILURE OR HAVE LET YOURSELF OR YOUR FAMILY DOWN: 0
4. FEELING TIRED OR HAVING LITTLE ENERGY: 0
3. TROUBLE FALLING OR STAYING ASLEEP: 0
1. LITTLE INTEREST OR PLEASURE IN DOING THINGS: 0
9. THOUGHTS THAT YOU WOULD BE BETTER OFF DEAD, OR OF HURTING YOURSELF: 0
2. FEELING DOWN, DEPRESSED OR HOPELESS: 0
SUM OF ALL RESPONSES TO PHQ QUESTIONS 1-9: 0
8. MOVING OR SPEAKING SO SLOWLY THAT OTHER PEOPLE COULD HAVE NOTICED. OR THE OPPOSITE, BEING SO FIGETY OR RESTLESS THAT YOU HAVE BEEN MOVING AROUND A LOT MORE THAN USUAL: 0

## 2023-09-25 ASSESSMENT — LIFESTYLE VARIABLES
HOW MANY STANDARD DRINKS CONTAINING ALCOHOL DO YOU HAVE ON A TYPICAL DAY: 1 OR 2
HOW OFTEN DO YOU HAVE A DRINK CONTAINING ALCOHOL: 2-4 TIMES A MONTH

## 2023-09-25 NOTE — PROGRESS NOTES
Ana Maria Reagan MD   cloNIDine (CATAPRES) 0.1 MG tablet Take 1 tablet by mouth daily Yes Angela Morrison MD   sildenafil (VIAGRA) 100 MG tablet Take 1 tablet by mouth daily as needed for Erectile Dysfunction Yes Angela Morrison MD   blood glucose monitor strips Test blood glucose level 1 time per day. Yes Angela Morrison MD   Lancets (Marissa Drones) [address] Yes Angela Morrison MD   metFORMIN (GLUCOPHAGE) 500 MG tablet Take 1 tablet by mouth daily (with breakfast) Yes Angela Morrison MD   DULoxetine (CYMBALTA) 60 MG extended release capsule Take 1 capsule by mouth in the morning.  Yes Angela Morrison MD   sildenafil (VIAGRA) 100 MG tablet Take 1 tablet by mouth daily as needed for Erectile Dysfunction Yes Angela Morrison MD   Blood Glucose Monitoring Suppl (BLOOD GLUCOSE MONITOR SYSTEM) w/Device KIT USE TO MONITOR BLOOD GLUCOSE LEVEL. (TEST ONCE PER DAY) Yes Angela Morrison MD   hydrOXYzine (VISTARIL) 25 MG capsule Take 1 capsule by mouth 3 times daily as needed Yes Historical Provider, MD   QUEtiapine (SEROQUEL) 50 MG tablet Take 1 tablet by mouth nightly Yes Historical Provider, MD   lamoTRIgine (LAMICTAL) 200 MG tablet Take 1 tablet by mouth daily Yes Historical Provider, MD   acetaminophen (TYLENOL) 325 MG tablet Take 2 tablets by mouth every 4 hours as needed for Pain Yes Angela Morrison MD   ibuprofen (ADVIL;MOTRIN) 800 MG tablet Take 1 tablet by mouth 2 times daily as needed for Pain  Arlen Munguia MD   meloxicam (MOBIC) 15 MG tablet Take 1 tablet by mouth daily  Angela Morrison MD       CareTeam (Including outside providers/suppliers regularly involved in providing care):   Patient Care Team:  Angela Morrison MD as PCP - General (Family Medicine)  Angela Morrison MD as PCP - Empaneled Provider  Ruchi Steven MD as Consulting Physician (Gastroenterology)  Jackelyn Hays MD as Consulting Physician (Hematology and Oncology)  Arelis Unger

## 2023-10-03 ENCOUNTER — TELEPHONE (OUTPATIENT)
Dept: ONCOLOGY | Age: 63
End: 2023-10-03

## 2023-10-03 DIAGNOSIS — Z87.891 PERSONAL HISTORY OF NICOTINE DEPENDENCE: Primary | ICD-10-CM

## 2023-10-03 NOTE — TELEPHONE ENCOUNTER
Our records indicate that your patient is coming due for their annual lung cancer screening follow up testing. For your convenience, we have pended the order for the scan for you. If you do not agree with the need for the test, please cancel the order and let us know. Sincerely,    [address] Screening Program    Auto printed reminder letter sent to patient.

## 2023-10-04 ENCOUNTER — TELEPHONE (OUTPATIENT)
Dept: PRIMARY CARE CLINIC | Age: 63
End: 2023-10-04

## 2023-10-04 RX ORDER — BLOOD PRESSURE TEST KIT
KIT MISCELLANEOUS DAILY
COMMUNITY
End: 2023-10-04 | Stop reason: SDUPTHER

## 2023-10-04 RX ORDER — BLOOD PRESSURE TEST KIT
KIT MISCELLANEOUS
Qty: 3 EACH | Refills: 5 | Status: SHIPPED | OUTPATIENT
Start: 2023-10-04

## 2023-10-04 NOTE — TELEPHONE ENCOUNTER
Pt is having an MRI on Monday. Asking for something to keep him calm during the MRI. I let him know that he would need a . Uses [employer] listed.

## 2023-10-12 ENCOUNTER — TELEPHONE (OUTPATIENT)
Dept: PRIMARY CARE CLINIC | Age: 63
End: 2023-10-12

## 2023-10-12 ENCOUNTER — HOSPITAL ENCOUNTER (OUTPATIENT)
Age: 63
Discharge: HOME | End: 2023-10-14
Attending: FAMILY MEDICINE
Payer: MEDICARE

## 2023-10-12 ENCOUNTER — HOSPITAL ENCOUNTER (OUTPATIENT)
Dept: MRI IMAGING | Age: 63
Discharge: HOME | End: 2023-10-14
Attending: FAMILY MEDICINE
Payer: MEDICARE

## 2023-10-12 ENCOUNTER — HOSPITAL ENCOUNTER (OUTPATIENT)
Dept: GENERAL RADIOLOGY | Age: 63
Discharge: HOME | End: 2023-10-14
Attending: FAMILY MEDICINE
Payer: MEDICARE

## 2023-10-12 DIAGNOSIS — M47.812 OSTEOARTHRITIS OF CERVICAL SPINE, UNSPECIFIED SPINAL OSTEOARTHRITIS COMPLICATION STATUS: ICD-10-CM

## 2023-10-12 DIAGNOSIS — M47.816 LUMBAR FACET ARTHROPATHY: ICD-10-CM

## 2023-10-12 DIAGNOSIS — M25.552 BILATERAL HIP PAIN: ICD-10-CM

## 2023-10-12 DIAGNOSIS — M25.551 BILATERAL HIP PAIN: ICD-10-CM

## 2023-10-12 PROCEDURE — 73502 X-RAY EXAM HIP UNI 2-3 VIEWS: CPT

## 2023-10-12 NOTE — TELEPHONE ENCOUNTER
----- Message from Dharmesh Verduzco sent at 10/12/2023 12:43 PM EDT -----  Subject: Message to Provider    QUESTIONS  Information for Provider? Pt called in regards to speaking to provider   about being sedated for a procedure he has coming up . Pt has a mri and   x-rays that need to be done on 10/12 . Pt is a little freaked out . Please   reach out with urgent .   ---------------------------------------------------------------------------  --------------  Jose D Porter INFO  [phone]; OK to leave message on voicemail  ---------------------------------------------------------------------------  --------------  SCRIPT ANSWERS  Relationship to Patient?  Self

## 2023-10-13 NOTE — RESULT ENCOUNTER NOTE
Advise patient shows severe arthritis in the right hip.   If having of pain, may want to consider seeing orthopedic surgery

## 2023-10-16 DIAGNOSIS — M47.812 OSTEOARTHRITIS OF CERVICAL SPINE, UNSPECIFIED SPINAL OSTEOARTHRITIS COMPLICATION STATUS: ICD-10-CM

## 2023-10-16 DIAGNOSIS — G89.29 CHRONIC MIDLINE LOW BACK PAIN WITHOUT SCIATICA: ICD-10-CM

## 2023-10-16 DIAGNOSIS — M54.50 CHRONIC MIDLINE LOW BACK PAIN WITHOUT SCIATICA: ICD-10-CM

## 2023-10-16 DIAGNOSIS — M54.16 LUMBAR RADICULOPATHY: ICD-10-CM

## 2023-10-17 NOTE — TELEPHONE ENCOUNTER
LAST VISIT:   9/25/2023     Future Appointments   Date Time Provider [address]   10/26/2023  7:00 PM [facility]  [facility]   10/26/2023  7:45 PM [facility] MRI  [facility] MRI [facility] Radiolog   11/8/2023  4:30 PM STV CT ROOM 1 [facility] CT SCAN ST Radiolog   1/25/2024  2:30 PM Diomedes Holder MD [place] [id number] [facility]

## 2023-10-18 RX ORDER — OXYCODONE HYDROCHLORIDE AND ACETAMINOPHEN 5; 325 MG/1; MG/1
1 TABLET ORAL EVERY 8 HOURS PRN
Qty: 90 TABLET | Refills: 0 | Status: SHIPPED | OUTPATIENT
Start: 2023-10-18 | End: 2023-11-17

## 2023-10-26 ENCOUNTER — HOSPITAL ENCOUNTER (OUTPATIENT)
Dept: MRI IMAGING | Age: 63
Discharge: HOME | End: 2023-10-28
Attending: FAMILY MEDICINE
Payer: MEDICARE

## 2023-10-26 DIAGNOSIS — M47.816 LUMBAR FACET ARTHROPATHY: ICD-10-CM

## 2023-10-26 DIAGNOSIS — M47.812 OSTEOARTHRITIS OF CERVICAL SPINE, UNSPECIFIED SPINAL OSTEOARTHRITIS COMPLICATION STATUS: ICD-10-CM

## 2023-10-26 PROCEDURE — 72148 MRI LUMBAR SPINE W/O DYE: CPT

## 2023-10-26 PROCEDURE — 72141 MRI NECK SPINE W/O DYE: CPT

## 2023-10-31 ENCOUNTER — OFFICE VISIT (OUTPATIENT)
Age: 63
End: 2023-10-31

## 2023-10-31 VITALS — HEIGHT: 71 IN | WEIGHT: 181 LBS | BODY MASS INDEX: 25.34 KG/M2

## 2023-10-31 DIAGNOSIS — M16.11 OSTEOARTHRITIS OF RIGHT HIP, UNSPECIFIED OSTEOARTHRITIS TYPE: Primary | ICD-10-CM

## 2023-10-31 DIAGNOSIS — M16.12 OSTEOARTHRITIS OF LEFT HIP, UNSPECIFIED OSTEOARTHRITIS TYPE: ICD-10-CM

## 2023-10-31 NOTE — PROGRESS NOTES
adenoma     Visual floaters     Wears glasses     USES MAGNIFYING GLASS     Past Surgical History:   Procedure Laterality Date    CATARACT REMOVAL Left     CATARACT REMOVAL WITH IMPLANT Right 8-25-15    CHOLECYSTECTOMY  2/13    COLONOSCOPY  09/22/2016    the ICV was edematous and erythematous but most likely normal variant , bxs taken Large polyp 3 cm, at 50 cm from the anal verge with a very  wide stalk, not removed tattooed needs to be removed with subtotal colectomy    COLONOSCOPY N/A 5/2/2019    COLONOSCOPY POLYPECTOMY COLD BIOPSY, HOT SNARE performed by Jane Banda MD at [address] [facility] [address] N/A 8/11/2022    COLONOSCOPY POLYPECTOMY SNARE/COLD BIOPSY performed by Jane Banda MD at [address] [facility]    foot, base of skull-left side    EYE SURGERY  12/31/15    laser right eye, left vit    EYE SURGERY Left     torn retina lazer/freezer/hole    EYE SURGERY Left 1/66/60    SILICONE REMOVAL AIR FLUID EXCHANGE    EYE SURGERY      cataracts removed miguel. EYE SURGERY      total 6 eye surg to left, 2 eye surg. to rt.     HERNIA REPAIR      umbilical    LYMPH NODE BIOPSY Left 1971    BASE OF SKULL    NASAL SEPTUM SURGERY  11-24-15    FL OFFICE/OUTPT VISIT,PROCEDURE ONLY Right 7/12/2018    VITRECTOMY 25 GAUGE, AIR FLUID EXCHANGE, AIR GAS EXCHANGE WITH 18% SF6, ENDOLASER, 200 MSECONDS, 200 MWATTS, 377 PULSES performed by Orie Epley, MD at [address] [facility]  12/09/2016    w/ ileorectal anastomosis    [address]    UPPER GASTROINTESTINAL ENDOSCOPY N/A 5/2/2019    EGD BIOPSY performed by Jane Banda MD at [facility] 8/15/2022    EGD CONTROL HEMORRHAGE performed by Hebert Williamson MD at [address] [facility]  8/15/2022    EGD BIOPSY performed by Hebert Williamson MD at [facility]    VITRECTOMY Left 12/10/2015    VITRECTOMY Left 10/27/2016    membrane peeling
[id number]

## 2023-11-02 ENCOUNTER — TELEPHONE (OUTPATIENT)
Dept: PRIMARY CARE CLINIC | Age: 63
End: 2023-11-02

## 2023-11-02 NOTE — TELEPHONE ENCOUNTER
----- Message from Jameson Matamoros sent at 11/1/2023  3:41 PM EDT -----  Subject: Results Request    QUESTIONS  Results: imaging; Ordered by:   Date Performed: 2023-10-26  ---------------------------------------------------------------------------  --------------  Jacklyn LABOY    [phone]; OK to leave message on voicemail  ---------------------------------------------------------------------------  --------------

## 2023-11-08 ENCOUNTER — HOSPITAL ENCOUNTER (OUTPATIENT)
Dept: CT IMAGING | Age: 63
Discharge: HOME | End: 2023-11-10
Payer: MEDICARE

## 2023-11-08 DIAGNOSIS — Z87.891 PERSONAL HISTORY OF NICOTINE DEPENDENCE: ICD-10-CM

## 2023-11-08 PROCEDURE — 71271 CT THORAX LUNG CANCER SCR C-: CPT

## 2023-11-16 DIAGNOSIS — M54.16 LUMBAR RADICULOPATHY: ICD-10-CM

## 2023-11-16 DIAGNOSIS — G89.29 CHRONIC MIDLINE LOW BACK PAIN WITHOUT SCIATICA: ICD-10-CM

## 2023-11-16 DIAGNOSIS — M47.812 OSTEOARTHRITIS OF CERVICAL SPINE, UNSPECIFIED SPINAL OSTEOARTHRITIS COMPLICATION STATUS: ICD-10-CM

## 2023-11-16 DIAGNOSIS — M54.50 CHRONIC MIDLINE LOW BACK PAIN WITHOUT SCIATICA: ICD-10-CM

## 2023-11-16 RX ORDER — OXYCODONE HYDROCHLORIDE AND ACETAMINOPHEN 5; 325 MG/1; MG/1
1 TABLET ORAL EVERY 8 HOURS PRN
Qty: 90 TABLET | Refills: 0 | Status: SHIPPED | OUTPATIENT
Start: 2023-11-16 | End: 2023-12-16

## 2023-11-16 NOTE — TELEPHONE ENCOUNTER
LAST VISIT:   9/25/2023     Future Appointments   Date Time Provider [address]   12/7/2023 11:00 AM SCHEDULE, Dione PEREZ PB DERRELL Baltazar   1/25/2024  2:30 PM Eddy Seip, MD [facility] [place]ing

## 2023-11-29 ENCOUNTER — TELEPHONE (OUTPATIENT)
Age: 63
End: 2023-11-29

## 2023-11-29 DIAGNOSIS — Z01.818 PRE-OP EXAM: Primary | ICD-10-CM

## 2023-11-29 NOTE — TELEPHONE ENCOUNTER
Jose Martinez has been scheduled for sx on 12/20. I called and talked to him today. I gave him the date/time/location of PAT as well as the class and sx. He plans to attend the class on the 6th or 13th. He has concerns about the fact that he lives alone and does not have a walker or any other assistive devices for the bathroom. I will have Nexeon reach out to him. Instructions are in the mail.

## 2023-12-07 ENCOUNTER — HOSPITAL ENCOUNTER (OUTPATIENT)
Dept: PREADMISSION TESTING | Age: 63
Discharge: HOME | End: 2023-12-07
Payer: MEDICARE

## 2023-12-07 VITALS
WEIGHT: 177 LBS | TEMPERATURE: 97.7 F | HEIGHT: 71 IN | RESPIRATION RATE: 20 BRPM | OXYGEN SATURATION: 93 % | BODY MASS INDEX: 24.78 KG/M2

## 2023-12-07 LAB
ANION GAP SERPL CALCULATED.3IONS-SCNC: 10 MMOL/L (ref 9–17)
BUN SERPL-MCNC: 9 MG/DL (ref 8–23)
CALCIUM SERPL-MCNC: 9.5 MG/DL (ref 8.6–10.4)
CHLORIDE SERPL-SCNC: 100 MMOL/L (ref 98–107)
CO2 SERPL-SCNC: 28 MMOL/L (ref 20–31)
CREAT SERPL-MCNC: 0.7 MG/DL (ref 0.7–1.2)
ERYTHROCYTE [DISTWIDTH] IN BLOOD BY AUTOMATED COUNT: 13.1 % (ref 11.8–14.4)
GFR SERPL CREATININE-BSD FRML MDRD: >60 ML/MIN/1.73M2
GLUCOSE SERPL-MCNC: 101 MG/DL (ref 70–99)
HCT VFR BLD AUTO: 46.1 % (ref 40.7–50.3)
HGB BLD-MCNC: 15.7 G/DL (ref 13–17)
MCH RBC QN AUTO: 30.9 PG (ref 25.2–33.5)
MCHC RBC AUTO-ENTMCNC: 34.1 G/DL (ref 28.4–34.8)
MCV RBC AUTO: 90.7 FL (ref 82.6–102.9)
NRBC BLD-RTO: 0 PER 100 WBC
PLATELET # BLD AUTO: 317 K/UL (ref 138–453)
PMV BLD AUTO: 9.4 FL (ref 8.1–13.5)
POTASSIUM SERPL-SCNC: 4.3 MMOL/L (ref 3.7–5.3)
RBC # BLD AUTO: 5.08 M/UL (ref 4.21–5.77)
SODIUM SERPL-SCNC: 138 MMOL/L (ref 135–144)
WBC OTHER # BLD: 8 K/UL (ref 3.5–11.3)

## 2023-12-07 PROCEDURE — 93005 ELECTROCARDIOGRAM TRACING: CPT

## 2023-12-07 PROCEDURE — 80048 BASIC METABOLIC PNL TOTAL CA: CPT

## 2023-12-07 PROCEDURE — 85027 COMPLETE CBC AUTOMATED: CPT

## 2023-12-07 PROCEDURE — 87641 MR-STAPH DNA AMP PROBE: CPT

## 2023-12-07 PROCEDURE — 36415 COLL VENOUS BLD VENIPUNCTURE: CPT

## 2023-12-07 ASSESSMENT — PAIN DESCRIPTION - PAIN TYPE: TYPE: CHRONIC PAIN

## 2023-12-08 LAB
MRSA, DNA, NASAL: NEGATIVE
SPECIMEN DESCRIPTION: NORMAL

## 2023-12-09 LAB
EKG ATRIAL RATE: 77 BPM
EKG P AXIS: 51 DEGREES
EKG P-R INTERVAL: 160 MS
EKG Q-T INTERVAL: 382 MS
EKG QRS DURATION: 92 MS
EKG QTC CALCULATION (BAZETT): 432 MS
EKG R AXIS: 6 DEGREES
EKG T AXIS: 53 DEGREES
EKG VENTRICULAR RATE: 77 BPM

## 2023-12-12 ENCOUNTER — OFFICE VISIT (OUTPATIENT)
Dept: PRIMARY CARE CLINIC | Age: 63
End: 2023-12-12
Payer: MEDICARE

## 2023-12-12 VITALS
SYSTOLIC BLOOD PRESSURE: 138 MMHG | BODY MASS INDEX: 27.05 KG/M2 | WEIGHT: 193.2 LBS | OXYGEN SATURATION: 91 % | DIASTOLIC BLOOD PRESSURE: 88 MMHG | HEART RATE: 71 BPM | HEIGHT: 71 IN

## 2023-12-12 DIAGNOSIS — F41.9 ANXIETY: ICD-10-CM

## 2023-12-12 DIAGNOSIS — J44.9 CHRONIC OBSTRUCTIVE PULMONARY DISEASE, UNSPECIFIED COPD TYPE (HCC): Primary | ICD-10-CM

## 2023-12-12 DIAGNOSIS — E11.69 DIABETES MELLITUS TYPE 2 IN OBESE (HCC): ICD-10-CM

## 2023-12-12 DIAGNOSIS — E66.9 DIABETES MELLITUS TYPE 2 IN OBESE (HCC): ICD-10-CM

## 2023-12-12 DIAGNOSIS — M54.16 LUMBAR RADICULOPATHY: ICD-10-CM

## 2023-12-12 PROBLEM — K29.60 EROSIVE GASTRITIS: Status: RESOLVED | Noted: 2019-10-29 | Resolved: 2023-12-12

## 2023-12-12 PROBLEM — K25.3 ACUTE PYLORUS ULCER: Status: RESOLVED | Noted: 2022-08-16 | Resolved: 2023-12-12

## 2023-12-12 PROBLEM — K25.0 ACUTE GASTRIC ULCER WITH HEMORRHAGE: Status: RESOLVED | Noted: 2022-08-15 | Resolved: 2023-12-12

## 2023-12-12 PROBLEM — K92.1 MELENA: Status: RESOLVED | Noted: 2022-08-14 | Resolved: 2023-12-12

## 2023-12-12 PROBLEM — D62 ACUTE BLOOD LOSS ANEMIA: Status: RESOLVED | Noted: 2022-08-15 | Resolved: 2023-12-12

## 2023-12-12 PROBLEM — S52.541D: Status: RESOLVED | Noted: 2022-01-31 | Resolved: 2023-12-12

## 2023-12-12 PROCEDURE — G8417 CALC BMI ABV UP PARAM F/U: HCPCS | Performed by: FAMILY MEDICINE

## 2023-12-12 PROCEDURE — 4004F PT TOBACCO SCREEN RCVD TLK: CPT | Performed by: FAMILY MEDICINE

## 2023-12-12 PROCEDURE — 3044F HG A1C LEVEL LT 7.0%: CPT | Performed by: FAMILY MEDICINE

## 2023-12-12 PROCEDURE — 3023F SPIROM DOC REV: CPT | Performed by: FAMILY MEDICINE

## 2023-12-12 PROCEDURE — 3079F DIAST BP 80-89 MM HG: CPT | Performed by: FAMILY MEDICINE

## 2023-12-12 PROCEDURE — 2022F DILAT RTA XM EVC RTNOPTHY: CPT | Performed by: FAMILY MEDICINE

## 2023-12-12 PROCEDURE — 99214 OFFICE O/P EST MOD 30 MIN: CPT | Performed by: FAMILY MEDICINE

## 2023-12-12 PROCEDURE — 3017F COLORECTAL CA SCREEN DOC REV: CPT | Performed by: FAMILY MEDICINE

## 2023-12-12 PROCEDURE — G8427 DOCREV CUR MEDS BY ELIG CLIN: HCPCS | Performed by: FAMILY MEDICINE

## 2023-12-12 PROCEDURE — G8482 FLU IMMUNIZE ORDER/ADMIN: HCPCS | Performed by: FAMILY MEDICINE

## 2023-12-12 PROCEDURE — 3075F SYST BP GE 130 - 139MM HG: CPT | Performed by: FAMILY MEDICINE

## 2023-12-12 RX ORDER — HYDROXYZINE PAMOATE 50 MG/1
50 CAPSULE ORAL 3 TIMES DAILY PRN
Qty: 90 CAPSULE | Refills: 5 | Status: SHIPPED | OUTPATIENT
Start: 2023-12-12

## 2023-12-12 ASSESSMENT — ENCOUNTER SYMPTOMS
RHINORRHEA: 0
NAUSEA: 0
COUGH: 0
EYE DISCHARGE: 0
WHEEZING: 0
DIARRHEA: 0
ABDOMINAL PAIN: 0
EYE REDNESS: 0
VOMITING: 0
SORE THROAT: 0
SHORTNESS OF BREATH: 0

## 2023-12-12 NOTE — PROGRESS NOTES
[id number] PRIMARY CARE  [address] [facility] [address]  Dept: [phone]    Vipul Dyer is a 59 y.o. male Established patient, who presents today for his medical conditions/complaints as noted below. Chief Complaint   Patient presents with    Hip Pain     Right hip replacement surgery clearance       HPI:     HPI  Pt scheduled for right hip total replacement, followed by left hip replacement. No chest pain or sob. No fever or chills. No history of chronic heart disease. Has history of copd, but stable. Pt still smoking 1.5 ppd. No issues with anesthesia in the past.     OARRS reviewed. Current MME 24  Pt using percocet mostly for back pain. Last A1c 5.7 on 9/25    Reviewed prior notes None  Reviewed previous Labs    LDL Cholesterol (mg/dL)   Date Value   05/10/2023 62   03/17/2021 87   02/19/2019 84       (goal LDL is <100)   AST (U/L)   Date Value   05/10/2023 15     ALT (U/L)   Date Value   05/10/2023 15     BUN (mg/dL)   Date Value   12/07/2023 9     Hemoglobin A1C (%)   Date Value   09/25/2023 5.7     TSH (mIU/L)   Date Value   06/27/2013 5.65 (H)     BP Readings from Last 3 Encounters:   12/12/23 138/88   09/25/23 138/80   05/30/23 130/86          (goal 120/80)    Past Medical History:   Diagnosis Date    Abnormal EKG 10/27/2016    indicates inferior infarct.     Anxiety     Bipolar disorder ([address]) 2006    on rx    Chronic back pain 11/21/2014    Depression 2006    on rx    Diabetes mellitus ([address])     borderline    Fracture, clavicle 1996    LEFT    Hyperlipidemia     Hypertension 2006    on rx    Legally blind in left eye, as defined in [place]      very blurry    Lumbar radiculopathy 04/23/2014    Nicotine dependence     Osteoarthritis     Pain     LEFT EYE    Retinal detachment     history miguel    Sleep apnea     doesnt use cpap     Tubular adenoma     Visual floaters     Wears glasses     USES MAGNIFYING GLASS      Past Surgical History:

## 2023-12-18 ENCOUNTER — TELEPHONE (OUTPATIENT)
Dept: PRIMARY CARE CLINIC | Age: 63
End: 2023-12-18

## 2023-12-18 NOTE — TELEPHONE ENCOUNTER
Pt called in to say that he is cancelling his scheduled surgery with  on 12/20/23 at 830am. Pt is asking for another referral to an orthopedic surgeon at [facility] for his Rt hip anterior total arthroplasty with [employer].    Please advise.

## 2023-12-20 PROBLEM — M16.11 OSTEOARTHRITIS OF RIGHT HIP, UNSPECIFIED OSTEOARTHRITIS TYPE: Status: ACTIVE | Noted: 2023-12-20

## 2023-12-27 DIAGNOSIS — G89.18 POST-OP PAIN: ICD-10-CM

## 2023-12-27 RX ORDER — OXYCODONE HYDROCHLORIDE AND ACETAMINOPHEN 5; 325 MG/1; MG/1
1 TABLET ORAL EVERY 8 HOURS PRN
Qty: 90 TABLET | Refills: 0 | Status: SHIPPED | OUTPATIENT
Start: 2023-12-27 | End: 2024-01-26

## 2023-12-27 NOTE — TELEPHONE ENCOUNTER
LAST VISIT:   12/12/2023     Future Appointments   Date Time Provider [address]   1/4/2024 10:15 AM Alessandro Kam MD [facility]   1/25/2024  2:30 PM Bob Hay  [address] verified?  Yes    [address] , [address] [phone]  [place] [address]  Phone: [phone] Fax: [phone]

## 2024-01-04 ENCOUNTER — TELEPHONE (OUTPATIENT)
Age: 64
End: 2024-01-04

## 2024-01-04 NOTE — TELEPHONE ENCOUNTER
Patient was scheduled this morning for his 1st post op Lt CRYSTAL. I called the patient and he said he did not know he had an appointment today. I tried to get him to reschedule for later today and he said he is not able to drive and he has no transportation to get here today. I rescheduled him for 01/18/2024 with Dr Smith.   Does Dr Smith want him to come in and see JDL for his 1st post appointment since he will be out of the office for 2 weeks.  Please call patient if Dr Smith wants him in sooner.  Thank you  Inessa

## 2024-01-11 DIAGNOSIS — E66.9 DIABETES MELLITUS TYPE 2 IN OBESE (HCC): ICD-10-CM

## 2024-01-11 DIAGNOSIS — E11.69 DIABETES MELLITUS TYPE 2 IN OBESE (HCC): ICD-10-CM

## 2024-01-16 DIAGNOSIS — G89.18 POST-OP PAIN: ICD-10-CM

## 2024-01-16 RX ORDER — OXYCODONE HYDROCHLORIDE AND ACETAMINOPHEN 5; 325 MG/1; MG/1
1 TABLET ORAL EVERY 8 HOURS PRN
Qty: 90 TABLET | Refills: 0 | Status: SHIPPED | OUTPATIENT
Start: 2024-01-16 | End: 2024-02-15

## 2024-01-16 RX ORDER — OXYCODONE HYDROCHLORIDE AND ACETAMINOPHEN 5; 325 MG/1; MG/1
1 TABLET ORAL EVERY 8 HOURS PRN
Qty: 90 TABLET | Refills: 0 | OUTPATIENT
Start: 2024-01-16 | End: 2024-02-15

## 2024-01-16 NOTE — TELEPHONE ENCOUNTER
LAST VISIT:   12/12/2023     Future Appointments   Date Time Provider Department Center   1/18/2024  9:15 AM Zhang Smith MD [facility]   1/25/2024  2:30 PM Pranav Ramirez MD [facility]    Pharm-[facility] pharmacy

## 2024-01-22 ENCOUNTER — TELEPHONE (OUTPATIENT)
Age: 64
End: 2024-01-22

## 2024-01-22 NOTE — TELEPHONE ENCOUNTER
Patient called after missing his post operative appointment. No answer and unable to record message. Patient had called last Friday bc he thought his appointment was Friday. Patient was informed of day and time of this appointment but no showed and no call  for todays appointment    block performed without complications.  VSS.  Pt tolerated well.  Will continue to monitor.  Start-0657  Complete-0708  1versed  1fent

## 2024-02-07 DIAGNOSIS — E66.9 DIABETES MELLITUS TYPE 2 IN OBESE (HCC): ICD-10-CM

## 2024-02-07 DIAGNOSIS — E11.69 DIABETES MELLITUS TYPE 2 IN OBESE (HCC): ICD-10-CM

## 2024-02-07 RX ORDER — BLOOD SUGAR DIAGNOSTIC
STRIP MISCELLANEOUS
Qty: 50 STRIP | Refills: 3 | Status: SHIPPED | OUTPATIENT
Start: 2024-02-07

## 2024-02-07 NOTE — TELEPHONE ENCOUNTER
LAST VISIT:   12/12/2023     Future Appointments   Date Time Provider Department Center   2/8/2024  2:30 PM Zhang Smith MD [facility]   2/13/2024  3:15 PM Pranav Ramierz MD [facility]       Pharmacy verified? Yes    [facility] - [place], [place] - [address] [place], [place] [address]ge [facility] [phone] - F [phone]  [address] [facility] [address]  Phone: [phone] Fax: [phone]

## 2024-02-08 ENCOUNTER — OFFICE VISIT (OUTPATIENT)
Age: 64
End: 2024-02-08

## 2024-02-08 VITALS — WEIGHT: 188 LBS | BODY MASS INDEX: 26.32 KG/M2 | HEIGHT: 71 IN

## 2024-02-08 DIAGNOSIS — M16.11 OSTEOARTHRITIS OF RIGHT HIP, UNSPECIFIED OSTEOARTHRITIS TYPE: Primary | ICD-10-CM

## 2024-02-08 NOTE — PROGRESS NOTES
[facility] Orthopedics & Sports Medicine      [facility] PHYSICIANS [facility]  [facility] ORTHOPAEDICS AND SPORTS MEDICINE  [address]  [place] [address]  Dept: [phone]  Dept Fax: [phone]    Chief Compliant:  No chief complaint on file.       History of Present Illness:  This is a pleasant 63 y.o. male who is 7 weeks post operatively from a right anterior total hip arthroplasty. This is his first follow up appointment. Overall he is doing well. He notes some muscle spasms and tightness within the right anterior and lateral thigh. He also notes swelling and some discoloration to the right foot. Denies any calf pain. Denies any new injuries or falls. Ambulates without any assistive devices. He is not taking anything for pain.     Physical Exam: Skin intact. Surgical incision well approximated and healed with mature scar formation. Some TTP over anterior thigh musculature. Able to actively flex the hip without pain. Calf soft, easily compressible, non-tender.     Imagin views of the right hip demonstrate a right total hip arthroplasty in satisfactory alignment. No hardware complications, loss of fixation or periprosthetic fractures.       Assessment and Plan:    This is a pleasant 63 y.o. male who is status post above. Overall he is doing well. Explained that it may take up to 1 year to reach maximum recovery and encouraged him to continue to stay active walking and working on range of motion/strength of the right hip. Discussed swelling and dislocation of the operative extremity without pain is normal due to gravity from the hip and this is expected to improve over time. Patient may follow up as needed at this time.      Attending Physician Statement   I have seen and discussed the care of Ramirez Lainez  including pertinent history and exam findings, with Aleida Boyce NP. I have reviewed the key elements of all parts of the encounter with the nurse practitioner

## 2024-02-13 ENCOUNTER — OFFICE VISIT (OUTPATIENT)
Dept: PRIMARY CARE CLINIC | Age: 64
End: 2024-02-13

## 2024-02-13 VITALS
SYSTOLIC BLOOD PRESSURE: 126 MMHG | WEIGHT: 185.8 LBS | BODY MASS INDEX: 26.01 KG/M2 | OXYGEN SATURATION: 96 % | HEIGHT: 71 IN | DIASTOLIC BLOOD PRESSURE: 82 MMHG | HEART RATE: 90 BPM

## 2024-02-13 DIAGNOSIS — M47.816 LUMBAR SPONDYLOSIS: ICD-10-CM

## 2024-02-13 DIAGNOSIS — E11.9 TYPE 2 DIABETES MELLITUS WITHOUT COMPLICATION, WITHOUT LONG-TERM CURRENT USE OF INSULIN (HCC): Primary | ICD-10-CM

## 2024-02-13 DIAGNOSIS — E11.69 DIABETES MELLITUS TYPE 2 IN OBESE (HCC): ICD-10-CM

## 2024-02-13 DIAGNOSIS — E66.9 DIABETES MELLITUS TYPE 2 IN OBESE (HCC): ICD-10-CM

## 2024-02-13 DIAGNOSIS — F31.9 BIPOLAR 1 DISORDER (HCC): ICD-10-CM

## 2024-02-13 DIAGNOSIS — Z79.899 HIGH RISK MEDICATION USE: ICD-10-CM

## 2024-02-13 DIAGNOSIS — J44.9 CHRONIC OBSTRUCTIVE PULMONARY DISEASE, UNSPECIFIED COPD TYPE (HCC): ICD-10-CM

## 2024-02-13 DIAGNOSIS — M47.816 OSTEOARTHRITIS OF LUMBAR SPINE, UNSPECIFIED SPINAL OSTEOARTHRITIS COMPLICATION STATUS: ICD-10-CM

## 2024-02-13 LAB
ALCOHOL URINE: NORMAL
AMPHETAMINE SCREEN, URINE: NORMAL
BARBITURATE SCREEN, URINE: NORMAL
BENZODIAZEPINE SCREEN, URINE: NORMAL
BUPRENORPHINE URINE: NORMAL
COCAINE METABOLITE SCREEN URINE: NORMAL
FENTANYL SCREEN, URINE: NORMAL
GABAPENTIN SCREEN, URINE: NORMAL
HBA1C MFR BLD: 5.9 %
MDMA URINE: NORMAL
METHADONE SCREEN, URINE: NORMAL
METHAMPHETAMINE, URINE: NORMAL
OPIATE SCREEN URINE: NORMAL
OXYCODONE SCREEN URINE: NORMAL
PHENCYCLIDINE SCREEN URINE: NORMAL
PROPOXYPHENE SCREEN, URINE: NORMAL
SYNTHETIC CANNABINOIDS(K2) SCREEN, URINE: NORMAL
THC SCREEN, URINE: NORMAL
TRAMADOL SCREEN URINE: NORMAL
TRICYCLIC ANTIDEPRESSANTS, UR: NORMAL

## 2024-02-13 RX ORDER — CYCLOBENZAPRINE HCL 10 MG
10 TABLET ORAL 2 TIMES DAILY PRN
Qty: 60 TABLET | Refills: 5 | Status: SHIPPED | OUTPATIENT
Start: 2024-02-13

## 2024-02-13 NOTE — PROGRESS NOTES
ARTHROPLASTY Right 12/20/2023    RIGHT HIP ANTERIOR TOTAL ARTHROPLASTY WITH JEANNETTE BIOMET performed by Zhang Smith MD at [facility] OR    UMBILICAL HERNIA REPAIR  1996    UPPER GASTROINTESTINAL ENDOSCOPY N/A 05/02/2019    EGD BIOPSY performed by Melony Henderson MD at [facility] OR    UPPER GASTROINTESTINAL ENDOSCOPY N/A 08/15/2022    EGD CONTROL HEMORRHAGE performed by Ivan Workman MD at [facility] Endoscopy    UPPER GASTROINTESTINAL ENDOSCOPY  08/15/2022    EGD BIOPSY performed by Ivan Workman MD at [facility] Endoscopy    VITRECTOMY Left 12/10/2015    VITRECTOMY Left 10/27/2016    membrane peeling    VITRECTOMY Right 07/12/2018    laser, gas    WRIST FRACTURE SURGERY Right 01/31/2022    DISTAL RADIUS OPEN REDUCTION INTERNAL FIXATION performed by Wale Root MD at [facility] OR       Family History   Problem Relation Age of Onset    Heart Failure Mother     Cancer Mother     Heart Disease Mother         CAD-OPEN HEART    Mental Retardation Sister     Seizures Sister     Hypertension Other         maternal history       Social History     Tobacco Use    Smoking status: Every Day     Current packs/day: 1.00     Average packs/day: 1 pack/day for 30.0 years (30.0 ttl pk-yrs)     Types: Cigarettes    Smokeless tobacco: Never   Substance Use Topics    Alcohol use: Yes     Comment: 6 drinks per weekly- weekends only now (1/27/22)      Current Outpatient Medications   Medication Sig Dispense Refill    cyclobenzaprine (FLEXERIL) 10 MG tablet Take 1 tablet by mouth 2 times daily as needed for Muscle spasms 60 tablet 5    blood glucose test strips (ONETOUCH VERIO) strip TEST BLOOD GLUCOSE LEVEL 1 TIME PER DAY. 50 strip 3    oxyCODONE-acetaminophen (PERCOCET) 5-325 MG per tablet Take 1 tablet by mouth every 8 hours as needed for Pain for up to 30 days. Max Daily Amount: 3 tablets 90 tablet 0    metFORMIN (GLUCOPHAGE) 500 MG tablet TAKE 1 TABLET BY MOUTH DAILY (WITH BREAKFAST) 90 tablet 3    hydrOXYzine pamoate (VISTARIL) 50 MG

## 2024-02-26 ENCOUNTER — TELEPHONE (OUTPATIENT)
Dept: PRIMARY CARE CLINIC | Age: 64
End: 2024-02-26

## 2024-02-26 DIAGNOSIS — M47.816 LUMBAR SPONDYLOSIS: Primary | ICD-10-CM

## 2024-02-26 NOTE — TELEPHONE ENCOUNTER
Referral placed to Dr. Tanner, pain management  Keep follow up to discuss hypertension  Dr. Tanner will discuss pain options

## 2024-02-26 NOTE — TELEPHONE ENCOUNTER
Pt being weaned off of percocet, asking for something in place of it and also a referral to [facility]. States since being taken off diazepam his BP feels elevated and asking for a BP med. Pharm [facility]

## 2024-03-20 ENCOUNTER — HOSPITAL ENCOUNTER (OUTPATIENT)
Dept: PAIN MANAGEMENT | Age: 64
Discharge: HOME | End: 2024-03-20
Payer: MEDICARE

## 2024-03-20 VITALS — BODY MASS INDEX: 25.9 KG/M2 | WEIGHT: 185 LBS | HEIGHT: 71 IN

## 2024-03-20 DIAGNOSIS — M47.812 CERVICAL SPONDYLOSIS: ICD-10-CM

## 2024-03-20 DIAGNOSIS — M79.18 MYOFASCIAL PAIN SYNDROME: ICD-10-CM

## 2024-03-20 DIAGNOSIS — M51.36 LUMBAR DEGENERATIVE DISC DISEASE: ICD-10-CM

## 2024-03-20 DIAGNOSIS — M47.817 SPONDYLOSIS OF LUMBOSACRAL REGION WITHOUT MYELOPATHY OR RADICULOPATHY: Primary | ICD-10-CM

## 2024-03-20 PROCEDURE — 99204 OFFICE O/P NEW MOD 45 MIN: CPT | Performed by: STUDENT IN AN ORGANIZED HEALTH CARE EDUCATION/TRAINING PROGRAM

## 2024-03-20 PROCEDURE — 99203 OFFICE O/P NEW LOW 30 MIN: CPT

## 2024-03-20 ASSESSMENT — PAIN SCALES - GENERAL: PAINLEVEL_OUTOF10: 10

## 2024-03-20 NOTE — PROGRESS NOTES
Last Year: 1     Unstable Housing in the Last Year: No       Medications & Allergies:   Current Outpatient Medications   Medication Instructions    Alcohol Swabs PADS Using 1 pad daily    atenolol (TENORMIN) 25 mg, Oral, DAILY    atorvastatin (LIPITOR) 20 mg, Oral, NIGHTLY, TAKE 1 TABLET BY MOUTH DAILY    Blood Glucose Monitoring Suppl (BLOOD GLUCOSE MONITOR SYSTEM) w/Device KIT USE TO MONITOR BLOOD GLUCOSE LEVEL. (TEST ONCE PER DAY)    blood glucose test strips (ONETOUCH VERIO) strip TEST BLOOD GLUCOSE LEVEL 1 TIME PER DAY.    cloNIDine (CATAPRES) 0.1 mg, Oral, DAILY    cyclobenzaprine (FLEXERIL) 10 mg, Oral, 2 TIMES DAILY PRN    DULoxetine (CYMBALTA) 60 mg, Oral, DAILY    hydrOXYzine pamoate (VISTARIL) 50 mg, Oral, 3 TIMES DAILY PRN    lamoTRIgine (LAMICTAL) 200 mg, Oral, DAILY    Lancets (ONETOUCH DELICA PLUS DVAZBT30B) MISC USE ONCE DAILY    metFORMIN (GLUCOPHAGE) 500 mg, Oral, DAILY WITH BREAKFAST    pantoprazole (PROTONIX) 40 mg, Oral, 2 TIMES DAILY BEFORE MEALS    QUEtiapine (SEROQUEL) 50 mg, Oral, NIGHTLY    sildenafil (VIAGRA) 100 mg, Oral, DAILY PRN    sildenafil (VIAGRA) 100 mg, Oral, DAILY PRN    tamsulosin (FLOMAX) 0.4 mg, Oral, DAILY       Allergies   Allergen Reactions    Poison Ivy Extract Itching     Hives & itching       Review of Systems:   Constitutional: negative for weight changes or fevers  Cardiovascular: negative for chest pain, palpitations, irregular heart beat  Respiratory: negative for dyspnea, cough, wheezing  Gastrointestinal: negative for constipation, diarrhea, nausea  Genitourinary: negative for bowel/bladder incontinence   Musculoskeletal: positive for low back pain  Neurological: negative for radicular leg pain, leg weakness or numbness/tingling  Behavioral/Psych: negative for anxiety/depression   Hematological: negative for abnormal bleeding, anticoagulation use or antiplatelet use  All other systems reviewed and are negative    OBJECTIVE:    There were no vitals filed for this

## 2024-04-09 DIAGNOSIS — I10 ESSENTIAL HYPERTENSION: ICD-10-CM

## 2024-04-09 RX ORDER — ATENOLOL 25 MG/1
25 TABLET ORAL DAILY
Qty: 90 TABLET | Refills: 3 | Status: SHIPPED | OUTPATIENT
Start: 2024-04-09

## 2024-04-09 RX ORDER — CLONIDINE HYDROCHLORIDE 0.1 MG/1
0.1 TABLET ORAL DAILY
Qty: 90 TABLET | Refills: 3 | Status: SHIPPED | OUTPATIENT
Start: 2024-04-09

## 2024-04-15 DIAGNOSIS — E11.69 TYPE 2 DIABETES MELLITUS WITH OBESITY (HCC): ICD-10-CM

## 2024-04-15 DIAGNOSIS — E66.9 TYPE 2 DIABETES MELLITUS WITH OBESITY (HCC): ICD-10-CM

## 2024-04-15 RX ORDER — BLOOD SUGAR DIAGNOSTIC
STRIP MISCELLANEOUS
Qty: 50 STRIP | Refills: 0 | Status: SHIPPED | OUTPATIENT
Start: 2024-04-15

## 2024-05-01 RX ORDER — PANTOPRAZOLE SODIUM 40 MG/1
40 TABLET, DELAYED RELEASE ORAL
Qty: 180 TABLET | Refills: 3 | Status: SHIPPED | OUTPATIENT
Start: 2024-05-01

## 2024-05-01 RX ORDER — TAMSULOSIN HYDROCHLORIDE 0.4 MG/1
0.4 CAPSULE ORAL DAILY
Qty: 90 CAPSULE | Refills: 2 | Status: SHIPPED | OUTPATIENT
Start: 2024-05-01

## 2024-05-01 NOTE — TELEPHONE ENCOUNTER
LAST VISIT:   2/13/2024     Future Appointments   Date Time Provider Department Center   5/1/2024  2:15 PM Toni Tanner DO [facility] [place]   5/14/2024  3:15 PM [facility]

## 2024-05-01 NOTE — TELEPHONE ENCOUNTER
LAST VISIT:   2/13/2024     Future Appointments   Date Time Provider Department Center   5/1/2024  2:15 PM Toni Tanner DO [facility] [place]   5/14/2024  3:15 PM [facility]

## 2024-05-13 ENCOUNTER — HOSPITAL ENCOUNTER (OUTPATIENT)
Dept: PAIN MANAGEMENT | Age: 64
Discharge: HOME | End: 2024-05-13
Payer: MEDICARE

## 2024-05-13 VITALS — HEIGHT: 71 IN | WEIGHT: 185 LBS | BODY MASS INDEX: 25.9 KG/M2

## 2024-05-13 DIAGNOSIS — M47.812 CERVICAL SPONDYLOSIS: ICD-10-CM

## 2024-05-13 DIAGNOSIS — M47.817 LUMBOSACRAL SPONDYLOSIS WITHOUT MYELOPATHY: Primary | ICD-10-CM

## 2024-05-13 DIAGNOSIS — M79.18 MYOFASCIAL PAIN SYNDROME: ICD-10-CM

## 2024-05-13 DIAGNOSIS — M51.36 LUMBAR DEGENERATIVE DISC DISEASE: ICD-10-CM

## 2024-05-13 PROCEDURE — 99214 OFFICE O/P EST MOD 30 MIN: CPT | Performed by: STUDENT IN AN ORGANIZED HEALTH CARE EDUCATION/TRAINING PROGRAM

## 2024-05-13 PROCEDURE — 99213 OFFICE O/P EST LOW 20 MIN: CPT

## 2024-05-13 ASSESSMENT — PAIN SCALES - GENERAL: PAINLEVEL_OUTOF10: 10

## 2024-05-13 NOTE — PROGRESS NOTES
spondylosis, and I will address this issue in the future if needed.    Neurologically, it appears the patient has full strength and normal sensation. There is no evidence of radiculopathy or myelopathy on examination.  Red flags noted below. The patient has failed conservative measures including outpatient physical therapy, greater than 3 medications for pain relief, a self-directed therapy program, as well as activity modification all within the last 6 weeks over 3 months. The patient's pain has been causing worsening quality of life and function.    Patient is a non-opioid care plan due to a failed urine drug screen on 2/4/2016 in which there were no medications in his urine drug screen and it was positive for alcohol.  Therefore, he was dismissed from [facility] pain management at that time.     PLAN:  Medications:    For nonopioid therapy, the following medications were prescribed:    -Continue medication prescribed by primary care physician    Opioid therapy:  -Non-opioid care plan    Interventions:  -Plan for bilateral lumbar L3, L4, L5 medial branch blocks    Imaging:  -Reviewed cervical and lumbar MRI with patient in room    Behavioral Therapies:  -Continue daily stretching and home exercise program    Referrals:  -None    Follow-up Plan:  -After procedure    Patient was offered intervention where appropriate.     Multi-modal Pain Therapy:  The patient was explicitly considered for multimodal and interdisciplinary therapy. Non-opioid and non-pharmacological opportunities to enhance analgesia and quality of life have been and will continue to be pursued.    Toni Tanner,   Interventional Pain Management/PM&R   [facility]    Orders Placed This Encounter    FACET JOINT L/S     Standing Status:   Future     Standing Expiration Date:   5/13/2025     Scheduling Instructions:      Bilateral lumbar L3, L4, L5 MBBx2    FACET JOINT L/S 2ND LEVEL     Standing Status:   Future     Standing Expiration

## 2024-05-14 DIAGNOSIS — E78.2 MIXED HYPERLIPIDEMIA: ICD-10-CM

## 2024-05-14 RX ORDER — ATORVASTATIN CALCIUM 20 MG/1
20 TABLET, FILM COATED ORAL NIGHTLY
Qty: 90 TABLET | Refills: 3 | Status: SHIPPED | OUTPATIENT
Start: 2024-05-14

## 2024-05-15 ENCOUNTER — HOSPITAL ENCOUNTER (EMERGENCY)
Age: 64
Discharge: LEFT BEFORE DISCHARGE | End: 2024-05-16
Attending: EMERGENCY MEDICINE
Payer: MEDICARE

## 2024-05-15 ENCOUNTER — APPOINTMENT (OUTPATIENT)
Dept: GENERAL RADIOLOGY | Age: 64
End: 2024-05-15
Payer: MEDICARE

## 2024-05-15 ENCOUNTER — APPOINTMENT (OUTPATIENT)
Dept: CT IMAGING | Age: 64
End: 2024-05-15
Payer: MEDICARE

## 2024-05-15 DIAGNOSIS — S20.212A CONTUSION OF RIB ON LEFT SIDE, INITIAL ENCOUNTER: Primary | ICD-10-CM

## 2024-05-15 DIAGNOSIS — R09.02 HYPOXEMIA: ICD-10-CM

## 2024-05-15 LAB
ANION GAP SERPL CALCULATED.3IONS-SCNC: 11 MMOL/L (ref 9–16)
BASOPHILS # BLD: 0.05 K/UL (ref 0–0.2)
BASOPHILS NFR BLD: 0 % (ref 0–2)
BUN SERPL-MCNC: 7 MG/DL (ref 8–23)
CALCIUM SERPL-MCNC: 9.2 MG/DL (ref 8.6–10.4)
CHLORIDE SERPL-SCNC: 101 MMOL/L (ref 98–107)
CO2 SERPL-SCNC: 24 MMOL/L (ref 20–31)
CREAT SERPL-MCNC: 1 MG/DL (ref 0.7–1.2)
EOSINOPHIL # BLD: 0.29 K/UL (ref 0–0.44)
EOSINOPHILS RELATIVE PERCENT: 3 % (ref 1–4)
ERYTHROCYTE [DISTWIDTH] IN BLOOD BY AUTOMATED COUNT: 15 % (ref 11.8–14.4)
GFR, ESTIMATED: 89 ML/MIN/1.73M2
GLUCOSE SERPL-MCNC: 102 MG/DL (ref 74–99)
HCT VFR BLD AUTO: 40.3 % (ref 40.7–50.3)
HGB BLD-MCNC: 13.2 G/DL (ref 13–17)
IMM GRANULOCYTES # BLD AUTO: 0.04 K/UL (ref 0–0.3)
IMM GRANULOCYTES NFR BLD: 0 %
LYMPHOCYTES NFR BLD: 2.7 K/UL (ref 1.1–3.7)
LYMPHOCYTES RELATIVE PERCENT: 24 % (ref 24–43)
MCH RBC QN AUTO: 28.7 PG (ref 25.2–33.5)
MCHC RBC AUTO-ENTMCNC: 32.8 G/DL (ref 28.4–34.8)
MCV RBC AUTO: 87.6 FL (ref 82.6–102.9)
MONOCYTES NFR BLD: 0.77 K/UL (ref 0.1–1.2)
MONOCYTES NFR BLD: 7 % (ref 3–12)
NEUTROPHILS NFR BLD: 66 % (ref 36–65)
NEUTS SEG NFR BLD: 7.52 K/UL (ref 1.5–8.1)
NRBC BLD-RTO: 0 PER 100 WBC
PLATELET # BLD AUTO: 348 K/UL (ref 138–453)
PMV BLD AUTO: 8.7 FL (ref 8.1–13.5)
POTASSIUM SERPL-SCNC: 4.3 MMOL/L (ref 3.7–5.3)
RBC # BLD AUTO: 4.6 M/UL (ref 4.21–5.77)
RBC # BLD: ABNORMAL 10*6/UL
SODIUM SERPL-SCNC: 136 MMOL/L (ref 136–145)
WBC OTHER # BLD: 11.4 K/UL (ref 3.5–11.3)

## 2024-05-15 PROCEDURE — 96372 THER/PROPH/DIAG INJ SC/IM: CPT

## 2024-05-15 PROCEDURE — 80048 BASIC METABOLIC PNL TOTAL CA: CPT

## 2024-05-15 PROCEDURE — 99285 EMERGENCY DEPT VISIT HI MDM: CPT

## 2024-05-15 PROCEDURE — 85025 COMPLETE CBC W/AUTO DIFF WBC: CPT

## 2024-05-15 PROCEDURE — 6370000000 HC RX 637 (ALT 250 FOR IP)

## 2024-05-15 PROCEDURE — 71260 CT THORAX DX C+: CPT

## 2024-05-15 PROCEDURE — 6360000004 HC RX CONTRAST MEDICATION: Performed by: EMERGENCY MEDICINE

## 2024-05-15 PROCEDURE — 71046 X-RAY EXAM CHEST 2 VIEWS: CPT

## 2024-05-15 PROCEDURE — 6360000002 HC RX W HCPCS

## 2024-05-15 RX ORDER — ACETAMINOPHEN 500 MG
1000 TABLET ORAL ONCE
Status: COMPLETED | OUTPATIENT
Start: 2024-05-15 | End: 2024-05-15

## 2024-05-15 RX ORDER — KETOROLAC TROMETHAMINE 15 MG/ML
15 INJECTION, SOLUTION INTRAMUSCULAR; INTRAVENOUS ONCE
Status: COMPLETED | OUTPATIENT
Start: 2024-05-15 | End: 2024-05-15

## 2024-05-15 RX ORDER — HYDROXYZINE PAMOATE 25 MG/1
CAPSULE ORAL
COMMUNITY
Start: 2024-02-29

## 2024-05-15 RX ADMIN — IOPAMIDOL 75 ML: 755 INJECTION, SOLUTION INTRAVENOUS at 22:53

## 2024-05-15 RX ADMIN — ACETAMINOPHEN 1000 MG: 500 TABLET ORAL at 19:45

## 2024-05-15 RX ADMIN — KETOROLAC TROMETHAMINE 15 MG: 15 INJECTION, SOLUTION INTRAMUSCULAR; INTRAVENOUS at 19:44

## 2024-05-15 ASSESSMENT — PAIN DESCRIPTION - LOCATION: LOCATION: RIB CAGE

## 2024-05-15 ASSESSMENT — ENCOUNTER SYMPTOMS
ABDOMINAL PAIN: 0
COUGH: 1
SHORTNESS OF BREATH: 0

## 2024-05-15 ASSESSMENT — PAIN DESCRIPTION - ORIENTATION: ORIENTATION: LEFT

## 2024-05-15 ASSESSMENT — PAIN SCALES - GENERAL: PAINLEVEL_OUTOF10: 10

## 2024-05-15 NOTE — ED TRIAGE NOTES
Pt presents to ED via walking through triage c/o left sided rib pain d/t fall that occurred 2 days ago. Pt denies any blood thinner use. Pt reports slipping from water puddle and falling into table. Pt denies any LOC. Pt rates pain 10/10 and describes it as \"stabbing.\" Pt reports worsening pain upon exertion.   Pt Aox4 and speaking in complete sentences.

## 2024-05-15 NOTE — ED PROVIDER NOTES
normal.      Breath sounds: Normal breath sounds.      Comments: Patient's O2 drops to 88% while talking.  Does not seem short of breath.  Abdominal:      General: Abdomen is flat.      Palpations: Abdomen is soft.      Tenderness: There is no abdominal tenderness. There is no guarding.   Musculoskeletal:      Comments: Tenderness palpation over left lateral and posterior ribs   Skin:     General: Skin is warm and dry.      Comments: Abrasion over left back   Neurological:      Mental Status: He is alert.           DDX/DIAGNOSTIC RESULTS / EMERGENCY DEPARTMENT COURSE / MDM     Medical Decision Making  This 64-year-old male here for left-sided rib pain after falling onto his coffee table 2 days ago.  Patient did not hit his head, does have abrasion over left back, no other signs of injury.  Lungs clear to auscultation bilateral, does desaturate to 88% when having discussion.  Patient smokes cigarettes, does not report shortness of breath.  On chart review patient's last tetanus is in 2018 patient placed on oxygen, will get chest x-ray and give Tylenol, Toradol for pain relief.    DDx: Rib contusion, rib fracture, COPD    Patient provided I-S with teaching, 2500, reporting improved pain after pain medication.  After ambulation patient desaturated to 82% on room air, patient does not have oxygen at home, has not been prescribed oxygen.  Patient agreeable to stay for CT PE and labs.  Patient concerned because dogs were in the car.  Not agreeable to admission at this time as he would need to take his dogs back to his house.  Patient adamant that he will come back for admission for oxygen.  Patient signed out to resident pending CT PE, plan to discharge with Robaxin for pain.    Amount and/or Complexity of Data Reviewed  Labs: ordered.  Radiology: ordered and independent interpretation performed. Decision-making details documented in ED Course.    Risk  OTC drugs.  Prescription drug management.        EMERGENCY DEPARTMENT  COURSE:    ED Course as of 05/15/24 2345   Wed May 15, 2024   2022 XR CHEST (2 VW)  IMPRESSION:  No radiographic evidence of acute cardiopulmonary process.   [AS]   2206 Fall, new 02 requirement. Has dogs in car. Awaiting CT PE. Likely with AMA  [MS]      ED Course User Index  [AS] Devika Urrutia DO  [MS] Rony Leach DO   CONSULTS:  None    CRITICAL CARE:  There was significant risk of life threatening deterioration of patient's condition requiring my direct management. Critical care time 0 minutes, excluding any documented procedures.    FINAL IMPRESSION      1. Contusion of rib on left side, initial encounter          DISPOSITION / PLAN     DISPOSITION  signed out to resident pending CT PE and labs.  Patient reports dogs are in a car, would need to take them home prior to admission for the night.  Patient plans to come back after he takes his dogs home.  Patient willing to get CT PE prior to AMA.  Patient will need admission for new O2 requirement.      PATIENT REFERRED TO:  No follow-up provider specified.    DISCHARGE MEDICATIONS:  New Prescriptions    No medications on file       Devika Urrutia DO  Emergency Medicine Resident    (Please note that portions of thisnote were completed with a voice recognition program.  Efforts were made to edit the dictations but occasionally words are mis-transcribed.)

## 2024-05-16 ENCOUNTER — HOSPITAL ENCOUNTER (EMERGENCY)
Age: 64
Discharge: HOME | End: 2024-05-16
Attending: EMERGENCY MEDICINE
Payer: MEDICARE

## 2024-05-16 VITALS
SYSTOLIC BLOOD PRESSURE: 143 MMHG | TEMPERATURE: 98 F | HEART RATE: 73 BPM | OXYGEN SATURATION: 100 % | RESPIRATION RATE: 19 BRPM | DIASTOLIC BLOOD PRESSURE: 96 MMHG

## 2024-05-16 VITALS
HEART RATE: 93 BPM | RESPIRATION RATE: 18 BRPM | BODY MASS INDEX: 28.53 KG/M2 | DIASTOLIC BLOOD PRESSURE: 83 MMHG | WEIGHT: 204.59 LBS | SYSTOLIC BLOOD PRESSURE: 117 MMHG | OXYGEN SATURATION: 92 % | TEMPERATURE: 98.6 F

## 2024-05-16 DIAGNOSIS — S20.212D CONTUSION OF RIB ON LEFT SIDE, SUBSEQUENT ENCOUNTER: Primary | ICD-10-CM

## 2024-05-16 DIAGNOSIS — F41.9 ANXIETY: ICD-10-CM

## 2024-05-16 PROCEDURE — 99283 EMERGENCY DEPT VISIT LOW MDM: CPT

## 2024-05-16 PROCEDURE — 6370000000 HC RX 637 (ALT 250 FOR IP): Performed by: STUDENT IN AN ORGANIZED HEALTH CARE EDUCATION/TRAINING PROGRAM

## 2024-05-16 RX ORDER — METHOCARBAMOL 500 MG/1
500 TABLET, FILM COATED ORAL 4 TIMES DAILY
Qty: 16 TABLET | Refills: 0 | Status: SHIPPED | OUTPATIENT
Start: 2024-05-16 | End: 2024-05-20

## 2024-05-16 RX ORDER — METHOCARBAMOL 500 MG/1
500 TABLET, FILM COATED ORAL ONCE
Status: COMPLETED | OUTPATIENT
Start: 2024-05-16 | End: 2024-05-16

## 2024-05-16 RX ORDER — ACETAMINOPHEN 500 MG
1000 TABLET ORAL 3 TIMES DAILY
Qty: 42 TABLET | Refills: 0 | Status: SHIPPED | OUTPATIENT
Start: 2024-05-16 | End: 2024-05-23

## 2024-05-16 RX ORDER — LIDOCAINE 4 G/G
1 PATCH TOPICAL DAILY
Qty: 30 PATCH | Refills: 0 | Status: SHIPPED | OUTPATIENT
Start: 2024-05-16 | End: 2024-06-15

## 2024-05-16 RX ORDER — LIDOCAINE 4 G/G
1 PATCH TOPICAL ONCE
Status: DISCONTINUED | OUTPATIENT
Start: 2024-05-16 | End: 2024-05-16 | Stop reason: HOSPADM

## 2024-05-16 RX ORDER — IBUPROFEN 400 MG/1
400 TABLET ORAL ONCE
Status: COMPLETED | OUTPATIENT
Start: 2024-05-16 | End: 2024-05-16

## 2024-05-16 RX ORDER — HYDROXYZINE PAMOATE 50 MG/1
50 CAPSULE ORAL 3 TIMES DAILY PRN
Qty: 90 CAPSULE | Refills: 5 | Status: SHIPPED | OUTPATIENT
Start: 2024-05-16

## 2024-05-16 RX ORDER — IBUPROFEN 200 MG
400 TABLET ORAL EVERY 6 HOURS PRN
Qty: 30 TABLET | Refills: 0 | Status: SHIPPED | OUTPATIENT
Start: 2024-05-16 | End: 2024-05-23

## 2024-05-16 RX ORDER — ACETAMINOPHEN 500 MG
1000 TABLET ORAL ONCE
Status: COMPLETED | OUTPATIENT
Start: 2024-05-16 | End: 2024-05-16

## 2024-05-16 RX ADMIN — IBUPROFEN 400 MG: 400 TABLET, FILM COATED ORAL at 02:12

## 2024-05-16 RX ADMIN — ACETAMINOPHEN 1000 MG: 500 TABLET ORAL at 02:12

## 2024-05-16 RX ADMIN — METHOCARBAMOL 500 MG: 500 TABLET ORAL at 02:12

## 2024-05-16 ASSESSMENT — ENCOUNTER SYMPTOMS
RHINORRHEA: 0
DIARRHEA: 0
ABDOMINAL PAIN: 0
NAUSEA: 0
SHORTNESS OF BREATH: 0
VOMITING: 0

## 2024-05-16 ASSESSMENT — PAIN - FUNCTIONAL ASSESSMENT: PAIN_FUNCTIONAL_ASSESSMENT: 0-10

## 2024-05-16 ASSESSMENT — PAIN DESCRIPTION - ORIENTATION: ORIENTATION: RIGHT

## 2024-05-16 ASSESSMENT — PAIN SCALES - GENERAL: PAINLEVEL_OUTOF10: 10

## 2024-05-16 ASSESSMENT — PAIN DESCRIPTION - LOCATION: LOCATION: RIB CAGE

## 2024-05-16 NOTE — ED PROVIDER NOTES
[facility]     Emergency Department     Faculty Attestation    I performed a history and physical examination of the patient and discussed management with the resident. I have reviewed and agree with the resident’s findings including all diagnostic interpretations, and treatment plans as written at the time of my review. Any areas of disagreement are noted on the chart. I was personally present for the key portions of any procedures. I have documented in the chart those procedures where I was not present during the key portions. For Physician Assistant/ Nurse Practitioner cases/documentation I have personally evaluated this patient and have completed at least one if not all key elements of the E/M (history, physical exam, and MDM). Additional findings are as noted.    PtName: Ramirez Lainez  MRN: [id number]  Birthdate 1960  Date of evaluation: 5/15/24  Note Started: 9:10 PM EDT    Primary Care Physician: Pranav Ramirez MD        History: This is a 64 y.o. male who presents to the Emergency Department with complaint of left-sided chest wall pain.  Patient fell a couple days ago landing on the left side of his chest wall.  He is complaining of pain with any type of movement or deep inspiration.    Physical:   weight is 92.8 kg (204 lb 9.4 oz). His temperature is 98.6 °F (37 °C). His blood pressure is 117/83 and his pulse is 93. His respiration is 18 and oxygen saturation is 94%.  Obvious abrasion on the left lateral posterior aspect of the chest wall.  Tender to palpation no crepitus is palpated, diminished breath sounds auscultated bilaterally, heart regular rate and rhythm, abdomen soft nontender    Impression: Fall left-sided chest wall pain    Plan: Chest x-ray    Chest x-ray is unremarkable.  Patient appeared to be mildly hypoxic.  When ambulated his oxygen saturations dropped down to 82% will now obtain labs plus a CT scan of the chest for

## 2024-05-16 NOTE — ED TRIAGE NOTES
Pt to ED with complaints of Left rib pain and SOB.  Pt was seen earlier for rib pain.   Pt was placed on o2 at 2 lpm for pt had episodes of hypoxia.  Pt was advised admission earlier but left AMA because he has to take his dogs home.   Pt went back for admission.   Pt's spo2 is 94-95 on RA this time.   Pt also complains of right rib pain.

## 2024-05-16 NOTE — DISCHARGE INSTRUCTIONS
Please take your medications as prescribed.  Please return emergency immediately after dropping off your dogs.  You understand the risks of sudden cardiac death.  If you do not return to the emergency department please follow-up with your [facility] provider soon as possible.

## 2024-05-16 NOTE — ED PROVIDER NOTES
[facility]     Emergency Department     Faculty Attestation    I performed a history and physical examination of the patient and discussed management with the resident. I have reviewed and agree with the resident’s findings including all diagnostic interpretations, and treatment plans as written. Any areas of disagreement are noted on the chart. I was personally present for the key portions of any procedures. I have documented in the chart those procedures where I was not present during the key portions. I have reviewed the emergency nurses triage note. I agree with the chief complaint, past medical history, past surgical history, allergies, medications, social and family history as documented unless otherwise noted below. Documentation of the HPI, Physical Exam and Medical Decision Making performed by [email or web address] is based on my personal performance of the HPI, PE and MDM. For Physician Assistant/ Nurse Practitioner cases/documentation I have personally evaluated this patient and have completed at least one if not all key elements of the E/M (history, physical exam, and MDM). Additional findings are as noted.    Note Started: 1:24 AM EDT     65 yo M returns after rib injury with hypoxia, no fever, no vomit, notes rib pain with movement,   Pt had left to care for his dogs  Ct no pe at 2300 on 5/15  PE vss gcs 15, talkative, speaking in complete sentences,     -pt maintaining saturation     EKG Interpretation    Interpreted by me      CRITICAL CARE: There was a high probability of clinically significant/life threatening deterioration in this patient's condition which required my urgent intervention.  Total critical care time was 5 minutes.  This excludes any time for separately reportable procedures.       Juanjose Plunkett DO  05/16/24 0127       Juanjose Castellanos DO  05/16/24 0250

## 2024-05-16 NOTE — ED PROVIDER NOTES
Housing Stability Vital Sign     Unable to Pay for Housing in the Last Year: No     Number of Places Lived in the Last Year: 1     Unstable Housing in the Last Year: No       Family History   Problem Relation Age of Onset    Heart Failure Mother     Cancer Mother     Heart Disease Mother         CAD-OPEN HEART    Mental Retardation Sister     Seizures Sister     Hypertension Other         maternal history       Allergies:  Poison ivy extract    Home Medications:  Prior to Admission medications    Medication Sig Start Date End Date Taking? Authorizing Provider   ibuprofen (ADVIL;MOTRIN) 200 MG tablet Take 2 tablets by mouth every 6 hours as needed for Pain or Fever 5/16/24 5/23/24 Yes Rony Leach,    acetaminophen (TYLENOL) 500 MG tablet Take 2 tablets by mouth 3 times daily for 7 days 5/16/24 5/23/24 Yes Rony Leach DO   methocarbamol (ROBAXIN) 500 MG tablet Take 1 tablet by mouth 4 times daily for 4 days 5/16/24 5/20/24 Yes Rony Leach DO   lidocaine 4 % external patch Place 1 patch onto the skin daily 5/16/24 6/15/24 Yes Rony Leach DO   hydrOXYzine pamoate (VISTARIL) 25 MG capsule TAKE 1 CAPSULE BY MOUTH TWICE DAILY AS NEEDED 2/29/24   Provider, MD Garfield   atorvastatin (LIPITOR) 20 MG tablet Take 1 tablet by mouth nightly TAKE 1 TABLET BY MOUTH DAILY 5/14/24   Pranav Ramirez MD   tamsulosin (FLOMAX) 0.4 MG capsule TAKE 1 CAPSULE BY MOUTH DAILY 5/1/24   Pranav Ramirez MD   pantoprazole (PROTONIX) 40 MG tablet TAKE 1 TABLET BY MOUTH 2 TIMES DAILY (BEFORE MEALS) 5/1/24   Pranav Ramirez MD   blood glucose test strips (ONETOUCH VERIO) strip TEST BLOOD SUGAR ONCE A DAY 4/15/24   Pranav Ramirez MD   atenolol (TENORMIN) 25 MG tablet TAKE 1 TABLET BY MOUTH DAILY 4/9/24   Pranav Ramirez MD   cloNIDine (CATAPRES) 0.1 MG tablet TAKE 1 TABLET BY MOUTH DAILY 4/9/24   Pranav Ramirez MD   cyclobenzaprine (FLEXERIL) 10 MG tablet Take 1 tablet by mouth 2 times  daily as needed for Muscle spasms 2/13/24   Pranav Ramirez MD   metFORMIN (GLUCOPHAGE) 500 MG tablet TAKE 1 TABLET BY MOUTH DAILY (WITH BREAKFAST) 1/12/24   Pranav Ramirez MD   hydrOXYzine pamoate (VISTARIL) 50 MG capsule Take 1 capsule by mouth 3 times daily as needed for Anxiety 12/12/23   Pranav Ramirez MD   Alcohol Swabs PADS Using 1 pad daily 10/4/23   Marah Casey MD   sildenafil (VIAGRA) 100 MG tablet Take 1 tablet by mouth daily as needed for Erectile Dysfunction 11/21/22   Pranav Ramirez MD   Lancets (ONETOUCH DELICA PLUS GNLQLV41H) MISC USE ONCE DAILY 10/21/22   Pranav Ramirez MD   DULoxetine (CYMBALTA) 60 MG extended release capsule Take 1 capsule by mouth in the morning. 7/26/22   Pranav Ramirez MD   meloxicam (MOBIC) 15 MG tablet Take 1 tablet by mouth daily 6/29/22 8/17/22  Pranav Ramirez MD   sildenafil (VIAGRA) 100 MG tablet Take 1 tablet by mouth daily as needed for Erectile Dysfunction 6/27/22   Pranav Ramirez MD   Blood Glucose Monitoring Suppl (BLOOD GLUCOSE MONITOR SYSTEM) w/Device KIT USE TO MONITOR BLOOD GLUCOSE LEVEL. (TEST ONCE PER DAY) 10/22/21   Pranav Ramirez MD   QUEtiapine (SEROQUEL) 50 MG tablet Take 1 tablet by mouth nightly 9/20/20   Garfield Agudelo MD   lamoTRIgine (LAMICTAL) 200 MG tablet Take 1 tablet by mouth daily 9/25/17   ProviderGarfield MD         REVIEW OF SYSTEMS       Review of Systems   Constitutional:  Negative for chills and fever.   HENT:  Negative for congestion and rhinorrhea.    Respiratory:  Negative for shortness of breath.    Cardiovascular:  Negative for chest pain.   Gastrointestinal:  Negative for abdominal pain, diarrhea, nausea and vomiting.   Musculoskeletal:  Positive for arthralgias and myalgias. Negative for neck pain and neck stiffness.   Skin:  Negative for color change, pallor, rash and wound.   Neurological:  Negative for weakness, numbness and headaches.       PHYSICAL EXAM

## 2024-05-16 NOTE — DISCHARGE INSTRUCTIONS
Call today or tomorrow to follow up with Pranav Ramirez MD  in 3 days.    Take your medication as prescribed for pain.  Use the incentive spirometer - 10 times per hour every hour while awake.  Do not use any rib binders / ace wraps around your chest.    Return to the Emergency Department for worsening of pain not controlled with medication, shortness of breath, chest pain, any other care or concern.

## 2024-05-16 NOTE — ED NOTES
Pt doesn't want to stay for admission. States he will take his dogs home and be back for admission.  Dr. Leach explained the risks and consequences of AMA.  Pt still satting low 90's and when talking pt is satting 898-89%.  Pt verbalized understanding of risks and consequences.

## 2024-05-16 NOTE — ED PROVIDER NOTES
[facility] ED  Emergency Department  Emergency Medicine Resident Turn-Over     Note Started: 10:06 PM EDT    Care of Ramirez Lainez was assumed from Dr. Urrutia and is being seen for Fall (2 days ago) and Rib Pain (Left sided)  .  The patient's initial evaluation and plan have been discussed with the prior provider who initially evaluated the patient.     EMERGENCY DEPARTMENT COURSE / MEDICAL DECISION MAKING:       MEDICATIONS GIVEN:  Orders Placed This Encounter   Medications    acetaminophen (TYLENOL) tablet 1,000 mg    ketorolac (TORADOL) injection 15 mg    iopamidol (ISOVUE-370) 76 % injection 75 mL       LABS / RADIOLOGY:     Labs Reviewed   CBC WITH AUTO DIFFERENTIAL - Abnormal; Notable for the following components:       Result Value    WBC 11.4 (*)     Hematocrit 40.3 (*)     RDW 15.0 (*)     Neutrophils % 66 (*)     All other components within normal limits   BASIC METABOLIC PANEL - Abnormal; Notable for the following components:    Glucose 102 (*)     BUN 7 (*)     All other components within normal limits       XR CHEST (2 VW)    Result Date: 5/15/2024  EXAMINATION: TWO XRAY VIEWS OF THE CHEST 5/15/2024 6:38 pm COMPARISON: None. HISTORY: ORDERING SYSTEM PROVIDED HISTORY: fall, L rib pain TECHNOLOGIST PROVIDED HISTORY: fall, L rib pain FINDINGS: Lungs are clear.  Emphysematous changes are seen.  There is no pleural effusion or pneumothorax. Heart is normal in size. Pulmonary vascular markings are normal. No acute osseous abnormalities are seen.     No radiographic evidence of acute cardiopulmonary process.       RECENT VITALS:     Temp: 98.6 °F (37 °C),  Pulse: 93, Respirations: 18, BP: 117/83, SpO2: 92 %    This patient is a 64 y.o. Male with rib contusion.  Fell.  CT imaging does not show any acute fracture.  Chest x-ray shows no pneumothorax.  CT agrees with this.  Concerns for new onset hypoxia as patient was dropping into the 80s when ambulating.  Patient states his dogs are in  his truck and he cannot stay.  Given this patient signed out AMA.  He signed this paperwork while he was on oxygen.  Patient states that he will return right away    ED Course as of 05/16/24 0156   Wed May 15, 2024   2022 XR CHEST (2 VW)  IMPRESSION:  No radiographic evidence of acute cardiopulmonary process.   [AS]   2206 Fall, new 02 requirement. Has dogs in car. Awaiting CT PE. Likely with AMA  [MS]   2353 Patient states he needs to go home to take his dogs out.  Does process decision-making capacity.  Was not hypoxic when this conversation took place.  Has insight and states that he cannot leave his service animals in his van overnight.  States he lives 4 blocks away and will take them home to return the emergency department immediately.  Patient was signed out AMA [MS]      ED Course User Index  [AS] Devika Urrutia,   [MS] Rony Leach DO       OUTSTANDING TASKS / RECOMMENDATIONS:    CT  Dispo     FINAL IMPRESSION:     1. Contusion of rib on left side, initial encounter    2. Hypoxemia        DISPOSITION:         DISPOSITION:  []  Discharge   []  Transfer -    []  Admission -     [x]  Against Medical Advice   []  Eloped   FOLLOW-UP: Pranav Ramirez MD  [address] [address]  [address]  [facility] [address] [phone]    Schedule an appointment as soon as possible for a visit       [facility] ED  [facility] [address]  [phone]    If symptoms worsen     DISCHARGE MEDICATIONS: Discharge Medication List as of 5/15/2024 11:53 PM              Rony Leach DO  Emergency Medicine Resident  [facility]

## 2024-05-17 ENCOUNTER — CARE COORDINATION (OUTPATIENT)
Dept: CARE COORDINATION | Age: 64
End: 2024-05-17

## 2024-05-17 NOTE — CARE COORDINATION
ED visits 5/15 and again yesterday for rib pain, dyspnea after a fall. Attempted to call for ED follow up and assess for care management, left VM message asking for return call. Will call again in a few days.    Future Appointments   Date Time Provider Department Center   5/23/2024  2:15 PM Pranav Ramirez MD [facility]   5/28/2024 11:00 AM Toni Tanner DO [facility]   6/11/2024 12:15 PM Toni Tanner DO [facility]

## 2024-05-20 ENCOUNTER — CARE COORDINATION (OUTPATIENT)
Dept: CARE COORDINATION | Age: 64
End: 2024-05-20

## 2024-05-20 NOTE — CARE COORDINATION
Ambulatory Care Coordination  ED Follow up Call    Reason for ED visit:  rib pain, shortness of breath   Status:     improved    Did you call your PCP prior to going to the ED?  No      Did you receive a discharge instructions from the Emergency Room? Yes  Review of Instructions:     Understands what to report/when to return?:  Yes   Understands discharge instructions?:  Yes   Following discharge instructions?:  Yes   If not why?     Are there any new complaints of pain? No  New Pain Meds? No    Constipation prophylaxis needed?  N/A    If you have a wound is the dressing clean, dry, and intact? N/A  Understands wound care regimen? N/A    Are there any other complaints/concerns that you wish to tell your provider?   no    FU appts/Provider:    Future Appointments   Date Time Provider Department Center   5/23/2024  2:15 PM Pranav Ramirez MD [facility]   5/28/2024 11:00 AM Toni Tanner, DO [facility] [place]   6/11/2024 12:15 PM Toni Tanner,  [facility] [place]           New Medications?:   yes      Medication Reconciliation by phone - Yes  Understands Medications?  Yes  Taking Medications? Yes  Can you swallow your pills?  Yes    Any further needs in the home i.e. Equipment?  No    Link to services in community?:  No   Which services:  NA    He slipped on melted ice, hurt left shoulder, rib area, back. Pain gradually improving. Has PCP appt this week. No further shortness of breath. BP is ok, blood sugars stable.    No needs identified for care management. Has DM- A1cm 5.9, checks blood sugars and blood pressure at home, independent with self care. Also follows with pain mgmt. Did not enroll in care management.

## 2024-05-28 ENCOUNTER — OFFICE VISIT (OUTPATIENT)
Dept: PRIMARY CARE CLINIC | Age: 64
End: 2024-05-28
Payer: MEDICARE

## 2024-05-28 ENCOUNTER — HOSPITAL ENCOUNTER (OUTPATIENT)
Dept: PAIN MANAGEMENT | Facility: CLINIC | Age: 64
Discharge: HOME | End: 2024-05-28

## 2024-05-28 VITALS
WEIGHT: 204.4 LBS | HEIGHT: 71 IN | OXYGEN SATURATION: 93 % | DIASTOLIC BLOOD PRESSURE: 78 MMHG | HEART RATE: 82 BPM | BODY MASS INDEX: 28.61 KG/M2 | SYSTOLIC BLOOD PRESSURE: 136 MMHG

## 2024-05-28 DIAGNOSIS — Z13.220 ENCOUNTER FOR LIPID SCREENING FOR CARDIOVASCULAR DISEASE: ICD-10-CM

## 2024-05-28 DIAGNOSIS — Z12.5 SCREENING PSA (PROSTATE SPECIFIC ANTIGEN): ICD-10-CM

## 2024-05-28 DIAGNOSIS — M50.30 DEGENERATIVE DISC DISEASE, CERVICAL: ICD-10-CM

## 2024-05-28 DIAGNOSIS — M54.16 LUMBAR RADICULOPATHY: ICD-10-CM

## 2024-05-28 DIAGNOSIS — Z00.00 ANNUAL PHYSICAL EXAM: ICD-10-CM

## 2024-05-28 DIAGNOSIS — E11.9 TYPE 2 DIABETES MELLITUS WITHOUT COMPLICATION, WITHOUT LONG-TERM CURRENT USE OF INSULIN (HCC): Primary | ICD-10-CM

## 2024-05-28 DIAGNOSIS — Z13.6 ENCOUNTER FOR LIPID SCREENING FOR CARDIOVASCULAR DISEASE: ICD-10-CM

## 2024-05-28 DIAGNOSIS — J44.9 CHRONIC OBSTRUCTIVE PULMONARY DISEASE, UNSPECIFIED COPD TYPE (HCC): ICD-10-CM

## 2024-05-28 PROBLEM — E11.69 DIABETES MELLITUS TYPE 2 IN OBESE: Status: RESOLVED | Noted: 2020-02-27 | Resolved: 2024-05-28

## 2024-05-28 PROBLEM — E66.9 DIABETES MELLITUS TYPE 2 IN OBESE: Status: RESOLVED | Noted: 2020-02-27 | Resolved: 2024-05-28

## 2024-05-28 LAB — HBA1C MFR BLD: 6 %

## 2024-05-28 PROCEDURE — 3078F DIAST BP <80 MM HG: CPT | Performed by: FAMILY MEDICINE

## 2024-05-28 PROCEDURE — G8427 DOCREV CUR MEDS BY ELIG CLIN: HCPCS | Performed by: FAMILY MEDICINE

## 2024-05-28 PROCEDURE — 3044F HG A1C LEVEL LT 7.0%: CPT | Performed by: FAMILY MEDICINE

## 2024-05-28 PROCEDURE — 3075F SYST BP GE 130 - 139MM HG: CPT | Performed by: FAMILY MEDICINE

## 2024-05-28 PROCEDURE — 3023F SPIROM DOC REV: CPT | Performed by: FAMILY MEDICINE

## 2024-05-28 PROCEDURE — 83036 HEMOGLOBIN GLYCOSYLATED A1C: CPT | Performed by: FAMILY MEDICINE

## 2024-05-28 PROCEDURE — 99214 OFFICE O/P EST MOD 30 MIN: CPT | Performed by: FAMILY MEDICINE

## 2024-05-28 PROCEDURE — 2022F DILAT RTA XM EVC RTNOPTHY: CPT | Performed by: FAMILY MEDICINE

## 2024-05-28 PROCEDURE — 4004F PT TOBACCO SCREEN RCVD TLK: CPT | Performed by: FAMILY MEDICINE

## 2024-05-28 PROCEDURE — G8417 CALC BMI ABV UP PARAM F/U: HCPCS | Performed by: FAMILY MEDICINE

## 2024-05-28 PROCEDURE — 3017F COLORECTAL CA SCREEN DOC REV: CPT | Performed by: FAMILY MEDICINE

## 2024-05-28 RX ORDER — GABAPENTIN 100 MG/1
100 CAPSULE ORAL 3 TIMES DAILY
Qty: 90 CAPSULE | Refills: 2 | Status: SHIPPED | OUTPATIENT
Start: 2024-05-28 | End: 2024-08-26

## 2024-05-28 SDOH — ECONOMIC STABILITY: FOOD INSECURITY: WITHIN THE PAST 12 MONTHS, THE FOOD YOU BOUGHT JUST DIDN'T LAST AND YOU DIDN'T HAVE MONEY TO GET MORE.: NEVER TRUE

## 2024-05-28 SDOH — ECONOMIC STABILITY: FOOD INSECURITY: WITHIN THE PAST 12 MONTHS, YOU WORRIED THAT YOUR FOOD WOULD RUN OUT BEFORE YOU GOT MONEY TO BUY MORE.: NEVER TRUE

## 2024-05-28 SDOH — ECONOMIC STABILITY: INCOME INSECURITY: HOW HARD IS IT FOR YOU TO PAY FOR THE VERY BASICS LIKE FOOD, HOUSING, MEDICAL CARE, AND HEATING?: NOT HARD AT ALL

## 2024-05-28 ASSESSMENT — PATIENT HEALTH QUESTIONNAIRE - PHQ9
SUM OF ALL RESPONSES TO PHQ QUESTIONS 1-9: 0
1. LITTLE INTEREST OR PLEASURE IN DOING THINGS: NOT AT ALL
SUM OF ALL RESPONSES TO PHQ QUESTIONS 1-9: 0
6. FEELING BAD ABOUT YOURSELF - OR THAT YOU ARE A FAILURE OR HAVE LET YOURSELF OR YOUR FAMILY DOWN: NOT AT ALL
4. FEELING TIRED OR HAVING LITTLE ENERGY: NOT AT ALL
3. TROUBLE FALLING OR STAYING ASLEEP: NOT AT ALL
SUM OF ALL RESPONSES TO PHQ QUESTIONS 1-9: 0
SUM OF ALL RESPONSES TO PHQ QUESTIONS 1-9: 0
8. MOVING OR SPEAKING SO SLOWLY THAT OTHER PEOPLE COULD HAVE NOTICED. OR THE OPPOSITE, BEING SO FIGETY OR RESTLESS THAT YOU HAVE BEEN MOVING AROUND A LOT MORE THAN USUAL: NOT AT ALL
5. POOR APPETITE OR OVEREATING: NOT AT ALL
2. FEELING DOWN, DEPRESSED OR HOPELESS: NOT AT ALL
7. TROUBLE CONCENTRATING ON THINGS, SUCH AS READING THE NEWSPAPER OR WATCHING TELEVISION: NOT AT ALL
9. THOUGHTS THAT YOU WOULD BE BETTER OFF DEAD, OR OF HURTING YOURSELF: NOT AT ALL
SUM OF ALL RESPONSES TO PHQ9 QUESTIONS 1 & 2: 0

## 2024-05-28 ASSESSMENT — ENCOUNTER SYMPTOMS
COUGH: 0
SHORTNESS OF BREATH: 0
RHINORRHEA: 0
NAUSEA: 0
DIARRHEA: 0
BACK PAIN: 1
ABDOMINAL PAIN: 0
WHEEZING: 0
EYE REDNESS: 0
VOMITING: 0
SORE THROAT: 0
EYE DISCHARGE: 0

## 2024-05-28 NOTE — PROGRESS NOTES
[facility]  [facility]  [address]  [facility] [address]  Dept: [phone]    Ramirez Lainez is a 64 y.o. male Established patient, who presents today for his medical conditions/complaints as noted below.      Chief Complaint   Patient presents with    Diabetes       HPI:     HPI  Pt here for diabetic check up.  No low blood sugar reactions. Blood sugar running  range.     Pt continues to have neck and back pain. Wondering what other medication can be used.   Pt had seen pain management, had injections recommended, but patient declined. Pt concerned about paralysis     Pt sees psychiatry for his depression.     Reviewed prior notes  pain managment  Reviewed previous Labs    No components found for: \"LDLCHOLESTEROL\", \"LDLCALC\"    (goal LDL is <100)   AST (U/L)   Date Value   05/10/2023 15     ALT (U/L)   Date Value   05/10/2023 15     BUN (mg/dL)   Date Value   05/15/2024 7 (L)     Hemoglobin A1C (%)   Date Value   05/28/2024 6.0     TSH (mIU/L)   Date Value   06/27/2013 5.65 (H)     BP Readings from Last 3 Encounters:   05/28/24 136/78   05/16/24 (!) 143/96   05/15/24 117/83          (goal 120/80)    Past Medical History:   Diagnosis Date    Abnormal EKG 10/27/2016    indicates inferior infarct.    Anxiety     Bipolar disorder (HCC) 2006    on rx    Chronic back pain 11/21/2014    Depression 2006    on rx    Diabetes mellitus (HCC)     borderline    Fracture, clavicle 1996    LEFT    Hyperlipidemia     Hypertension 2006    on rx    Legally blind in left eye, as defined in [place]      very blurry    Lumbar radiculopathy 04/23/2014    Nicotine dependence     Osteoarthritis     Pain     LEFT EYE    Retinal detachment     history miguel    Sleep apnea     doesnt use cpap     Tubular adenoma     Visual floaters     Wears glasses     USES MAGNIFYING GLASS      Past Surgical History:   Procedure Laterality Date    CATARACT REMOVAL Left     CATARACT REMOVAL WITH IMPLANT

## 2024-05-28 NOTE — DISCHARGE INSTRUCTIONS
You have received a sedative/anesthetic therefore you should not consume any alcoholic beverages for 24 hours.  Do not drive or operate machinery for 24 hours.    Do not take a tub bath for 72 hours after procedure (this includes hot tubs).  You may shower, but avoid hot water to injection site.   Avoid strenuous activity TODAY especially if you experience dizziness.   Remove band-aid the next day.    Wash off any residual iodine 24 hours from today.   Do not use heat, heating pad, or any other heating device over the injection site for 3 days after the procedure.    If you experience pain after your procedure, you may continue with your current pain medication as prescribed.  (DO NOT INCREASE YOUR PAIN MEDICATION WITHOUT TALKING TO DOCTOR)  Soreness and pain at injection site is common, may use ice to reduce soreness.    Please complete pain diary as instructed. Office staff will contact you to review results.    Call [facility] at [phone] if you experience:   Fever, chills or temperature over 100    Vomiting, headache, persistent stiff neck, nausea or blurred vision   Difficulty urinating or unable to urinate within 8 hours   Increase in weakness, numbness or loss of function of limbs  Increased redness, swelling or drainage at the injection site

## 2024-06-11 ENCOUNTER — HOSPITAL ENCOUNTER (OUTPATIENT)
Dept: PAIN MANAGEMENT | Facility: CLINIC | Age: 64
Discharge: HOME | End: 2024-06-11

## 2024-06-11 NOTE — FLOWSHEET NOTE
Pt unable to make his appointment for his procedure today d/t no transportation.  Instructed him to call Dr Tanner's office to reschedule.  Verbalized understanding

## 2024-06-11 NOTE — DISCHARGE INSTRUCTIONS
You have received a sedative/anesthetic therefore you should not consume any alcoholic beverages for 24 hours.  Do not drive or operate machinery for 24 hours.    Do not take a tub bath for 72 hours after procedure (this includes hot tubs).  You may shower, but avoid hot water to injection site.   Avoid strenuous activity TODAY especially if you experience dizziness.   Remove band-aid the next day.    Wash off any residual iodine 24 hours from today.   Do not use heat, heating pad, or any other heating device over the injection site for 3 days after the procedure.    If you experience pain after your procedure, you may continue with your current pain medication as prescribed.  (DO NOT INCREASE YOUR PAIN MEDICATION WITHOUT TALKING TO DOCTOR)  Soreness and pain at injection site is common, may use ice to reduce soreness.    Please complete pain diary as instructed. Office staff will contact you to review results.    Call [facility] at [phone] if you experience:   Fever, chills or temperature over 100    Vomiting, headache, persistent stiff neck, nausea or blurred vision   Difficulty urinating or unable to urinate within 8 hours   Increase in weakness, numbness or loss of function of limbs  Increased redness, swelling or drainage at the injection site

## 2024-06-14 ENCOUNTER — TELEPHONE (OUTPATIENT)
Dept: PRIMARY CARE CLINIC | Age: 64
End: 2024-06-14

## 2024-06-14 NOTE — TELEPHONE ENCOUNTER
Pt called stating that the gabapentin (NEURONTIN) 100 MG capsule is not working for him.     Pt would like to see if there is anything else that can be ordered for him.     Pt can be reached at [phone].  Pt uses [facility] [place], [place] - [address] [phone] - F [phone].    Please advise.

## 2024-06-17 NOTE — TELEPHONE ENCOUNTER
Let patient know that Dr. Ramirez is out of the office this week. He can try to double the dose of gabapentin in the meantime and continue to follow up with pain management.

## 2024-06-25 ENCOUNTER — HOSPITAL ENCOUNTER (OUTPATIENT)
Dept: PAIN MANAGEMENT | Facility: CLINIC | Age: 64
Discharge: HOME | End: 2024-06-25
Payer: MEDICARE

## 2024-06-25 VITALS
RESPIRATION RATE: 15 BRPM | BODY MASS INDEX: 25.2 KG/M2 | HEART RATE: 86 BPM | TEMPERATURE: 97.6 F | SYSTOLIC BLOOD PRESSURE: 120 MMHG | OXYGEN SATURATION: 94 % | WEIGHT: 180 LBS | DIASTOLIC BLOOD PRESSURE: 70 MMHG | HEIGHT: 71 IN

## 2024-06-25 DIAGNOSIS — R52 PAIN MANAGEMENT: ICD-10-CM

## 2024-06-25 LAB — GLUCOSE BLD-MCNC: 109 MG/DL (ref 75–110)

## 2024-06-25 PROCEDURE — 2500000003 HC RX 250 WO HCPCS: Performed by: STUDENT IN AN ORGANIZED HEALTH CARE EDUCATION/TRAINING PROGRAM

## 2024-06-25 PROCEDURE — 64494 INJ PARAVERT F JNT L/S 2 LEV: CPT

## 2024-06-25 PROCEDURE — 64493 INJ PARAVERT F JNT L/S 1 LEV: CPT | Performed by: STUDENT IN AN ORGANIZED HEALTH CARE EDUCATION/TRAINING PROGRAM

## 2024-06-25 PROCEDURE — 64494 INJ PARAVERT F JNT L/S 2 LEV: CPT | Performed by: STUDENT IN AN ORGANIZED HEALTH CARE EDUCATION/TRAINING PROGRAM

## 2024-06-25 PROCEDURE — 82947 ASSAY GLUCOSE BLOOD QUANT: CPT

## 2024-06-25 PROCEDURE — 64493 INJ PARAVERT F JNT L/S 1 LEV: CPT

## 2024-06-25 RX ORDER — LIDOCAINE HYDROCHLORIDE 20 MG/ML
INJECTION, SOLUTION EPIDURAL; INFILTRATION; INTRACAUDAL; PERINEURAL
Status: COMPLETED | OUTPATIENT
Start: 2024-06-25 | End: 2024-06-25

## 2024-06-25 RX ADMIN — LIDOCAINE HYDROCHLORIDE 5 ML: 20 INJECTION, SOLUTION EPIDURAL; INFILTRATION; INTRACAUDAL; PERINEURAL at 14:22

## 2024-06-25 ASSESSMENT — PAIN - FUNCTIONAL ASSESSMENT
PAIN_FUNCTIONAL_ASSESSMENT: 0-10
PAIN_FUNCTIONAL_ASSESSMENT: 0-10

## 2024-06-25 NOTE — OP NOTE
PROCEDURE PERFORMED: Bilateral Lumbar medial branch block    PREOPERATIVE DIAGNOSIS: Lumbosacral spondylosis    INDICATIONS: Chronic low back pain    The patient's history and physical exam were reviewed.  The risk, benefits, and alternatives of the procedure were discussed and all questions were answered to the patient's satisfaction.  The patient agreed to proceed and written informed consent was obtained.    POSTOPERATIVE DIAGNOSIS: Lumbar spondylosis    PHYSICIAN:  Dr. Toni Tanner DO    ANESTHESIA:  LOCAL    ASSISTANT:  NONE    PATHOLOGY:  NONE    ESTIMATED BLOOD LOSS:  N/A    IMPLANTS:  NONE    PROCEDURE DESCRIPTION: Diagnostic bilateral lumbar medial branch block using fluoroscopy    The patient was placed on the operative bed in prone position.  The area was prepped with  Chlorhexidine.  The area was then draped in a sterile fashion.    Targeted levels: Bilateral lumbar L3, L4, L5 medial branch block    An AP  fluoroscopy image was used to identify and feng Cleveland's point at the L3, L4 levels on the targeted side.  Additionally, the junction of the SAP and sacral ala was also marked on the same side.   A 25-gauge 3-1/2 inch Quincke spinal needle was then advanced toward each of these points under fluoroscopic guidance.  Once bone was contacted, negative aspiration was confirmed and 0.5 mL of lidocaine 2% was injected each level.  The needles were removed and the needle sites were dressed appropriately. The same procedure was performed on the opposite side.    The patient was transferred to the postoperative care unit in stable condition.  Written discharge instructions were given to the patient.  The patient was given a pain diary upon discharge.    COMPLICATIONS:  There were no apparent complications.  The patient tolerated the procedure well.

## 2024-06-25 NOTE — H&P
Pain Pre-Op H&P Note    SUBJECTIVE:  No chief complaint on file.      History of Present Illness:   Ramirez Lainez is a 64 y.o. male who presents with chronic low back pain.    Past Medical History:   Diagnosis Date    Abnormal EKG 10/27/2016    indicates inferior infarct.    Anxiety     Bipolar disorder (HCC) 2006    on rx    Chronic back pain 11/21/2014    Depression 2006    on rx    Diabetes mellitus (HCC)     borderline    Fracture, clavicle 1996    LEFT    Hyperlipidemia     Hypertension 2006    on rx    Legally blind in left eye, as defined in [place]      very blurry    Lumbar radiculopathy 04/23/2014    Nicotine dependence     Osteoarthritis     Pain     LEFT EYE    Retinal detachment     history miguel    Sleep apnea     doesnt use cpap     Tubular adenoma     Visual floaters     Wears glasses     USES MAGNIFYING GLASS       Past Surgical History:   Procedure Laterality Date    CATARACT REMOVAL Left     CATARACT REMOVAL WITH IMPLANT Right 08/25/2015    CHOLECYSTECTOMY  02/2013    COLONOSCOPY  09/22/2016    the ICV was edematous and erythematous but most likely normal variant , bxs taken Large polyp 3 cm, at 50 cm from the anal verge with a very  wide stalk, not removed tattooed needs to be removed with subtotal colectomy    COLONOSCOPY N/A 05/02/2019    COLONOSCOPY POLYPECTOMY COLD BIOPSY, HOT SNARE performed by Melony Henderson MD at [facility] [place] OR    COLONOSCOPY N/A 08/11/2022    COLONOSCOPY POLYPECTOMY SNARE/COLD BIOPSY performed by Melony Henderson MD at [facility] [place] ENDO    CYST REMOVAL  1970    foot, base of skull-left side    EYE SURGERY  12/31/2015    laser right eye, left vit    EYE SURGERY Left     torn retina lazer/freezer/hole    EYE SURGERY Left 03/31/2016    SILICONE REMOVAL AIR FLUID EXCHANGE    EYE SURGERY      cataracts removed miguel.    EYE SURGERY      total 6 eye surg to left, 2 eye surg. to rt.    FOOT SURGERY      HERNIA REPAIR      umbilical    LYMPH NODE BIOPSY Left 1971    BASE OF SKULL    NASAL SEPTUM SURGERY

## 2024-06-25 NOTE — DISCHARGE INSTRUCTIONS
You have received a sedative/anesthetic therefore you should not consume any alcoholic beverages for 24 hours.  Do not drive or operate machinery for 24 hours.    Do not take a tub bath for 72 hours after procedure (this includes hot tubs).  You may shower, but avoid hot water to injection site.   Avoid strenuous activity TODAY especially if you experience dizziness.   Remove band-aid the next day.    Wash off any residual iodine 24 hours from today.   Do not use heat, heating pad, or any other heating device over the injection site for 3 days after the procedure.    If you experience pain after your procedure, you may continue with your current pain medication as prescribed.  (DO NOT INCREASE YOUR PAIN MEDICATION WITHOUT TALKING TO DOCTOR)  Soreness and pain at injection site is common, may use ice to reduce soreness.    Please complete pain diary as instructed. Office staff will contact you to review results.    Call [facility] at [phone] if you experience:   Fever, chills or temperature over 100    Vomiting, headache, persistent stiff neck, nausea or blurred vision   Difficulty urinating or unable to urinate within 8 hours   Increase in weakness, numbness or loss of function of limbs  Increased redness, swelling or drainage at the injection site

## 2024-06-26 ENCOUNTER — TELEPHONE (OUTPATIENT)
Dept: PAIN MANAGEMENT | Age: 64
End: 2024-06-26

## 2024-06-26 NOTE — TELEPHONE ENCOUNTER
S/P: Bilateral Lumbar medial branch block       DOS: 06/25/2024    Pain before procedure with activity : 9    Pain after procedure with activity: 7    Activities following procedure include: Went out to lunch, ran errands and did light house chores    % of pain relief: 20%    Procedure successful: No    OV scheduled: 08/05/24

## 2024-06-28 DIAGNOSIS — M47.816 OSTEOARTHRITIS OF LUMBAR SPINE, UNSPECIFIED SPINAL OSTEOARTHRITIS COMPLICATION STATUS: ICD-10-CM

## 2024-06-28 RX ORDER — CYCLOBENZAPRINE HCL 10 MG
10 TABLET ORAL 2 TIMES DAILY PRN
Qty: 60 TABLET | Refills: 0 | Status: SHIPPED | OUTPATIENT
Start: 2024-06-28

## 2024-06-28 NOTE — TELEPHONE ENCOUNTER
Patient states the gabapentin is not working at all, even after the recent dose increase. Patient is asking what options he has.

## 2024-07-10 DIAGNOSIS — M47.816 OSTEOARTHRITIS OF LUMBAR SPINE, UNSPECIFIED SPINAL OSTEOARTHRITIS COMPLICATION STATUS: ICD-10-CM

## 2024-07-10 RX ORDER — CYCLOBENZAPRINE HCL 10 MG
10 TABLET ORAL 2 TIMES DAILY PRN
Qty: 60 TABLET | Refills: 5 | OUTPATIENT
Start: 2024-07-10

## 2024-07-17 DIAGNOSIS — M47.816 OSTEOARTHRITIS OF LUMBAR SPINE, UNSPECIFIED SPINAL OSTEOARTHRITIS COMPLICATION STATUS: ICD-10-CM

## 2024-07-17 RX ORDER — CYCLOBENZAPRINE HCL 10 MG
10 TABLET ORAL 2 TIMES DAILY PRN
Qty: 60 TABLET | Refills: 0 | OUTPATIENT
Start: 2024-07-17

## 2024-08-05 DIAGNOSIS — M50.30 DEGENERATIVE DISC DISEASE, CERVICAL: ICD-10-CM

## 2024-08-05 DIAGNOSIS — M47.816 OSTEOARTHRITIS OF LUMBAR SPINE, UNSPECIFIED SPINAL OSTEOARTHRITIS COMPLICATION STATUS: ICD-10-CM

## 2024-08-05 DIAGNOSIS — M54.16 LUMBAR RADICULOPATHY: ICD-10-CM

## 2024-08-05 RX ORDER — CYCLOBENZAPRINE HCL 10 MG
10 TABLET ORAL 2 TIMES DAILY PRN
Qty: 60 TABLET | Refills: 0 | Status: SHIPPED | OUTPATIENT
Start: 2024-08-05

## 2024-08-05 RX ORDER — GABAPENTIN 100 MG/1
100 CAPSULE ORAL 3 TIMES DAILY
Qty: 90 CAPSULE | Refills: 2 | Status: SHIPPED | OUTPATIENT
Start: 2024-08-05 | End: 2024-11-03

## 2024-08-05 NOTE — TELEPHONE ENCOUNTER
Cyclobenzaprine and Gabapentin Pended [facility].   Patients states the gabapentin is not working and want to know if there is anything else he can use.

## 2024-09-09 DIAGNOSIS — M47.816 OSTEOARTHRITIS OF LUMBAR SPINE, UNSPECIFIED SPINAL OSTEOARTHRITIS COMPLICATION STATUS: ICD-10-CM

## 2024-09-10 RX ORDER — CYCLOBENZAPRINE HCL 10 MG
10 TABLET ORAL 2 TIMES DAILY PRN
Qty: 60 TABLET | Refills: 0 | Status: SHIPPED | OUTPATIENT
Start: 2024-09-10 | End: 2024-09-13 | Stop reason: SDUPTHER

## 2024-09-13 DIAGNOSIS — M47.816 OSTEOARTHRITIS OF LUMBAR SPINE, UNSPECIFIED SPINAL OSTEOARTHRITIS COMPLICATION STATUS: ICD-10-CM

## 2024-09-13 RX ORDER — CYCLOBENZAPRINE HCL 10 MG
10 TABLET ORAL 2 TIMES DAILY PRN
Qty: 60 TABLET | Refills: 0 | Status: SHIPPED | OUTPATIENT
Start: 2024-09-13

## 2024-10-17 DIAGNOSIS — M47.816 OSTEOARTHRITIS OF LUMBAR SPINE, UNSPECIFIED SPINAL OSTEOARTHRITIS COMPLICATION STATUS: ICD-10-CM

## 2024-10-17 RX ORDER — CYCLOBENZAPRINE HCL 10 MG
10 TABLET ORAL 2 TIMES DAILY PRN
Qty: 60 TABLET | Refills: 0 | Status: SHIPPED | OUTPATIENT
Start: 2024-10-17

## 2024-11-01 DIAGNOSIS — M50.30 DEGENERATIVE DISC DISEASE, CERVICAL: ICD-10-CM

## 2024-11-01 DIAGNOSIS — M54.16 LUMBAR RADICULOPATHY: ICD-10-CM

## 2024-11-01 RX ORDER — GABAPENTIN 100 MG/1
100 CAPSULE ORAL 3 TIMES DAILY
Qty: 90 CAPSULE | Refills: 0 | Status: SHIPPED | OUTPATIENT
Start: 2024-11-01 | End: 2024-12-01

## 2024-11-14 RX ORDER — QUETIAPINE FUMARATE 50 MG/1
50 TABLET, FILM COATED ORAL NIGHTLY
Qty: 30 TABLET | Refills: 5 | Status: SHIPPED | OUTPATIENT
Start: 2024-11-14

## 2024-11-22 ENCOUNTER — TELEPHONE (OUTPATIENT)
Dept: PRIMARY CARE CLINIC | Age: 64
End: 2024-11-22

## 2024-11-22 RX ORDER — AZITHROMYCIN 250 MG/1
TABLET, FILM COATED ORAL
Qty: 1 PACKET | Refills: 0 | Status: SHIPPED | OUTPATIENT
Start: 2024-11-22

## 2024-11-22 RX ORDER — BENZONATATE 100 MG/1
100 CAPSULE ORAL 3 TIMES DAILY PRN
Qty: 30 CAPSULE | Refills: 0 | Status: SHIPPED | OUTPATIENT
Start: 2024-11-22 | End: 2024-12-02

## 2024-11-22 NOTE — TELEPHONE ENCOUNTER
Patient is requesting an antibiotic.    Patient states he's had a sore throat, hoarse voice, headaches, fatigue and intermittent fevers for about 3 weeks. Patient states he recently tested himself for COVID and it was negative.    Pharmacy confirmed. Please advise.

## 2024-11-27 DIAGNOSIS — M47.816 OSTEOARTHRITIS OF LUMBAR SPINE, UNSPECIFIED SPINAL OSTEOARTHRITIS COMPLICATION STATUS: ICD-10-CM

## 2024-11-29 RX ORDER — CYCLOBENZAPRINE HCL 10 MG
10 TABLET ORAL 2 TIMES DAILY PRN
Qty: 60 TABLET | Refills: 0 | Status: SHIPPED | OUTPATIENT
Start: 2024-11-29

## 2024-12-03 DIAGNOSIS — M47.816 OSTEOARTHRITIS OF LUMBAR SPINE, UNSPECIFIED SPINAL OSTEOARTHRITIS COMPLICATION STATUS: ICD-10-CM

## 2024-12-03 RX ORDER — CYCLOBENZAPRINE HCL 10 MG
10 TABLET ORAL 2 TIMES DAILY PRN
Qty: 60 TABLET | Refills: 0 | OUTPATIENT
Start: 2024-12-03

## 2024-12-04 DIAGNOSIS — M54.16 LUMBAR RADICULOPATHY: ICD-10-CM

## 2024-12-04 DIAGNOSIS — M50.30 DEGENERATIVE DISC DISEASE, CERVICAL: ICD-10-CM

## 2024-12-04 RX ORDER — GABAPENTIN 100 MG/1
100 CAPSULE ORAL 3 TIMES DAILY
Qty: 90 CAPSULE | Refills: 2 | Status: SHIPPED | OUTPATIENT
Start: 2024-12-04 | End: 2025-03-04

## 2024-12-04 NOTE — TELEPHONE ENCOUNTER
LAST VISIT:   5/28/2024     No future appointments.      [facility] [place], [place] - [address]  [phone] - F [phone]  [address] [place] [address]  Phone: [phone] Fax: [phone]

## 2024-12-06 RX ORDER — PEN NEEDLE, DIABETIC 32GX 5/32"
NEEDLE, DISPOSABLE MISCELLANEOUS
Qty: 70 EACH | Refills: 5 | Status: SHIPPED | OUTPATIENT
Start: 2024-12-06

## 2024-12-06 NOTE — TELEPHONE ENCOUNTER
LAST VISIT:   5/28/2024     No future appointments.    Pharmacy verified? Yes    [facility] - [place], [place] - [address]  [phone] - F [phone]  [address] [facility] [address]  Phone: [phone] Fax: [phone]

## 2024-12-13 RX ORDER — SILDENAFIL 100 MG/1
100 TABLET, FILM COATED ORAL DAILY PRN
Qty: 30 TABLET | Refills: 3 | Status: SHIPPED | OUTPATIENT
Start: 2024-12-13

## 2025-02-20 DIAGNOSIS — M54.16 LUMBAR RADICULOPATHY: ICD-10-CM

## 2025-02-20 DIAGNOSIS — M47.816 OSTEOARTHRITIS OF LUMBAR SPINE, UNSPECIFIED SPINAL OSTEOARTHRITIS COMPLICATION STATUS: ICD-10-CM

## 2025-02-21 RX ORDER — SILDENAFIL 100 MG/1
100 TABLET, FILM COATED ORAL DAILY PRN
Qty: 30 TABLET | Refills: 3 | Status: SHIPPED | OUTPATIENT
Start: 2025-02-21

## 2025-02-21 RX ORDER — CYCLOBENZAPRINE HCL 10 MG
10 TABLET ORAL 2 TIMES DAILY PRN
Qty: 60 TABLET | Refills: 0 | Status: SHIPPED | OUTPATIENT
Start: 2025-02-21

## 2025-03-04 DIAGNOSIS — M47.816 OSTEOARTHRITIS OF LUMBAR SPINE, UNSPECIFIED SPINAL OSTEOARTHRITIS COMPLICATION STATUS: ICD-10-CM

## 2025-03-04 RX ORDER — CYCLOBENZAPRINE HCL 10 MG
10 TABLET ORAL 2 TIMES DAILY PRN
Qty: 60 TABLET | Refills: 0 | Status: SHIPPED | OUTPATIENT
Start: 2025-03-04

## 2025-04-14 DIAGNOSIS — M47.816 OSTEOARTHRITIS OF LUMBAR SPINE, UNSPECIFIED SPINAL OSTEOARTHRITIS COMPLICATION STATUS: ICD-10-CM

## 2025-04-16 RX ORDER — CYCLOBENZAPRINE HCL 10 MG
10 TABLET ORAL 2 TIMES DAILY PRN
Qty: 60 TABLET | Refills: 0 | OUTPATIENT
Start: 2025-04-16

## 2025-04-16 NOTE — TELEPHONE ENCOUNTER
Called patient to sched an apt in order to re-fill his medication & patient stated \" this is bullshit I will call you back to schedule\".

## 2025-08-12 ENCOUNTER — TRANSCRIBE ORDERS (OUTPATIENT)
Dept: ADMINISTRATIVE | Age: 65
End: 2025-08-12

## 2025-08-12 DIAGNOSIS — Z87.891 PERSONAL HISTORY OF TOBACCO USE: Primary | ICD-10-CM

## 2025-08-12 DIAGNOSIS — F17.200 CURRENT SMOKER: ICD-10-CM

## 2025-08-29 ENCOUNTER — HOSPITAL ENCOUNTER (OUTPATIENT)
Dept: CT IMAGING | Age: 65
Discharge: HOME | End: 2025-08-31
Payer: MEDICARE

## 2025-08-29 VITALS — HEIGHT: 71 IN | BODY MASS INDEX: 25.2 KG/M2 | WEIGHT: 180 LBS

## 2025-08-29 DIAGNOSIS — Z87.891 PERSONAL HISTORY OF TOBACCO USE: ICD-10-CM

## 2025-08-29 DIAGNOSIS — F17.200 CURRENT SMOKER: ICD-10-CM

## 2025-08-29 PROCEDURE — 71271 CT THORAX LUNG CANCER SCR C-: CPT

## (undated) DEVICE — PROBE OPHTH 25GA LSR CVD FLEX NIT TAPR TIP

## (undated) DEVICE — GLOVE ORTHO 8   MSG9480

## (undated) DEVICE — SUTURE VCRL + SZ 2-0 L27IN ABSRB UD CT-2 L26MM 1/2 CIR TAPR VCP269H

## (undated) DEVICE — DEFENDO AIR WATER SUCTION AND BIOPSY VALVE KIT FOR  OLYMPUS: Brand: DEFENDO AIR/WATER/SUCTION AND BIOPSY VALVE

## (undated) DEVICE — BIT DRL L100MM DIA2MM QUIK CPL W/O STP REUSE

## (undated) DEVICE — CLIP INT L235CM WRK CHN DIA2.8MM OPN 11MM BRAID CATH ROT BX/10

## (undated) DEVICE — ZIMMER® STERILE DISPOSABLE TOURNIQUET CUFF WITH PLC, DUAL PORT, SINGLE BLADDER, 18 IN. (46 CM)

## (undated) DEVICE — CYCLOGEL 1% GTTS

## (undated) DEVICE — FORCEPS BX L240CM WRK CHN 2.8MM STD CAP W/ NDL MIC MESH

## (undated) DEVICE — GLOVE SURG SZ 8 L12IN FNGR THK79MIL GRN LTX FREE

## (undated) DEVICE — 60 ML SYRINGE LUER-LOCK TIP: Brand: MONOJECT

## (undated) DEVICE — FORCEPS BX L240CM JAW DIA2.8MM L CAP W/ NDL MIC MESH TOOTH

## (undated) DEVICE — RETINA PK

## (undated) DEVICE — BITEBLOCK 54FR W/ DENT RIM BLOX

## (undated) DEVICE — ENDO KIT W/SYRINGE: Brand: MEDLINE INDUSTRIES, INC.

## (undated) DEVICE — POLYP TRAP: Brand: TRAPEASE®

## (undated) DEVICE — SOLUTION SURG PREP POV IOD 7.5% 4 OZ

## (undated) DEVICE — SYRINGE, LUER LOCK, 10ML: Brand: MEDLINE

## (undated) DEVICE — STANDARD HYPODERMIC NEEDLE,ALUMINUM HUB: Brand: MONOJECT

## (undated) DEVICE — CAUTERY BPLR 25GA INTOCU W/ HNDPC CRD DISP FOR CX9404

## (undated) DEVICE — 6 ML SYRINGE LUER-LOCK TIP: Brand: MONOJECT

## (undated) DEVICE — DISCONTINUED USE 435154 INVENTORY EXISTS CSFILTER SYRINGE BL ST SLGS033SS (50/BX)

## (undated) DEVICE — MERCY HEALTH ST CHARLES: Brand: MEDLINE INDUSTRIES, INC.

## (undated) DEVICE — SNARE ENDOSCP L240CM LOOP W13MM DIA2.4MM SHT THROW SM OVL

## (undated) DEVICE — BIT DRL L110MM DIA1.8MM QUIK CPL CALIB W/O STP REUSE

## (undated) DEVICE — SUTURE ETHLN SZ 3-0 L18IN NONABSORBABLE BLK FS-1 L24MM 3/8 663H

## (undated) DEVICE — Device

## (undated) DEVICE — 1 EACH 40411 STERILE DISPOSABLE SUPER VIEW® LENS SET & 1 EACH 40100 STERILE MICROSCOPE DRAPE: Brand: SUPER VIEW® PACK

## (undated) DEVICE — NEEDLE FLTR 18GA L1.5IN MEM THK5UM BLNT DISP

## (undated) DEVICE — OCCLUDER EYE POLYETH HYPOALRG ADH TAPE W/O PINHOLE

## (undated) DEVICE — C-ARM: Brand: UNBRANDED

## (undated) DEVICE — SOLUTION IRRIG 1000ML PENTALYTE PLAS POUR BTL TIS U SOL

## (undated) DEVICE — SYRINGE MED 50ML LUERSLIP TIP

## (undated) DEVICE — MICROSURGICAL INSTRUMENT 25GA SOFT TIP CANNULA, DSP, 0.8MM: Brand: ALCON

## (undated) DEVICE — SNARE ENDOSCP POLYP MED 2.4 MM 240 CM 27 MM 2.8 MM SHT SENS

## (undated) DEVICE — AGENT VISCOELASTIC 1ML 2% HYDROXYPROPYL METHCELL PRELD GLS

## (undated) DEVICE — JELLY,LUBE,STERILE,FLIP TOP,TUBE,2-OZ: Brand: MEDLINE

## (undated) DEVICE — SURGICAL PROCEDURE PACK 25 GA VITRECTOMY

## (undated) DEVICE — GOWN,POLY REINFORCED,LG: Brand: MEDLINE

## (undated) DEVICE — ERBE NESSY® OMEGA PLATE USA (85+23)CM² , WITH CABLE 3 M: Brand: ERBE